# Patient Record
Sex: FEMALE | Race: WHITE | Employment: OTHER | URBAN - METROPOLITAN AREA
[De-identification: names, ages, dates, MRNs, and addresses within clinical notes are randomized per-mention and may not be internally consistent; named-entity substitution may affect disease eponyms.]

---

## 2017-01-24 ENCOUNTER — ANTICOAGULATION THERAPY VISIT (OUTPATIENT)
Dept: NURSING | Facility: CLINIC | Age: 72
End: 2017-01-24
Payer: COMMERCIAL

## 2017-01-24 DIAGNOSIS — I48.91 ATRIAL FIBRILLATION, UNSPECIFIED TYPE (H): ICD-10-CM

## 2017-01-24 DIAGNOSIS — Z79.01 LONG-TERM (CURRENT) USE OF ANTICOAGULANTS: Primary | ICD-10-CM

## 2017-01-24 LAB — INR POINT OF CARE: 3.3 (ref 0.86–1.14)

## 2017-01-24 PROCEDURE — 99207 ZZC NO CHARGE NURSE ONLY: CPT

## 2017-01-24 PROCEDURE — 85610 PROTHROMBIN TIME: CPT | Mod: QW

## 2017-01-24 PROCEDURE — 36416 COLLJ CAPILLARY BLOOD SPEC: CPT

## 2017-01-24 NOTE — PROGRESS NOTES
ANTICOAGULATION FOLLOW-UP CLINIC VISIT    Patient Name:  Zulema Nixon  Date:  1/24/2017  Contact Type:  Face to Face    SUBJECTIVE:     Patient Findings     Positives No Problem Findings           OBJECTIVE    INR PROTIME   Date Value Ref Range Status   01/24/2017 3.3* 0.86 - 1.14 Final       ASSESSMENT / PLAN  No question data found.  Anticoagulation Summary as of 1/24/2017     INR goal 2.0-3.0   Selected INR 3.3! (1/24/2017)   Maintenance plan 5 mg (5 mg x 1) on Wed, Sat; 7.5 mg (5 mg x 1.5) all other days   Full instructions 1/24: 5 mg; Otherwise 5 mg on Wed, Sat; 7.5 mg all other days   Weekly total 47.5 mg   Plan last modified Nereyda Root RN (3/1/2016)   Next INR check 1/31/2017   Target end date     Indications   Long-term (current) use of anticoagulants [Z79.01] [Z79.01]  Atrial fibrillation  unspecified type (H) [I48.91]         Anticoagulation Episode Summary     INR check location     Preferred lab     Send INR reminders to RV NURSE    Comments       Anticoagulation Care Providers     Provider Role Specialty Phone number    Madina Mercado MD [facility] [phone]            See the Encounter Report to view Anticoagulation Flowsheet and Dosing Calendar (Go to Encounters tab in chart review, and find the Anticoagulation Therapy Visit)    Dosage adjustment made based on physician directed care plan.    Kylah Pierce RN

## 2017-01-24 NOTE — MR AVS SNAPSHOT
Zulema Nixon   1/24/2017 10:30 AM   Anticoagulation Therapy Visit    Description:  71 year old female   Provider:   ANTICOAGULATION CLINIC   Department:   Nurse           INR as of 1/24/2017     Selected INR 3.3! (1/24/2017)      Anticoagulation Summary as of 1/24/2017     INR goal 2.0-3.0   Selected INR 3.3! (1/24/2017)   Full instructions 1/24: 5 mg; Otherwise 5 mg on Wed, Sat; 7.5 mg all other days   Next INR check 1/31/2017    Indications   Long-term (current) use of anticoagulants [Z79.01] [Z79.01]  Atrial fibrillation  unspecified type (H) [I48.91]         Your next Anticoagulation Clinic appointment(s)     Jan 31, 2017 10:15 AM   Anticoagulation Visit with  ANTICOAGULATION CLINIC   [facility] ([facility])    [address] [place] [id number]   [phone]              Contact Numbers     Clinic Number:         January 2017 Details    Sun Mon Tue Wed Thu Fri Sat     1               2               3               4               5               6               7                 8               9               10               11               12               13               14                 15               16               17               18               19               20               21                 22               23               24      5 mg   See details      25      5 mg         26      7.5 mg         27      7.5 mg         28      5 mg           29      7.5 mg         30      7.5 mg         31                 Date Details   01/24 This INR check       Date of next INR:  1/31/2017         How to take your warfarin dose     To take:  5 mg Take 1 of the 5 mg tablets.    To take:  7.5 mg Take 1.5 of the 5 mg tablets.

## 2017-01-31 ENCOUNTER — ANTICOAGULATION THERAPY VISIT (OUTPATIENT)
Dept: NURSING | Facility: CLINIC | Age: 72
End: 2017-01-31
Payer: COMMERCIAL

## 2017-01-31 DIAGNOSIS — I48.91 ATRIAL FIBRILLATION, UNSPECIFIED TYPE (H): ICD-10-CM

## 2017-01-31 DIAGNOSIS — Z79.01 LONG-TERM (CURRENT) USE OF ANTICOAGULANTS: Primary | ICD-10-CM

## 2017-01-31 LAB — INR POINT OF CARE: 2.6 (ref 0.86–1.14)

## 2017-01-31 PROCEDURE — 99207 ZZC NO CHARGE NURSE ONLY: CPT

## 2017-01-31 PROCEDURE — 36416 COLLJ CAPILLARY BLOOD SPEC: CPT

## 2017-01-31 PROCEDURE — 85610 PROTHROMBIN TIME: CPT | Mod: QW

## 2017-01-31 NOTE — MR AVS SNAPSHOT
Zulema Nixon   1/31/2017 10:15 AM   Anticoagulation Therapy Visit    Description:  71 year old female   Provider:   ANTICOAGULATION CLINIC   Department:   Nurse           INR as of 1/31/2017     Selected INR 2.6 (1/31/2017)      Anticoagulation Summary as of 1/31/2017     INR goal 2.0-3.0   Selected INR 2.6 (1/31/2017)   Full instructions 5 mg on Wed, Sat; 7.5 mg all other days   Next INR check 3/14/2017    Indications   Long-term (current) use of anticoagulants [Z79.01] [Z79.01]  Atrial fibrillation  unspecified type (H) [I48.91]         Your next Anticoagulation Clinic appointment(s)     Mar 14, 2017 10:30 AM   Anticoagulation Visit with  ANTICOAGULATION CLINIC   [facility] ([facility])    [address] [place] [id number] [phone]              Contact Numbers     Clinic Number:         January 2017 Details    Sun Mon Tue Wed Thu Fri Sat     1               2               3               4               5               6               7                 8               9               10               11               12               13               14                 15               16               17               18               19               20               21                 22               23               24               25               26               27               28                 29               30               31      7.5 mg   See details           Date Details   01/31 This INR check               How to take your warfarin dose     To take:  7.5 mg Take 1.5 of the 5 mg tablets.           February 2017 Details    Sun Mon Tue Wed Thu Fri Sat        1      5 mg         2      7.5 mg         3      7.5 mg         4      5 mg           5      7.5 mg         6      7.5 mg         7      7.5 mg         8      5 mg         9      7.5 mg         10      7.5 mg         11      5 mg           12      7.5 mg         13       7.5 mg         14      7.5 mg         15      5 mg         16      7.5 mg         17      7.5 mg         18      5 mg           19      7.5 mg         20      7.5 mg         21      7.5 mg         22      5 mg         23      7.5 mg         24      7.5 mg         25      5 mg           26      7.5 mg         27      7.5 mg         28      7.5 mg              Date Details   No additional details            How to take your warfarin dose     To take:  5 mg Take 1 of the 5 mg tablets.    To take:  7.5 mg Take 1.5 of the 5 mg tablets.           March 2017 Details    Sun Mon Tue Wed Thu Fri Sat        1      5 mg         2      7.5 mg         3      7.5 mg         4      5 mg           5      7.5 mg         6      7.5 mg         7      7.5 mg         8      5 mg         9      7.5 mg         10      7.5 mg         11      5 mg           12      7.5 mg         13      7.5 mg         14            15               16               17               18                 19               20               21               22               23               24               25                 26               27               28               29               30               31                 Date Details   No additional details    Date of next INR:  3/14/2017         How to take your warfarin dose     To take:  5 mg Take 1 of the 5 mg tablets.    To take:  7.5 mg Take 1.5 of the 5 mg tablets.

## 2017-01-31 NOTE — PROGRESS NOTES
ANTICOAGULATION FOLLOW-UP CLINIC VISIT    Patient Name:  Zulema Nixon  Date:  1/31/2017  Contact Type:  Face to Face    SUBJECTIVE:     Patient Findings     Positives No Problem Findings           OBJECTIVE    INR PROTIME   Date Value Ref Range Status   01/31/2017 2.6* 0.86 - 1.14 Final       ASSESSMENT / PLAN  No question data found.  Anticoagulation Summary as of 1/31/2017     INR goal 2.0-3.0   Selected INR 2.6 (1/31/2017)   Maintenance plan 5 mg (5 mg x 1) on Wed, Sat; 7.5 mg (5 mg x 1.5) all other days   Full instructions 5 mg on Wed, Sat; 7.5 mg all other days   Weekly total 47.5 mg   No change documented Kylah Pierce RN   Plan last modified Nereyda Root RN (3/1/2016)   Next INR check 3/14/2017   Target end date     Indications   Long-term (current) use of anticoagulants [Z79.01] [Z79.01]  Atrial fibrillation  unspecified type (H) [I48.91]         Anticoagulation Episode Summary     INR check location     Preferred lab     Send INR reminders to RV NURSE    Comments       Anticoagulation Care Providers     Provider Role Specialty Phone number    Madina Mercado MD [facility] [phone]            See the Encounter Report to view Anticoagulation Flowsheet and Dosing Calendar (Go to Encounters tab in chart review, and find the Anticoagulation Therapy Visit)    Dosage adjustment made based on physician directed care plan.    Kylah Pierce, RN

## 2017-02-22 ENCOUNTER — OFFICE VISIT (OUTPATIENT)
Dept: CARDIOLOGY | Facility: CLINIC | Age: 72
End: 2017-02-22
Payer: COMMERCIAL

## 2017-02-22 VITALS
SYSTOLIC BLOOD PRESSURE: 104 MMHG | BODY MASS INDEX: 42.32 KG/M2 | HEIGHT: 65 IN | DIASTOLIC BLOOD PRESSURE: 60 MMHG | HEART RATE: 57 BPM | WEIGHT: 254 LBS

## 2017-02-22 DIAGNOSIS — I48.20 CHRONIC ATRIAL FIBRILLATION (H): Primary | ICD-10-CM

## 2017-02-22 DIAGNOSIS — I83.93 ASYMPTOMATIC VARICOSE VEINS, BILATERAL: ICD-10-CM

## 2017-02-22 DIAGNOSIS — I10 ESSENTIAL HYPERTENSION WITH GOAL BLOOD PRESSURE LESS THAN 140/90: ICD-10-CM

## 2017-02-22 PROCEDURE — 99214 OFFICE O/P EST MOD 30 MIN: CPT | Performed by: INTERNAL MEDICINE

## 2017-02-22 RX ORDER — AMLODIPINE BESYLATE 10 MG/1
5 TABLET ORAL DAILY
Qty: 90 TABLET | Refills: 1 | COMMUNITY
Start: 2017-02-22 | End: 2017-06-24 | Stop reason: DRUGHIGH

## 2017-02-22 NOTE — PATIENT INSTRUCTIONS
Please try relaxing the hip angle when sewing. Or try getting up and moving more often.  Let's try decreasing the AMLODIPINE dose from 10mg down to 5mg and see how your blood pressure and swelling do.     Please check your blood pressures over the next couple of weeks and let me know if they are over 140 consistently after you decrease the AMLODIPINE.    Let's see you back in another year or so. If you notice any shortness of breath, worse swelling, chest discomfort or anything you think might be heart related, please call and we can squeeze you in earlier.

## 2017-02-22 NOTE — MR AVS SNAPSHOT
After Visit Summary   2/22/2017    Zulema Nixon    MRN: [id number]           Patient Information     Date Of Birth          1945        Visit Information        Provider Department      2/22/2017 11:00 AM Edis Walsh MD [facility]        Today's Diagnoses     Chronic atrial fibrillation (H)    -  1    Asymptomatic varicose veins, bilateral        Essential hypertension with goal blood pressure less than 140/90          Care Instructions    Please try relaxing the hip angle when sewing. Or try getting up and moving more often.  Let's try decreasing the AMLODIPINE dose from 10mg down to 5mg and see how your blood pressure and swelling do.     Please check your blood pressures over the next couple of weeks and let me know if they are over 140 consistently after you decrease the AMLODIPINE.    Let's see you back in another year or so. If you notice any shortness of breath, worse swelling, chest discomfort or anything you think might be heart related, please call and we can squeeze you in earlier.        Follow-ups after your visit        Additional Services     Follow-Up with Cardiologist                 Your next 10 appointments already scheduled     Mar 14, 2017 10:30 AM CDT   Anticoagulation Visit with [facility] ANTICOAGULATION CLINIC   [facility]    [address] [place] [id number] [phone]              Future tests that were ordered for you today     Open Future Orders        Priority Expected Expires Ordered    Follow-Up with Cardiologist Routine 2/22/2018 7/7/2018 2/22/2017            Who to contact     If you have questions or need follow up information about today's clinic visit or your schedule please contact [facility] directly at [phone].  Normal or non-critical lab and imaging results will be communicated to you by Ryann  "letter or phone within 4 business days after the clinic has received the results. If you do not hear from us within 7 days, please contact the clinic through [employer] or phone. If you have a critical or abnormal lab result, we will notify you by phone as soon as possible.  Submit refill requests through [employer] or call your pharmacy and they will forward the refill request to us. Please allow 3 business days for your refill to be completed.          Additional Information About Your Visit        [employer] Information     [employer] gives you secure access to your electronic health record. If you see a primary care provider, you can also send messages to your care team and make appointments. If you have questions, please call your primary care clinic.  If you do not have a primary care provider, please call [phone] and they will assist you.        Care EveryWhere ID     This is your Care EveryWhere ID. This could be used by other organizations to access your [place] medical records  [id number]        Your Vitals Were     Pulse Height BMI (Body Mass Index)             57 1.638 m (5' 4.5\") 42.93 kg/m2          Blood Pressure from Last 3 Encounters:   02/22/17 104/60   12/12/16 130/82   09/12/16 136/80    Weight from Last 3 Encounters:   02/22/17 115.2 kg (254 lb)   12/12/16 116.1 kg (256 lb)   09/12/16 115.2 kg (254 lb)                 Today's Medication Changes          These changes are accurate as of: 2/22/17 11:18 AM.  If you have any questions, ask your nurse or doctor.               These medicines have changed or have updated prescriptions.        Dose/Directions    amLODIPine 10 MG tablet   Commonly known as:  NORVASC   This may have changed:  how much to take   Used for:  Essential hypertension with goal blood pressure less than 140/90   Changed by:  Edis Walsh MD        Dose:  5 mg   Take 0.5 tablets (5 mg) by mouth daily   Quantity:  90 tablet   Refills:  1                Primary Care Provider " Office Phone # Fax #    Madina BHARATH Mercado [phone]       [address] [address]        Thank you!     Thank you for choosing [facility] [place]  for your care. Our goal is always to provide you with excellent care. Hearing back from our patients is one way we can continue to improve our services. Please take a few minutes to complete the written survey that you may receive in the mail after your visit with us. Thank you!             Your Updated Medication List - Protect others around you: Learn how to safely use, store and throw away your medicines at www.disposemymeds.org.          This list is accurate as of: 2/22/17 11:18 AM.  Always use your most recent med list.                   Brand Name Dispense Instructions for use    amLODIPine 10 MG tablet    NORVASC    90 tablet    Take 0.5 tablets (5 mg) by mouth daily       aspirin 81 MG tablet     0    1 tab po QD (Once per day)       atenolol-chlorthalidone 100-25 MG per tablet    TENORETIC    90 tablet    Take 1 tablet by mouth daily       Biotin 10 MG Tabs tablet      1 TABLET DAILY       blood glucose monitoring test strip    no brand specified     Compact Plus test strips Use to test blood sugars 1 times daily or as directed       CALTRATE 600 + D 600-200 MG-IU Tabs     60    1 tablet twice daily       CENTRUM SILVER per tablet     3 MONTHS    ONE DAILY       ketoconazole 2 % cream    NIZORAL    30 g    Apply topically 2 times daily Apply to affected nails daily       lisinopril 40 MG tablet    PRINIVIL/ZESTRIL    90 tablet    Take 1 tablet (40 mg) by mouth daily       metFORMIN 1000 MG tablet    GLUCOPHAGE    225 tablet    TAKE 1 AND A 1/2 TABLETS BY MOUTH WITH BREAKFAST AND 1 TABLET WITH SUPPER       omega 3 1000 MG Caps     90 capsule    Take 1 g by mouth daily       simvastatin 20 MG tablet    ZOCOR    90 tablet    TAKE ONE TABLET BY MOUTH EVERY NIGHT AT  BEDTIME       vitamin D 1000 UNITS capsule     3 MONTHS    1 CAPSULE DAILY       warfarin 5 MG tablet    COUMADIN    180 tablet    TAKE 1-2 TABLETS (5-10 MG) BY MOUTH DAILY AS INSTRUCTED

## 2017-02-22 NOTE — LETTER
"2/22/2017    Madina Mercado MD  [facility]  [address] [place] [address]    RE: Zulema Nixon       Dear Colleague,    I had the pleasure of seeing Zulema Nixon in the [facility].    Vascular Cardiology Progress Note          Assessment and Plan:     1. 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    Continue compression stockings.      2. Chronic atrial fibrillation, good rate control and anticoagulation    Continue current medications.    Follow-up in one year either in [place] or [place] depending on her preference.    This note was transcribed using electronic voice recognition software and there may be typographical errors present.                Interval History:   The patient is a very pleasant 71 year old whom I'm meeting for the first time.  She previously saw Dr. Chatman and had multiple vein ablations performed.  She states that these did help her symptoms of lower extremity edema.  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  She has been on amlodipine 10 mg daily throughout.  Her chronic atrial fibrillation is asymptomatic.  She has good rate control and is anticoagulated for stroke prophylaxis.                       Review of Systems:   Review of Systems:  Skin:  Negative     Eyes:  Positive for glasses  ENT:  Negative    Respiratory:  Negative    Cardiovascular:  Negative;palpitations edema  Gastroenterology: Negative    Genitourinary:  Negative    Musculoskeletal:  Negative    Neurologic:  Negative    Psychiatric:  Negative    Heme/Lymph/Imm:  Negative    Endocrine:  Positive for diabetes              Physical Exam:     Vitals: /60  Pulse 57  Ht 1.638 m (5' 4.5\")  Wt 115.2 kg (254 lb)  BMI 42.93 kg/m2  Constitutional:  cooperative, alert and oriented, well " developed, well nourished, in no acute distress        Skin:  warm and dry to the touch, no apparent skin lesions or masses noted        Head:  normocephalic, no masses or lesions        Eyes:  pupils equal and round, conjunctivae and lids unremarkable, sclera white, no xanthalasma, EOMS intact, no nystagmus        ENT:  no pallor or cyanosis, dentition good        Neck:  JVP normal        Chest:  normal breath sounds, clear to auscultation, normal A-P diameter, normal symmetry, normal respiratory excursion, no use of accessory muscles        Cardiac:   irregularly irregular rhythm           rate controlled    Abdomen:      benign    Extremities and Back:      trace lower extremity edema    Neurological:  affect appropriate, oriented to time, person and place                 Medications:     Current Outpatient Prescriptions   Medication Sig Dispense Refill     amLODIPine (NORVASC) 10 MG tablet Take 0.5 tablets (5 mg) by mouth daily 90 tablet 1     lisinopril (PRINIVIL/ZESTRIL) 40 MG tablet Take 1 tablet (40 mg) by mouth daily 90 tablet 1     atenolol-chlorthalidone (TENORETIC) 100-25 MG per tablet Take 1 tablet by mouth daily 90 tablet 1     simvastatin (ZOCOR) 20 MG tablet TAKE ONE TABLET BY MOUTH EVERY NIGHT AT BEDTIME 90 tablet 1     metFORMIN (GLUCOPHAGE) 1000 MG tablet TAKE 1 AND A 1/2 TABLETS BY MOUTH WITH BREAKFAST AND 1 TABLET WITH SUPPER 225 tablet 1     warfarin (COUMADIN) 5 MG tablet TAKE 1-2 TABLETS (5-10 MG) BY MOUTH DAILY AS INSTRUCTED 180 tablet 1     ketoconazole (NIZORAL) 2 % cream Apply topically 2 times daily Apply to affected nails daily 30 g 1     blood glucose monitoring (NO BRAND SPECIFIED) test strip Compact Plus test strips Use to test blood sugars 1 times daily or as directed       omega 3 1000 MG CAPS Take 1 g by mouth daily 90 capsule      BIOTIN 10 MG OR TABS 1 TABLET DAILY       VITAMIN D 1000 UNIT OR CAPS 1 CAPSULE DAILY 3 MONTHS 1 YEAR     CENTRUM SILVER OR TABS ONE DAILY 3 MONTHS 1  YEAR     CALTRATE 600 + D 600-200 MG-IU OR TABS 1 tablet twice daily 60 11     ASPIRIN 81 MG OR TABS 1 tab po QD (Once per day) 0 0     [DISCONTINUED] amLODIPine (NORVASC) 10 MG tablet Take 1 tablet (10 mg) by mouth daily 90 tablet 1                Data:   All laboratory data reviewed  No results found for this or any previous visit (from the past 24 hour(s)).    All laboratory data reviewed  Lab Results   Component Value Date    CHOL 109 05/24/2016     Lab Results   Component Value Date    HDL 35 05/24/2016     Lab Results   Component Value Date    LDL 55 05/24/2016     Lab Results   Component Value Date    TRIG 97 05/24/2016     Lab Results   Component Value Date    CHOLHDLRATIO 3.4 04/22/2015     TSH   Date Value Ref Range Status   12/12/2016 0.76 0.40 - 4.00 mU/L Final     Last Basic Metabolic Panel:  Lab Results   Component Value Date     12/12/2016      Lab Results   Component Value Date    POTASSIUM 3.8 12/12/2016     Lab Results   Component Value Date    CHLORIDE 101 12/12/2016     Lab Results   Component Value Date    ALVA 9.3 12/12/2016     Lab Results   Component Value Date    CO2 29 12/12/2016     Lab Results   Component Value Date    BUN 15 12/12/2016     Lab Results   Component Value Date    CR 0.71 12/12/2016     Lab Results   Component Value Date     12/12/2016     Lab Results   Component Value Date    WBC 7.3 12/12/2016     Lab Results   Component Value Date    RBC 4.60 12/12/2016     Lab Results   Component Value Date    HGB 13.1 12/12/2016     Lab Results   Component Value Date    HCT 39.2 12/12/2016     Lab Results   Component Value Date    MCV 85 12/12/2016     Lab Results   Component Value Date    MCH 28.5 12/12/2016     Lab Results   Component Value Date    MCHC 33.4 12/12/2016     Lab Results   Component Value Date    RDW 13.6 12/12/2016     Lab Results   Component Value Date     12/12/2016     Thank you for allowing me to participate in the care of your  patient.    Sincerely,     Edis Walsh MD     [facility]

## 2017-02-22 NOTE — PROGRESS NOTES
"Vascular Cardiology Progress Note          Assessment and Plan:     1. 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    Continue compression stockings.      2. Chronic atrial fibrillation, good rate control and anticoagulation    Continue current medications.    Follow-up in one year either in [place] or [place] depending on her preference.    This note was transcribed using electronic voice recognition software and there may be typographical errors present.                Interval History:   The patient is a very pleasant 71 year old whom I'm meeting for the first time.  She previously saw Dr. Chatman and had multiple vein ablations performed.  She states that these did help her symptoms of lower extremity edema.  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  She has been on amlodipine 10 mg daily throughout.  Her chronic atrial fibrillation is asymptomatic.  She has good rate control and is anticoagulated for stroke prophylaxis.                       Review of Systems:   Review of Systems:  Skin:  Negative     Eyes:  Positive for glasses  ENT:  Negative    Respiratory:  Negative    Cardiovascular:  Negative;palpitations edema  Gastroenterology: Negative    Genitourinary:  Negative    Musculoskeletal:  Negative    Neurologic:  Negative    Psychiatric:  Negative    Heme/Lymph/Imm:  Negative    Endocrine:  Positive for diabetes              Physical Exam:     Vitals: /60  Pulse 57  Ht 1.638 m (5' 4.5\")  Wt 115.2 kg (254 lb)  BMI 42.93 kg/m2  Constitutional:  cooperative, alert and oriented, well developed, well nourished, in no acute distress        Skin:  warm and dry to the touch, no apparent skin lesions or masses noted        Head:  normocephalic, no masses or lesions        Eyes:  pupils equal and round, conjunctivae and lids " unremarkable, sclera white, no xanthalasma, EOMS intact, no nystagmus        ENT:  no pallor or cyanosis, dentition good        Neck:  JVP normal        Chest:  normal breath sounds, clear to auscultation, normal A-P diameter, normal symmetry, normal respiratory excursion, no use of accessory muscles        Cardiac:   irregularly irregular rhythm           rate controlled    Abdomen:      benign    Extremities and Back:      trace lower extremity edema    Neurological:  affect appropriate, oriented to time, person and place                 Medications:     Current Outpatient Prescriptions   Medication Sig Dispense Refill     amLODIPine (NORVASC) 10 MG tablet Take 0.5 tablets (5 mg) by mouth daily 90 tablet 1     lisinopril (PRINIVIL/ZESTRIL) 40 MG tablet Take 1 tablet (40 mg) by mouth daily 90 tablet 1     atenolol-chlorthalidone (TENORETIC) 100-25 MG per tablet Take 1 tablet by mouth daily 90 tablet 1     simvastatin (ZOCOR) 20 MG tablet TAKE ONE TABLET BY MOUTH EVERY NIGHT AT BEDTIME 90 tablet 1     metFORMIN (GLUCOPHAGE) 1000 MG tablet TAKE 1 AND A 1/2 TABLETS BY MOUTH WITH BREAKFAST AND 1 TABLET WITH SUPPER 225 tablet 1     warfarin (COUMADIN) 5 MG tablet TAKE 1-2 TABLETS (5-10 MG) BY MOUTH DAILY AS INSTRUCTED 180 tablet 1     ketoconazole (NIZORAL) 2 % cream Apply topically 2 times daily Apply to affected nails daily 30 g 1     blood glucose monitoring (NO BRAND SPECIFIED) test strip Compact Plus test strips Use to test blood sugars 1 times daily or as directed       omega 3 1000 MG CAPS Take 1 g by mouth daily 90 capsule      BIOTIN 10 MG OR TABS 1 TABLET DAILY       VITAMIN D 1000 UNIT OR CAPS 1 CAPSULE DAILY 3 MONTHS 1 YEAR     CENTRUM SILVER OR TABS ONE DAILY 3 MONTHS 1 YEAR     CALTRATE 600 + D 600-200 MG-IU OR TABS 1 tablet twice daily 60 11     ASPIRIN 81 MG OR TABS 1 tab po QD (Once per day) 0 0     [DISCONTINUED] amLODIPine (NORVASC) 10 MG tablet Take 1 tablet (10 mg) by mouth daily 90 tablet 1                 Data:   All laboratory data reviewed  No results found for this or any previous visit (from the past 24 hour(s)).    All laboratory data reviewed  Lab Results   Component Value Date    CHOL 109 05/24/2016     Lab Results   Component Value Date    HDL 35 05/24/2016     Lab Results   Component Value Date    LDL 55 05/24/2016     Lab Results   Component Value Date    TRIG 97 05/24/2016     Lab Results   Component Value Date    CHOLHDLRATIO 3.4 04/22/2015     TSH   Date Value Ref Range Status   12/12/2016 0.76 0.40 - 4.00 mU/L Final     Last Basic Metabolic Panel:  Lab Results   Component Value Date     12/12/2016      Lab Results   Component Value Date    POTASSIUM 3.8 12/12/2016     Lab Results   Component Value Date    CHLORIDE 101 12/12/2016     Lab Results   Component Value Date    ALVA 9.3 12/12/2016     Lab Results   Component Value Date    CO2 29 12/12/2016     Lab Results   Component Value Date    BUN 15 12/12/2016     Lab Results   Component Value Date    CR 0.71 12/12/2016     Lab Results   Component Value Date     12/12/2016     Lab Results   Component Value Date    WBC 7.3 12/12/2016     Lab Results   Component Value Date    RBC 4.60 12/12/2016     Lab Results   Component Value Date    HGB 13.1 12/12/2016     Lab Results   Component Value Date    HCT 39.2 12/12/2016     Lab Results   Component Value Date    MCV 85 12/12/2016     Lab Results   Component Value Date    MCH 28.5 12/12/2016     Lab Results   Component Value Date    MCHC 33.4 12/12/2016     Lab Results   Component Value Date    RDW 13.6 12/12/2016     Lab Results   Component Value Date     12/12/2016

## 2017-02-23 ENCOUNTER — TRANSFERRED RECORDS (OUTPATIENT)
Dept: HEALTH INFORMATION MANAGEMENT | Facility: CLINIC | Age: 72
End: 2017-02-23

## 2017-03-14 ENCOUNTER — ANTICOAGULATION THERAPY VISIT (OUTPATIENT)
Dept: NURSING | Facility: CLINIC | Age: 72
End: 2017-03-14
Payer: COMMERCIAL

## 2017-03-14 DIAGNOSIS — Z79.01 LONG-TERM (CURRENT) USE OF ANTICOAGULANTS: ICD-10-CM

## 2017-03-14 DIAGNOSIS — I48.91 ATRIAL FIBRILLATION, UNSPECIFIED TYPE (H): ICD-10-CM

## 2017-03-14 LAB — INR POINT OF CARE: 3.5 (ref 0.86–1.14)

## 2017-03-14 PROCEDURE — 36416 COLLJ CAPILLARY BLOOD SPEC: CPT

## 2017-03-14 PROCEDURE — 85610 PROTHROMBIN TIME: CPT | Mod: QW

## 2017-03-14 NOTE — MR AVS SNAPSHOT
Zulema Nixon   3/14/2017 10:30 AM   Anticoagulation Therapy Visit    Description:  71 year old female   Provider:   ANTICOAGULATION CLINIC   Department:   Nurse           INR as of 3/14/2017     Today's INR 3.5!      Anticoagulation Summary as of 3/14/2017     INR goal 2.0-3.0   Today's INR 3.5!   Full instructions 3/14: 5 mg; Otherwise 5 mg on Wed, Sat; 7.5 mg all other days   Next INR check 3/28/2017    Indications   Long-term (current) use of anticoagulants [Z79.01] [Z79.01]  Atrial fibrillation  unspecified type (H) [I48.91]         Your next Anticoagulation Clinic appointment(s)     Mar 28, 2017 10:30 AM CDT   Anticoagulation Visit with  ANTICOAGULATION CLINIC   [facility]    [address] [place] [id number]   [phone]              Contact Numbers     Clinic Number:         March 2017 Details    Sun Mon Tue Wed Thu Fri Sat        1               2               3               4                 5               6               7               8               9               10               11                 12               13               14      5 mg   See details      15      5 mg         16      7.5 mg         17      7.5 mg         18      5 mg           19      7.5 mg         20      7.5 mg         21      7.5 mg         22      5 mg         23      7.5 mg         24      7.5 mg         25      5 mg           26      7.5 mg         27      7.5 mg         28            29               30               31                 Date Details   03/14 This INR check       Date of next INR:  3/28/2017         How to take your warfarin dose     To take:  5 mg Take 1 of the 5 mg tablets.    To take:  7.5 mg Take 1.5 of the 5 mg tablets.

## 2017-03-14 NOTE — PROGRESS NOTES
ANTICOAGULATION FOLLOW-UP CLINIC VISIT    Patient Name:  Zulema Nixon  Date:  3/14/2017  Contact Type:  Face to Face    SUBJECTIVE:     Patient Findings     Positives No Problem Findings           OBJECTIVE    INR Protime   Date Value Ref Range Status   03/14/2017 3.5 (A) 0.86 - 1.14 Final       ASSESSMENT / PLAN  No question data found.  Anticoagulation Summary as of 3/14/2017     INR goal 2.0-3.0   Today's INR 3.5!   Maintenance plan 5 mg (5 mg x 1) on Wed, Sat; 7.5 mg (5 mg x 1.5) all other days   Full instructions 3/14: 5 mg; Otherwise 5 mg on Wed, Sat; 7.5 mg all other days   Weekly total 47.5 mg   Plan last modified Nereyda Root RN (3/1/2016)   Next INR check 3/28/2017   Target end date     Indications   Long-term (current) use of anticoagulants [Z79.01] [Z79.01]  Atrial fibrillation  unspecified type (H) [I48.91]         Anticoagulation Episode Summary     INR check location     Preferred lab     Send INR reminders to RV NURSE    Comments       Anticoagulation Care Providers     Provider Role Specialty Phone number    Madina Mercado MD [facility] [phone]            See the Encounter Report to view Anticoagulation Flowsheet and Dosing Calendar (Go to Encounters tab in chart review, and find the Anticoagulation Therapy Visit)    Dosage adjustment made based on physician directed care plan.    Kylah Pierce RN

## 2017-03-28 ENCOUNTER — ANTICOAGULATION THERAPY VISIT (OUTPATIENT)
Dept: NURSING | Facility: CLINIC | Age: 72
End: 2017-03-28
Payer: COMMERCIAL

## 2017-03-28 DIAGNOSIS — I48.91 ATRIAL FIBRILLATION, UNSPECIFIED TYPE (H): ICD-10-CM

## 2017-03-28 DIAGNOSIS — Z79.01 LONG-TERM (CURRENT) USE OF ANTICOAGULANTS: ICD-10-CM

## 2017-03-28 LAB — INR POINT OF CARE: 2.9 (ref 0.86–1.14)

## 2017-03-28 PROCEDURE — 99207 ZZC NO CHARGE NURSE ONLY: CPT

## 2017-03-28 PROCEDURE — 85610 PROTHROMBIN TIME: CPT | Mod: QW

## 2017-03-28 PROCEDURE — 36416 COLLJ CAPILLARY BLOOD SPEC: CPT

## 2017-03-28 NOTE — MR AVS SNAPSHOT
Zulema Nixon   3/28/2017 10:30 AM   Anticoagulation Therapy Visit    Description:  71 year old female   Provider:   ANTICOAGULATION CLINIC   Department:  Rv Nurse           INR as of 3/28/2017     Today's INR 2.9      Anticoagulation Summary as of 3/28/2017     INR goal 2.0-3.0   Today's INR 2.9   Full instructions 5 mg on Wed, Sat; 7.5 mg all other days   Next INR check 4/11/2017    Indications   Long-term (current) use of anticoagulants [Z79.01] [Z79.01]  Atrial fibrillation  unspecified type (H) [I48.91]         Your next Anticoagulation Clinic appointment(s)     Apr 11, 2017 10:30 AM CDT   Anticoagulation Visit with  ANTICOAGULATION CLINIC   [facility]    [address] [place] [id number] [phone]              Contact Numbers     Clinic Number:         March 2017 Details    Sun Mon Tue Wed Thu Fri Sat        1               2               3               4                 5               6               7               8               9               10               11                 12               13               14               15               16               17               18                 19               20               21               22               23               24               25                 26               27               28      7.5 mg   See details      29      5 mg         30      7.5 mg         31      7.5 mg           Date Details   03/28 This INR check               How to take your warfarin dose     To take:  5 mg Take 1 of the 5 mg tablets.    To take:  7.5 mg Take 1.5 of the 5 mg tablets.           April 2017 Details    Sun Mon Tue Wed Thu Fri Sat           1      5 mg           2      7.5 mg         3      7.5 mg         4      7.5 mg         5      5 mg         6      7.5 mg         7      7.5 mg         8      5 mg           9      7.5 mg         10      7.5 mg         11             12               13               14               15                 16               17               18               19               20               21               22                 23               24               25               26               27               28               29                 30                      Date Details   No additional details    Date of next INR:  4/11/2017         How to take your warfarin dose     To take:  5 mg Take 1 of the 5 mg tablets.    To take:  7.5 mg Take 1.5 of the 5 mg tablets.

## 2017-03-28 NOTE — PROGRESS NOTES
ANTICOAGULATION FOLLOW-UP CLINIC VISIT    Patient Name:  Zulema Nixon  Date:  3/28/2017  Contact Type:  Face to Face    SUBJECTIVE:     Patient Findings     Positives No Problem Findings           OBJECTIVE    INR Protime   Date Value Ref Range Status   03/28/2017 2.9 (A) 0.86 - 1.14 Final       ASSESSMENT / PLAN  No question data found.  Anticoagulation Summary as of 3/28/2017     INR goal 2.0-3.0   Today's INR 2.9   Maintenance plan 5 mg (5 mg x 1) on Wed, Sat; 7.5 mg (5 mg x 1.5) all other days   Full instructions 5 mg on Wed, Sat; 7.5 mg all other days   Weekly total 47.5 mg   No change documented Kylah Pierce RN   Plan last modified Nereyda Root, RN (3/1/2016)   Next INR check 4/11/2017   Target end date     Indications   Long-term (current) use of anticoagulants [Z79.01] [Z79.01]  Atrial fibrillation  unspecified type (H) [I48.91]         Anticoagulation Episode Summary     INR check location     Preferred lab     Send INR reminders to RV NURSE    Comments       Anticoagulation Care Providers     Provider Role Specialty Phone number    Madina Mercado MD [facility] [phone]            See the Encounter Report to view Anticoagulation Flowsheet and Dosing Calendar (Go to Encounters tab in chart review, and find the Anticoagulation Therapy Visit)    Dosage adjustment made based on physician directed care plan.    Kylah Pierce, RN

## 2017-04-11 ENCOUNTER — ANTICOAGULATION THERAPY VISIT (OUTPATIENT)
Dept: NURSING | Facility: CLINIC | Age: 72
End: 2017-04-11
Payer: COMMERCIAL

## 2017-04-11 DIAGNOSIS — Z79.01 LONG-TERM (CURRENT) USE OF ANTICOAGULANTS: ICD-10-CM

## 2017-04-11 DIAGNOSIS — I48.91 ATRIAL FIBRILLATION, UNSPECIFIED TYPE (H): ICD-10-CM

## 2017-04-11 LAB — INR POINT OF CARE: 2.6 (ref 0.86–1.14)

## 2017-04-11 PROCEDURE — 85610 PROTHROMBIN TIME: CPT | Mod: QW

## 2017-04-11 PROCEDURE — 36416 COLLJ CAPILLARY BLOOD SPEC: CPT

## 2017-04-11 NOTE — MR AVS SNAPSHOT
Zulema Nixon   4/11/2017 10:30 AM   Anticoagulation Therapy Visit    Description:  71 year old female   Provider:   ANTICOAGULATION CLINIC   Department:  Rv Nurse           INR as of 4/11/2017     Today's INR 2.6      Anticoagulation Summary as of 4/11/2017     INR goal 2.0-3.0   Today's INR 2.6   Full instructions 5 mg on Wed, Sat; 7.5 mg all other days   Next INR check 5/23/2017    Indications   Long-term (current) use of anticoagulants [Z79.01] [Z79.01]  Atrial fibrillation  unspecified type (H) [I48.91]         Your next Anticoagulation Clinic appointment(s)     May 23, 2017 10:30 AM CDT   Anticoagulation Visit with  ANTICOAGULATION CLINIC   [facility]    [address] [place] [id number] [phone]              Contact Numbers     Clinic Number:         April 2017 Details    Sun Mon Tue Wed Thu Fri Sat           1                 2               3               4               5               6               7               8                 9               10               11      7.5 mg   See details      12      5 mg         13      7.5 mg         14      7.5 mg         15      5 mg           16      7.5 mg         17      7.5 mg         18      7.5 mg         19      5 mg         20      7.5 mg         21      7.5 mg         22      5 mg           23      7.5 mg         24      7.5 mg         25      7.5 mg         26      5 mg         27      7.5 mg         28      7.5 mg         29      5 mg           30      7.5 mg                Date Details   04/11 This INR check               How to take your warfarin dose     To take:  5 mg Take 1 of the 5 mg tablets.    To take:  7.5 mg Take 1.5 of the 5 mg tablets.           May 2017 Details    Sun Mon Tue Wed Thu Fri Sat      1      7.5 mg         2      7.5 mg         3      5 mg         4      7.5 mg         5      7.5 mg         6      5 mg           7      7.5 mg         8      7.5  mg         9      7.5 mg         10      5 mg         11      7.5 mg         12      7.5 mg         13      5 mg           14      7.5 mg         15      7.5 mg         16      7.5 mg         17      5 mg         18      7.5 mg         19      7.5 mg         20      5 mg           21      7.5 mg         22      7.5 mg         23            24               25               26               27                 28               29               30               31                   Date Details   No additional details    Date of next INR:  5/23/2017         How to take your warfarin dose     To take:  5 mg Take 1 of the 5 mg tablets.    To take:  7.5 mg Take 1.5 of the 5 mg tablets.

## 2017-04-11 NOTE — PROGRESS NOTES
ANTICOAGULATION FOLLOW-UP CLINIC VISIT    Patient Name:  Zulema Nixon  Date:  4/11/2017  Contact Type:  Face to Face    SUBJECTIVE:     Patient Findings     Positives No Problem Findings           OBJECTIVE    INR Protime   Date Value Ref Range Status   04/11/2017 2.6 (A) 0.86 - 1.14 Final       ASSESSMENT / PLAN  No question data found.  Anticoagulation Summary as of 4/11/2017     INR goal 2.0-3.0   Today's INR 2.6   Maintenance plan 5 mg (5 mg x 1) on Wed, Sat; 7.5 mg (5 mg x 1.5) all other days   Full instructions 5 mg on Wed, Sat; 7.5 mg all other days   Weekly total 47.5 mg   No change documented Kylah Pierce RN   Plan last modified Nereyda Root, RN (3/1/2016)   Next INR check 5/23/2017   Target end date     Indications   Long-term (current) use of anticoagulants [Z79.01] [Z79.01]  Atrial fibrillation  unspecified type (H) [I48.91]         Anticoagulation Episode Summary     INR check location     Preferred lab     Send INR reminders to RV NURSE    Comments       Anticoagulation Care Providers     Provider Role Specialty Phone number    Madina Mercado MD [facility] [phone]            See the Encounter Report to view Anticoagulation Flowsheet and Dosing Calendar (Go to Encounters tab in chart review, and find the Anticoagulation Therapy Visit)    Dosage adjustment made based on physician directed care plan.    Kylah Pierce, RN

## 2017-05-08 DIAGNOSIS — I10 ESSENTIAL HYPERTENSION WITH GOAL BLOOD PRESSURE LESS THAN 140/90: ICD-10-CM

## 2017-05-08 RX ORDER — ATENOLOL AND CHLORTHALIDONE TABLET 100; 25 MG/1; MG/1
1 TABLET ORAL DAILY
Qty: 90 TABLET | Refills: 0 | Status: SHIPPED | OUTPATIENT
Start: 2017-05-08 | End: 2017-05-11

## 2017-05-08 NOTE — TELEPHONE ENCOUNTER
Medication is being filled for 1 time refill only due to:  Patient needs labs lipids.   Peace Root RN

## 2017-05-08 NOTE — TELEPHONE ENCOUNTER
atenolol-chlorthalidone (TENORETIC) 100-25 MG per tablet      Last Written Prescription Date: 12.12.16  Last Fill Quantity: 90, # refills: 1  Last Office Visit with G, P or [facility] prescribing provider: 12.12.16       Potassium   Date Value Ref Range Status   12/12/2016 3.8 3.4 - 5.3 mmol/L Final     Creatinine   Date Value Ref Range Status   12/12/2016 0.71 0.52 - 1.04 mg/dL Final     BP Readings from Last 3 Encounters:   02/22/17 104/60   12/12/16 130/82   09/12/16 136/80

## 2017-05-11 ENCOUNTER — TELEPHONE (OUTPATIENT)
Dept: FAMILY MEDICINE | Facility: CLINIC | Age: 72
End: 2017-05-11

## 2017-05-11 DIAGNOSIS — I10 ESSENTIAL HYPERTENSION WITH GOAL BLOOD PRESSURE LESS THAN 140/90: Primary | ICD-10-CM

## 2017-05-11 RX ORDER — ATENOLOL 25 MG/1
25 TABLET ORAL DAILY
Qty: 30 TABLET | Refills: 0 | Status: SHIPPED | OUTPATIENT
Start: 2017-05-11 | End: 2017-05-11

## 2017-05-11 RX ORDER — CHLORTHALIDONE 50 MG/1
100 TABLET ORAL DAILY
Qty: 60 TABLET | Refills: 0 | Status: SHIPPED | OUTPATIENT
Start: 2017-05-11 | End: 2017-05-11

## 2017-05-11 RX ORDER — CHLORTHALIDONE 25 MG/1
25 TABLET ORAL DAILY
Qty: 30 TABLET | Refills: 1 | Status: SHIPPED | OUTPATIENT
Start: 2017-05-11 | End: 2017-06-24

## 2017-05-11 RX ORDER — ATENOLOL 100 MG/1
100 TABLET ORAL DAILY
Qty: 30 TABLET | Refills: 1 | Status: SHIPPED | OUTPATIENT
Start: 2017-05-11 | End: 2017-06-24

## 2017-05-11 NOTE — TELEPHONE ENCOUNTER
[facility] pharmacy fax received to change atenolol-chlorthalidone 100-25 tablets to separate meds due to back order of combination tablets.     Peace Root RN

## 2017-05-23 ENCOUNTER — ANTICOAGULATION THERAPY VISIT (OUTPATIENT)
Dept: NURSING | Facility: CLINIC | Age: 72
End: 2017-05-23
Payer: COMMERCIAL

## 2017-05-23 DIAGNOSIS — Z79.01 LONG-TERM (CURRENT) USE OF ANTICOAGULANTS: ICD-10-CM

## 2017-05-23 DIAGNOSIS — I48.91 ATRIAL FIBRILLATION, UNSPECIFIED TYPE (H): ICD-10-CM

## 2017-05-23 LAB — INR POINT OF CARE: 3.5 (ref 0.86–1.14)

## 2017-05-23 PROCEDURE — 36416 COLLJ CAPILLARY BLOOD SPEC: CPT

## 2017-05-23 PROCEDURE — 85610 PROTHROMBIN TIME: CPT | Mod: QW

## 2017-05-23 NOTE — PROGRESS NOTES
ANTICOAGULATION FOLLOW-UP CLINIC VISIT    Patient Name:  Zulema Nixon  Date:  5/23/2017  Contact Type:  Face to Face    SUBJECTIVE:     Patient Findings     Positives Change in diet/appetite           OBJECTIVE    INR Protime   Date Value Ref Range Status   05/23/2017 3.5 (A) 0.86 - 1.14 Final       ASSESSMENT / PLAN  INR assessment SUPRA    Recheck INR In: 6 WEEKS    INR Location Clinic      Anticoagulation Summary as of 5/23/2017     INR goal 2.0-3.0   Today's INR 3.5!   Maintenance plan 5 mg (5 mg x 1) on Wed, Sat; 7.5 mg (5 mg x 1.5) all other days   Full instructions 5/23: 5 mg; Otherwise 5 mg on Wed, Sat; 7.5 mg all other days   Weekly total 47.5 mg   Plan last modified Nereyda Root RN (3/1/2016)   Next INR check 6/27/2017   Target end date     Indications   Long-term (current) use of anticoagulants [Z79.01] [Z79.01]  Atrial fibrillation  unspecified type (H) [I48.91]         Anticoagulation Episode Summary     INR check location     Preferred lab     Send INR reminders to RV NURSE    Comments       Anticoagulation Care Providers     Provider Role Specialty Phone number    Madina Mercado MD [facility] [phone]            See the Encounter Report to view Anticoagulation Flowsheet and Dosing Calendar (Go to Encounters tab in chart review, and find the Anticoagulation Therapy Visit)        Torie Hunter RN

## 2017-05-23 NOTE — MR AVS SNAPSHOT
Zulema Nixon   5/23/2017 10:30 AM   Anticoagulation Therapy Visit    Description:  71 year old female   Provider:   ANTICOAGULATION CLINIC   Department:   Nurse           INR as of 5/23/2017     Today's INR 3.5!      Anticoagulation Summary as of 5/23/2017     INR goal 2.0-3.0   Today's INR 3.5!   Full instructions 5/23: 5 mg; Otherwise 5 mg on Wed, Sat; 7.5 mg all other days   Next INR check 6/27/2017    Indications   Long-term (current) use of anticoagulants [Z79.01] [Z79.01]  Atrial fibrillation  unspecified type (H) [I48.91]         Your next Anticoagulation Clinic appointment(s)     Jun 27, 2017 10:30 AM CDT   Anticoagulation Visit with  ANTICOAGULATION CLINIC   [facility] ([facility]    [address] [place] [id number] [phone]              Contact Numbers     Clinic Number:         May 2017 Details    Sun Mon Tue Wed Thu Fri Sat      1               2               3               4               5               6                 7               8               9               10               11               12               13                 14               15               16               17               18               19               20                 21               22               23      5 mg   See details      24      5 mg         25      7.5 mg         26      7.5 mg         27      5 mg           28      7.5 mg         29      7.5 mg         30      7.5 mg         31      5 mg             Date Details   05/23 This INR check               How to take your warfarin dose     To take:  5 mg Take 1 of the 5 mg tablets.    To take:  7.5 mg Take 1.5 of the 5 mg tablets.           June 2017 Details    Sun Mon Tue Wed Thu Fri Sat         1      7.5 mg         2      7.5 mg         3      5 mg           4      7.5 mg         5      7.5 mg         6      7.5 mg         7      5 mg         8      7.5 mg         9      7.5 mg          10      5 mg           11      7.5 mg         12      7.5 mg         13      7.5 mg         14      5 mg         15      7.5 mg         16      7.5 mg         17      5 mg           18      7.5 mg         19      7.5 mg         20      7.5 mg         21      5 mg         22      7.5 mg         23      7.5 mg         24      5 mg           25      7.5 mg         26      7.5 mg         27            28               29               30                 Date Details   No additional details    Date of next INR:  6/27/2017         How to take your warfarin dose     To take:  5 mg Take 1 of the 5 mg tablets.    To take:  7.5 mg Take 1.5 of the 5 mg tablets.

## 2017-06-08 DIAGNOSIS — E11.42 TYPE 2 DIABETES MELLITUS WITH DIABETIC POLYNEUROPATHY, WITHOUT LONG-TERM CURRENT USE OF INSULIN (H): ICD-10-CM

## 2017-06-08 DIAGNOSIS — E11.59 TYPE 2 DIABETES MELLITUS WITH OTHER CIRCULATORY COMPLICATIONS (H): ICD-10-CM

## 2017-06-08 NOTE — TELEPHONE ENCOUNTER
metFORMIN (GLUCOPHAGE) 1000 MG tablet         Last Written Prescription Date: 12.12.16  Last Fill Quantity: 225, # refills: 1  Last Office Visit with [facility], [facility] or  [facility] prescribing provider:  12.12.16   Next 5 appointments (look out 90 days)     Jun 24, 2017  8:45 AM CDT   Office Visit with Madina Mercado MD   [facility]    [address] [place] [id number] [phone]                   BP Readings from Last 3 Encounters:   02/22/17 104/60   12/12/16 130/82   09/12/16 136/80     Lab Results   Component Value Date    MICROL 15 12/12/2016     Lab Results   Component Value Date    UMALCR 13.60 12/12/2016     Creatinine   Date Value Ref Range Status   12/12/2016 0.71 0.52 - 1.04 mg/dL Final   ]  GFR Estimate   Date Value Ref Range Status   12/12/2016 80 >60 mL/min/1.7m2 Final     Comment:     Non  GFR Calc   05/24/2016 89 >60 mL/min/1.7m2 Final     Comment:     Non  GFR Calc   12/10/2015 >90  Non  GFR Calc   >60 mL/min/1.7m2 Final     GFR Estimate If Black   Date Value Ref Range Status   12/12/2016 >90   GFR Calc   >60 mL/min/1.7m2 Final   05/24/2016 >90   GFR Calc   >60 mL/min/1.7m2 Final   12/10/2015 >90   GFR Calc   >60 mL/min/1.7m2 Final     Lab Results   Component Value Date    CHOL 109 05/24/2016     Lab Results   Component Value Date    HDL 35 05/24/2016     Lab Results   Component Value Date    LDL 55 05/24/2016     Lab Results   Component Value Date    TRIG 97 05/24/2016     Lab Results   Component Value Date    CHOLHDLRATIO 3.4 04/22/2015     Lab Results   Component Value Date    AST 16 12/12/2016     Lab Results   Component Value Date    ALT 30 12/12/2016     Lab Results   Component Value Date    A1C 7.2 12/12/2016    A1C 6.8 05/24/2016    A1C 7.0 12/10/2015    A1C 6.9 04/22/2015    A1C 6.7 11/13/2014     Potassium   Date Value Ref  Range Status   12/12/2016 3.8 3.4 - 5.3 mmol/L Final

## 2017-06-24 ENCOUNTER — OFFICE VISIT (OUTPATIENT)
Dept: FAMILY MEDICINE | Facility: CLINIC | Age: 72
End: 2017-06-24
Payer: COMMERCIAL

## 2017-06-24 VITALS
DIASTOLIC BLOOD PRESSURE: 82 MMHG | HEART RATE: 75 BPM | OXYGEN SATURATION: 97 % | SYSTOLIC BLOOD PRESSURE: 126 MMHG | WEIGHT: 254 LBS | HEIGHT: 65 IN | BODY MASS INDEX: 42.32 KG/M2 | TEMPERATURE: 98.2 F

## 2017-06-24 DIAGNOSIS — R60.0 BILATERAL LEG EDEMA: ICD-10-CM

## 2017-06-24 DIAGNOSIS — N18.2 CKD (CHRONIC KIDNEY DISEASE) STAGE 2, GFR 60-89 ML/MIN: ICD-10-CM

## 2017-06-24 DIAGNOSIS — B35.1 ONYCHOMYCOSIS: ICD-10-CM

## 2017-06-24 DIAGNOSIS — E11.42 TYPE 2 DIABETES MELLITUS WITH DIABETIC POLYNEUROPATHY, WITHOUT LONG-TERM CURRENT USE OF INSULIN (H): ICD-10-CM

## 2017-06-24 DIAGNOSIS — I48.91 ATRIAL FIBRILLATION, UNSPECIFIED TYPE (H): ICD-10-CM

## 2017-06-24 DIAGNOSIS — E78.5 HYPERLIPIDEMIA LDL GOAL <100: ICD-10-CM

## 2017-06-24 DIAGNOSIS — E66.01 MORBID OBESITY DUE TO EXCESS CALORIES (H): ICD-10-CM

## 2017-06-24 DIAGNOSIS — E11.59 TYPE 2 DIABETES MELLITUS WITH OTHER CIRCULATORY COMPLICATIONS (H): ICD-10-CM

## 2017-06-24 DIAGNOSIS — I48.19 PERSISTENT ATRIAL FIBRILLATION (H): ICD-10-CM

## 2017-06-24 DIAGNOSIS — I10 ESSENTIAL HYPERTENSION WITH GOAL BLOOD PRESSURE LESS THAN 140/90: Primary | ICD-10-CM

## 2017-06-24 DIAGNOSIS — R20.0 NUMBNESS OF RIGHT HAND: ICD-10-CM

## 2017-06-24 LAB
ALBUMIN SERPL-MCNC: 3.6 G/DL (ref 3.4–5)
ALBUMIN UR-MCNC: NEGATIVE MG/DL
ALP SERPL-CCNC: 57 U/L (ref 40–150)
ALT SERPL W P-5'-P-CCNC: 22 U/L (ref 0–50)
ANION GAP SERPL CALCULATED.3IONS-SCNC: 8 MMOL/L (ref 3–14)
APPEARANCE UR: CLEAR
AST SERPL W P-5'-P-CCNC: 15 U/L (ref 0–45)
BILIRUB SERPL-MCNC: 0.6 MG/DL (ref 0.2–1.3)
BILIRUB UR QL STRIP: NEGATIVE
BUN SERPL-MCNC: 13 MG/DL (ref 7–30)
CALCIUM SERPL-MCNC: 9.1 MG/DL (ref 8.5–10.1)
CHLORIDE SERPL-SCNC: 100 MMOL/L (ref 94–109)
CHOLEST SERPL-MCNC: 122 MG/DL
CO2 SERPL-SCNC: 29 MMOL/L (ref 20–32)
COLOR UR AUTO: YELLOW
CREAT SERPL-MCNC: 0.73 MG/DL (ref 0.52–1.04)
ERYTHROCYTE [DISTWIDTH] IN BLOOD BY AUTOMATED COUNT: 13.7 % (ref 10–15)
GFR SERPL CREATININE-BSD FRML MDRD: 78 ML/MIN/1.7M2
GLUCOSE SERPL-MCNC: 179 MG/DL (ref 70–99)
GLUCOSE UR STRIP-MCNC: NEGATIVE MG/DL
HBA1C MFR BLD: 7.2 % (ref 4.3–6)
HCT VFR BLD AUTO: 38 % (ref 35–47)
HDLC SERPL-MCNC: 33 MG/DL
HGB BLD-MCNC: 12.6 G/DL (ref 11.7–15.7)
HGB UR QL STRIP: NEGATIVE
KETONES UR STRIP-MCNC: NEGATIVE MG/DL
LDLC SERPL CALC-MCNC: 65 MG/DL
LEUKOCYTE ESTERASE UR QL STRIP: NEGATIVE
MCH RBC QN AUTO: 28.3 PG (ref 26.5–33)
MCHC RBC AUTO-ENTMCNC: 33.2 G/DL (ref 31.5–36.5)
MCV RBC AUTO: 85 FL (ref 78–100)
NITRATE UR QL: NEGATIVE
NONHDLC SERPL-MCNC: 89 MG/DL
PH UR STRIP: 8.5 PH (ref 5–7)
PLATELET # BLD AUTO: 259 10E9/L (ref 150–450)
POTASSIUM SERPL-SCNC: 3.8 MMOL/L (ref 3.4–5.3)
PROT SERPL-MCNC: 7.2 G/DL (ref 6.8–8.8)
RBC # BLD AUTO: 4.46 10E12/L (ref 3.8–5.2)
SODIUM SERPL-SCNC: 137 MMOL/L (ref 133–144)
SP GR UR STRIP: 1.01 (ref 1–1.03)
TRIGL SERPL-MCNC: 119 MG/DL
TSH SERPL DL<=0.005 MIU/L-ACNC: 0.67 MU/L (ref 0.4–4)
URN SPEC COLLECT METH UR: ABNORMAL
UROBILINOGEN UR STRIP-ACNC: 1 EU/DL (ref 0.2–1)
WBC # BLD AUTO: 6.5 10E9/L (ref 4–11)

## 2017-06-24 PROCEDURE — 82043 UR ALBUMIN QUANTITATIVE: CPT | Performed by: FAMILY MEDICINE

## 2017-06-24 PROCEDURE — 83036 HEMOGLOBIN GLYCOSYLATED A1C: CPT | Performed by: FAMILY MEDICINE

## 2017-06-24 PROCEDURE — 80061 LIPID PANEL: CPT | Performed by: FAMILY MEDICINE

## 2017-06-24 PROCEDURE — 99214 OFFICE O/P EST MOD 30 MIN: CPT | Performed by: FAMILY MEDICINE

## 2017-06-24 PROCEDURE — 99207 C FOOT EXAM  NO CHARGE: CPT | Mod: 25 | Performed by: FAMILY MEDICINE

## 2017-06-24 PROCEDURE — 36415 COLL VENOUS BLD VENIPUNCTURE: CPT | Performed by: FAMILY MEDICINE

## 2017-06-24 PROCEDURE — 80053 COMPREHEN METABOLIC PANEL: CPT | Performed by: FAMILY MEDICINE

## 2017-06-24 PROCEDURE — 85027 COMPLETE CBC AUTOMATED: CPT | Performed by: FAMILY MEDICINE

## 2017-06-24 PROCEDURE — 81003 URINALYSIS AUTO W/O SCOPE: CPT | Performed by: FAMILY MEDICINE

## 2017-06-24 PROCEDURE — 84443 ASSAY THYROID STIM HORMONE: CPT | Performed by: FAMILY MEDICINE

## 2017-06-24 RX ORDER — WARFARIN SODIUM 5 MG/1
TABLET ORAL
Qty: 180 TABLET | Refills: 3 | Status: SHIPPED | OUTPATIENT
Start: 2017-06-24 | End: 2018-01-10

## 2017-06-24 RX ORDER — CHLORTHALIDONE 25 MG/1
25 TABLET ORAL DAILY
Qty: 90 TABLET | Refills: 1 | Status: SHIPPED | OUTPATIENT
Start: 2017-06-24 | End: 2018-01-10

## 2017-06-24 RX ORDER — KETOCONAZOLE 20 MG/G
CREAM TOPICAL 2 TIMES DAILY
Qty: 30 G | Refills: 1 | Status: SHIPPED | OUTPATIENT
Start: 2017-06-24 | End: 2018-01-10

## 2017-06-24 RX ORDER — SIMVASTATIN 20 MG
TABLET ORAL
Qty: 90 TABLET | Refills: 1 | Status: SHIPPED | OUTPATIENT
Start: 2017-06-24 | End: 2018-01-10

## 2017-06-24 RX ORDER — AMLODIPINE BESYLATE 10 MG/1
10 TABLET ORAL DAILY
Qty: 90 TABLET | Refills: 1 | Status: SHIPPED | OUTPATIENT
Start: 2017-06-24 | End: 2018-01-10

## 2017-06-24 RX ORDER — LISINOPRIL 40 MG/1
40 TABLET ORAL DAILY
Qty: 90 TABLET | Refills: 1 | Status: SHIPPED | OUTPATIENT
Start: 2017-06-24 | End: 2018-01-10

## 2017-06-24 RX ORDER — ATENOLOL 100 MG/1
100 TABLET ORAL DAILY
Qty: 90 TABLET | Refills: 1 | Status: SHIPPED | OUTPATIENT
Start: 2017-06-24 | End: 2018-01-10

## 2017-06-24 NOTE — NURSING NOTE
"Chief Complaint   Patient presents with     Recheck Medication       Initial /82  Pulse 75  Temp 98.2  F (36.8  C) (Oral)  Ht 5' 4.5\" (1.638 m)  Wt 254 lb (115.2 kg)  SpO2 97%  BMI 42.93 kg/m2 Estimated body mass index is 42.93 kg/(m^2) as calculated from the following:    Height as of this encounter: 5' 4.5\" (1.638 m).    Weight as of this encounter: 254 lb (115.2 kg)..  BP completed using cuff size: gala Sanz MA  "

## 2017-06-24 NOTE — MR AVS SNAPSHOT
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)        Type 2 diabetes mellitus with diabetic polyneuropathy, without long-term current use of insulin (H)        Bilateral leg edema- has been to lymphedema clinic in past        CKD (chronic kidney disease) stage 2, GFR 60-89 ml/min        Hyperlipidemia LDL goal <100        Atrial fibrillation, unspecified type (H)        Morbid obesity due to excess calories (H)        Onychomycosis        Persistent atrial fibrillation (H)        Numbness of right hand          Care Instructions    Start walking for exercise - If walking outside,   - rain or shine - walk a block, then come home, next day walk   2 blocks , then come home ; and then add a block further from home daily - work up to 30-45minutes daily or 3-4x/week - or can work  up to  3-4 miles briskly daily.       If walking on treadmill or at  the mall, start with 5 minutes first day, then 6 minutes next day , then 7 minutes next day, then 8 minutes next day, etc.   Gradually work up to the above goals.  No stopping.      Cardiology recommended that pt continue to take 81mg asa for CV prophylaxis ( no increased bruising for pt currently or in past) in addition to her coumadin for A fib.     Establish an exercise regimen. Activity goal: slowly work your way up to 45 minutes 5 days a week. New exercise routine: cardiovascular workout on exercise equipment, walking and weightlifting. Diet regimen was discussed and plan is self-directed dieting: reduce calories, reduce portions, reduce  "processed  carbs, increase fruits/vegetables and avoid sweets and supervised diet program. Avoid artificial sweeteners and \"diet\" drinks and sodas.       Try Yoga for seniors to help with flexibility and balance . Look at Preeti Kolb DVD's also for seniors.     See Patient Instructions.     Recheck with me again in 6 months or sooner , if needed.              Thank you for choosing [facility]  for your Health Care. It was a pleasure seeing you at your visit today. Please contact us with any questions or concerns you may have.                   Madina Mercado MD                                  To reach your [facility] care team after hours call:   [phone]    Our clinic hours are:     Monday- 7:30 am - 7:00 pm                             Tuesday through Friday- 7:30 am - 5:00 pm                                        Saturday- 8:00 am - 12:00 pm                  Phone:  [phone]    Our pharmacy hours are:     Monday  8:00 am to 7:00 pm      Tuesday through Friday 8:00am to 6:00pm                        Saturday - 9:00 am to 1:00 pm      Sunday : Closed.              Phone:  [phone]      There is also information available at our web site:  [email or web address]    If your provider ordered any lab tests and you do not receive the results within 10 business days, please call the clinic.    If you need a medication refill please contact your pharmacy.  Please allow 2 business days for your refill to be completed.    Our clinic offers telephone visits and e visits.  Please ask one of your team members to explain more.      Use [employer] (secure email communication and access to your chart) to send your primary care provider a message or make an appointment. Ask someone on your Team how to sign up for [employer].                       Follow-ups after your visit        Additional Services     DIABETES EDUCATOR REFERRAL       Your provider has referred you to Diabetes " Education: [facility]: Diabetes Education - All [facility] [phone]   [email or web address]    This is a Previous Diagnosis: Follow-up DSMT - 2 hours.  Type of diabetes is Type 2 - On Oral Medication                                                          A1C is: Lab Results       Component                Value               Date                       A1C                      7.2                 12/12/2016            If an urgent visit is needed or A1C is above 12, Care Team to call the diabetes education team at [phone] or send a message to the diabetes education pool (P DIAB ED-PATIENT CARE).    Diabetes education focus: Knowledge: Healthy Eating, Being Active, Monitoring Blood Sugar, Taking Medication, Problem Solving/Goal Setting and Reducing Risks (Preventing Acute and Chronic Diabetes Complications) and Blood glucose meter instruction       Education needs: needs a new meter and test strips, etc.                                                                                                                                                       Please be aware that coverage of these services is subject to the terms and limitations of your health insurance plan.  Call member services at your health plan to determine Diabetes Self-Management Training benefits and ask which blood glucose monitor brands are covered by your plan.      Please bring the following to your appointment:    -   List of current medications   -   List of blood glucose monitor brands that are covered by your insurance plan  -   Blood glucose monitor and log book  -   Food records for the 3 days prior to your visit            INR CLINIC REFERRAL       Your provider has referred you to INR Services.    Please be aware that coverage of these services is subject to the terms and limitations of your health insurance plan.  Call member services at your health plan with any benefit or coverage  questions.    Indication for Anticoagulation: Atrial Fibrillation  If nonstandard INR is desired, indicate goal range and explanation: 2.0-3.0  Expected Duration of Therapy: Lifetime            NEUROLOGY ADULT REFERRAL       Your provider has referred you to: [facility]: [facility] [phone]   [email or web address]    EMG Procedure/Referral: EMG  Right upper extremity      Please be aware that coverage of these services is subject to the terms and limitations of your health insurance plan.  Call member services at your health plan with any benefit or coverage questions.      Please bring the following with you to your appointment:    (1) Any X-Rays, CTs or MRIs which have been performed.  Contact the facility where they were done to arrange for  prior to your scheduled appointment.  Any new CT, MRI or other procedures ordered by your specialist must be performed at a [facility] or coordinated by your clinic's referral office.  (2) List of current medications  (3) This referral request   (4) Any documents/labs given to you for this referral                  Your next 10 appointments already scheduled     Jun 27, 2017 10:30 AM CDT   Anticoagulation Visit with  [facility] CLINIC   [facility]    [address] [place] [id number] [phone]            Jun 28, 2017  8:30 AM CDT   Diabetic Education with  DIABETIC ED RESOURCE   [facility]    [address] [place] [address] [address] [id number] [phone]              Who to contact     If you have questions or need follow up information about today's clinic visit or your schedule please contact [facility] directly at [phone].  Normal or non-critical lab and imaging results will be communicated to you by MyChart, letter or phone within 4 business days  "after the clinic has received the results. If you do not hear from us within 7 days, please contact the clinic through [employer] or phone. If you have a critical or abnormal lab result, we will notify you by phone as soon as possible.  Submit refill requests through [employer] or call your pharmacy and they will forward the refill request to us. Please allow 3 business days for your refill to be completed.          Additional Information About Your Visit        [employer] Information     [employer] gives you secure access to your electronic health record. If you see a primary care provider, you can also send messages to your care team and make appointments. If you have questions, please call your primary care clinic.  If you do not have a primary care provider, please call [phone] and they will assist you.        Care EveryWhere ID     This is your Care EveryWhere ID. This could be used by other organizations to access your [place] medical records  [id number]        Your Vitals Were     Pulse Temperature Height Pulse Oximetry BMI (Body Mass Index)       75 98.2  F (36.8  C) (Oral) 5' 4.5\" (1.638 m) 97% 42.93 kg/m2        Blood Pressure from Last 3 Encounters:   06/24/17 126/82   02/22/17 104/60   12/12/16 130/82    Weight from Last 3 Encounters:   06/24/17 254 lb (115.2 kg)   02/22/17 254 lb (115.2 kg)   12/12/16 256 lb (116.1 kg)              We Performed the Following     Albumin Random Urine Quantitative     CBC with platelets     Comprehensive metabolic panel     DIABETES EDUCATOR REFERRAL     FOOT EXAM     Hemoglobin A1c     INR CLINIC REFERRAL     Lipid panel reflex to direct LDL     NEUROLOGY ADULT REFERRAL     TSH with free T4 reflex     UA reflex to Microscopic and Culture          Today's Medication Changes          These changes are accurate as of: 6/24/17  9:45 AM.  If you have any questions, ask your nurse or doctor.               These medicines have changed or have updated prescriptions.        " Dose/Directions    amLODIPine 10 MG tablet   Commonly known as:  NORVASC   This may have changed:  how much to take   Used for:  Essential hypertension with goal blood pressure less than 140/90   Changed by:  Madina Mercado MD        Dose:  10 mg   Take 1 tablet (10 mg) by mouth daily   Quantity:  90 tablet   Refills:  1            Where to get your medicines      These medications were sent to [id number] - [place], [place] - [address]  [address] [place] [address]     Phone:  [phone]     amLODIPine 10 MG tablet    atenolol 100 MG tablet    chlorthalidone 25 MG tablet    ketoconazole 2 % cream    lisinopril 40 MG tablet    simvastatin 20 MG tablet    warfarin 5 MG tablet                Primary Care Provider Office Phone # Fax #    Madina Mercado [phone]       [facility] [address] [facility] [address]        Equal Access to Services     [facility] CenterALESIA AH: Hadii aad ku hadasho Soomaali, waaxda luqadaha, qaybta kaalmada adeegyada, waxay idiin hayaan sandra gonzalez . So [facility] [place] [phone].    ATENCIÓN: Si habla español, tiene a webster disposición servicios gratuitos de asistencia lingüística. LlUniversity Hospitals [facility] [phone].    We comply with applicable federal civil rights laws and [place] laws. We do not discriminate on the basis of race, color, national origin, age, disability sex, sexual orientation or gender identity.            Thank you!     Thank you for choosing [facility]  for your care. Our goal is always to provide you with excellent care. Hearing back from our patients is one way we can continue to improve our services. Please take a few minutes to complete the written survey that you may receive in the mail after your visit with us. Thank you!             Your Updated Medication List - Protect others around you: Learn how to safely use, store and throw away your medicines at www.disposemymeds.org.           This list is accurate as of: 6/24/17  9:45 AM.  Always use your most recent med list.                   Brand Name Dispense Instructions for use Diagnosis    amLODIPine 10 MG tablet    NORVASC    90 tablet    Take 1 tablet (10 mg) by mouth daily    Essential hypertension with goal blood pressure less than 140/90       aspirin 81 MG tablet     0    1 tab po QD (Once per day)    Type II or unspecified type diabetes mellitus without mention of complication, not stated as uncontrolled       atenolol 100 MG tablet    TENORMIN    90 tablet    Take 1 tablet (100 mg) by mouth daily    Essential hypertension with goal blood pressure less than 140/90       Biotin 10 MG Tabs tablet      1 TABLET DAILY        blood glucose monitoring test strip    no brand specified     Compact Plus test strips Use to test blood sugars 1 times daily or as directed        CALTRATE 600 + D 600-200 MG-IU Tabs     60    1 tablet twice daily        CENTRUM SILVER per tablet     3 MONTHS    ONE DAILY        chlorthalidone 25 MG tablet    HYGROTON    90 tablet    Take 1 tablet (25 mg) by mouth daily    Essential hypertension with goal blood pressure less than 140/90       ketoconazole 2 % cream    NIZORAL    30 g    Apply topically 2 times daily Apply to affected nails daily    Onychomycosis, Type 2 diabetes mellitus with diabetic polyneuropathy, without long-term current use of insulin (H)       lisinopril 40 MG tablet    PRINIVIL/ZESTRIL    90 tablet    Take 1 tablet (40 mg) by mouth daily    Essential hypertension with goal blood pressure less than 140/90       metFORMIN 1000 MG tablet    GLUCOPHAGE    225 tablet    TAKE 1 AND A 1/2 TABLETS BY MOUTH WITH BREAKFAST AND 1 TABLET WITH SUPPER    Type 2 diabetes mellitus with other circulatory complications (H), Type 2 diabetes mellitus with diabetic polyneuropathy, without long-term current use of insulin (H)       omega 3 1000 MG Caps     90 capsule    Take 1 g by mouth daily        simvastatin  20 MG tablet    ZOCOR    90 tablet    TAKE ONE TABLET BY MOUTH EVERY NIGHT AT BEDTIME    Hyperlipidemia LDL goal <100       vitamin D 1000 UNITS capsule     3 MONTHS    1 CAPSULE DAILY    Nutrition disorder       warfarin 5 MG tablet    COUMADIN    180 tablet    TAKE 1-2 TABLETS (5-10 MG) BY MOUTH DAILY AS INSTRUCTED    Persistent atrial fibrillation (H)

## 2017-06-24 NOTE — PATIENT INSTRUCTIONS
"Start walking for exercise - If walking outside,   - rain or shine - walk a block, then come home, next day walk   2 blocks , then come home ; and then add a block further from home daily - work up to 30-45minutes daily or 3-4x/week - or can work  up to  3-4 miles briskly daily.       If walking on treadmill or at  the mall, start with 5 minutes first day, then 6 minutes next day , then 7 minutes next day, then 8 minutes next day, etc.   Gradually work up to the above goals.  No stopping.      Cardiology recommended that pt continue to take 81mg asa for CV prophylaxis ( no increased bruising for pt currently or in past) in addition to her coumadin for A fib.     Establish an exercise regimen. Activity goal: slowly work your way up to 45 minutes 5 days a week. New exercise routine: cardiovascular workout on exercise equipment, walking and weightlifting. Diet regimen was discussed and plan is self-directed dieting: reduce calories, reduce portions, reduce processed  carbs, increase fruits/vegetables and avoid sweets and supervised diet program. Avoid artificial sweeteners and \"diet\" drinks and sodas.       Try Yoga for seniors to help with flexibility and balance . Look at Preeti Kolb DVD's also for seniors.     See Patient Instructions.     Recheck with me again in 6 months or sooner , if needed.              Thank you for choosing [facility]  for your Health Care. It was a pleasure seeing you at your visit today. Please contact us with any questions or concerns you may have.                   Madina Mercado MD                                  To reach your [facility] care team after hours call:   [phone]    Our clinic hours are:     Monday- 7:30 am - 7:00 pm                             Tuesday through Friday- 7:30 am - 5:00 pm                                        Saturday- 8:00 am - 12:00 pm                  Phone:  [phone]    Our pharmacy hours are:     Monday "  8:00 am to 7:00 pm      Tuesday through Friday 8:00am to 6:00pm                        Saturday - 9:00 am to 1:00 pm      Sunday : Closed.              Phone:  [phone]      There is also information available at our web site:  [email or web address]    If your provider ordered any lab tests and you do not receive the results within 10 business days, please call the clinic.    If you need a medication refill please contact your pharmacy.  Please allow 2 business days for your refill to be completed.    Our clinic offers telephone visits and e visits.  Please ask one of your team members to explain more.      Use [employer] (secure email communication and access to your chart) to send your primary care provider a message or make an appointment. Ask someone on your Team how to sign up for [employer].

## 2017-06-24 NOTE — PROGRESS NOTES
SUBJECTIVE:                                                    Zulema Nixon is a 71 year old female who presents to clinic today for the following health issues:    Hyperlipidemia Follow-Up      Rate your low fat/cholesterol diet?: fair    Taking statin?  Yes, no muscle aches from statin    Other lipid medications/supplements?:  Fish oil/Omega 3, dose 1000mg without side effects  Recent Labs   Lab Test  05/24/16   0929  12/10/15   1203  04/22/15   1002  11/13/14   0957   CHOL  109  110  111  128   HDL  35*  39*  33*  37*   LDL  55  51  55  66   TRIG  97  99  114  125   CHOLHDLRATIO   --    --   3.4  3.5            Hypertension Follow-up:       Outpatient blood pressures are not being checked.  Cardiologist wants to lower amlodipine but dose didn't get called in- when her bp was 104/60, but has been taking 10mg daily - didn't decrease to 1/2 tablet as recommended secondary to her bp's being higher again.   Will keep her amlodipine at 10mg daily as her leg edema is well controlled.  Uses compression knee-high hose daily.     Low Salt Diet: low salt  BP Readings from Last 6 Encounters:   06/24/17 126/82   02/22/17 104/60   12/12/16 130/82   09/12/16 136/80   08/08/16 122/82   05/23/16 110/52       Last Basic Metabolic Panel:  Lab Results   Component Value Date     12/12/2016      Lab Results   Component Value Date    POTASSIUM 3.8 12/12/2016     Lab Results   Component Value Date    CHLORIDE 101 12/12/2016     Lab Results   Component Value Date    ALVA 9.3 12/12/2016     Lab Results   Component Value Date    CO2 29 12/12/2016     Lab Results   Component Value Date    BUN 15 12/12/2016     Lab Results   Component Value Date    CR 0.71 12/12/2016     Lab Results   Component Value Date     12/12/2016       Diabetes Follow-up:       Patient is checking blood sugars: irregularly as her meter hasn't been working well.     Diabetic concerns: other - doesn't always follow her diet      Symptoms of hypoglycemia  (low blood sugar): none     Paresthesias (numbness or burning in feet) or sores: No     Date of last diabetic eye exam: 2/2017.     Chronic Kidney Disease Follow-up:       Current NSAID use?  No      Amount of exercise or physical activity: very little- -strongly encouraged regular walking program.      Problems taking medications regularly: No    Medication side effects: none    Diet: regular (no restrictions)      Patient Active Problem List   Diagnosis     Morbid obesity due to excess calories (H)     ARMAND (obstructive sleep apnea)     Atrial fibrillation, unspecified type (H)     HYPERLIPIDEMIA LDL GOAL <100     Status post laparoscopic cholecystectomy     CKD (chronic kidney disease) stage 2, GFR 60-89 ml/min     Venous stasis of lower extremity     Advanced directives, counseling/discussion     Sensorineural hearing loss of both ears - using hearing aids     Bilateral leg edema- has been to lymphedema clinic in past     Essential hypertension with goal blood pressure less than 140/90     Type 2 diabetes mellitus with other circulatory complications, 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)     Long-term (current) use of anticoagulants [Z79.01]     Onychomycosis     Type 2 diabetes mellitus with diabetic polyneuropathy, without long-term current use of insulin (H)       Current Outpatient Prescriptions   Medication Sig Dispense Refill     metFORMIN (GLUCOPHAGE) 1000 MG tablet TAKE 1 AND A 1/2 TABLETS BY MOUTH WITH BREAKFAST AND 1 TABLET WITH SUPPER 225 tablet 1     atenolol (TENORMIN) 100 MG tablet Take 1 tablet (100 mg) by mouth daily 30 tablet 1     chlorthalidone (HYGROTON) 25 MG tablet Take 1 tablet (25 mg) by mouth daily 30 tablet 1     amLODIPine (NORVASC) 10 MG tablet Take 0.5 tablets (5 mg) by mouth daily 90 tablet 1     lisinopril (PRINIVIL/ZESTRIL) 40 MG tablet Take 1 tablet (40 mg) by mouth daily 90 tablet 1     simvastatin (ZOCOR) 20 MG tablet TAKE ONE TABLET BY MOUTH EVERY NIGHT AT BEDTIME 90 tablet 1      warfarin (COUMADIN) 5 MG tablet TAKE 1-2 TABLETS (5-10 MG) BY MOUTH DAILY AS INSTRUCTED 180 tablet 1     ketoconazole (NIZORAL) 2 % cream Apply topically 2 times daily Apply to affected nails daily 30 g 1     blood glucose monitoring (NO BRAND SPECIFIED) test strip Compact Plus test strips Use to test blood sugars 1 times daily or as directed       omega 3 1000 MG CAPS Take 1 g by mouth daily 90 capsule      BIOTIN 10 MG OR TABS 1 TABLET DAILY       VITAMIN D 1000 UNIT OR CAPS 1 CAPSULE DAILY 3 MONTHS 1 YEAR     CENTRUM SILVER OR TABS ONE DAILY 3 MONTHS 1 YEAR     CALTRATE 600 + D 600-200 MG-IU OR TABS 1 tablet twice daily 60 11     ASPIRIN 81 MG OR TABS 1 tab po QD (Once per day) 0 0       Allergies   Allergen Reactions     Tetracycline      lightheaded     Problem list and histories reviewed & adjusted, as indicated.  Additional history: as documented    Recent Labs   Lab Test  12/12/16   1006  05/24/16   0929  12/10/15   1203  04/22/15   1002   A1C  7.2*  6.8*  7.0*  6.9*   LDL   --   55  51  55   HDL   --   35*  39*  33*   TRIG   --   97  99  114   ALT  30  31  29  28   CR  0.71  0.66  0.64  0.70   GFRESTIMATED  80  89  >90  Non  GFR Calc    82   GFRESTBLACK  >90   GFR Calc    >90   GFR Calc    >90   GFR Calc    >90   GFR Calc     POTASSIUM  3.8  3.7  3.6  4.0   TSH  0.76  0.76  0.82  0.70      BP Readings from Last 3 Encounters:   06/24/17 126/82   02/22/17 104/60   12/12/16 130/82    Wt Readings from Last 3 Encounters:   06/24/17 254 lb (115.2 kg)   02/22/17 254 lb (115.2 kg)   12/12/16 256 lb (116.1 kg)         Labs reviewed in EPIC.     Reviewed and updated as needed this visit by clinical staff       Reviewed and updated as needed this visit by Provider       ROS:getting some right hand numbness during the night.  Not right now. Happening more often, wonders about carpal tunnel.   C: NEGATIVE for fever, chills, change in  "weight  INTEGUMENTARY/SKIN: NEGATIVE for worrisome rashes, moles or lesions  E/M: NEGATIVE for ear, mouth and throat problems  R: NEGATIVE for significant cough or SOB  CV: NEGATIVE for chest pain, palpitations or peripheral edema  GI: NEGATIVE for nausea, abdominal pain, heartburn, or change in bowel habits  MUSCULOSKELETAL: NEGATIVE for significant arthralgias or myalgia  ENDOCRINE: NEGATIVE for temperature intolerance, skin/hair changes  PSYCHIATRIC: NEGATIVE for changes in mood or affect  ROS otherwise negative    OBJECTIVE:                                                    /82  Pulse 75  Temp 98.2  F (36.8  C) (Oral)  Ht 5' 4.5\" (1.638 m)  Wt 254 lb (115.2 kg)  SpO2 97%  BMI 42.93 kg/m2  Body mass index is 42.93 kg/(m^2).   GENERAL: morbidly obese, alert, well nourished, well hydrated, no distress  HENT: ear canals- normal; TMs- normal; Nose- normal; Mouth- no ulcers, no lesions  NECK: no tenderness, no adenopathy, no asymmetry, no masses, no stiffness; thyroid- normal to palpation  RESP: lungs clear to auscultation - no rales, no rhonchi, no wheezes  CV: regular rates and rhythm, normal S1 S2, no S3 or S4 and no murmur, no click or rub -  ABDOMEN: soft, no tenderness, no  hepatosplenomegaly, no masses, normal bowel sounds  MS: extremities- no gross deformities noted, no edema  SKIN: no suspicious lesions, no rashes  Diabetic foot exam: normal DP and PT pulses, no trophic changes or ulcerative lesions and normal sensory exam  PSYCH: Alert and oriented times 3; speech- coherent , normal rate and volume; able to articulate logical thoughts, able to abstract reason, no tangential thoughts, no hallucinations or delusions, affect- normal.     Diagnostic test results:  See [facility] orders.      ASSESSMENT/PLAN:                                                        ICD-10-CM    1. Essential hypertension with goal blood pressure less than 140/90 I10 Albumin Random Urine Quantitative     CBC with platelets "     Comprehensive metabolic panel     UA reflex to Microscopic and Culture     TSH with free T4 reflex     amLODIPine (NORVASC) 10 MG tablet     lisinopril (PRINIVIL/ZESTRIL) 40 MG tablet     chlorthalidone (HYGROTON) 25 MG tablet     atenolol (TENORMIN) 100 MG tablet   2. 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 Type 2 diabetes mellitus with diabetic polyneuropathy, without long-term current use of insulin (H) E11.42 FOOT EXAM     Comprehensive metabolic panel     Hemoglobin A1c     ketoconazole (NIZORAL) 2 % cream     DIABETES EDUCATOR REFERRAL   4. Bilateral leg edema- has been to lymphedema clinic in past R60.0    5. CKD (chronic kidney disease) stage 2, GFR 60-89 ml/min N18.2    6. Hyperlipidemia LDL goal <100 E78.5 Albumin Random Urine Quantitative     Comprehensive metabolic panel     Lipid panel reflex to direct LDL     simvastatin (ZOCOR) 20 MG tablet   7. Atrial fibrillation, unspecified type (H) I48.91 INR CLINIC REFERRAL   8. Morbid obesity due to excess calories (H) E66.01    9. Onychomycosis B35.1 ketoconazole (NIZORAL) 2 % cream   10. Persistent atrial fibrillation (H) I48.1 warfarin (COUMADIN) 5 MG tablet   11. Numbness of right hand R20.0 NEUROLOGY ADULT REFERRAL        Spent 36 minutes on pt care today. All face to face time from 0909am  to 0945am.  Greater than 50% of time spent in coordination of care/counseling today re:   1. Essential hypertension with goal blood pressure less than 140/90    2. 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 Type 2 diabetes mellitus with diabetic polyneuropathy, without long-term current use of insulin (H)    4. Bilateral leg edema- has  "been to lymphedema clinic in past    5. CKD (chronic kidney disease) stage 2, GFR 60-89 ml/min    6. Hyperlipidemia LDL goal <100    7. Atrial fibrillation, unspecified type (H)    8. Morbid obesity due to excess calories (H)    9. Onychomycosis    10. Persistent atrial fibrillation (H)    11. Numbness of right hand      Cardiology recommended that pt continue to take 81mg asa for CV prophylaxis ( no increased bruising for pt currently or in past) in addition to her coumadin for A fib.     Establish an exercise regimen. Activity goal: 45 minutes 5 days a week. New exercise routine: cardiovascular workout on exercise equipment, walking and weightlifting. Diet regimen was discussed and plan is self-directed dieting: reduce calories, reduce portions, reduce processed  carbs, increase fruits/vegetables and avoid sweets and supervised diet program. Avoid artificial sweeteners and \"diet\" drinks and sodas.       Try Yoga for seniors to help with flexibility and balance . Look at Preeti Kolb DVD's also for seniors.     See Patient Instructions.     Madina Mercado MD  [facility] [place]  "

## 2017-06-26 LAB
CREAT UR-MCNC: 141 MG/DL
MICROALBUMIN UR-MCNC: 11 MG/L
MICROALBUMIN/CREAT UR: 7.59 MG/G CR (ref 0–25)

## 2017-06-27 ENCOUNTER — ANTICOAGULATION THERAPY VISIT (OUTPATIENT)
Dept: NURSING | Facility: CLINIC | Age: 72
End: 2017-06-27
Payer: COMMERCIAL

## 2017-06-27 DIAGNOSIS — Z79.01 LONG-TERM (CURRENT) USE OF ANTICOAGULANTS: ICD-10-CM

## 2017-06-27 DIAGNOSIS — I48.91 ATRIAL FIBRILLATION, UNSPECIFIED TYPE (H): ICD-10-CM

## 2017-06-27 LAB — INR POINT OF CARE: 3.6 (ref 0.86–1.14)

## 2017-06-27 PROCEDURE — 36416 COLLJ CAPILLARY BLOOD SPEC: CPT

## 2017-06-27 PROCEDURE — 85610 PROTHROMBIN TIME: CPT | Mod: QW

## 2017-06-27 NOTE — PROGRESS NOTES
ANTICOAGULATION FOLLOW-UP CLINIC VISIT    Patient Name:  Zulema Nixon  Date:  6/27/2017  Contact Type:  Face to Face    SUBJECTIVE:     Patient Findings     Positives No Problem Findings           OBJECTIVE    INR Protime   Date Value Ref Range Status   06/27/2017 3.6 (A) 0.86 - 1.14 Final       ASSESSMENT / PLAN  No question data found.  Anticoagulation Summary as of 6/27/2017     INR goal 2.0-3.0   Today's INR 3.6!   Maintenance plan 5 mg (5 mg x 1) on Wed, Sat; 7.5 mg (5 mg x 1.5) all other days   Full instructions 6/27: Hold; Otherwise 5 mg on Wed, Sat; 7.5 mg all other days   Weekly total 47.5 mg   Plan last modified Nereyda Root RN (3/1/2016)   Next INR check 7/5/2017   Target end date     Indications   Long-term (current) use of anticoagulants [Z79.01] [Z79.01]  Atrial fibrillation  unspecified type (H) [I48.91]         Anticoagulation Episode Summary     INR check location     Preferred lab     Send INR reminders to RV NURSE    Comments       Anticoagulation Care Providers     Provider Role Specialty Phone number    Madina Mercado MD [facility] [phone]            See the Encounter Report to view Anticoagulation Flowsheet and Dosing Calendar (Go to Encounters tab in chart review, and find the Anticoagulation Therapy Visit)    Dosage adjustment made based on physician directed care plan.    Kylah Pierce RN

## 2017-06-27 NOTE — MR AVS SNAPSHOT
Zulema Nixon   6/27/2017 10:30 AM   Anticoagulation Therapy Visit    Description:  71 year old female   Provider:   ANTICOAGULATION CLINIC   Department:   Nurse           INR as of 6/27/2017     Today's INR 3.6!      Anticoagulation Summary as of 6/27/2017     INR goal 2.0-3.0   Today's INR 3.6!   Full instructions 6/27: Hold; Otherwise 5 mg on Wed, Sat; 7.5 mg all other days   Next INR check 7/5/2017    Indications   Long-term (current) use of anticoagulants [Z79.01] [Z79.01]  Atrial fibrillation  unspecified type (H) [I48.91]         Your next Anticoagulation Clinic appointment(s)     Jul 03, 2017  9:45 AM CDT   Anticoagulation Visit with  ANTICOAGULATION CLINIC   [facility]    [address] [place] [id number]   [phone]              Contact Numbers     Clinic Number:         June 2017 Details    Sun Mon Tue Wed Thu Fri Sat         1               2               3                 4               5               6               7               8               9               10                 11               12               13               14               15               16               17                 18               19               20               21               22               23               24                 25               26               27      Hold   See details      28      5 mg         29      7.5 mg         30      7.5 mg           Date Details   06/27 This INR check               How to take your warfarin dose     To take:  5 mg Take 1 of the 5 mg tablets.    To take:  7.5 mg Take 1.5 of the 5 mg tablets.    Hold Do not take your warfarin dose. See the Details table to the right for additional instructions.                July 2017 Details    Sun Mon Tue Wed Thu Fri Sat           1      5 mg           2      7.5 mg         3      7.5 mg         4      7.5 mg         5            6               7                8                 9               10               11               12               13               14               15                 16               17               18               19               20               21               22                 23               24               25               26               27               28               29                 30               31                     Date Details   No additional details    Date of next INR:  7/5/2017         How to take your warfarin dose     To take:  5 mg Take 1 of the 5 mg tablets.    To take:  7.5 mg Take 1.5 of the 5 mg tablets.

## 2017-06-28 ENCOUNTER — TELEPHONE (OUTPATIENT)
Dept: FAMILY MEDICINE | Facility: CLINIC | Age: 72
End: 2017-06-28

## 2017-06-28 ENCOUNTER — ALLIED HEALTH/NURSE VISIT (OUTPATIENT)
Dept: EDUCATION SERVICES | Facility: CLINIC | Age: 72
End: 2017-06-28
Payer: COMMERCIAL

## 2017-06-28 DIAGNOSIS — E11.59 TYPE 2 DIABETES MELLITUS WITH OTHER CIRCULATORY COMPLICATIONS (H): Primary | ICD-10-CM

## 2017-06-28 DIAGNOSIS — E11.9 DIABETES MELLITUS WITHOUT COMPLICATION (H): ICD-10-CM

## 2017-06-28 PROCEDURE — G0108 DIAB MANAGE TRN  PER INDIV: HCPCS

## 2017-06-28 NOTE — MR AVS SNAPSHOT
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  Normal or non-critical lab and imaging results will be communicated to you by MyChart, letter or phone within 4 business days after the clinic has received the results. If you do not hear from us within 7 days, please contact the clinic through [employer] or phone. If you have a critical or abnormal lab result, we will notify you by phone as soon as possible.  Submit refill requests through [employer] or call your pharmacy and they will forward the refill request to us. Please allow 3 business days for your refill to be completed.          Additional Information About Your Visit        MyChart Information     [employer]Things gives you secure access to your electronic health record. If you see a primary care provider, you can also send messages to your care team and make appointments. If you have questions, please call your primary care clinic.  If you do not have a primary care provider, please call [phone] and they will assist you.        Care EveryWhere ID     This is your Care  EveryWhere ID. This could be used by other organizations to access your [facility] medical records  [id number]         Blood Pressure from Last 3 Encounters:   06/24/17 126/82   02/22/17 104/60   12/12/16 130/82    Weight from Last 3 Encounters:   06/24/17 254 lb (115.2 kg)   02/22/17 254 lb (115.2 kg)   12/12/16 256 lb (116.1 kg)              We Performed the Following     DIABETES EDUCATION - Individual  []          Today's Medication Changes          These changes are accurate as of: 6/28/17  9:17 AM.  If you have any questions, ask your nurse or doctor.               Start taking these medicines.        Dose/Directions    blood glucose monitoring lancets   Used for:  Type 2 diabetes mellitus with other circulatory complications (H)        Use to test blood sugar 1 times daily or as directed.   Quantity:  100 each   Refills:  1         These medicines have changed or have updated prescriptions.        Dose/Directions    * blood glucose monitoring test strip   Commonly known as:  no brand specified   This may have changed:  Another medication with the same name was added. Make sure you understand how and when to take each.        Compact Plus test strips Use to test blood sugars 1 times daily or as directed   Refills:  0       * blood glucose monitoring test strip   Commonly known as:  JAYDE CONTOUR NEXT   This may have changed:  You were already taking a medication with the same name, and this prescription was added. Make sure you understand how and when to take each.   Used for:  Type 2 diabetes mellitus with other circulatory complications (H)        Use to test blood sugar 1 times daily or as directed.   Quantity:  100 strip   Refills:  3       * Notice:  This list has 2 medication(s) that are the same as other medications prescribed for you. Read the directions carefully, and ask your doctor or other care provider to review them with you.         Where to get your medicines      These medications were  sent to [id number] - [place], [place] - [address]  [address] [address]     Phone:  [phone]     blood glucose monitoring lancets    blood glucose monitoring test strip                Primary Care Provider Office Phone # Fax #    Madina Mercado [phone]       [facility] [address] [place] [id number]        Equal Access to Services     TREY WELCH : Hadii aad ku hadasho Soomaali, waaxda luqadaha, qaybta kaalmada adeegyada, waxay idiin hayaan adeeg kharash la'shern . So [facility] [phone].    ATENCIÓN: Si habla español, tiene a webster disposición servicios gratuitos de asistencia lingüística. [facility] [phone].    We comply with applicable federal civil rights laws and [place] laws. We do not discriminate on the basis of race, color, national origin, age, disability sex, sexual orientation or gender identity.            Thank you!     Thank you for choosing [facility]  for your care. Our goal is always to provide you with excellent care. Hearing back from our patients is one way we can continue to improve our services. Please take a few minutes to complete the written survey that you may receive in the mail after your visit with us. Thank you!             Your Updated Medication List - Protect others around you: Learn how to safely use, store and throw away your medicines at www.disposemymeds.org.          This list is accurate as of: 6/28/17  9:17 AM.  Always use your most recent med list.                   Brand Name Dispense Instructions for use Diagnosis    amLODIPine 10 MG tablet    NORVASC    90 tablet    Take 1 tablet (10 mg) by mouth daily    Essential hypertension with goal blood pressure less than 140/90       aspirin 81 MG tablet     0    1 tab po QD (Once per day)    Type II or unspecified type diabetes mellitus without mention of complication, not stated as uncontrolled       atenolol 100 MG  tablet    TENORMIN    90 tablet    Take 1 tablet (100 mg) by mouth daily    Essential hypertension with goal blood pressure less than 140/90       Biotin 10 MG Tabs tablet      1 TABLET DAILY        blood glucose monitoring lancets     100 each    Use to test blood sugar 1 times daily or as directed.    Type 2 diabetes mellitus with other circulatory complications (H)       * blood glucose monitoring test strip    no brand specified     Compact Plus test strips Use to test blood sugars 1 times daily or as directed        * blood glucose monitoring test strip    JAYDE CONTOUR NEXT    100 strip    Use to test blood sugar 1 times daily or as directed.    Type 2 diabetes mellitus with other circulatory complications (H)       CALTRATE 600 + D 600-200 MG-IU Tabs     60    1 tablet twice daily        CENTRUM SILVER per tablet     3 MONTHS    ONE DAILY        chlorthalidone 25 MG tablet    HYGROTON    90 tablet    Take 1 tablet (25 mg) by mouth daily    Essential hypertension with goal blood pressure less than 140/90       ketoconazole 2 % cream    NIZORAL    30 g    Apply topically 2 times daily Apply to affected nails daily    Onychomycosis, Type 2 diabetes mellitus with diabetic polyneuropathy, without long-term current use of insulin (H)       lisinopril 40 MG tablet    PRINIVIL/ZESTRIL    90 tablet    Take 1 tablet (40 mg) by mouth daily    Essential hypertension with goal blood pressure less than 140/90       metFORMIN 1000 MG tablet    GLUCOPHAGE    225 tablet    TAKE 1 AND A 1/2 TABLETS BY MOUTH WITH BREAKFAST AND 1 TABLET WITH SUPPER    Type 2 diabetes mellitus with other circulatory complications (H), Type 2 diabetes mellitus with diabetic polyneuropathy, without long-term current use of insulin (H)       omega 3 1000 MG Caps     90 capsule    Take 1 g by mouth daily        simvastatin 20 MG tablet    ZOCOR    90 tablet    TAKE ONE TABLET BY MOUTH EVERY NIGHT AT BEDTIME    Hyperlipidemia LDL goal <100        vitamin D 1000 UNITS capsule     3 MONTHS    1 CAPSULE DAILY    Nutrition disorder       warfarin 5 MG tablet    COUMADIN    180 tablet    TAKE 1-2 TABLETS (5-10 MG) BY MOUTH DAILY AS INSTRUCTED    Persistent atrial fibrillation (H)       * Notice:  This list has 2 medication(s) that are the same as other medications prescribed for you. Read the directions carefully, and ask your doctor or other care provider to review them with you.

## 2017-06-28 NOTE — TELEPHONE ENCOUNTER
Date Forms was received: June 28, 2017    Forms received by: Fax    Last office visit: 6/24/2017    Purpose of Form:  Medicare New Prescription Order for Diabetic Testing Supplies    How the form needs to be returned for patient:  Fax  [phone]    Form currently placed  [facility]    Felecia Powell, CMA

## 2017-06-28 NOTE — PROGRESS NOTES
Diabetes Self Management Training: Individual Review Visit    Zulema Nixon presents today for education related to Type 2 diabetes.    She is accompanied by self    Patient's diabetes management related comments/concerns: wants new meter    Patient's emotional response to diabetes: expresses readiness to learn and confidence diabetes can be controlled    Patient would like this visit to be focused around the following diabetes-related behaviors and goals: Assistance with making lifestyle changes and Monitoring    ASSESSMENT:  Patient Problem List and Family Medical History reviewed for relevant medical history, current medical status, and diabetes risk factors.    Zulema has been diabetic for many years, and admits that she has been a little loose in her food habits lately. Lots of travel, has been eating more unhealthy foods and has been less motivated to really stick to healthy choices. Admits this is a mental game she plays in her head, because she feels ok, and really does think she is healthier than many of her friends, so she justifies some poor choices. Really focused on reshifting her mentality towards keeping her health, because healthier habits will only help her stay healthy in the future. Wants to start a diary to write down these thought patterns.     Current Diabetes Management per Patient:  Taking diabetes medications?   yes:     Diabetes Medication(s)     Biguanides Sig    metFORMIN (GLUCOPHAGE) 1000 MG tablet TAKE 1 AND A 1/2 TABLETS BY MOUTH WITH BREAKFAST AND 1 TABLET WITH SUPPER          *Abbreviated insulin dose documentation key: Insulin Trade Name ([id number]) - i.e. Humalog 5-5-5-0 (Humalog 5 units at breakfast, 5 units at lunch, and 5 units at dinner).    Past Diabetes Education: Yes    Patient glucose self monitoring as follows: one time daily.   BG results: meter broke last month - needs new     BG values are: unable to assess  Patient's most recent   Lab Results  "  Component Value Date    A1C 7.2 06/24/2017    is not meeting goal of <7.0    Nutrition:  Patient eats 3 meals per day    Physical Activity:    Does have 2 flights of stairs in her home ,though doesn't do much activity outside of being \"busy\" with family    Diabetes Complications:  Not discussed today.    Vitals:  There were no vitals taken for this visit.  Estimated body mass index is 42.93 kg/(m^2) as calculated from the following:    Height as of 6/24/17: 5' 4.5\" (1.638 m).    Weight as of 6/24/17: 254 lb (115.2 kg).   Last 3 BP:   BP Readings from Last 3 Encounters:   06/24/17 126/82   02/22/17 104/60   12/12/16 130/82       History   Smoking Status     Never Smoker   Smokeless Tobacco     Never Used       Labs:  Lab Results   Component Value Date    A1C 7.2 06/24/2017     Lab Results   Component Value Date     06/24/2017     Lab Results   Component Value Date    LDL 65 06/24/2017     HDL Cholesterol   Date Value Ref Range Status   06/24/2017 33 (L) >49 mg/dL Final   ]  GFR Estimate   Date Value Ref Range Status   06/24/2017 78 >60 mL/min/1.7m2 Final     Comment:     Non  GFR Calc     GFR Estimate If Black   Date Value Ref Range Status   06/24/2017 >90   GFR Calc   >60 mL/min/1.7m2 Final     Lab Results   Component Value Date    CR 0.73 06/24/2017     No results found for: MICROALBUMIN    Socio/Economic Considerations:    Support system: family, spouse/significant other and children    Health Beliefs and Attitudes:   Patient Activation Measure Survey Score:  ANTHONY Score (Last Two) 4/12/2011 3/25/2013   ANTHONY Raw Score 39 37   Activation Score 56.4 49.9   ANTHONY Level 3 2       Stage of Change: PREPARATION (Decided to change - considering how)      Diabetes knowledge and skills assessment:     Patient is knowledgeable in diabetes management concepts related to: Healthy Eating, Being Active, Monitoring, Taking Medication, Problem Solving, Reducing Risks and Healthy " Coping    Patient needs further education on the following diabetes management concepts: Healthy Eating    Barriers to Learning Assessment: No Barriers identified    Based on learning assessment above, most appropriate setting for further diabetes education would be: Group class or Individual setting.    INTERVENTION:   Education provided today on:  AADE Self-Care Behaviors:  Healthy Eating: consistency in amount, composition, and timing of food intake, weight reduction and portion control  Being Active: relationship to blood glucose  Monitoring: individual blood glucose targets  Patient was instructed on Contour Next EZ meter and was able to provide an accurate return demonstration.    Opportunities for ongoing education and support in diabetes-self management were discussed.    Pt verbalized understanding of concepts discussed and recommendations provided today.       Education Materials Provided:  No new materials provided today    PLAN:  Keep a food diary  Keep a journal of healthy thought patterns to help motivate you to make healthy choices  Increase walking to an intentional time frame of at least 10 extra minutes per day  Test blood sugar fasting - goal <130 in the morning    FOLLOW-UP:  Follow-up yearly for diabetes education review.    Ongoing plan for education and support: Written resources (magazines, books, etc.)    LOBITO Quintero CDE  Time Spent: 30 minutes  Encounter Type: Individual    Any diabetes medication dose changes were made via the CDE Protocol and Collaborative Practice Agreement with the patient's referring provider. A copy of this encounter was shared with the provider.

## 2017-07-03 ENCOUNTER — MEDICAL CORRESPONDENCE (OUTPATIENT)
Dept: HEALTH INFORMATION MANAGEMENT | Facility: CLINIC | Age: 72
End: 2017-07-03

## 2017-07-03 ENCOUNTER — ANTICOAGULATION THERAPY VISIT (OUTPATIENT)
Dept: NURSING | Facility: CLINIC | Age: 72
End: 2017-07-03
Payer: COMMERCIAL

## 2017-07-03 ENCOUNTER — TELEPHONE (OUTPATIENT)
Dept: FAMILY MEDICINE | Facility: CLINIC | Age: 72
End: 2017-07-03

## 2017-07-03 DIAGNOSIS — I48.91 ATRIAL FIBRILLATION, UNSPECIFIED TYPE (H): ICD-10-CM

## 2017-07-03 DIAGNOSIS — Z79.01 LONG-TERM (CURRENT) USE OF ANTICOAGULANTS: ICD-10-CM

## 2017-07-03 LAB — INR POINT OF CARE: 2.3 (ref 0.86–1.14)

## 2017-07-03 PROCEDURE — 85610 PROTHROMBIN TIME: CPT | Mod: QW

## 2017-07-03 PROCEDURE — 36416 COLLJ CAPILLARY BLOOD SPEC: CPT

## 2017-07-03 NOTE — TELEPHONE ENCOUNTER
Date Forms was received: July 3, 2017    Forms received by: Fax    Last office visit: 06/24/2017    Purpose of Form:  Medicare New Prescription Order for Diabetic Testing Supplies    How the form needs to be returned for patient:  Fax [phone]    Form currently placed  [employer]    Felecia Powell, CMA

## 2017-07-03 NOTE — TELEPHONE ENCOUNTER
Dr. Mercado completed form.  Faxed back to [facility].  Sent original to scan.    Felecia Powell CMA

## 2017-07-03 NOTE — PROGRESS NOTES
ANTICOAGULATION FOLLOW-UP CLINIC VISIT    Patient Name:  Zulema Nixon  Date:  7/3/2017  Contact Type:  Face to Face    SUBJECTIVE:        OBJECTIVE    INR Protime   Date Value Ref Range Status   07/03/2017 2.3 (A) 0.86 - 1.14 Final       ASSESSMENT / PLAN  No question data found.  Anticoagulation Summary as of 7/3/2017     INR goal 2.0-3.0   Today's INR 2.3   Maintenance plan 5 mg (5 mg x 1) on Wed, Sat; 7.5 mg (5 mg x 1.5) all other days   Full instructions 5 mg on Wed, Sat; 7.5 mg all other days   Weekly total 47.5 mg   No change documented Kylah Pierce RN   Plan last modified Nereyda Root RN (3/1/2016)   Next INR check 7/24/2017   Target end date     Indications   Long-term (current) use of anticoagulants [Z79.01] [Z79.01]  Atrial fibrillation  unspecified type (H) [I48.91]         Anticoagulation Episode Summary     INR check location     Preferred lab     Send INR reminders to RV NURSE    Comments       Anticoagulation Care Providers     Provider Role Specialty Phone number    Madina Mercado MD [facility] [phone]            See the Encounter Report to view Anticoagulation Flowsheet and Dosing Calendar (Go to Encounters tab in chart review, and find the Anticoagulation Therapy Visit)    Dosage adjustment made based on physician directed care plan.    Kylah Pierce, RN

## 2017-07-03 NOTE — MR AVS SNAPSHOT
Zulema Nixon   7/3/2017 9:45 AM   Anticoagulation Therapy Visit    Description:  71 year old female   Provider:   ANTICOAGULATION CLINIC   Department:  [facility] Nurse           INR as of 7/3/2017     Today's INR 2.3      Anticoagulation Summary as of 7/3/2017     INR goal 2.0-3.0   Today's INR 2.3   Full instructions 5 mg on Wed, Sat; 7.5 mg all other days   Next INR check 7/24/2017    Indications   Long-term (current) use of anticoagulants [Z79.01] [Z79.01]  Atrial fibrillation  unspecified type (H) [I48.91]         Your next Anticoagulation Clinic appointment(s)     Jul 24, 2017 10:30 AM CDT   Anticoagulation Visit with  ANTICOAGULATION CLINIC   [facility]    [address] [place] [id number]   [phone]              Contact Numbers     Clinic Number:         July 2017 Details    Sun Mon Tue Wed Thu Fri Sat           1                 2               3      7.5 mg   See details      4      7.5 mg         5      5 mg         6      7.5 mg         7      7.5 mg         8      5 mg           9      7.5 mg         10      7.5 mg         11      7.5 mg         12      5 mg         13      7.5 mg         14      7.5 mg         15      5 mg           16      7.5 mg         17      7.5 mg         18      7.5 mg         19      5 mg         20      7.5 mg         21      7.5 mg         22      5 mg           23      7.5 mg         24            25               26               27               28               29                 30               31                     Date Details   07/03 This INR check       Date of next INR:  7/24/2017         How to take your warfarin dose     To take:  5 mg Take 1 of the 5 mg tablets.    To take:  7.5 mg Take 1.5 of the 5 mg tablets.

## 2017-07-24 ENCOUNTER — ANTICOAGULATION THERAPY VISIT (OUTPATIENT)
Dept: NURSING | Facility: CLINIC | Age: 72
End: 2017-07-24
Payer: COMMERCIAL

## 2017-07-24 DIAGNOSIS — Z79.01 LONG-TERM (CURRENT) USE OF ANTICOAGULANTS: ICD-10-CM

## 2017-07-24 DIAGNOSIS — I48.91 ATRIAL FIBRILLATION, UNSPECIFIED TYPE (H): ICD-10-CM

## 2017-07-24 LAB — INR POINT OF CARE: 2.8 (ref 0.86–1.14)

## 2017-07-24 PROCEDURE — 36416 COLLJ CAPILLARY BLOOD SPEC: CPT

## 2017-07-24 PROCEDURE — 85610 PROTHROMBIN TIME: CPT | Mod: QW

## 2017-07-24 NOTE — PROGRESS NOTES
ANTICOAGULATION FOLLOW-UP CLINIC VISIT    Patient Name:  Zulema Nixon  Date:  7/24/2017  Contact Type:  Face to Face    SUBJECTIVE:     Patient Findings     Positives No Problem Findings           OBJECTIVE    INR Protime   Date Value Ref Range Status   07/24/2017 2.8 (A) 0.86 - 1.14 Final       ASSESSMENT / PLAN  INR assessment THER    Recheck INR In: 6 WEEKS    INR Location Clinic      Anticoagulation Summary as of 7/24/2017     INR goal 2.0-3.0   Today's INR 2.8   Maintenance plan 5 mg (5 mg x 1) on Wed, Sat; 7.5 mg (5 mg x 1.5) all other days   Full instructions 5 mg on Wed, Sat; 7.5 mg all other days   Weekly total 47.5 mg   No change documented Neryeda Root RN   Plan last modified Nereyda Root RN (3/1/2016)   Next INR check 9/5/2017   Target end date     Indications   Long-term (current) use of anticoagulants [Z79.01] [Z79.01]  Atrial fibrillation  unspecified type (H) [I48.91]         Anticoagulation Episode Summary     INR check location     Preferred lab     Send INR reminders to RV NURSE    Comments       Anticoagulation Care Providers     Provider Role Specialty Phone number    Madina Mercado MD [facility] [phone]            See the Encounter Report to view Anticoagulation Flowsheet and Dosing Calendar (Go to Encounters tab in chart review, and find the Anticoagulation Therapy Visit)    Dosage adjustment made based on physician directed care plan.    Nereyda Root RN

## 2017-07-24 NOTE — MR AVS SNAPSHOT
Zulema Nixon   7/24/2017 11:15 AM   Anticoagulation Therapy Visit    Description:  72 year old female   Provider:   ANTICOAGULATION CLINIC   Department:   Nurse           INR as of 7/24/2017     Today's INR 2.8      Anticoagulation Summary as of 7/24/2017     INR goal 2.0-3.0   Today's INR 2.8   Full instructions 5 mg on Wed, Sat; 7.5 mg all other days   Next INR check 9/5/2017    Indications   Long-term (current) use of anticoagulants [Z79.01] [Z79.01]  Atrial fibrillation  unspecified type (H) [I48.91]         Your next Anticoagulation Clinic appointment(s)     Jul 24, 2017 11:15 AM CDT   Anticoagulation [facility]  [facility] CL[facility]    [address] [place] [id number]   [phone]            Sep 05, 2017 10:30 AM CDT   Anticoagulation Visit with  ANTICOAGULATION CLINIC   [facility]    [address] [place] [id number]   [phone]              Contact Numbers     Clinic Number:         July 2017 Details    Sun Mon Tue Wed Thu Fri Sat           1                 2               3               4               5               6               7               8                 9               10               11               12               13               14               15                 16               17               18               19               20               21               22                 23               24      7.5 mg   See details      25      7.5 mg         26      5 mg         27      7.5 mg         28      7.5 mg         29      5 mg           30      7.5 mg         31      7.5 mg               Date Details   07/24 This INR check               How to take your warfarin dose     To take:  5 mg Take 1 of the 5 mg tablets.    To take:  7.5 mg Take 1.5 of the 5 mg tablets.           August 2017 Details    Sun Mon Tue Wed  Thu Fri Sat       1      7.5 mg         2      5 mg         3      7.5 mg         4      7.5 mg         5      5 mg           6      7.5 mg         7      7.5 mg         8      7.5 mg         9      5 mg         10      7.5 mg         11      7.5 mg         12      5 mg           13      7.5 mg         14      7.5 mg         15      7.5 mg         16      5 mg         17      7.5 mg         18      7.5 mg         19      5 mg           20      7.5 mg         21      7.5 mg         22      7.5 mg         23      5 mg         24      7.5 mg         25      7.5 mg         26      5 mg           27      7.5 mg         28      7.5 mg         29      7.5 mg         30      5 mg         31      7.5 mg            Date Details   No additional details            How to take your warfarin dose     To take:  5 mg Take 1 of the 5 mg tablets.    To take:  7.5 mg Take 1.5 of the 5 mg tablets.           September 2017 Details    Sun Mon Tue Wed Thu Fri Sat          1      7.5 mg         2      5 mg           3      7.5 mg         4      7.5 mg         5            6               7               8               9                 10               11               12               13               14               15               16                 17               18               19               20               21               22               23                 24               25               26               27               28               29               30                Date Details   No additional details    Date of next INR:  9/5/2017         How to take your warfarin dose     To take:  5 mg Take 1 of the 5 mg tablets.    To take:  7.5 mg Take 1.5 of the 5 mg tablets.

## 2017-09-05 ENCOUNTER — ANTICOAGULATION THERAPY VISIT (OUTPATIENT)
Dept: NURSING | Facility: CLINIC | Age: 72
End: 2017-09-05
Payer: COMMERCIAL

## 2017-09-05 DIAGNOSIS — I48.91 ATRIAL FIBRILLATION, UNSPECIFIED TYPE (H): ICD-10-CM

## 2017-09-05 DIAGNOSIS — Z79.01 LONG-TERM (CURRENT) USE OF ANTICOAGULANTS: ICD-10-CM

## 2017-09-05 LAB — INR POINT OF CARE: 3.4 (ref 0.86–1.14)

## 2017-09-05 PROCEDURE — 85610 PROTHROMBIN TIME: CPT | Mod: QW

## 2017-09-05 PROCEDURE — 36416 COLLJ CAPILLARY BLOOD SPEC: CPT

## 2017-09-05 NOTE — PROGRESS NOTES
ANTICOAGULATION FOLLOW-UP CLINIC VISIT    Patient Name:  Zulema Nixon  Date:  9/5/2017  Contact Type:  Face to Face    SUBJECTIVE:     Patient Findings     Positives No Problem Findings           OBJECTIVE    INR Protime   Date Value Ref Range Status   09/05/2017 3.4 (A) 0.86 - 1.14 Final       ASSESSMENT / PLAN  No question data found.  Anticoagulation Summary as of 9/5/2017     INR goal 2.0-3.0   Today's INR 3.4!   Maintenance plan 5 mg (5 mg x 1) on Wed, Sat; 7.5 mg (5 mg x 1.5) all other days   Full instructions 9/5: 5 mg; Otherwise 5 mg on Wed, Sat; 7.5 mg all other days   Weekly total 47.5 mg   Plan last modified Nereyda Root RN (3/1/2016)   Next INR check 9/19/2017   Target end date     Indications   Long-term (current) use of anticoagulants [Z79.01] [Z79.01]  Atrial fibrillation  unspecified type (H) [I48.91]         Anticoagulation Episode Summary     INR check location     Preferred lab     Send INR reminders to RV NURSE    Comments       Anticoagulation Care Providers     Provider Role Specialty Phone number    Madina Mercado MD [facility] [phone]            See the Encounter Report to view Anticoagulation Flowsheet and Dosing Calendar (Go to Encounters tab in chart review, and find the Anticoagulation Therapy Visit)    Dosage adjustment made based on physician directed care plan.    Kylah Pierce RN

## 2017-09-05 NOTE — MR AVS SNAPSHOT
Zulema Nixon   9/5/2017 10:30 AM   Anticoagulation Therapy Visit    Description:  72 year old female   Provider:   ANTICOAGULATION CLINIC   Department:   Nurse           INR as of 9/5/2017     Today's INR 3.4!      Anticoagulation Summary as of 9/5/2017     INR goal 2.0-3.0   Today's INR 3.4!   Full instructions 9/5: 5 mg; Otherwise 5 mg on Wed, Sat; 7.5 mg all other days   Next INR check 9/19/2017    Indications   Long-term (current) use of anticoagulants [Z79.01] [Z79.01]  Atrial fibrillation  unspecified type (H) [I48.91]         Your next Anticoagulation Clinic appointment(s)     Sep 19, 2017 10:30 AM CDT   Anticoagulation Visit with  ANTICOAGULATION CLINIC   [facility]    [address] [place] [id number]   [phone]              Contact Numbers     Clinic Number:         September 2017 Details    Sun Mon Tue Wed Thu Fri Sat          1               2                 3               4               5      5 mg   See details      6      5 mg         7      7.5 mg         8      7.5 mg         9      5 mg           10      7.5 mg         11      7.5 mg         12      7.5 mg         13      5 mg         14      7.5 mg         15      7.5 mg         16      5 mg           17      7.5 mg         18      7.5 mg         19            20               21               22               23                 24               25               26               27               28               29               30                Date Details   09/05 This INR check       Date of next INR:  9/19/2017         How to take your warfarin dose     To take:  5 mg Take 1 of the 5 mg tablets.    To take:  7.5 mg Take 1.5 of the 5 mg tablets.

## 2017-09-18 ENCOUNTER — ANTICOAGULATION THERAPY VISIT (OUTPATIENT)
Dept: NURSING | Facility: CLINIC | Age: 72
End: 2017-09-18
Payer: COMMERCIAL

## 2017-09-18 DIAGNOSIS — I48.91 ATRIAL FIBRILLATION, UNSPECIFIED TYPE (H): ICD-10-CM

## 2017-09-18 DIAGNOSIS — Z79.01 LONG-TERM (CURRENT) USE OF ANTICOAGULANTS: ICD-10-CM

## 2017-09-18 LAB — INR POINT OF CARE: 3.6 (ref 0.86–1.14)

## 2017-09-18 PROCEDURE — 36416 COLLJ CAPILLARY BLOOD SPEC: CPT

## 2017-09-18 PROCEDURE — 85610 PROTHROMBIN TIME: CPT | Mod: QW

## 2017-09-18 NOTE — PROGRESS NOTES
ANTICOAGULATION FOLLOW-UP CLINIC VISIT    Patient Name:  Zulema Nixon  Date:  9/18/2017  Contact Type:  Face to Face    SUBJECTIVE:     Patient Findings     Positives No Problem Findings           OBJECTIVE    INR Protime   Date Value Ref Range Status   09/18/2017 3.6 (A) 0.86 - 1.14 Final       ASSESSMENT / PLAN  No question data found.  Anticoagulation Summary as of 9/18/2017     INR goal 2.0-3.0   Today's INR 3.6!   Maintenance plan 5 mg (5 mg x 1) on Wed, Sat; 7.5 mg (5 mg x 1.5) all other days   Full instructions 9/18: Hold; Otherwise 5 mg on Wed, Sat; 7.5 mg all other days   Weekly total 47.5 mg   Plan last modified Nereyda Root RN (3/1/2016)   Next INR check 9/25/2017   Target end date     Indications   Long-term (current) use of anticoagulants [Z79.01] [Z79.01]  Atrial fibrillation  unspecified type (H) [I48.91]         Anticoagulation Episode Summary     INR check location     Preferred lab     Send INR reminders to RV NURSE    Comments       Anticoagulation Care Providers     Provider Role Specialty Phone number    Madina Mercado MD [facility] [phone]            See the Encounter Report to view Anticoagulation Flowsheet and Dosing Calendar (Go to Encounters tab in chart review, and find the Anticoagulation Therapy Visit)    Dosage adjustment made based on physician directed care plan.    Kylah Pierce RN

## 2017-09-18 NOTE — MR AVS SNAPSHOT
Zulema Nixon   9/18/2017 10:30 AM   Anticoagulation Therapy Visit    Description:  72 year old female   Provider:   ANTICOAGULATION CLINIC   Department:   Nurse           INR as of 9/18/2017     Today's INR 3.6!      Anticoagulation Summary as of 9/18/2017     INR goal 2.0-3.0   Today's INR 3.6!   Full instructions 9/18: Hold; Otherwise 5 mg on Wed, Sat; 7.5 mg all other days   Next INR check 9/25/2017    Indications   Long-term (current) use of anticoagulants [Z79.01] [Z79.01]  Atrial fibrillation  unspecified type (H) [I48.91]         Your next Anticoagulation Clinic appointment(s)     Sep 25, 2017 10:30 AM CDT   Anticoagulation Visit with  ANT[facility]AGULATION CLINIC   [facility]    [address] [place] [phone]   [phone]              Contact Numbers     Clinic Number:         September 2017 Details    Sun Mon Tue Wed Thu Fri Sat          1               2                 3               4               5               6               7               8               9                 10               11               12               13               14               15               16                 17               18      Hold   See details      19      7.5 mg         20      5 mg         21      7.5 mg         22      7.5 mg         23      5 mg           24      7.5 mg         25            26               27               28               29               30                Date Details   09/18 This INR check       Date of next INR:  9/25/2017         How to take your warfarin dose     To take:  5 mg Take 1 of the 5 mg tablets.    To take:  7.5 mg Take 1.5 of the 5 mg tablets.    Hold Do not take your warfarin dose. See the Details table to the right for additional instructions.

## 2017-09-25 ENCOUNTER — ANTICOAGULATION THERAPY VISIT (OUTPATIENT)
Dept: NURSING | Facility: CLINIC | Age: 72
End: 2017-09-25
Payer: COMMERCIAL

## 2017-09-25 DIAGNOSIS — I48.91 ATRIAL FIBRILLATION, UNSPECIFIED TYPE (H): ICD-10-CM

## 2017-09-25 DIAGNOSIS — Z79.01 LONG-TERM (CURRENT) USE OF ANTICOAGULANTS: ICD-10-CM

## 2017-09-25 LAB — INR POINT OF CARE: 2.1 (ref 0.86–1.14)

## 2017-09-25 PROCEDURE — 85610 PROTHROMBIN TIME: CPT | Mod: QW

## 2017-09-25 PROCEDURE — 36416 COLLJ CAPILLARY BLOOD SPEC: CPT

## 2017-09-25 NOTE — PROGRESS NOTES
ANTICOAGULATION FOLLOW-UP CLINIC VISIT    Patient Name:  Zulema Nixon  Date:  9/25/2017  Contact Type:  Face to Face    SUBJECTIVE:     Patient Findings     Positives No Problem Findings           OBJECTIVE    INR Protime   Date Value Ref Range Status   09/25/2017 2.1 (A) 0.86 - 1.14 Final       ASSESSMENT / PLAN  No question data found.  Anticoagulation Summary as of 9/25/2017     INR goal 2.0-3.0   Today's INR 2.1   Maintenance plan 5 mg (5 mg x 1) on Wed, Sat; 7.5 mg (5 mg x 1.5) all other days   Full instructions 5 mg on Wed, Sat; 7.5 mg all other days   Weekly total 47.5 mg   No change documented Kylah Pierce RN   Plan last modified Nereyda Root, RN (3/1/2016)   Next INR check 10/17/2017   Target end date     Indications   Long-term (current) use of anticoagulants [Z79.01] [Z79.01]  Atrial fibrillation  unspecified type (H) [I48.91]         Anticoagulation Episode Summary     INR check location     Preferred lab     Send INR reminders to RV NURSE    Comments       Anticoagulation Care Providers     Provider Role Specialty Phone number    Madina Mercado MD [facility] [phone]            See the Encounter Report to view Anticoagulation Flowsheet and Dosing Calendar (Go to Encounters tab in chart review, and find the Anticoagulation Therapy Visit)    Dosage adjustment made based on physician directed care plan.    Kylah Pierce, RN

## 2017-09-25 NOTE — MR AVS SNAPSHOT
Zulema Nixon   9/25/2017 10:30 AM   Anticoagulation Therapy Visit    Description:  72 year old female   Provider:   ANTICOAGULATION CLINIC   Department:  [facility]           INR as of 9/25/2017     Today's INR 2.1      Anticoagulation Summary as of 9/25/2017     INR goal 2.0-3.0   Today's INR 2.1   Full instructions 5 mg on Wed, Sat; 7.5 mg all other days   Next INR check 10/17/2017    Indications   Long-term (current) use of anticoagulants [Z79.01] [Z79.01]  Atrial fibrillation  unspecified type (H) [I48.91]         Your next Anticoagulation Clinic appointment(s)     Oct 17, 2017 10:30 AM CDT   Anticoagulation Visit with  ANTICOAGULATION CLINIC   [facility] ([facility]    [address] [place] [id number] [phone]              Contact Numbers     Clinic Number:         September 2017 Details    Sun Mon Tue Wed Thu Fri Sat          1               2                 3               4               5               6               7               8               9                 10               11               12               13               14               15               16                 17               18               19               20               21               22               23                 24               25      7.5 mg   See details      26      7.5 mg         27      5 mg         28      7.5 mg         29      7.5 mg         30      5 mg          Date Details   09/25 This INR check               How to take your warfarin dose     To take:  5 mg Take 1 of the 5 mg tablets.    To take:  7.5 mg Take 1.5 of the 5 mg tablets.           October 2017 Details    Sun Mon Tue Wed Thu Fri Sat     1      7.5 mg         2      7.5 mg         3      7.5 mg         4      5 mg         5      7.5 mg         6      7.5 mg         7      5 mg           8      7.5 mg         9      7.5 mg         10      7.5 mg         11      5 mg          12      7.5 mg         13      7.5 mg         14      5 mg           15      7.5 mg         16      7.5 mg         17            18               19               20               21                 22               23               24               25               26               27               28                 29               30               31                    Date Details   No additional details    Date of next INR:  10/17/2017         How to take your warfarin dose     To take:  5 mg Take 1 of the 5 mg tablets.    To take:  7.5 mg Take 1.5 of the 5 mg tablets.

## 2017-10-17 ENCOUNTER — ANTICOAGULATION THERAPY VISIT (OUTPATIENT)
Dept: NURSING | Facility: CLINIC | Age: 72
End: 2017-10-17
Payer: COMMERCIAL

## 2017-10-17 DIAGNOSIS — Z79.01 LONG-TERM (CURRENT) USE OF ANTICOAGULANTS: ICD-10-CM

## 2017-10-17 DIAGNOSIS — I48.91 ATRIAL FIBRILLATION, UNSPECIFIED TYPE (H): ICD-10-CM

## 2017-10-17 LAB — INR POINT OF CARE: 2.9 (ref 0.86–1.14)

## 2017-10-17 PROCEDURE — 85610 PROTHROMBIN TIME: CPT | Mod: QW

## 2017-10-17 PROCEDURE — 36416 COLLJ CAPILLARY BLOOD SPEC: CPT

## 2017-10-17 NOTE — MR AVS SNAPSHOT
Zulema Nixon   10/17/2017 10:30 AM   Anticoagulation Therapy Visit    Description:  72 year old female   Provider:   ANTICOAGULATION CLINIC   Department:  [facility] Nurse           INR as of 10/17/2017     Today's INR 2.9      Anticoagulation Summary as of 10/17/2017     INR goal 2.0-3.0   Today's INR 2.9   Full instructions 5 mg on Wed, Sat; 7.5 mg all other days   Next INR check 11/28/2017    Indications   Long-term (current) use of anticoagulants [Z79.01] [Z79.01]  Atrial fibrillation  unspecified type (H) [I48.91]         Your next Anticoagulation Clinic appointment(s)     Nov 28, 2017 10:30 AM CST   Anticoagulation Visit with  ANTICOAGULATION CLINIC   [facility]    [address] [place] [id number] [phone]              Contact Numbers     Clinic Number:         October 2017 Details    Sun Mon Tue Wed Thu Fri Sat     1               2               3               4               5               6               7                 8               9               10               11               12               13               14                 15               16               17      7.5 mg   See details      18      5 mg         19      7.5 mg         20      7.5 mg         21      5 mg           22      7.5 mg         23      7.5 mg         24      7.5 mg         25      5 mg         26      7.5 mg         27      7.5 mg         28      5 mg           29      7.5 mg         30      7.5 mg         31      7.5 mg              Date Details   10/17 This INR check               How to take your warfarin dose     To take:  5 mg Take 1 of the 5 mg tablets.    To take:  7.5 mg Take 1.5 of the 5 mg tablets.           November 2017 Details    Sun Mon Tue Wed Thu Fri Sat        1      5 mg         2      7.5 mg         3      7.5 mg         4      5 mg           5      7.5 mg         6      7.5 mg         7      7.5 mg         8      5 mg          9      7.5 mg         10      7.5 mg         11      5 mg           12      7.5 mg         13      7.5 mg         14      7.5 mg         15      5 mg         16      7.5 mg         17      7.5 mg         18      5 mg           19      7.5 mg         20      7.5 mg         21      7.5 mg         22      5 mg         23      7.5 mg         24      7.5 mg         25      5 mg           26      7.5 mg         27      7.5 mg         28            29               30                  Date Details   No additional details    Date of next INR:  11/28/2017         How to take your warfarin dose     To take:  5 mg Take 1 of the 5 mg tablets.    To take:  7.5 mg Take 1.5 of the 5 mg tablets.

## 2017-10-17 NOTE — PROGRESS NOTES
ANTICOAGULATION FOLLOW-UP CLINIC VISIT    Patient Name:  Zulema Nixon  Date:  10/17/2017  Contact Type:  Face to Face    SUBJECTIVE:     Patient Findings     Positives No Problem Findings           OBJECTIVE    INR Protime   Date Value Ref Range Status   09/25/2017 2.1 (A) 0.86 - 1.14 Final       ASSESSMENT / PLAN  INR assessment THER    Recheck INR In: 6 WEEKS    INR Location Clinic      Anticoagulation Summary as of 10/17/2017     INR goal 2.0-3.0   Today's INR    Maintenance plan 5 mg (5 mg x 1) on Wed, Sat; 7.5 mg (5 mg x 1.5) all other days   Full instructions 5 mg on Wed, Sat; 7.5 mg all other days   Weekly total 47.5 mg   No change documented Torie Perales, RN   Plan last modified Nereyda Root RN (3/1/2016)   Next INR check 11/28/2017   Target end date     Indications   Long-term (current) use of anticoagulants [Z79.01] [Z79.01]  Atrial fibrillation  unspecified type (H) [I48.91]         Anticoagulation Episode Summary     INR check location     Preferred lab     Send INR reminders to RV NURSE    Comments       Anticoagulation Care Providers     Provider Role Specialty Phone number    Madina Mercado MD Responsible [facility] [phone]            See the Encounter Report to view Anticoagulation Flowsheet and Dosing Calendar (Go to Encounters tab in chart review, and find the Anticoagulation Therapy Visit)    Dosage adjustment made based on physician directed care plan.    PURVI Thorne, RN, PHN  [facility] [phone]

## 2017-11-28 ENCOUNTER — ANTICOAGULATION THERAPY VISIT (OUTPATIENT)
Dept: NURSING | Facility: CLINIC | Age: 72
End: 2017-11-28
Payer: COMMERCIAL

## 2017-11-28 DIAGNOSIS — Z79.01 LONG-TERM (CURRENT) USE OF ANTICOAGULANTS: ICD-10-CM

## 2017-11-28 DIAGNOSIS — Z23 NEED FOR PROPHYLACTIC VACCINATION AND INOCULATION AGAINST INFLUENZA: Primary | ICD-10-CM

## 2017-11-28 DIAGNOSIS — I48.91 ATRIAL FIBRILLATION, UNSPECIFIED TYPE (H): ICD-10-CM

## 2017-11-28 LAB — INR POINT OF CARE: 3 (ref 0.86–1.14)

## 2017-11-28 PROCEDURE — 85610 PROTHROMBIN TIME: CPT | Mod: QW

## 2017-11-28 PROCEDURE — 36416 COLLJ CAPILLARY BLOOD SPEC: CPT

## 2017-11-28 PROCEDURE — 90662 IIV NO PRSV INCREASED AG IM: CPT

## 2017-11-28 PROCEDURE — G0008 ADMIN INFLUENZA VIRUS VAC: HCPCS

## 2017-11-28 NOTE — PROGRESS NOTES
Injectable Influenza Immunization Documentation    1.  Is the person to be vaccinated sick today?   No    2. Does the person to be vaccinated have an allergy to a component   of the vaccine?   No  Egg Allergy Algorithm Link    3. Has the person to be vaccinated ever had a serious reaction   to influenza vaccine in the past?   No    4. Has the person to be vaccinated ever had Guillain-Barré syndrome?   No    Form completed by pt and Kylah Pierce RN, BSN  [facility] Triage              ANTICOAGULATION FOLLOW-UP CLINIC VISIT    Patient Name:  Zulema Nixon  Date:  11/28/2017  Contact Type:  Face to Face    SUBJECTIVE:     Patient Findings     Positives No Problem Findings           OBJECTIVE    INR Protime   Date Value Ref Range Status   11/28/2017 3.0 (A) 0.86 - 1.14 Final       ASSESSMENT / PLAN  No question data found.  Anticoagulation Summary as of 11/28/2017     INR goal 2.0-3.0   Today's INR 3.0   Maintenance plan 5 mg (5 mg x 1) on Wed, Sat; 7.5 mg (5 mg x 1.5) all other days   Full instructions 5 mg on Wed, Sat; 7.5 mg all other days   Weekly total 47.5 mg   Plan last modified Nereyda Root, RN (3/1/2016)   Next INR check 1/10/2018   Target end date     Indications   Long-term (current) use of anticoagulants [Z79.01] [Z79.01]  Atrial fibrillation  unspecified type (H) [I48.91]         Anticoagulation Episode Summary     INR check location     Preferred lab     Send INR reminders to RV NURSE    Comments       Anticoagulation Care Providers     Provider Role Specialty Phone number    Madina Mercado MD [facility] [phone]            See the Encounter Report to view Anticoagulation Flowsheet and Dosing Calendar (Go to Encounters tab in chart review, and find the Anticoagulation Therapy Visit)    Dosage adjustment made based on physician directed care plan.    Kylah Pierce, RN

## 2017-11-28 NOTE — MR AVS SNAPSHOT
Zulema Nixon   11/28/2017 10:30 AM   Anticoagulation Therapy Visit    Description:  72 year old female   Provider:  [facility]   Department:  [facility]           INR as of 11/28/2017     Today's INR 3.0      Anticoagulation Summary as of 11/28/2017     INR goal 2.0-3.0   Today's INR 3.0   Full instructions 5 mg on Wed, Sat; 7.5 mg all other days   Next INR check 1/10/2018    Indications   Long-term (current) use of anticoagulants [Z79.01] [Z79.01]  Atrial fibrillation  unspecified type (H) [I48.91]         Contact Numbers     Clinic Number:         November 2017 Details    Sun Mon Tue Wed Thu Fri Sat        1               2               3               4                 5               6               7               8               9               10               11                 12               13               14               15               16               17               18                 19               20               21               22               23               24               25                 26               27               28      7.5 mg   See details      29      5 mg         30      7.5 mg            Date Details   11/28 This INR check               How to take your warfarin dose     To take:  5 mg Take 1 of the 5 mg tablets.    To take:  7.5 mg Take 1.5 of the 5 mg tablets.           December 2017 Details    Sun Mon Tue Wed Thu Fri Sat          1      7.5 mg         2      5 mg           3      7.5 mg         4      7.5 mg         5      7.5 mg         6      5 mg         7      7.5 mg         8      7.5 mg         9      5 mg           10      7.5 mg         11      7.5 mg         12      7.5 mg         13      5 mg         14      7.5 mg         15      7.5 mg         16      5 mg           17      7.5 mg         18      7.5 mg         19      7.5 mg         20      5 mg         21      7.5 mg         22      7.5 mg         23      5 mg           24      7.5  mg         25      7.5 mg         26      7.5 mg         27      5 mg         28      7.5 mg         29      7.5 mg         30      5 mg           31      7.5 mg                Date Details   No additional details            How to take your warfarin dose     To take:  5 mg Take 1 of the 5 mg tablets.    To take:  7.5 mg Take 1.5 of the 5 mg tablets.           January 2018 Details    Sun Mon Tue Wed Thu Fri Sat      1      7.5 mg         2      7.5 mg         3      5 mg         4      7.5 mg         5      7.5 mg         6      5 mg           7      7.5 mg         8      7.5 mg         9      7.5 mg         10            11               12               13                 14               15               16               17               18               19               20                 21               22               23               24               25               26               27                 28               29               30               31                   Date Details   No additional details    Date of next INR:  1/10/2018         How to take your warfarin dose     To take:  5 mg Take 1 of the 5 mg tablets.    To take:  7.5 mg Take 1.5 of the 5 mg tablets.

## 2017-12-05 DIAGNOSIS — E11.42 TYPE 2 DIABETES MELLITUS WITH DIABETIC POLYNEUROPATHY, WITHOUT LONG-TERM CURRENT USE OF INSULIN (H): ICD-10-CM

## 2017-12-06 NOTE — TELEPHONE ENCOUNTER
Requested Prescriptions   Pending Prescriptions Disp Refills     metFORMIN (GLUCOPHAGE) 1000 MG tablet [Pharmacy Med Name: MetFORMIN HCl Oral Tablet 1000 MG] 225 tablet 0     Sig: TAKE 1 AND 1/2 TABLETS BY MOUTH WITH BREAKFAST AND TAKE 1 TABLET BY MOUTH WITH SUPPER    Biguanide Agents Passed    12/5/2017 12:06 PM       Passed - Patient's BP is less than 140/90    BP Readings from Last 3 Encounters:   06/24/17 126/82   02/22/17 104/60   12/12/16 130/82                Passed - Patient has documented LDL within the past 12 mos.    Recent Labs   Lab Test  06/24/17   0946   LDL  65            Passed - Patient has had a Microalbumin in the past 12 mos.    Recent Labs   Lab Test  06/24/17   0946   MICROL  11   UMALCR  7.59            Passed - Patient is age 10 or older       Passed - Patient has documented A1c within the specified period of time.    Recent Labs   Lab Test  06/24/17   0946   A1C  7.2*            Passed - Patient's CR is NOT>1.4 OR Patient's EGFR is NOT<45 within past 12 mos.    Recent Labs   Lab Test  06/24/17   0946   GFRESTIMATED  78   GFRESTBLACK  >90   GFR Calc         Recent Labs   Lab Test  06/24/17   0946   CR  0.73            Passed - Patient does NOT have a diagnosis of CHF.       Passed - Patient is not pregnant       Passed - Patient has not had a positive pregnancy test within the past 12 mos.        Passed - Recent (6 mos) or future visit with authorizing provider's specialty    Patient had office visit in the last 6 months or has a visit in the next 30 days with authorizing provider.  See chart review.             Prescription approved per [facility] Refill Protocol.  Patient has follow up scheduled for 1/10/2018.      Torie Perales, BS, RN, PHN  [facility]) [phone]

## 2018-01-06 ENCOUNTER — HEALTH MAINTENANCE LETTER (OUTPATIENT)
Age: 73
End: 2018-01-06

## 2018-01-10 ENCOUNTER — OFFICE VISIT (OUTPATIENT)
Dept: FAMILY MEDICINE | Facility: CLINIC | Age: 73
End: 2018-01-10
Payer: COMMERCIAL

## 2018-01-10 ENCOUNTER — ANTICOAGULATION THERAPY VISIT (OUTPATIENT)
Dept: FAMILY MEDICINE | Facility: CLINIC | Age: 73
End: 2018-01-10

## 2018-01-10 VITALS
OXYGEN SATURATION: 98 % | WEIGHT: 249 LBS | HEART RATE: 80 BPM | DIASTOLIC BLOOD PRESSURE: 68 MMHG | TEMPERATURE: 98.1 F | HEIGHT: 64 IN | BODY MASS INDEX: 42.51 KG/M2 | SYSTOLIC BLOOD PRESSURE: 112 MMHG

## 2018-01-10 DIAGNOSIS — E66.01 MORBID OBESITY DUE TO EXCESS CALORIES (H): ICD-10-CM

## 2018-01-10 DIAGNOSIS — R26.89 BALANCE PROBLEMS: ICD-10-CM

## 2018-01-10 DIAGNOSIS — E78.5 HYPERLIPIDEMIA LDL GOAL <100: ICD-10-CM

## 2018-01-10 DIAGNOSIS — Z02.89 ENCOUNTER FOR COMPLETION OF FORM WITH PATIENT: ICD-10-CM

## 2018-01-10 DIAGNOSIS — R60.0 BILATERAL LEG EDEMA: ICD-10-CM

## 2018-01-10 DIAGNOSIS — I87.8 VENOUS STASIS OF LOWER EXTREMITY: ICD-10-CM

## 2018-01-10 DIAGNOSIS — M75.102 ROTATOR CUFF SYNDROME, LEFT: ICD-10-CM

## 2018-01-10 DIAGNOSIS — Z12.11 SCREEN FOR COLON CANCER: ICD-10-CM

## 2018-01-10 DIAGNOSIS — N18.2 CKD (CHRONIC KIDNEY DISEASE) STAGE 2, GFR 60-89 ML/MIN: ICD-10-CM

## 2018-01-10 DIAGNOSIS — I48.19 PERSISTENT ATRIAL FIBRILLATION (H): ICD-10-CM

## 2018-01-10 DIAGNOSIS — Z12.31 VISIT FOR SCREENING MAMMOGRAM: ICD-10-CM

## 2018-01-10 DIAGNOSIS — B35.1 ONYCHOMYCOSIS: ICD-10-CM

## 2018-01-10 DIAGNOSIS — I48.91 ATRIAL FIBRILLATION, UNSPECIFIED TYPE (H): ICD-10-CM

## 2018-01-10 DIAGNOSIS — I10 ESSENTIAL HYPERTENSION WITH GOAL BLOOD PRESSURE LESS THAN 140/90: ICD-10-CM

## 2018-01-10 DIAGNOSIS — M17.0 OSTEOARTHRITIS OF BOTH KNEES, UNSPECIFIED OSTEOARTHRITIS TYPE: ICD-10-CM

## 2018-01-10 DIAGNOSIS — E11.42 TYPE 2 DIABETES MELLITUS WITH DIABETIC POLYNEUROPATHY, WITHOUT LONG-TERM CURRENT USE OF INSULIN (H): Primary | ICD-10-CM

## 2018-01-10 DIAGNOSIS — K58.0 IRRITABLE BOWEL SYNDROME WITH DIARRHEA: ICD-10-CM

## 2018-01-10 DIAGNOSIS — Z79.01 LONG-TERM (CURRENT) USE OF ANTICOAGULANTS: ICD-10-CM

## 2018-01-10 DIAGNOSIS — R29.898 LEG WEAKNESS, BILATERAL: ICD-10-CM

## 2018-01-10 LAB — INR POINT OF CARE: 2.4 (ref 0.86–1.14)

## 2018-01-10 PROCEDURE — 36416 COLLJ CAPILLARY BLOOD SPEC: CPT | Performed by: FAMILY MEDICINE

## 2018-01-10 PROCEDURE — 99215 OFFICE O/P EST HI 40 MIN: CPT | Performed by: FAMILY MEDICINE

## 2018-01-10 PROCEDURE — 85610 PROTHROMBIN TIME: CPT | Mod: QW | Performed by: FAMILY MEDICINE

## 2018-01-10 PROCEDURE — 99207 C FOOT EXAM  NO CHARGE: CPT | Mod: 25 | Performed by: FAMILY MEDICINE

## 2018-01-10 RX ORDER — WARFARIN SODIUM 5 MG/1
TABLET ORAL
Qty: 180 TABLET | Refills: 3 | Status: SHIPPED | OUTPATIENT
Start: 2018-01-10 | End: 2018-07-13

## 2018-01-10 RX ORDER — SIMVASTATIN 20 MG
TABLET ORAL
Qty: 90 TABLET | Refills: 1 | Status: SHIPPED | OUTPATIENT
Start: 2018-01-10 | End: 2018-07-13

## 2018-01-10 RX ORDER — KETOCONAZOLE 20 MG/G
CREAM TOPICAL 2 TIMES DAILY
Qty: 30 G | Refills: 1 | Status: SHIPPED | OUTPATIENT
Start: 2018-01-10 | End: 2019-01-14

## 2018-01-10 RX ORDER — ATENOLOL 100 MG/1
100 TABLET ORAL DAILY
Qty: 90 TABLET | Refills: 1 | Status: SHIPPED | OUTPATIENT
Start: 2018-01-10 | End: 2018-07-13

## 2018-01-10 RX ORDER — HYDROCHLOROTHIAZIDE 25 MG/1
25 TABLET ORAL DAILY
Qty: 90 TABLET | Refills: 1 | Status: SHIPPED | OUTPATIENT
Start: 2018-01-10 | End: 2018-07-13

## 2018-01-10 RX ORDER — AMLODIPINE BESYLATE 10 MG/1
10 TABLET ORAL DAILY
Qty: 90 TABLET | Refills: 1 | Status: SHIPPED | OUTPATIENT
Start: 2018-01-10 | End: 2018-04-09 | Stop reason: DRUGHIGH

## 2018-01-10 RX ORDER — LISINOPRIL 40 MG/1
40 TABLET ORAL DAILY
Qty: 90 TABLET | Refills: 1 | Status: SHIPPED | OUTPATIENT
Start: 2018-01-10 | End: 2018-07-13

## 2018-01-10 NOTE — MR AVS SNAPSHOT
After Visit Summary   1/10/2018    Zulema Nixon    MRN: [id number]           Patient Information     Date Of Birth          1945        Visit Information        Provider Department      1/10/2018 9:45 AM Madina Mercado MD [facility] [place]        Today's Diagnoses     Type 2 diabetes mellitus with diabetic polyneuropathy, without long-term current use of insulin (H)    -  1    Leg weakness, bilateral        Balance problems        Rotator cuff syndrome, left        Visit for screening mammogram        Morbid obesity due to excess calories (H)        Irritable bowel syndrome with diarrhea        Bilateral leg edema- has been to lymphedema clinic in past        Venous stasis of lower extremity        Hyperlipidemia LDL goal <100        Atrial fibrillation, unspecified type (H)        CKD (chronic kidney disease) stage 2, GFR 60-89 ml/min        Essential hypertension with goal blood pressure less than 140/90        Screen for colon cancer        Onychomycosis        Persistent atrial fibrillation (H)        Osteoarthritis of both knees, unspecified osteoarthritis type        Encounter for completion of form with patient          Care Instructions        Recheck your blood pressure in our pharmacy in 1 week or sooner if needed.  Have pharmacy send me their note.        KNEE  Rehabilitation Exercises              You can do the hamstring stretch right away. When the pain in your knee has decreased, you can do the quadriceps stretch and start strengthening the thigh muscles using the rest of the exercises.   Standing hamstring stretch: Put the heel of the leg on your injured side on a stool about 15 inches high. Keep your leg straight. Lean forward, bending at the hips, until you feel a mild stretch in the back of your thigh. Make sure you don't roll your shoulders or bend at the waist when doing this or you will stretch your lower back instead of your leg. Hold the stretch for 15 to  30 seconds. Repeat 3 times.   Quadriceps stretch: Stand an arm's length away from the wall with your injured leg farthest from the wall. Facing straight ahead, brace yourself by keeping one hand against the wall. With your other hand, grasp the ankle of your injured leg and pull your heel toward your buttocks. Don't arch or twist your back. Keep your knees together. Hold this stretch for 15 to 30 seconds.   Side-lying leg lift: Lie on your uninjured side. Tighten the front thigh muscles on your injured leg and lift that leg 8 to 10 inches away from the other leg. Keep the leg straight and lower it slowly. Do 3 sets of 10.   Quad sets: Sit on the floor with your injured leg straight and your other leg bent. Press the back of the knee of your injured leg against the floor by tightening the muscles on the top of your thigh. Hold this position 10 seconds. Relax. Do 3 sets of 10.   Straight leg raise: Lie on your back with your legs straight out in front of you. Bend the knee on your uninjured side and place the foot flat on the floor. Tighten the thigh muscle on your injured side and lift your leg about 8 inches off the floor. Keep your leg straight and your thigh muscle tight. Slowly lower your leg back down to the floor. Do 3 sets of 10.   Step-up: Stand with the foot of your injured leg on a support 3 to 5 inches high (like a small step or block of wood). Keep your other foot flat on the floor. Shift your weight onto the injured leg on the support. Straighten your injured leg as the other leg comes off the floor. Return to the starting position by bending your injured leg and slowly lowering your uninjured leg back to the floor. Do 3 sets of 10.   Wall squat with a ball: Stand with your back, shoulders, and head against a wall. Look straight ahead. Keep your shoulders relaxed and your feet 3 feet from the wall and shoulder's width apart. Place a soccer or basketball-sized ball behind your back. Keeping your back  against the wall, slowly squat down to a 45-degree angle. Your thighs will not yet be parallel to the floor. Hold this position for 10 seconds and then slowly slide back up the wall. Repeat 10 times. Build up to 3 sets of 10.   Knee stabilization: Wrap a piece of elastic tubing around the ankle of the uninjured leg. Tie a knot in the other end of the tubing and close it in a door.   0. Stand facing the door on the leg without tubing and bend your knee slightly, keeping your thigh muscles tight. While maintaining this position, move the leg with the tubing straight back behind you. Do 3 sets of 10.   0. Turn 90 degrees so the leg without tubing is closest to the door. Move the leg with tubing away from your body. Do 3 sets of 10.   0. Turn 90 degrees again so your back is to the door. Move the leg with tubing straight out in front of you. Do 3 sets of 10.   0. Turn your body 90 degrees again so the leg with tubing is closest to the door. Move the leg with tubing across your body. Do 3 sets of 10.   Hold onto a chair if you need help balancing. This exercise can be made even more challenging by standing on a pillow while you move the leg with tubing.   Resisted terminal knee extension: Make a loop from a piece of elastic tubing by tying a knot in both ends. Close both knots in a door. Step into the loop so the tubing is around the back of your injured leg. Lift the other foot off the ground. Hold onto a chair for balance, if needed. Bend the knee on the leg with tubing about 45 degrees. Slowly straighten your leg, keeping your thigh muscle tight as you do this. Do this 10 times. Do 3 sets. An easier way to do this is to stand on both legs for better support while you do the exercise.   Standing calf stretch: Stand facing a wall with your hands on the wall at about eye level. Keep your injured leg back with your heel on the floor. Keep the other leg forward with the knee bent. Turn your back foot slightly inward (as if  you were pigeon-toed). Slowly lean into the wall until you feel a stretch in the back of your calf. Hold the stretch for 15 to 30 seconds. Return to the starting position. Repeat 3 times. Do this exercise several times each day.   Clam exercise: Lie on your uninjured side with your hips and knees bent and feet together. Slowly raise your top leg toward the ceiling while keeping your heels touching each other. Hold for 2 seconds and lower slowly. Do 3 sets of 10 repetitions.   Iliotibial band stretch: Side-bending: Cross one leg in front of the other leg and lean in the opposite direction from the front leg. Reach the arm on the side of the back leg over your head while you do this. Hold this position for 15 to 30 seconds. Return to the starting position. Repeat 3 times and then switch legs and repeat the exercise.   Published by [employer]  This content is reviewed periodically and is subject to change as new health information becomes available. The information is intended to inform and educate and is not a replacement for medical evaluation, advice, diagnosis or treatment by a healthcare professional.   Written by Norma Gutierrez MS, PT, and Rosanna Brandon, PT, [facility], [facility], for [employer]   ? 2010 [facility] and/or its affiliates. All Rights Reserved.   Copyright   Clinical Reference Systems 2011  Adult Health Advisor                                   Follow-ups after your visit        Additional Services     CARE COORDINATION REFERRAL       Services are provided by a Care Coordinator for people with complex needs such as: medical, social, or financial troubles.  The Care Coordinator works with the patient and their Primary Care Provider to determine health goals, obtain resources, achieve outcomes, and develop care plans that help coordinate the patient's care.     Reason for Referral: pt desires referral to [facility] program     Provide additional details for Care Coordination to best meet the patient's  current needs: pt desires referral to  Wellness program for exercise and nutrition and group program, if still availab.e     Clinical Staff have discussed the Care Coordination Referral with the patient and/or caregiver: yes            GASTROENTEROLOGY ADULT REF PROCEDURE ONLY       Last Lab Result: Creatinine (mg/dL)       Date                     Value                 06/24/2017               0.73             ----------  There is no height or weight on file to calculate BMI.     Needed:  No  Language:  English    Patient will be contacted to schedule procedure.     Please be aware that coverage of these services is subject to the terms and limitations of your health insurance plan.  Call member services at your health plan with any benefit or coverage questions.  Any procedures must be performed at a [place] facility OR coordinated by your clinic's referral office.    Please bring the following with you to your appointment:    (1) Any X-Rays, CTs or MRIs which have been performed.  Contact the facility where they were done to arrange for  prior to your scheduled appointment.    (2) List of current medications   (3) This referral request   (4) Any documents/labs given to you for this referral            INR CLINIC REFERRAL       Your provider has referred you to INR Services.    Please be aware that coverage of these services is subject to the terms and limitations of your health insurance plan.  Call member services at your health plan with any benefit or coverage questions.    Indication for Anticoagulation: Atrial Fibrillation  If nonstandard INR is desired, indicate goal range and explanation: 2.0-3.0  Expected Duration of Therapy: Lifetime            NEUROLOGY ADULT REFERRAL       Your provider has referred you for the following:   Consult at [facility]: [facility] [phone]   [email or web address]  Or   [facility]:  [facility] - [place] [phone]   [email or web address]  General Neurology  Or   [facility]: [facility] [place] [phone]   [email or web address]  [place] [phone]   [email or web address]  [place] [phone]   [email or web address]    Please be aware that coverage of these services is subject to the terms and limitations of your health insurance plan.  Call member services at your health plan with any benefit or coverage questions.      Please bring the following with you to your appointment:    (1) Any X-Rays, CTs or MRIs which have been performed.  Contact the facility where they were done to arrange for  prior to your scheduled appointment.    (2) List of current medications  (3) This referral request   (4) Any documents/labs given to you for this referral            PHYSICAL THERAPY REFERRAL       *This therapy referral will be filtered to a centralized scheduling office at [facility] and the patient will receive a call to schedule an appointment at a [place] location most convenient for them. *     [facility] provides Physical Therapy evaluation and treatment and many specialty services across the [place] system.  If requesting a specialty program, please choose from the list below.    If you have not heard from the scheduling office within 2 business days, please call [phone] for all locations, with the exception of [place], please call [phone].  Treatment: Evaluation & Treatment for bilateral leg weakness , left rotator cuff issues and feeling off balance   Special Instructions/Modalities: general physical therapy and vestibular therapy   Special Programs: Balance/Vestibular     Please be aware that coverage of these services is subject to the terms and limitations of your health insurance  "plan.  Call member services at your health plan with any benefit or coverage questions.      **Note to Provider:  If you are referring outside of [place] for the therapy appointment, please list the name of the location in the \"special instructions\" above, print the referral and give to the patient to schedule the appointment.                  Your next 10 appointments already scheduled     Jan 17, 2018 10:50 AM CST   (Arrive by 10:35 AM)   MAMMOGRAM with [email or web address]   [facility] ([facility])    [address] [facility], [address]  [facility] [id number] [phone]           Do not wear any body powder, lotions, deodorant or perfume the day of the exam. Bring a list of all medications, especially hormones.  If your last mammogram was not done at [place], please bring your mammogram films. We will need the name of your MD/PA to send a copy of your report.            Feb 21, 2018 10:45 AM CST   Anticoagulation Visit with [facility] ANT[facility]AGULATION CLINIC   [facility]    [address] [place] [id number] [phone]              Future tests that were ordered for you today     Open Future Orders        Priority Expected Expires Ordered    MA SCREENING DIGITAL BILAT - Future  (s+30) Routine  1/10/2019 1/10/2018            Who to contact     If you have questions or need follow up information about today's clinic visit or your schedule please contact [facility] directly at [phone].  Normal or non-critical lab and imaging results will be communicated to you by MyChart, letter or phone within 4 business days after the clinic has received the results. If you do not hear from us within 7 days, please contact the clinic through MyChart or phone. If you have a critical or abnormal lab result, we will notify you by phone as soon as possible.  Submit refill requests through [email or web address] or call your pharmacy and they will " "forward the refill request to us. Please allow 3 business days for your refill to be completed.          Additional Information About Your Visit        [employer] Information     [employer] gives you secure access to your electronic health record. If you see a primary care provider, you can also send messages to your care team and make appointments. If you have questions, please call your primary care clinic.  If you do not have a primary care provider, please call [phone] and they will assist you.        Care EveryWhere ID     This is your Care EveryWhere ID. This could be used by other organizations to access your [place] medical records  [id number]        Your Vitals Were     Pulse Temperature Height Pulse Oximetry BMI (Body Mass Index)       80 98.1  F (36.7  C) (Oral) 5' 3.75\" (1.619 m) 98% 43.08 kg/m2        Blood Pressure from Last 3 Encounters:   01/10/18 112/68   06/24/17 126/82   02/22/17 104/60    Weight from Last 3 Encounters:   01/10/18 249 lb (112.9 kg)   06/24/17 254 lb (115.2 kg)   02/22/17 254 lb (115.2 kg)              We Performed the Following     CARE COORDINATION REFERRAL     FOOT EXAM     GASTROENTEROLOGY ADULT REF PROCEDURE ONLY     INR CLINIC REFERRAL     NEUROLOGY ADULT REFERRAL     PHYSICAL THERAPY REFERRAL          Today's Medication Changes          These changes are accurate as of: 1/10/18 11:59 PM.  If you have any questions, ask your nurse or doctor.               Start taking these medicines.        Dose/Directions    hydrochlorothiazide 25 MG tablet   Commonly known as:  HYDRODIURIL   Used for:  Essential hypertension with goal blood pressure less than 140/90, CKD (chronic kidney disease) stage 2, GFR 60-89 ml/min   Started by:  Madina Mercado MD        Dose:  25 mg   Take 1 tablet (25 mg) by mouth daily   Quantity:  90 tablet   Refills:  1         These medicines have changed or have updated prescriptions.        Dose/Directions    metFORMIN 1000 MG tablet   Commonly " known as:  GLUCOPHAGE   This may have changed:  See the new instructions.   Used for:  Type 2 diabetes mellitus with diabetic polyneuropathy, without long-term current use of insulin (H)   Changed by:  Madina Mercado MD        TAKE 1 AND 1/2 TABLETS BY MOUTH WITH BREAKFAST AND TAKE 1 TABLET BY MOUTH WITH SUPPER   Quantity:  225 tablet   Refills:  1         Stop taking these medicines if you haven't already. Please contact your care team if you have questions.     chlorthalidone 25 MG tablet   Commonly known as:  HYGROTON   Stopped by:  Madina Mercado MD                Where to get your medicines      These medications were sent to [id number] - [place], [place] - [address]  [address] [address]     Phone:  [phone]     amLODIPine 10 MG tablet    atenolol 100 MG tablet    hydrochlorothiazide 25 MG tablet    ketoconazole 2 % cream    lisinopril 40 MG tablet    metFORMIN 1000 MG tablet    simvastatin 20 MG tablet    warfarin 5 MG tablet                Primary Care Provider Office Phone # Fax #    Madina Mercado [phone] [address] [facility] [address]        Equal Access to Services     [facility] AH: Hadii aad ku hadasho Soomaali, waaxda luqadaha, qaybta kaalmada adeegyada, waxay leesa hayshern sandra doty laprimitivo monzon. So [facility] [phone].    ATENCIÓN: Si habla español, tiene a webster disposición servicios gratuitos de asistencia lingüística. [facility] [phone].    We comply with applicable federal civil rights laws and [place] laws. We do not discriminate on the basis of race, color, national origin, age, disability, sex, sexual orientation, or gender identity.            Thank you!     Thank you for choosing [facility]  for your care. Our goal is always to provide you with excellent care. Hearing back from our patients is one way we can continue to improve our services. Please take a few minutes to  complete the written survey that you may receive in the mail after your visit with us. Thank you!             Your Updated Medication List - Protect others around you: Learn how to safely use, store and throw away your medicines at www.disposemymeds.org.          This list is accurate as of: 1/10/18 11:59 PM.  Always use your most recent med list.                   Brand Name Dispense Instructions for use Diagnosis    amLODIPine 10 MG tablet    NORVASC    90 tablet    Take 1 tablet (10 mg) by mouth daily    Essential hypertension with goal blood pressure less than 140/90       aspirin 81 MG tablet     0    1 tab po QD (Once per day)    Type II or unspecified type diabetes mellitus without mention of complication, not stated as uncontrolled       atenolol 100 MG tablet    TENORMIN    90 tablet    Take 1 tablet (100 mg) by mouth daily    Essential hypertension with goal blood pressure less than 140/90       Biotin 10 MG Tabs tablet      1 TABLET DAILY        blood glucose monitoring lancets     100 each    Use to test blood sugar 1 times daily or as directed.    Type 2 diabetes mellitus with other circulatory complications       * blood glucose monitoring test strip    no brand specified     Compact Plus test strips Use to test blood sugars 1 times daily or as directed        * blood glucose monitoring test strip    JAYDE CONTOUR NEXT    100 strip    Use to test blood sugar 1 times daily or as directed.    Type 2 diabetes mellitus with other circulatory complications       CALTRATE 600 + D 600-200 MG-IU Tabs     60    1 tablet twice daily        CENTRUM SILVER per tablet     3 MONTHS    ONE DAILY        hydrochlorothiazide 25 MG tablet    HYDRODIURIL    90 tablet    Take 1 tablet (25 mg) by mouth daily    Essential hypertension with goal blood pressure less than 140/90, CKD (chronic kidney disease) stage 2, GFR 60-89 ml/min       ketoconazole 2 % cream    NIZORAL    30 g    Apply topically 2 times daily Apply to  affected nails daily    Onychomycosis, Type 2 diabetes mellitus with diabetic polyneuropathy, without long-term current use of insulin (H)       lisinopril 40 MG tablet    PRINIVIL/ZESTRIL    90 tablet    Take 1 tablet (40 mg) by mouth daily    Essential hypertension with goal blood pressure less than 140/90       metFORMIN 1000 MG tablet    GLUCOPHAGE    225 tablet    TAKE 1 AND 1/2 TABLETS BY MOUTH WITH BREAKFAST AND TAKE 1 TABLET BY MOUTH WITH SUPPER    Type 2 diabetes mellitus with diabetic polyneuropathy, without long-term current use of insulin (H)       omega 3 1000 MG Caps     90 capsule    Take 1 g by mouth daily        simvastatin 20 MG tablet    ZOCOR    90 tablet    TAKE ONE TABLET BY MOUTH EVERY NIGHT AT BEDTIME    Hyperlipidemia LDL goal <100       vitamin D 1000 UNITS capsule     3 MONTHS    1 CAPSULE DAILY    Nutrition disorder       warfarin 5 MG tablet    COUMADIN    180 tablet    TAKE 1-2 TABLETS (5-10 MG) BY MOUTH DAILY AS INSTRUCTED    Persistent atrial fibrillation (H)       * Notice:  This list has 2 medication(s) that are the same as other medications prescribed for you. Read the directions carefully, and ask your doctor or other care provider to review them with you.

## 2018-01-10 NOTE — MR AVS SNAPSHOT
Zulema Nixon   1/10/2018   Anticoagulation Therapy Visit    Description:  72 year old female   Provider:  Madina Mercado MD   Department:   Family Practice           INR as of 1/10/2018     Today's INR 2.4      Anticoagulation Summary as of 1/10/2018     INR goal 2.0-3.0   Today's INR 2.4   Full instructions 5 mg on Wed, Sat; 7.5 mg all other days   Next INR check 2/21/2018    Indications   Long-term (current) use of anticoagulants [Z79.01] [Z79.01]  Atrial fibrillation  unspecified type (H) [I48.91]         Your next Anticoagulation Clinic appointment(s)     Feb 21, 2018 10:45 AM CST   Anticoagulation Visit with  ANTICOAGULATION CLINIC   [facility] ([facility]    [address] [place] [id number]   [phone]              January 2018 Details    Sun Mon Tue Wed Thu Fri Sat      1               2               3               4               5               6                 7               8               9               10      5 mg   See details      11      7.5 mg         12      7.5 mg         13      5 mg           14      7.5 mg         15      7.5 mg         16      7.5 mg         17      5 mg         18      7.5 mg         19      7.5 mg         20      5 mg           21      7.5 mg         22      7.5 mg         23      7.5 mg         24      5 mg         25      7.5 mg         26      7.5 mg         27      5 mg           28      7.5 mg         29      7.5 mg         30      7.5 mg         31      5 mg             Date Details   01/10 This INR check               How to take your warfarin dose     To take:  5 mg Take 1 of the 5 mg tablets.    To take:  7.5 mg Take 1.5 of the 5 mg tablets.           February 2018 Details    Sun Mon Tue Wed Thu Fri Sat         1      7.5 mg         2      7.5 mg         3      5 mg           4      7.5 mg         5      7.5 mg         6      7.5 mg         7      5 mg         8      7.5 mg         9       7.5 mg         10      5 mg           11      7.5 mg         12      7.5 mg         13      7.5 mg         14      5 mg         15      7.5 mg         16      7.5 mg         17      5 mg           18      7.5 mg         19      7.5 mg         20      7.5 mg         21            22               23               24                 25               26               27               28                   Date Details   No additional details    Date of next INR:  2/21/2018         How to take your warfarin dose     To take:  5 mg Take 1 of the 5 mg tablets.    To take:  7.5 mg Take 1.5 of the 5 mg tablets.

## 2018-01-10 NOTE — NURSING NOTE
"Chief Complaint   Patient presents with     Recheck Medication       Initial /82 (BP Location: Right arm, Patient Position: Chair, Cuff Size: Adult Large)  Pulse 80  Temp 98.1  F (36.7  C) (Oral)  Ht 5' 3.75\" (1.619 m)  Wt 249 lb (112.9 kg)  SpO2 98%  BMI 43.08 kg/m2 Estimated body mass index is 43.08 kg/(m^2) as calculated from the following:    Height as of this encounter: 5' 3.75\" (1.619 m).    Weight as of this encounter: 249 lb (112.9 kg).  Medication Reconciliation: complete   Felecia Powell CMA  "

## 2018-01-10 NOTE — PROGRESS NOTES
ANTICOAGULATION FOLLOW-UP CLINIC VISIT    Patient Name:  Zulema Nixon  Date:  1/10/2018  Contact Type:  Face to Face    SUBJECTIVE:     Patient Findings     Positives No Problem Findings           OBJECTIVE    INR Protime   Date Value Ref Range Status   01/10/2018 2.4 (A) 0.86 - 1.14 Final       ASSESSMENT / PLAN  No question data found.  Anticoagulation Summary as of 1/10/2018     INR goal 2.0-3.0   Today's INR 2.4   Maintenance plan 5 mg (5 mg x 1) on Wed, Sat; 7.5 mg (5 mg x 1.5) all other days   Full instructions 5 mg on Wed, Sat; 7.5 mg all other days   Weekly total 47.5 mg   No change documented Kylah Pierce RN   Plan last modified Nereyda Root, RN (3/1/2016)   Next INR check 2/21/2018   Target end date     Indications   Long-term (current) use of anticoagulants [Z79.01] [Z79.01]  Atrial fibrillation  unspecified type (H) [I48.91]         Anticoagulation Episode Summary     INR check location     Preferred lab     Send INR reminders to RV NURSE    Comments       Anticoagulation Care Providers     Provider Role Specialty Phone number    Madina Mercado MD [facility] [phone]            See the Encounter Report to view Anticoagulation Flowsheet and Dosing Calendar (Go to Encounters tab in chart review, and find the Anticoagulation Therapy Visit)    Dosage adjustment made based on physician directed care plan.    Kylah Pierce, RN

## 2018-01-10 NOTE — PATIENT INSTRUCTIONS
Recheck your blood pressure in our pharmacy in 1 week or sooner if needed.  Have pharmacy send me their note.        KNEE  Rehabilitation Exercises              You can do the hamstring stretch right away. When the pain in your knee has decreased, you can do the quadriceps stretch and start strengthening the thigh muscles using the rest of the exercises.   Standing hamstring stretch: Put the heel of the leg on your injured side on a stool about 15 inches high. Keep your leg straight. Lean forward, bending at the hips, until you feel a mild stretch in the back of your thigh. Make sure you don't roll your shoulders or bend at the waist when doing this or you will stretch your lower back instead of your leg. Hold the stretch for 15 to 30 seconds. Repeat 3 times.   Quadriceps stretch: Stand an arm's length away from the wall with your injured leg farthest from the wall. Facing straight ahead, brace yourself by keeping one hand against the wall. With your other hand, grasp the ankle of your injured leg and pull your heel toward your buttocks. Don't arch or twist your back. Keep your knees together. Hold this stretch for 15 to 30 seconds.   Side-lying leg lift: Lie on your uninjured side. Tighten the front thigh muscles on your injured leg and lift that leg 8 to 10 inches away from the other leg. Keep the leg straight and lower it slowly. Do 3 sets of 10.   Quad sets: Sit on the floor with your injured leg straight and your other leg bent. Press the back of the knee of your injured leg against the floor by tightening the muscles on the top of your thigh. Hold this position 10 seconds. Relax. Do 3 sets of 10.   Straight leg raise: Lie on your back with your legs straight out in front of you. Bend the knee on your uninjured side and place the foot flat on the floor. Tighten the thigh muscle on your injured side and lift your leg about 8 inches off the floor. Keep your leg straight and your thigh muscle tight. Slowly  lower your leg back down to the floor. Do 3 sets of 10.   Step-up: Stand with the foot of your injured leg on a support 3 to 5 inches high (like a small step or block of wood). Keep your other foot flat on the floor. Shift your weight onto the injured leg on the support. Straighten your injured leg as the other leg comes off the floor. Return to the starting position by bending your injured leg and slowly lowering your uninjured leg back to the floor. Do 3 sets of 10.   Wall squat with a ball: Stand with your back, shoulders, and head against a wall. Look straight ahead. Keep your shoulders relaxed and your feet 3 feet from the wall and shoulder's width apart. Place a soccer or basketball-sized ball behind your back. Keeping your back against the wall, slowly squat down to a 45-degree angle. Your thighs will not yet be parallel to the floor. Hold this position for 10 seconds and then slowly slide back up the wall. Repeat 10 times. Build up to 3 sets of 10.   Knee stabilization: Wrap a piece of elastic tubing around the ankle of the uninjured leg. Tie a knot in the other end of the tubing and close it in a door.   0. Stand facing the door on the leg without tubing and bend your knee slightly, keeping your thigh muscles tight. While maintaining this position, move the leg with the tubing straight back behind you. Do 3 sets of 10.   0. Turn 90 degrees so the leg without tubing is closest to the door. Move the leg with tubing away from your body. Do 3 sets of 10.   0. Turn 90 degrees again so your back is to the door. Move the leg with tubing straight out in front of you. Do 3 sets of 10.   0. Turn your body 90 degrees again so the leg with tubing is closest to the door. Move the leg with tubing across your body. Do 3 sets of 10.   Hold onto a chair if you need help balancing. This exercise can be made even more challenging by standing on a pillow while you move the leg with tubing.   Resisted terminal knee extension:  Make a loop from a piece of elastic tubing by tying a knot in both ends. Close both knots in a door. Step into the loop so the tubing is around the back of your injured leg. Lift the other foot off the ground. Hold onto a chair for balance, if needed. Bend the knee on the leg with tubing about 45 degrees. Slowly straighten your leg, keeping your thigh muscle tight as you do this. Do this 10 times. Do 3 sets. An easier way to do this is to stand on both legs for better support while you do the exercise.   Standing calf stretch: Stand facing a wall with your hands on the wall at about eye level. Keep your injured leg back with your heel on the floor. Keep the other leg forward with the knee bent. Turn your back foot slightly inward (as if you were pigeon-toed). Slowly lean into the wall until you feel a stretch in the back of your calf. Hold the stretch for 15 to 30 seconds. Return to the starting position. Repeat 3 times. Do this exercise several times each day.   Clam exercise: Lie on your uninjured side with your hips and knees bent and feet together. Slowly raise your top leg toward the ceiling while keeping your heels touching each other. Hold for 2 seconds and lower slowly. Do 3 sets of 10 repetitions.   Iliotibial band stretch: Side-bending: Cross one leg in front of the other leg and lean in the opposite direction from the front leg. Reach the arm on the side of the back leg over your head while you do this. Hold this position for 15 to 30 seconds. Return to the starting position. Repeat 3 times and then switch legs and repeat the exercise.   Published by [facility].  This content is reviewed periodically and is subject to change as new health information becomes available. The information is intended to inform and educate and is not a replacement for medical evaluation, advice, diagnosis or treatment by a healthcare professional.   Written by Norma Gutierrez, MS, PT, and Rosanna Brandon, PT, [facility], [facility], for  [employer].   ? 2010 [facility] and/or its affiliates. All Rights Reserved.   Copyright   [employer] 2011  Adult Health Advisor

## 2018-01-10 NOTE — PROGRESS NOTES
SUBJECTIVE:                                                    Zulema Nixon is a 72 year old female who presents to clinic today for the following health issues:    Getting some achiness and weakness in her legs with walking at the mall,etc.  Then knees start to hurt.  Afraid of falling, so hasn't been walking outside.  Decreased physical activity over previous.  Feels like her balance is off a bit. Hx of osteoporosis.   Hasn't been exercising regularly.  Usually wears compression hose, but didn't today, so I could check her feet.  Saw a Neurologist for her right hand numbness and carpal tunnel syndrome and has been wearing a night-time brace and hasn't had the numbness that she had before.   Desires to have a handicapped sticker for parking in winter.  No issues with tremors.     Feels like left shoulder still weak and decreased range of motion. Desires PT for that as well.       Diabetes Follow-up:       Patient is checking blood sugars: 3 times a week, results are averaging 170 - fasting in the am's.     Diabetic concerns: None     Symptoms of hypoglycemia (low blood sugar): none     Paresthesias (numbness or burning in feet) or sores: No     Date of last diabetic eye exam: 02/23/2017    Was hard leaving the [employer] alone this year.     Lab Results   Component Value Date    A1C 7.2 06/24/2017    A1C 7.2 12/12/2016    A1C 6.8 05/24/2016    A1C 7.0 12/10/2015    A1C 6.9 04/22/2015       Hyperlipidemia Follow-Up:       Rate your low fat/cholesterol diet?: fair    Taking statin?  Yes, possible muscle aches from statin    Other lipid medications/supplements?:  Fish oil/Omega 3    Is  fasting today.   Recent Labs   Lab Test  06/24/17   0946  05/24/16   0929   04/22/15   1002  11/13/14   0957   CHOL  122  109   < >  111  128   HDL  33*  35*   < >  33*  37*   LDL  65  55   < >  55  66   TRIG  119  97   < >  114  125   CHOLHDLRATIO   --    --    --   3.4  3.5    < > = values in this interval not displayed.           Lab Results   Component Value Date    CHOL 122 06/24/2017    CHOL 109 05/24/2016     Lab Results   Component Value Date    HDL 33 06/24/2017    HDL 35 05/24/2016     Lab Results   Component Value Date    LDL 65 06/24/2017    LDL 55 05/24/2016     Lab Results   Component Value Date    TRIG 119 06/24/2017    TRIG 97 05/24/2016     Lab Results   Component Value Date    CHOLHDLRATIO 3.4 04/22/2015    CHOLHDLRATIO 3.5 11/13/2014       Hypertension Follow-up      Outpatient blood pressures are being checked at home.  Results are usually around 135/80.    Low Salt Diet: low salt    Chlorthalidone has become too expensive for her $57-$60 /month. Would like to switch to HCTZ.      BP Readings from Last 5 Encounters:   01/10/18 144/82   06/24/17 126/82   02/22/17 104/60   12/12/16 130/82   09/12/16 136/80       Chronic Kidney Disease Follow-up      Current NSAID use?  Yes:   Aspirin 81 mg  Frequency: 1 tablet daily    Lab Results   Component Value Date    CR 0.73 06/24/2017           Amount of exercise or physical activity: trying to but walking is getting more difficult    Problems taking medications regularly: No    Medication side effects: none    Diet: eating a lot more vegetables and less meat      Problem list and histories reviewed & adjusted, as indicated.  Additional history: as documented    Reviewed and updated as needed this visit by clinical staffTobacco  Allergies  Meds  Med Hx  Surg Hx  Fam Hx  Soc Hx      Reviewed and updated as needed this visit by Provider       Patient Active Problem List   Diagnosis     Morbid obesity due to excess calories (H)     ARMAND (obstructive sleep apnea)     Atrial fibrillation, unspecified type (H)     Hyperlipidemia LDL goal <100     Status post laparoscopic cholecystectomy     CKD (chronic kidney disease) stage 2, GFR 60-89 ml/min     Venous stasis of lower extremity     Advanced directives, counseling/discussion     Sensorineural hearing loss of both ears - using  hearing aids     Bilateral leg edema- has been to lymphedema clinic in past     Essential hypertension with goal blood pressure less than 140/90     Type 2 diabetes mellitus with other circulatory complications, 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)     Long-term (current) use of anticoagulants [Z79.01]     Onychomycosis     Type 2 diabetes mellitus with diabetic polyneuropathy, without long-term current use of insulin (H)       Current Outpatient Prescriptions   Medication Sig Dispense Refill     metFORMIN (GLUCOPHAGE) 1000 MG tablet TAKE 1 AND 1/2 TABLETS BY MOUTH WITH BREAKFAST AND TAKE 1 TABLET BY MOUTH WITH SUPPER 225 tablet 0     blood glucose monitoring (JAYDE CONTOUR NEXT) test strip Use to test blood sugar 1 times daily or as directed. 100 strip 3     blood glucose monitoring (JAYDE MICROLET) lancets Use to test blood sugar 1 times daily or as directed. 100 each 1     amLODIPine (NORVASC) 10 MG tablet Take 1 tablet (10 mg) by mouth daily 90 tablet 1     ketoconazole (NIZORAL) 2 % cream Apply topically 2 times daily Apply to affected nails daily 30 g 1     warfarin (COUMADIN) 5 MG tablet TAKE 1-2 TABLETS (5-10 MG) BY MOUTH DAILY AS INSTRUCTED 180 tablet 3     simvastatin (ZOCOR) 20 MG tablet TAKE ONE TABLET BY MOUTH EVERY NIGHT AT BEDTIME 90 tablet 1     lisinopril (PRINIVIL/ZESTRIL) 40 MG tablet Take 1 tablet (40 mg) by mouth daily 90 tablet 1     chlorthalidone (HYGROTON) 25 MG tablet Take 1 tablet (25 mg) by mouth daily 90 tablet 1     atenolol (TENORMIN) 100 MG tablet Take 1 tablet (100 mg) by mouth daily 90 tablet 1     blood glucose monitoring (NO BRAND SPECIFIED) test strip Compact Plus test strips Use to test blood sugars 1 times daily or as directed       omega 3 1000 MG CAPS Take 1 g by mouth daily 90 capsule      BIOTIN 10 MG OR TABS 1 TABLET DAILY       VITAMIN D 1000 UNIT OR CAPS 1 CAPSULE DAILY 3 MONTHS 1 YEAR     CENTRUM SILVER OR TABS ONE DAILY 3 MONTHS 1 YEAR     CALTRATE 600 + D 600-200 MG-IU  "OR TABS 1 tablet twice daily 60 11     ASPIRIN 81 MG OR TABS 1 tab po QD (Once per day) 0 0          Allergies   Allergen Reactions     Tetracycline      lightheaded          ROS:getting some irritable bowel syndrome symptoms with diarrhea in the am's. Unpredictable - doesn't seem to matter what she has for breakfast.  Tried to eliminate dairy and    ROS: 12 point ROS neg other than the symptoms noted above     OBJECTIVE:                                                    /82 (BP Location: Right arm, Patient Position: Chair, Cuff Size: Adult Large)  Pulse 80  Temp 98.1  F (36.7  C) (Oral)  Ht 5' 3.75\" (1.619 m)  Wt 249 lb (112.9 kg)  SpO2 98%  BMI 43.08 kg/m2  Body mass index is 43.08 kg/(m^2).   GENERAL: healthy, alert, well nourished, well hydrated, no distress  HENT: ear canals- normal; TMs- normal; Nose- normal; Mouth- no ulcers, no lesions  NECK: no tenderness, no adenopathy, no asymmetry, no masses, no stiffness; thyroid- normal to palpation  RESP: lungs clear to auscultation - no rales, no rhonchi, no wheezes  CV: regular rates and rhythm, normal S1 S2, no S3 or S4 and no murmur, no click or rub -  Breasts are symmetric.  No dominant, discrete, fixed  or suspicious masses are noted.  Mild symmetric fibrocystic densities are noted in both upper outer quadrants. No skin or nipple changes or axillary nodes. Self exam is taught and encouraged. Mammogram - is due and ordered today.  ABDOMEN: soft, no tenderness, no  hepatosplenomegaly, no masses, normal bowel sounds  MS: extremities- no gross deformities noted, no edema  NEURO: strength and tone- normal, sensory exam- grossly normal, mentation- intact, speech- normal, reflexes- symmetric  Diabetic foot exam: normal DP and PT pulses, no trophic changes or ulcerative lesions, normal sensory exam and normal monofilament exam    Diagnostic Test Results:  See [facility] orders.        ASSESSMENT/PLAN:                                                        " ICD-10-CM    1. Type 2 diabetes mellitus with diabetic polyneuropathy, without long-term current use of insulin (H) E11.42 HEMOGLOBIN A1C     CBC with platelets differential     Comprehensive metabolic panel     Albumin Random Urine Quantitative with Creat Ratio     UA reflex to Microscopic and Culture     FOOT EXAM     CARE COORDINATION REFERRAL     metFORMIN (GLUCOPHAGE) 1000 MG tablet     ketoconazole (NIZORAL) 2 % cream   2. Leg weakness, bilateral R29.898 PHYSICAL THERAPY REFERRAL     Magnesium     NEUROLOGY ADULT REFERRAL     CARE COORDINATION REFERRAL   3. Balance problems R26.89 PHYSICAL THERAPY REFERRAL     CARE COORDINATION REFERRAL   4. Rotator cuff syndrome, left M75.102    5. Visit for screening mammogram Z12.31 MA SCREENING DIGITAL BILAT - Future  (s+30)   6. Morbid obesity due to excess calories (H) E66.01 CARE COORDINATION REFERRAL   7. Irritable bowel syndrome with diarrhea K58.0    8. Bilateral leg edema- has been to lymphedema clinic in past R60.0 Comprehensive metabolic panel     CARE COORDINATION REFERRAL   9. Venous stasis of lower extremity I87.8 CARE COORDINATION REFERRAL   10. Hyperlipidemia LDL goal <100 E78.5 Lipid panel reflex to direct LDL Fasting     simvastatin (ZOCOR) 20 MG tablet   11. Atrial fibrillation, unspecified type (H) I48.91 CBC with platelets differential     INR CLINIC REFERRAL     CARE COORDINATION REFERRAL   12. CKD (chronic kidney disease) stage 2, GFR 60-89 ml/min N18.2 CBC with platelets differential     Comprehensive metabolic panel     Albumin Random Urine Quantitative with Creat Ratio     hydrochlorothiazide (HYDRODIURIL) 25 MG tablet   13. Essential hypertension with goal blood pressure less than 140/90 I10 CBC with platelets differential     Comprehensive metabolic panel     T3 total     TSH     T4 FREE     UA reflex to Microscopic and Culture     amLODIPine (NORVASC) 10 MG tablet     lisinopril (PRINIVIL/ZESTRIL) 40 MG tablet     atenolol (TENORMIN) 100 MG  tablet     hydrochlorothiazide (HYDRODIURIL) 25 MG tablet   14. Screen for colon cancer Z12.11 GASTROENTEROLOGY ADULT REF PROCEDURE ONLY   15. Onychomycosis B35.1 ketoconazole (NIZORAL) 2 % cream   16. Persistent atrial fibrillation (H) I48.1 warfarin (COUMADIN) 5 MG tablet   17. Osteoarthritis of both knees, unspecified osteoarthritis type M17.0    18. Encounter for completion of form with patient Z02.89      Spent  51 minutes on pt care today outside of preventative care. All face to face time from 10:22am  to 11:16am.  Greater than 50% of time spent in coordination of care/counseling today re:  1. Type 2 diabetes mellitus with diabetic polyneuropathy, without long-term current use of insulin (H)    2. Leg weakness, bilateral    3. Balance problems    4. Rotator cuff syndrome, left    5. Visit for screening mammogram    6. Morbid obesity due to excess calories (H)    7. Irritable bowel syndrome with diarrhea    8. Bilateral leg edema- has been to lymphedema clinic in past    9. Venous stasis of lower extremity    10. Hyperlipidemia LDL goal <100    11. Atrial fibrillation, unspecified type (H)    12. CKD (chronic kidney disease) stage 2, GFR 60-89 ml/min    13. Essential hypertension with goal blood pressure less than 140/90    14. Screen for colon cancer    15. Onychomycosis    16. Persistent atrial fibrillation (H)    17. Osteoarthritis of both knees, unspecified osteoarthritis type    18. Encounter for completion of form with patient       See Patient Instructions.  Taking Align probiotic for irritable bowel syndrome daily.  Completed disability parking permit application for patient today - reason - cannot walk without signif. Risk of falling.     Recheck your blood pressure in our pharmacy in 1 week or sooner if needed.  Have pharmacy send me their note.      Recheck with me again in 3-6 months or sooner, if needed.    Exercises given to help strengthen the knees and leg muscles.   .       Madina Mercado  MD    [facility]

## 2018-01-11 DIAGNOSIS — R60.0 BILATERAL LEG EDEMA: ICD-10-CM

## 2018-01-11 DIAGNOSIS — E11.42 TYPE 2 DIABETES MELLITUS WITH DIABETIC POLYNEUROPATHY, WITHOUT LONG-TERM CURRENT USE OF INSULIN (H): ICD-10-CM

## 2018-01-11 DIAGNOSIS — N18.2 CKD (CHRONIC KIDNEY DISEASE) STAGE 2, GFR 60-89 ML/MIN: ICD-10-CM

## 2018-01-11 DIAGNOSIS — I48.91 ATRIAL FIBRILLATION, UNSPECIFIED TYPE (H): ICD-10-CM

## 2018-01-11 DIAGNOSIS — I10 ESSENTIAL HYPERTENSION WITH GOAL BLOOD PRESSURE LESS THAN 140/90: ICD-10-CM

## 2018-01-11 DIAGNOSIS — E78.5 HYPERLIPIDEMIA LDL GOAL <100: ICD-10-CM

## 2018-01-11 DIAGNOSIS — R29.898 LEG WEAKNESS, BILATERAL: ICD-10-CM

## 2018-01-11 LAB
ALBUMIN UR-MCNC: NEGATIVE MG/DL
APPEARANCE UR: CLEAR
BACTERIA #/AREA URNS HPF: ABNORMAL /HPF
BASOPHILS # BLD AUTO: 0 10E9/L (ref 0–0.2)
BASOPHILS NFR BLD AUTO: 0.3 %
BILIRUB UR QL STRIP: NEGATIVE
COLOR UR AUTO: YELLOW
DIFFERENTIAL METHOD BLD: NORMAL
EOSINOPHIL # BLD AUTO: 0.2 10E9/L (ref 0–0.7)
EOSINOPHIL NFR BLD AUTO: 2.1 %
ERYTHROCYTE [DISTWIDTH] IN BLOOD BY AUTOMATED COUNT: 13.2 % (ref 10–15)
GLUCOSE UR STRIP-MCNC: NEGATIVE MG/DL
HBA1C MFR BLD: 7.2 % (ref 4.3–6)
HCT VFR BLD AUTO: 38.7 % (ref 35–47)
HGB BLD-MCNC: 12.8 G/DL (ref 11.7–15.7)
HGB UR QL STRIP: NEGATIVE
KETONES UR STRIP-MCNC: ABNORMAL MG/DL
LEUKOCYTE ESTERASE UR QL STRIP: ABNORMAL
LYMPHOCYTES # BLD AUTO: 1.9 10E9/L (ref 0.8–5.3)
LYMPHOCYTES NFR BLD AUTO: 26.4 %
MCH RBC QN AUTO: 27.8 PG (ref 26.5–33)
MCHC RBC AUTO-ENTMCNC: 33.1 G/DL (ref 31.5–36.5)
MCV RBC AUTO: 84 FL (ref 78–100)
MONOCYTES # BLD AUTO: 0.6 10E9/L (ref 0–1.3)
MONOCYTES NFR BLD AUTO: 7.8 %
NEUTROPHILS # BLD AUTO: 4.6 10E9/L (ref 1.6–8.3)
NEUTROPHILS NFR BLD AUTO: 63.4 %
NITRATE UR QL: NEGATIVE
NON-SQ EPI CELLS #/AREA URNS LPF: ABNORMAL /LPF
PH UR STRIP: 8.5 PH (ref 5–7)
PLATELET # BLD AUTO: 232 10E9/L (ref 150–450)
RBC # BLD AUTO: 4.6 10E12/L (ref 3.8–5.2)
RBC #/AREA URNS AUTO: ABNORMAL /HPF
SOURCE: ABNORMAL
SP GR UR STRIP: 1.01 (ref 1–1.03)
T3 SERPL-MCNC: 96 NG/DL (ref 60–181)
UROBILINOGEN UR STRIP-ACNC: 1 EU/DL (ref 0.2–1)
WBC # BLD AUTO: 7.3 10E9/L (ref 4–11)
WBC #/AREA URNS AUTO: ABNORMAL /HPF

## 2018-01-11 PROCEDURE — 83036 HEMOGLOBIN GLYCOSYLATED A1C: CPT | Performed by: FAMILY MEDICINE

## 2018-01-11 PROCEDURE — 80050 GENERAL HEALTH PANEL: CPT | Performed by: FAMILY MEDICINE

## 2018-01-11 PROCEDURE — 82550 ASSAY OF CK (CPK): CPT | Performed by: FAMILY MEDICINE

## 2018-01-11 PROCEDURE — 36415 COLL VENOUS BLD VENIPUNCTURE: CPT | Performed by: FAMILY MEDICINE

## 2018-01-11 PROCEDURE — 82043 UR ALBUMIN QUANTITATIVE: CPT | Performed by: FAMILY MEDICINE

## 2018-01-11 PROCEDURE — 84439 ASSAY OF FREE THYROXINE: CPT | Performed by: FAMILY MEDICINE

## 2018-01-11 PROCEDURE — 84480 ASSAY TRIIODOTHYRONINE (T3): CPT | Performed by: FAMILY MEDICINE

## 2018-01-11 PROCEDURE — 80061 LIPID PANEL: CPT | Performed by: FAMILY MEDICINE

## 2018-01-11 PROCEDURE — 81001 URINALYSIS AUTO W/SCOPE: CPT | Performed by: FAMILY MEDICINE

## 2018-01-11 PROCEDURE — 83735 ASSAY OF MAGNESIUM: CPT | Performed by: FAMILY MEDICINE

## 2018-01-12 LAB
ALBUMIN SERPL-MCNC: 3.8 G/DL (ref 3.4–5)
ALP SERPL-CCNC: 53 U/L (ref 40–150)
ALT SERPL W P-5'-P-CCNC: 22 U/L (ref 0–50)
ANION GAP SERPL CALCULATED.3IONS-SCNC: 11 MMOL/L (ref 3–14)
AST SERPL W P-5'-P-CCNC: 18 U/L (ref 0–45)
BILIRUB SERPL-MCNC: 0.6 MG/DL (ref 0.2–1.3)
BUN SERPL-MCNC: 12 MG/DL (ref 7–30)
CALCIUM SERPL-MCNC: 9.4 MG/DL (ref 8.5–10.1)
CHLORIDE SERPL-SCNC: 99 MMOL/L (ref 94–109)
CHOLEST SERPL-MCNC: 109 MG/DL
CK SERPL-CCNC: 52 U/L (ref 30–225)
CO2 SERPL-SCNC: 27 MMOL/L (ref 20–32)
CREAT SERPL-MCNC: 0.63 MG/DL (ref 0.52–1.04)
CREAT UR-MCNC: 185 MG/DL
GFR SERPL CREATININE-BSD FRML MDRD: >90 ML/MIN/1.7M2
GLUCOSE SERPL-MCNC: 178 MG/DL (ref 70–99)
HDLC SERPL-MCNC: 37 MG/DL
LDLC SERPL CALC-MCNC: 49 MG/DL
MAGNESIUM SERPL-MCNC: 1.7 MG/DL (ref 1.6–2.3)
MICROALBUMIN UR-MCNC: 19 MG/L
MICROALBUMIN/CREAT UR: 10.22 MG/G CR (ref 0–25)
NONHDLC SERPL-MCNC: 72 MG/DL
POTASSIUM SERPL-SCNC: 3.9 MMOL/L (ref 3.4–5.3)
PROT SERPL-MCNC: 6.8 G/DL (ref 6.8–8.8)
SODIUM SERPL-SCNC: 137 MMOL/L (ref 133–144)
T4 FREE SERPL-MCNC: 1.42 NG/DL (ref 0.76–1.46)
TRIGL SERPL-MCNC: 117 MG/DL
TSH SERPL DL<=0.005 MIU/L-ACNC: 0.95 MU/L (ref 0.4–4)

## 2018-01-15 ENCOUNTER — CARE COORDINATION (OUTPATIENT)
Dept: CARE COORDINATION | Facility: CLINIC | Age: 73
End: 2018-01-15

## 2018-01-15 NOTE — LETTER
Health Care Home - Access Care Plan    About Me  Patient Name:  Zulema Nixon    YOB: 1945  Age:                             72 year old   Clarence MRN:            [id number] Telephone Information:     Home Phone [phone]   Mobile [phone]       Address:    [address] [place] [id number] Email address:  [email or web address]      Emergency Contact(s)  Name Relationship Lgl Grd Work Phone Home Phone Mobile Phone   1. PALAK NIXON Spouse   [phone]   2. SABAS PINTO Daughter   [phone]              Health Maintenance: Routine Health maintenance Reviewed: Due/Overdue     My Access Plan  Medical Emergency 911   Questions or concerns during clinic hours Primary Clinic Line, I will call the clinic directly: Primary Clinic: [facility] [phone]   24 Hour Appointment Line [phone] or  [phone]) (toll free)   24 Hour Nurse Line [phone] (toll free)   Questions or concerns outside clinic hours 24 Hour Appointment Line, I will call the after-hours on-call line:   [facility] [phone] or [phone]) (toll-free)   Preferred Urgent Care Preferred Urgent Care: Other   Preferred Hospital Preferred Hospital: [facility]  [phone]   Preferred Pharmacy [id number] [place], [place] - [address] [place] [address] E     Behavioral Health Crisis Line The National Suicide Prevention Lifeline at 1-130.616.1994 or 911     My Care Team Members  Patient Care Team       Relationship Specialty Notifications Start End    Madina Mercado MD PCP - General Family Practice  11/13/14     Phone: [phone] Fax: [phone]         [address] [place] [id number]    Kylah Ramírez, RN Clinic Care Coordinator Nurse Admissions 2/14/18     Phone: [phone] Fax: [phone]            My Medical and Care Information  Problem List   Patient Active Problem List    Diagnosis     Morbid obesity due to excess calories (H)     ARMAND (obstructive sleep apnea)     Atrial fibrillation, unspecified type (H)     Hyperlipidemia LDL goal <100     Status post laparoscopic cholecystectomy     CKD (chronic kidney disease) stage 2, GFR 60-89 ml/min     Venous stasis of lower extremity     Advanced directives, counseling/discussion     Sensorineural hearing loss of both ears - using hearing aids     Bilateral leg edema- has been to lymphedema clinic in past     Essential hypertension with goal blood pressure less than 140/90     Type 2 diabetes mellitus with other circulatory complication, without long-term current use of insulin (H)     Long-term (current) use of anticoagulants [Z79.01]     Onychomycosis     Type 2 diabetes mellitus with diabetic polyneuropathy, without long-term current use of insulin (H)     Irritable bowel syndrome with diarrhea     Osteoarthritis of both knees, unspecified osteoarthritis type      Current Medications and Allergies:  See printed Medication Report

## 2018-01-17 ENCOUNTER — HOSPITAL ENCOUNTER (OUTPATIENT)
Dept: MAMMOGRAPHY | Facility: CLINIC | Age: 73
Discharge: HOME | End: 2018-01-17
Attending: FAMILY MEDICINE | Admitting: FAMILY MEDICINE
Payer: MEDICARE

## 2018-01-17 DIAGNOSIS — Z12.31 VISIT FOR SCREENING MAMMOGRAM: ICD-10-CM

## 2018-01-17 PROCEDURE — 77067 SCR MAMMO BI INCL CAD: CPT

## 2018-02-14 NOTE — PROGRESS NOTES
Clinic Care Coordination Contact  Care Team Conversations    Received referral from PCP for the [facility] program.  Placed call to pt, [place], inquired on whether or not she would be interested in going to one further out, or if she would like to wait.  She stated that she would wait to hear if there is a closer option.  She denies any other needs at this time.  Will monitor for a program in the next month closer to pt and let her know if one is available.

## 2018-02-16 ENCOUNTER — ALLIED HEALTH/NURSE VISIT (OUTPATIENT)
Dept: FAMILY MEDICINE | Facility: CLINIC | Age: 73
End: 2018-02-16
Payer: COMMERCIAL

## 2018-02-16 VITALS — DIASTOLIC BLOOD PRESSURE: 72 MMHG | SYSTOLIC BLOOD PRESSURE: 132 MMHG

## 2018-02-16 DIAGNOSIS — I10 ESSENTIAL HYPERTENSION WITH GOAL BLOOD PRESSURE LESS THAN 140/90: Primary | ICD-10-CM

## 2018-02-16 PROCEDURE — 99207 ZZC NO CHARGE NURSE ONLY: CPT | Performed by: FAMILY MEDICINE

## 2018-02-16 NOTE — MR AVS SNAPSHOT
After Visit Summary   2/16/2018    Zulema Nixon    MRN: [id number]           Patient Information     Date Of Birth          1945        Visit Information        Provider Department      2/16/2018 9:08 AM Madina Mercado MD [facility]        Today's Diagnoses     Essential hypertension with goal blood pressure less than 140/90    -  1       Follow-ups after your visit        Your next 10 appointments already scheduled     Feb 21, 2018 10:45 AM CST   Anticoagulation Visit with [facility]   [facility] ([facility]    [address] [place] [id number] [phone]            Mar 08, 2018 10:45 AM CST   Return Visit with Edis Walsh MD   [facility] ([facility])    [address] [place] [id number] [phone]              Who to contact     If you have questions or need follow up information about today's clinic visit or your schedule please contact [facility] directly at [phone].  Normal or non-critical lab and imaging results will be communicated to you by "[employer]hart, letter or phone within 4 business days after the clinic has received the results. If you do not hear from us within 7 days, please contact the clinic through "[employer]hart or phone. If you have a critical or abnormal lab result, we will notify you by phone as soon as possible.  Submit refill requests through [employer] or call your pharmacy and they will forward the refill request to us. Please allow 3 business days for your refill to be completed.          Additional Information About Your Visit        MyChart Information     [employer] gives you secure access to your electronic health record. If you see a primary care provider, you can also send messages to your care team and make appointments. If you have questions, please call your primary care clinic.   If you do not have a primary care provider, please call [phone] and they will assist you.        Care EveryWhere ID     This is your Care EveryWhere ID. This could be used by other organizations to access your [facility] records  [id number]         Blood Pressure from Last 3 Encounters:   02/16/18 132/72   01/10/18 112/68   06/24/17 126/82    Weight from Last 3 Encounters:   01/10/18 249 lb (112.9 kg)   06/24/17 254 lb (115.2 kg)   02/22/17 254 lb (115.2 kg)              Today, you had the following     No orders found for display       Primary Care Provider Office Phone # Fax #    Madina Mercado [phone] [address] [facility] [address]        Equal Access to Services     TREY WELCH : Hadii tawny bello hadasho Soomaali, waaxda luqadaha, qaybta kaalmada ademaceyyada, anjel gonzalez . So [facility] [phone].    ATENCIÓN: Si habla español, tiene a webster disposición servicios gratuitos de asistencia lingüística. Miles al [phone].    We comply with applicable federal civil rights laws and [place] laws. We do not discriminate on the basis of race, color, national origin, age, disability, sex, sexual orientation, or gender identity.            Thank you!     Thank you for choosing [facility]  for your care. Our goal is always to provide you with excellent care. Hearing back from our patients is one way we can continue to improve our services. Please take a few minutes to complete the written survey that you may receive in the mail after your visit with us. Thank you!             Your Updated Medication List - Protect others around you: Learn how to safely use, store and throw away your medicines at www.disposemymeds.org.          This list is accurate as of 2/16/18  9:09 AM.  Always use your most recent med list.                   Brand Name Dispense Instructions for use Diagnosis    amLODIPine 10 MG tablet    NORVASC    90  tablet    Take 1 tablet (10 mg) by mouth daily    Essential hypertension with goal blood pressure less than 140/90       aspirin 81 MG tablet     0    1 tab po QD (Once per day)    Type II or unspecified type diabetes mellitus without mention of complication, not stated as uncontrolled       atenolol 100 MG tablet    TENORMIN    90 tablet    Take 1 tablet (100 mg) by mouth daily    Essential hypertension with goal blood pressure less than 140/90       Biotin 10 MG Tabs tablet      1 TABLET DAILY        blood glucose monitoring lancets     100 each    Use to test blood sugar 1 times daily or as directed.    Type 2 diabetes mellitus with other circulatory complications       * blood glucose monitoring test strip    no brand specified     Compact Plus test strips Use to test blood sugars 1 times daily or as directed        * blood glucose monitoring test strip    JAYDE CONTOUR NEXT    100 strip    Use to test blood sugar 1 times daily or as directed.    Type 2 diabetes mellitus with other circulatory complications       CALTRATE 600 + D 600-200 MG-IU Tabs     60    1 tablet twice daily        CENTRUM SILVER per tablet     3 MONTHS    ONE DAILY        hydrochlorothiazide 25 MG tablet    HYDRODIURIL    90 tablet    Take 1 tablet (25 mg) by mouth daily    Essential hypertension with goal blood pressure less than 140/90, CKD (chronic kidney disease) stage 2, GFR 60-89 ml/min       ketoconazole 2 % cream    NIZORAL    30 g    Apply topically 2 times daily Apply to affected nails daily    Onychomycosis, Type 2 diabetes mellitus with diabetic polyneuropathy, without long-term current use of insulin (H)       lisinopril 40 MG tablet    PRINIVIL/ZESTRIL    90 tablet    Take 1 tablet (40 mg) by mouth daily    Essential hypertension with goal blood pressure less than 140/90       metFORMIN 1000 MG tablet    GLUCOPHAGE    225 tablet    TAKE 1 AND 1/2 TABLETS BY MOUTH WITH BREAKFAST AND TAKE 1 TABLET BY MOUTH WITH SUPPER    Type 2  diabetes mellitus with diabetic polyneuropathy, without long-term current use of insulin (H)       omega 3 1000 MG Caps     90 capsule    Take 1 g by mouth daily        simvastatin 20 MG tablet    ZOCOR    90 tablet    TAKE ONE TABLET BY MOUTH EVERY NIGHT AT BEDTIME    Hyperlipidemia LDL goal <100       vitamin D 1000 UNITS capsule     3 MONTHS    1 CAPSULE DAILY    Nutrition disorder       warfarin 5 MG tablet    COUMADIN    180 tablet    TAKE 1-2 TABLETS (5-10 MG) BY MOUTH DAILY AS INSTRUCTED    Persistent atrial fibrillation (H)       * Notice:  This list has 2 medication(s) that are the same as other medications prescribed for you. Read the directions carefully, and ask your doctor or other care provider to review them with you.

## 2018-02-16 NOTE — PROGRESS NOTES
Zulema Nixon is enrolled/participating in the retail pharmacy Blood Pressure Goals Achievement Program (BPGAP).  Zulema Nxion was evaluated at [facility] on February 16, 2018 at which time her blood pressure was:    BP Readings from Last 3 Encounters:   02/16/18 132/72   01/10/18 112/68   06/24/17 126/82     Reviewed lifestyle modifications for blood pressure control and reduction: including making healthy food choices, managing weight, getting regular exercise, smoking cessation, reducing alcohol consumption, monitoring blood pressure regularly.     Zulema Nixon is not experiencing symptoms.    Follow-Up: BP is at goal of < 140/90mmHg (patient 18+ years of age with or without diabetes).  Recommended follow-up at pharmacy in 6 months.     Recommendation to Provider: No change     Zulema Nixon was evaluated for enrollment into the PGEN study today.    Patient eligible for enrollment:  No  Patient interested in enrollment:  No    Completed by: Thank you,  Doris Early RPh, Mgr [facility] [phone]

## 2018-02-21 ENCOUNTER — ANTICOAGULATION THERAPY VISIT (OUTPATIENT)
Dept: NURSING | Facility: CLINIC | Age: 73
End: 2018-02-21
Payer: COMMERCIAL

## 2018-02-21 DIAGNOSIS — I48.91 ATRIAL FIBRILLATION, UNSPECIFIED TYPE (H): ICD-10-CM

## 2018-02-21 DIAGNOSIS — Z79.01 LONG-TERM (CURRENT) USE OF ANTICOAGULANTS: ICD-10-CM

## 2018-02-21 LAB — INR POINT OF CARE: 3.1 (ref 0.86–1.14)

## 2018-02-21 PROCEDURE — 36416 COLLJ CAPILLARY BLOOD SPEC: CPT

## 2018-02-21 PROCEDURE — 85610 PROTHROMBIN TIME: CPT | Mod: QW

## 2018-02-21 NOTE — MR AVS SNAPSHOT
Zulema Nixon   2/21/2018 10:45 AM   Anticoagulation Therapy Visit    Description:  72 year old female   Provider:  [facility]   Department:  [facility]           INR as of 2/21/2018     Today's INR 3.1!      Anticoagulation Summary as of 2/21/2018     INR goal 2.0-3.0   Today's INR 3.1!   Full instructions 5 mg on Wed, Sat; 7.5 mg all other days   Next INR check 4/4/2018    Indications   Long-term (current) use of anticoagulants [Z79.01] [Z79.01]  Atrial fibrillation  unspecified type (H) [I48.91]         Your next Anticoagulation Clinic appointment(s)     Apr 04, 2018 10:45 AM CDT   Anticoagulation [facility] with  [facility]UL[facility] CLINIC   [facility]    [address] [place] [id number] [phone]              Contact Numbers     Clinic Number:         February 2018 Details    Sun Mon Tue Wed Thu Fri Sat         1               2               3                 4               5               6               7               8               9               10                 11               12               13               14               15               16               17                 18               19               20               21      5 mg   See details      22      7.5 mg         23      7.5 mg         24      5 mg           25      7.5 mg         26      7.5 mg         27      7.5 mg         28      5 mg             Date Details   02/21 This INR check               How to take your warfarin dose     To take:  5 mg Take 1 of the 5 mg tablets.    To take:  7.5 mg Take 1.5 of the 5 mg tablets.           March 2018 Details    Sun Mon Tue Wed Thu Fri Sat         1      7.5 mg         2      7.5 mg         3      5 mg           4      7.5 mg         5      7.5 mg         6      7.5 mg         7      5 mg         8      7.5 mg         9      7.5 mg         10      5 mg           11      7.5 mg         12      7.5 mg          13      7.5 mg         14      5 mg         15      7.5 mg         16      7.5 mg         17      5 mg           18      7.5 mg         19      7.5 mg         20      7.5 mg         21      5 mg         22      7.5 mg         23      7.5 mg         24      5 mg           25      7.5 mg         26      7.5 mg         27      7.5 mg         28      5 mg         29      7.5 mg         30      7.5 mg         31      5 mg          Date Details   No additional details            How to take your warfarin dose     To take:  5 mg Take 1 of the 5 mg tablets.    To take:  7.5 mg Take 1.5 of the 5 mg tablets.           April 2018 Details    Sun Mon Tue Wed Thu Fri Sat     1      7.5 mg         2      7.5 mg         3      7.5 mg         4            5               6               7                 8               9               10               11               12               13               14                 15               16               17               18               19               20               21                 22               23               24               25               26               27               28                 29               30                     Date Details   No additional details    Date of next INR:  4/4/2018         How to take your warfarin dose     To take:  5 mg Take 1 of the 5 mg tablets.    To take:  7.5 mg Take 1.5 of the 5 mg tablets.

## 2018-02-21 NOTE — PROGRESS NOTES
ANTICOAGULATION FOLLOW-UP CLINIC VISIT    Patient Name:  Zulema Nixon  Date:  2/21/2018  Contact Type:  Face to Face    SUBJECTIVE:     Patient Findings     Positives No Problem Findings           OBJECTIVE    INR Protime   Date Value Ref Range Status   02/21/2018 3.1 (A) 0.86 - 1.14 Final       ASSESSMENT / PLAN  No question data found.  Anticoagulation Summary as of 2/21/2018     INR goal 2.0-3.0   Today's INR 3.1!   Maintenance plan 5 mg (5 mg x 1) on Wed, Sat; 7.5 mg (5 mg x 1.5) all other days   Full instructions 5 mg on Wed, Sat; 7.5 mg all other days   Weekly total 47.5 mg   No change documented Kylah Pierce RN   Plan last modified Nereyda Root, RN (3/1/2016)   Next INR check 4/4/2018   Target end date     Indications   Long-term (current) use of anticoagulants [Z79.01] [Z79.01]  Atrial fibrillation  unspecified type (H) [I48.91]         Anticoagulation Episode Summary     INR check location     Preferred lab     Send INR reminders to RV NURSE    Comments       Anticoagulation Care Providers     Provider Role Specialty Phone number    Madina Mercado MD [facility] [phone]            See the Encounter Report to view Anticoagulation Flowsheet and Dosing Calendar (Go to Encounters tab in chart review, and find the Anticoagulation Therapy Visit)    Dosage adjustment made based on physician directed care plan.    Kylah Pierce, RN

## 2018-02-22 ENCOUNTER — TELEPHONE (OUTPATIENT)
Dept: FAMILY MEDICINE | Facility: CLINIC | Age: 73
End: 2018-02-22

## 2018-02-22 NOTE — TELEPHONE ENCOUNTER
I spoke with the patient. The reason for referral was for possible carpal tunnel, PCP has the patient wear a brace and that has eliminated most of the patient's concers, she will call the clinic to schedule if she is interested in the future. I informed the patient that if and when she is interested, we would schedule her with a Neurologist closer to her home in [place]. She appreciated that suggestion.    Olga STARKS [facility]~Specialty/Med Surg   [phone]

## 2018-03-08 ENCOUNTER — OFFICE VISIT (OUTPATIENT)
Dept: CARDIOLOGY | Facility: CLINIC | Age: 73
End: 2018-03-08
Attending: INTERNAL MEDICINE
Payer: COMMERCIAL

## 2018-03-08 VITALS
BODY MASS INDEX: 42.85 KG/M2 | SYSTOLIC BLOOD PRESSURE: 124 MMHG | HEART RATE: 60 BPM | WEIGHT: 251 LBS | DIASTOLIC BLOOD PRESSURE: 72 MMHG | HEIGHT: 64 IN

## 2018-03-08 DIAGNOSIS — I48.20 CHRONIC ATRIAL FIBRILLATION (H): ICD-10-CM

## 2018-03-08 DIAGNOSIS — I83.90 ASYMPTOMATIC VARICOSE VEINS: ICD-10-CM

## 2018-03-08 PROCEDURE — 99214 OFFICE O/P EST MOD 30 MIN: CPT | Performed by: INTERNAL MEDICINE

## 2018-03-08 NOTE — PROGRESS NOTES
Cardiology Progress Note          Assessment and Plan:     1. Vein disease, status post radio frequency ablation    Continue compression stockings.  Consider decrease of amlodipine if edema continues or worsens.      2. Hypertension, good control    Normal renal function.  Could consider adding triamterene or Spironolactone if needed to substitute for some or all of the amlodipine.      3. Chronic atrial for ablation, good rate control and asymptomatic.    Continue anticoagulation and atenolol.    If patient notices occasional palpitations in the morning before taking her atenolol, we may split dose it to 50 mg twice per day.    Routine follow-up in 1 year, sooner if symptoms arise.    This note was transcribed using electronic voice recognition software and there may be typographical errors present.                Interval History:   The patient is a very pleasant 72 year old whom I have been following after venous ablation.  She also has chronic atrial fibrillation.  She did not decrease her amlodipine last year for concern that the blood pressure would increase.  She has been switched from chlorthalidone to hydrochlorothiazide 25 mrem daily.  She has not noticed any increase in urination.  Blood pressures are pretty well controlled.  The edema does not bother her very much at this point.  She wears compression stockings religiously.  She denies any lightheadedness or dyspnea on exertion.  She is anticoagulated for her atrial fibrillation.                     Review of Systems:   Review of Systems:  Skin:  Negative for     Eyes:  Negative for glasses  ENT:  Positive for hearing loss  Respiratory:  Positive for sleep apnea;CPAP  Cardiovascular:    edema;Positive for  Gastroenterology: Negative for    Genitourinary:  Negative for    Musculoskeletal:    arthritis  Neurologic:  Negative for    Psychiatric:  Negative    Heme/Lymph/Imm:  Negative    Endocrine:  Positive for diabetes              Physical Exam:  "    Vitals: /72  Pulse 60  Ht 1.619 m (5' 3.75\")  Wt 113.9 kg (251 lb)  BMI 43.42 kg/m2  Constitutional:  cooperative, alert and oriented, well developed, well nourished, in no acute distress        Skin:  warm and dry to the touch, no apparent skin lesions or masses noted        Head:  normocephalic, no masses or lesions        Eyes:  pupils equal and round, conjunctivae and lids unremarkable, sclera white, no xanthalasma, EOMS intact, no nystagmus        ENT:  no pallor or cyanosis, dentition good        Neck:  JVP normal;no carotid bruit        Chest:  normal breath sounds, clear to auscultation, normal A-P diameter, normal symmetry, normal respiratory excursion, no use of accessory muscles        Cardiac:   irregularly irregular rhythm           rate controlled    Abdomen:      benign    Extremities and Back:    erythema      Neurological:  no gross motor deficits;affect appropriate                 Medications:     Current Outpatient Prescriptions   Medication Sig Dispense Refill     metFORMIN (GLUCOPHAGE) 1000 MG tablet TAKE 1 AND 1/2 TABLETS BY MOUTH WITH BREAKFAST AND TAKE 1 TABLET BY MOUTH WITH SUPPER 225 tablet 1     amLODIPine (NORVASC) 10 MG tablet Take 1 tablet (10 mg) by mouth daily 90 tablet 1     ketoconazole (NIZORAL) 2 % cream Apply topically 2 times daily Apply to affected nails daily 30 g 1     warfarin (COUMADIN) 5 MG tablet TAKE 1-2 TABLETS (5-10 MG) BY MOUTH DAILY AS INSTRUCTED 180 tablet 3     simvastatin (ZOCOR) 20 MG tablet TAKE ONE TABLET BY MOUTH EVERY NIGHT AT BEDTIME 90 tablet 1     lisinopril (PRINIVIL/ZESTRIL) 40 MG tablet Take 1 tablet (40 mg) by mouth daily 90 tablet 1     atenolol (TENORMIN) 100 MG tablet Take 1 tablet (100 mg) by mouth daily 90 tablet 1     hydrochlorothiazide (HYDRODIURIL) 25 MG tablet Take 1 tablet (25 mg) by mouth daily 90 tablet 1     blood glucose monitoring (Carter-Waters CONTOUR NEXT) test strip Use to test blood sugar 1 times daily or as directed. 100 " strip 3     blood glucose monitoring (JAYED MICROLET) lancets Use to test blood sugar 1 times daily or as directed. 100 each 1     blood glucose monitoring (NO BRAND SPECIFIED) test strip Compact Plus test strips Use to test blood sugars 1 times daily or as directed       omega 3 1000 MG CAPS Take 1 g by mouth daily 90 capsule      BIOTIN 10 MG OR TABS 1 TABLET DAILY       VITAMIN D 1000 UNIT OR CAPS 1 CAPSULE DAILY 3 MONTHS 1 YEAR     CENTRUM SILVER OR TABS ONE DAILY 3 MONTHS 1 YEAR     CALTRATE 600 + D 600-200 MG-IU OR TABS 1 tablet twice daily 60 11     ASPIRIN 81 MG OR TABS 1 tab po QD (Once per day) 0 0                Data:   All laboratory data reviewed  No results found for this or any previous visit (from the past 24 hour(s)).    All laboratory data reviewed  Lab Results   Component Value Date    CHOL 109 01/11/2018     Lab Results   Component Value Date    HDL 37 01/11/2018     Lab Results   Component Value Date    LDL 49 01/11/2018     Lab Results   Component Value Date    TRIG 117 01/11/2018     Lab Results   Component Value Date    CHOLHDLRATIO 3.4 04/22/2015     TSH   Date Value Ref Range Status   01/11/2018 0.95 0.40 - 4.00 mU/L Final     Last Basic Metabolic Panel:  Lab Results   Component Value Date     01/11/2018      Lab Results   Component Value Date    POTASSIUM 3.9 01/11/2018     Lab Results   Component Value Date    CHLORIDE 99 01/11/2018     Lab Results   Component Value Date    ALVA 9.4 01/11/2018     Lab Results   Component Value Date    CO2 27 01/11/2018     Lab Results   Component Value Date    BUN 12 01/11/2018     Lab Results   Component Value Date    CR 0.63 01/11/2018     Lab Results   Component Value Date     01/11/2018     Lab Results   Component Value Date    WBC 7.3 01/11/2018     Lab Results   Component Value Date    RBC 4.60 01/11/2018     Lab Results   Component Value Date    HGB 12.8 01/11/2018     Lab Results   Component Value Date    HCT 38.7 01/11/2018     Lab  Results   Component Value Date    MCV 84 01/11/2018     Lab Results   Component Value Date    MCH 27.8 01/11/2018     Lab Results   Component Value Date    MCHC 33.1 01/11/2018     Lab Results   Component Value Date    RDW 13.2 01/11/2018     Lab Results   Component Value Date     01/11/2018

## 2018-03-08 NOTE — LETTER
3/8/2018    Madina Mercado MD  [address] [facility] [address]    RE: Zulema Nixon       Dear Colleague,    I had the pleasure of seeing Zulema Nixon in the [facility].    Cardiology Progress Note          Assessment and Plan:     1. Vein disease, status post radio frequency ablation    Continue compression stockings.  Consider decrease of amlodipine if edema continues or worsens.      2. Hypertension, good control    Normal renal function.  Could consider adding triamterene or Spironolactone if needed to substitute for some or all of the amlodipine.      3. Chronic atrial for ablation, good rate control and asymptomatic.    Continue anticoagulation and atenolol.    If patient notices occasional palpitations in the morning before taking her atenolol, we may split dose it to 50 mg twice per day.    Routine follow-up in 1 year, sooner if symptoms arise.    This note was transcribed using electronic voice recognition software and there may be typographical errors present.                Interval History:   The patient is a very pleasant 72 year old whom I have been following after venous ablation.  She also has chronic atrial fibrillation.  She did not decrease her amlodipine last year for concern that the blood pressure would increase.  She has been switched from chlorthalidone to hydrochlorothiazide 25 mrem daily.  She has not noticed any increase in urination.  Blood pressures are pretty well controlled.  The edema does not bother her very much at this point.  She wears compression stockings religiously.  She denies any lightheadedness or dyspnea on exertion.  She is anticoagulated for her atrial fibrillation.                     Review of Systems:   Review of Systems:  Skin:  Negative for     Eyes:  Negative for glasses  ENT:  Positive for hearing loss  Respiratory:  Positive for sleep apnea;CPAP  Cardiovascular:    edema;Positive for  Gastroenterology: Negative for   "  Genitourinary:  Negative for    Musculoskeletal:    arthritis  Neurologic:  Negative for    Psychiatric:  Negative    Heme/Lymph/Imm:  Negative    Endocrine:  Positive for diabetes              Physical Exam:     Vitals: /72  Pulse 60  Ht 1.619 m (5' 3.75\")  Wt 113.9 kg (251 lb)  BMI 43.42 kg/m2  Constitutional:  cooperative, alert and oriented, well developed, well nourished, in no acute distress        Skin:  warm and dry to the touch, no apparent skin lesions or masses noted        Head:  normocephalic, no masses or lesions        Eyes:  pupils equal and round, conjunctivae and lids unremarkable, sclera white, no xanthalasma, EOMS intact, no nystagmus        ENT:  no pallor or cyanosis, dentition good        Neck:  JVP normal;no carotid bruit        Chest:  normal breath sounds, clear to auscultation, normal A-P diameter, normal symmetry, normal respiratory excursion, no use of accessory muscles        Cardiac:   irregularly irregular rhythm           rate controlled    Abdomen:      benign    Extremities and Back:    erythema      Neurological:  no gross motor deficits;affect appropriate                 Medications:     Current Outpatient Prescriptions   Medication Sig Dispense Refill     metFORMIN (GLUCOPHAGE) 1000 MG tablet TAKE 1 AND 1/2 TABLETS BY MOUTH WITH BREAKFAST AND TAKE 1 TABLET BY MOUTH WITH SUPPER 225 tablet 1     amLODIPine (NORVASC) 10 MG tablet Take 1 tablet (10 mg) by mouth daily 90 tablet 1     ketoconazole (NIZORAL) 2 % cream Apply topically 2 times daily Apply to affected nails daily 30 g 1     warfarin (COUMADIN) 5 MG tablet TAKE 1-2 TABLETS (5-10 MG) BY MOUTH DAILY AS INSTRUCTED 180 tablet 3     simvastatin (ZOCOR) 20 MG tablet TAKE ONE TABLET BY MOUTH EVERY NIGHT AT BEDTIME 90 tablet 1     lisinopril (PRINIVIL/ZESTRIL) 40 MG tablet Take 1 tablet (40 mg) by mouth daily 90 tablet 1     atenolol (TENORMIN) 100 MG tablet Take 1 tablet (100 mg) by mouth daily 90 tablet 1     " hydrochlorothiazide (HYDRODIURIL) 25 MG tablet Take 1 tablet (25 mg) by mouth daily 90 tablet 1     blood glucose monitoring (JAYDE CONTOUR NEXT) test strip Use to test blood sugar 1 times daily or as directed. 100 strip 3     blood glucose monitoring (JAYDE MICROLET) lancets Use to test blood sugar 1 times daily or as directed. 100 each 1     blood glucose monitoring (NO BRAND SPECIFIED) test strip Compact Plus test strips Use to test blood sugars 1 times daily or as directed       omega 3 1000 MG CAPS Take 1 g by mouth daily 90 capsule      BIOTIN 10 MG OR TABS 1 TABLET DAILY       VITAMIN D 1000 UNIT OR CAPS 1 CAPSULE DAILY 3 MONTHS 1 YEAR     CENTRUM SILVER OR TABS ONE DAILY 3 MONTHS 1 YEAR     CALTRATE 600 + D 600-200 MG-IU OR TABS 1 tablet twice daily 60 11     ASPIRIN 81 MG OR TABS 1 tab po QD (Once per day) 0 0                Data:   All laboratory data reviewed  No results found for this or any previous visit (from the past 24 hour(s)).    All laboratory data reviewed  Lab Results   Component Value Date    CHOL 109 01/11/2018     Lab Results   Component Value Date    HDL 37 01/11/2018     Lab Results   Component Value Date    LDL 49 01/11/2018     Lab Results   Component Value Date    TRIG 117 01/11/2018     Lab Results   Component Value Date    CHOLHDLRATIO 3.4 04/22/2015     TSH   Date Value Ref Range Status   01/11/2018 0.95 0.40 - 4.00 mU/L Final     Last Basic Metabolic Panel:  Lab Results   Component Value Date     01/11/2018      Lab Results   Component Value Date    POTASSIUM 3.9 01/11/2018     Lab Results   Component Value Date    CHLORIDE 99 01/11/2018     Lab Results   Component Value Date    ALVA 9.4 01/11/2018     Lab Results   Component Value Date    CO2 27 01/11/2018     Lab Results   Component Value Date    BUN 12 01/11/2018     Lab Results   Component Value Date    CR 0.63 01/11/2018     Lab Results   Component Value Date     01/11/2018     Lab Results   Component Value Date     WBC 7.3 01/11/2018     Lab Results   Component Value Date    RBC 4.60 01/11/2018     Lab Results   Component Value Date    HGB 12.8 01/11/2018     Lab Results   Component Value Date    HCT 38.7 01/11/2018     Lab Results   Component Value Date    MCV 84 01/11/2018     Lab Results   Component Value Date    MCH 27.8 01/11/2018     Lab Results   Component Value Date    MCHC 33.1 01/11/2018     Lab Results   Component Value Date    RDW 13.2 01/11/2018     Lab Results   Component Value Date     01/11/2018                 Thank you for allowing me to participate in the care of your patient.      Sincerely,     Edis Walsh MD     [facility]    cc:   Edis Walsh MD  [address] [facility] [address]  [place] [address]

## 2018-03-08 NOTE — MR AVS SNAPSHOT
After Visit Summary   3/8/2018    Zulema Nixon    MRN: [id number]           Patient Information     Date Of Birth          1945        Visit Information        Provider Department      3/8/2018 10:45 AM Edis Walsh MD [facility]        Today's Diagnoses     Asymptomatic varicose veins        Chronic atrial fibrillation (H)          Care Instructions    If the swelling gets worse or too annoying, then we can cut the AMLODIPINE dose to 5mg or 2.5mg. But to compensate for the blood pressure, we would have to add an additional medication.    We might add a helper pill to the HCTZ called triamterene. It comes in a combination pill with the HTCZ called MAXZIDE. The Triamterene helps the HCTZ work better to lower the blood pressure.    Alternately, we could use a different diuretic altogether called SPIRONOLACTONE and that is a hormone blocking blood pressure medication that prevents retaining water in the first place.    If you notice more palpitations early in the morning, we might cut the ATENOLOL in half and take it twice per day to overlap the doses.              Follow-ups after your visit        Additional Services     Follow-Up with Cardiologist                 Your next 10 appointments already scheduled     Apr 04, 2018 10:45 AM CDT   Anticoagulation Visit with  ANTICOAGULATION CLINIC   [facility]    [address] [place] [id number] [phone]              Future tests that were ordered for you today     Open Future Orders        Priority Expected Expires Ordered    Follow-Up with Cardiologist Routine 3/8/2019 3/9/2019 3/8/2018            Who to contact     If you have questions or need follow up information about today's clinic visit or your schedule please contact [facility] directly at [phone].  Normal or non-critical lab and  "imaging results will be communicated to you by MyChart, letter or phone within 4 business days after the clinic has received the results. If you do not hear from us within 7 days, please contact the clinic through [employer] or phone. If you have a critical or abnormal lab result, we will notify you by phone as soon as possible.  Submit refill requests through [employer] or call your pharmacy and they will forward the refill request to us. Please allow 3 business days for your refill to be completed.          Additional Information About Your Visit        [employer] Capture Information     [employer] gives you secure access to your electronic health record. If you see a primary care provider, you can also send messages to your care team and make appointments. If you have questions, please call your primary care clinic.  If you do not have a primary care provider, please call [phone] and they will assist you.        Care EveryWhere ID     This is your Care EveryWhere ID. This could be used by other organizations to access your [facility] medical records  [id number]        Your Vitals Were     Pulse Height BMI (Body Mass Index)             60 1.619 m (5' 3.75\") 43.42 kg/m2          Blood Pressure from Last 3 Encounters:   03/08/18 124/72   02/16/18 132/72   01/10/18 112/68    Weight from Last 3 Encounters:   03/08/18 113.9 kg (251 lb)   01/10/18 112.9 kg (249 lb)   06/24/17 115.2 kg (254 lb)              We Performed the Following     Follow-Up with Cardiologist        Primary Care Provider Office Phone # Fax #    Madinajose l Mercado [phone] [address] [facility] [address]        Equal Access to Services     [facility] : Hadii tawny [place], waaxda luqadaha, qaybta kaalanjel cabrera. So [facility] [phone].    ATENCIÓN: Si habla español, tiene a webster disposición servicios gratuitos de asistencia lingüística. Llame al [phone].    We " comply with applicable federal civil rights laws and [place] laws. We do not discriminate on the basis of race, color, national origin, age, disability, sex, sexual orientation, or gender identity.            Thank you!     Thank you for choosing [facility]  for your care. Our goal is always to provide you with excellent care. Hearing back from our patients is one way we can continue to improve our services. Please take a few minutes to complete the written survey that you may receive in the mail after your visit with us. Thank you!             Your Updated Medication List - Protect others around you: Learn how to safely use, store and throw away your medicines at www.disposemymeds.org.          This list is accurate as of 3/8/18 11:13 AM.  Always use your most recent med list.                   Brand Name Dispense Instructions for use Diagnosis    amLODIPine 10 MG tablet    NORVASC    90 tablet    Take 1 tablet (10 mg) by mouth daily    Essential hypertension with goal blood pressure less than 140/90       aspirin 81 MG tablet     0    1 tab po QD (Once per day)    Type II or unspecified type diabetes mellitus without mention of complication, not stated as uncontrolled       atenolol 100 MG tablet    TENORMIN    90 tablet    Take 1 tablet (100 mg) by mouth daily    Essential hypertension with goal blood pressure less than 140/90       Biotin 10 MG Tabs tablet      1 TABLET DAILY        blood glucose monitoring lancets     100 each    Use to test blood sugar 1 times daily or as directed.    Type 2 diabetes mellitus with other circulatory complications       * blood glucose monitoring test strip    no brand specified     Compact Plus test strips Use to test blood sugars 1 times daily or as directed        * blood glucose monitoring test strip    JAYDE CONTOUR NEXT    100 strip    Use to test blood sugar 1 times daily or as directed.    Type 2 diabetes mellitus with other  circulatory complications       CALTRATE 600 + D 600-200 MG-IU Tabs     60    1 tablet twice daily        CENTRUM SILVER per tablet     3 MONTHS    ONE DAILY        hydrochlorothiazide 25 MG tablet    HYDRODIURIL    90 tablet    Take 1 tablet (25 mg) by mouth daily    Essential hypertension with goal blood pressure less than 140/90, CKD (chronic kidney disease) stage 2, GFR 60-89 ml/min       ketoconazole 2 % cream    NIZORAL    30 g    Apply topically 2 times daily Apply to affected nails daily    Onychomycosis, Type 2 diabetes mellitus with diabetic polyneuropathy, without long-term current use of insulin (H)       lisinopril 40 MG tablet    PRINIVIL/ZESTRIL    90 tablet    Take 1 tablet (40 mg) by mouth daily    Essential hypertension with goal blood pressure less than 140/90       metFORMIN 1000 MG tablet    GLUCOPHAGE    225 tablet    TAKE 1 AND 1/2 TABLETS BY MOUTH WITH BREAKFAST AND TAKE 1 TABLET BY MOUTH WITH SUPPER    Type 2 diabetes mellitus with diabetic polyneuropathy, without long-term current use of insulin (H)       omega 3 1000 MG Caps     90 capsule    Take 1 g by mouth daily        simvastatin 20 MG tablet    ZOCOR    90 tablet    TAKE ONE TABLET BY MOUTH EVERY NIGHT AT BEDTIME    Hyperlipidemia LDL goal <100       vitamin D 1000 UNITS capsule     3 MONTHS    1 CAPSULE DAILY    Nutrition disorder       warfarin 5 MG tablet    COUMADIN    180 tablet    TAKE 1-2 TABLETS (5-10 MG) BY MOUTH DAILY AS INSTRUCTED    Persistent atrial fibrillation (H)       * Notice:  This list has 2 medication(s) that are the same as other medications prescribed for you. Read the directions carefully, and ask your doctor or other care provider to review them with you.

## 2018-03-08 NOTE — LETTER
3/8/2018    Madina Mercado MD  [address] [facility] [address]    RE: Zulema Nixon       Dear Colleague,    I had the pleasure of seeing Zulema Nixon in the [facility].    Cardiology Progress Note          Assessment and Plan:     1. Vein disease, status post radio frequency ablation    Continue compression stockings.  Consider decrease of amlodipine if edema continues or worsens.      2. Hypertension, good control    Normal renal function.  Could consider adding triamterene or Spironolactone if needed to substitute for some or all of the amlodipine.      3. Chronic atrial for ablation, good rate control and asymptomatic.    Continue anticoagulation and atenolol.    If patient notices occasional palpitations in the morning before taking her atenolol, we may split dose it to 50 mg twice per day.    Routine follow-up in 1 year, sooner if symptoms arise.    This note was transcribed using electronic voice recognition software and there may be typographical errors present.                Interval History:   The patient is a very pleasant 72 year old whom I have been following after venous ablation.  She also has chronic atrial fibrillation.  She did not decrease her amlodipine last year for concern that the blood pressure would increase.  She has been switched from chlorthalidone to hydrochlorothiazide 25 mrem daily.  She has not noticed any increase in urination.  Blood pressures are pretty well controlled.  The edema does not bother her very much at this point.  She wears compression stockings religiously.  She denies any lightheadedness or dyspnea on exertion.  She is anticoagulated for her atrial fibrillation.                     Review of Systems:   Review of Systems:  Skin:  Negative for     Eyes:  Negative for glasses  ENT:  Positive for hearing loss  Respiratory:  Positive for sleep apnea;CPAP  Cardiovascular:    edema;Positive for  Gastroenterology: Negative for   "  Genitourinary:  Negative for    Musculoskeletal:    arthritis  Neurologic:  Negative for    Psychiatric:  Negative    Heme/Lymph/Imm:  Negative    Endocrine:  Positive for diabetes              Physical Exam:     Vitals: /72  Pulse 60  Ht 1.619 m (5' 3.75\")  Wt 113.9 kg (251 lb)  BMI 43.42 kg/m2  Constitutional:  cooperative, alert and oriented, well developed, well nourished, in no acute distress        Skin:  warm and dry to the touch, no apparent skin lesions or masses noted        Head:  normocephalic, no masses or lesions        Eyes:  pupils equal and round, conjunctivae and lids unremarkable, sclera white, no xanthalasma, EOMS intact, no nystagmus        ENT:  no pallor or cyanosis, dentition good        Neck:  JVP normal;no carotid bruit        Chest:  normal breath sounds, clear to auscultation, normal A-P diameter, normal symmetry, normal respiratory excursion, no use of accessory muscles        Cardiac:   irregularly irregular rhythm           rate controlled    Abdomen:      benign    Extremities and Back:    erythema      Neurological:  no gross motor deficits;affect appropriate                 Medications:     Current Outpatient Prescriptions   Medication Sig Dispense Refill     metFORMIN (GLUCOPHAGE) 1000 MG tablet TAKE 1 AND 1/2 TABLETS BY MOUTH WITH BREAKFAST AND TAKE 1 TABLET BY MOUTH WITH SUPPER 225 tablet 1     amLODIPine (NORVASC) 10 MG tablet Take 1 tablet (10 mg) by mouth daily 90 tablet 1     ketoconazole (NIZORAL) 2 % cream Apply topically 2 times daily Apply to affected nails daily 30 g 1     warfarin (COUMADIN) 5 MG tablet TAKE 1-2 TABLETS (5-10 MG) BY MOUTH DAILY AS INSTRUCTED 180 tablet 3     simvastatin (ZOCOR) 20 MG tablet TAKE ONE TABLET BY MOUTH EVERY NIGHT AT BEDTIME 90 tablet 1     lisinopril (PRINIVIL/ZESTRIL) 40 MG tablet Take 1 tablet (40 mg) by mouth daily 90 tablet 1     atenolol (TENORMIN) 100 MG tablet Take 1 tablet (100 mg) by mouth daily 90 tablet 1     " hydrochlorothiazide (HYDRODIURIL) 25 MG tablet Take 1 tablet (25 mg) by mouth daily 90 tablet 1     blood glucose monitoring (JAYDE CONTOUR NEXT) test strip Use to test blood sugar 1 times daily or as directed. 100 strip 3     blood glucose monitoring (JAYDE MICROLET) lancets Use to test blood sugar 1 times daily or as directed. 100 each 1     blood glucose monitoring (NO BRAND SPECIFIED) test strip Compact Plus test strips Use to test blood sugars 1 times daily or as directed       omega 3 1000 MG CAPS Take 1 g by mouth daily 90 capsule      BIOTIN 10 MG OR TABS 1 TABLET DAILY       VITAMIN D 1000 UNIT OR CAPS 1 CAPSULE DAILY 3 MONTHS 1 YEAR     CENTRUM SILVER OR TABS ONE DAILY 3 MONTHS 1 YEAR     CALTRATE 600 + D 600-200 MG-IU OR TABS 1 tablet twice daily 60 11     ASPIRIN 81 MG OR TABS 1 tab po QD (Once per day) 0 0                Data:   All laboratory data reviewed  No results found for this or any previous visit (from the past 24 hour(s)).    All laboratory data reviewed  Lab Results   Component Value Date    CHOL 109 01/11/2018     Lab Results   Component Value Date    HDL 37 01/11/2018     Lab Results   Component Value Date    LDL 49 01/11/2018     Lab Results   Component Value Date    TRIG 117 01/11/2018     Lab Results   Component Value Date    CHOLHDLRATIO 3.4 04/22/2015     TSH   Date Value Ref Range Status   01/11/2018 0.95 0.40 - 4.00 mU/L Final     Last Basic Metabolic Panel:  Lab Results   Component Value Date     01/11/2018      Lab Results   Component Value Date    POTASSIUM 3.9 01/11/2018     Lab Results   Component Value Date    CHLORIDE 99 01/11/2018     Lab Results   Component Value Date    ALVA 9.4 01/11/2018     Lab Results   Component Value Date    CO2 27 01/11/2018     Lab Results   Component Value Date    BUN 12 01/11/2018     Lab Results   Component Value Date    CR 0.63 01/11/2018     Lab Results   Component Value Date     01/11/2018     Lab Results   Component Value Date     WBC 7.3 01/11/2018     Lab Results   Component Value Date    RBC 4.60 01/11/2018     Lab Results   Component Value Date    HGB 12.8 01/11/2018     Lab Results   Component Value Date    HCT 38.7 01/11/2018     Lab Results   Component Value Date    MCV 84 01/11/2018     Lab Results   Component Value Date    MCH 27.8 01/11/2018     Lab Results   Component Value Date    MCHC 33.1 01/11/2018     Lab Results   Component Value Date    RDW 13.2 01/11/2018     Lab Results   Component Value Date     01/11/2018     Thank you for allowing me to participate in the care of your patient.    Sincerely,     Edis Walsh MD     [facility]

## 2018-03-08 NOTE — PATIENT INSTRUCTIONS
If the swelling gets worse or too annoying, then we can cut the AMLODIPINE dose to 5mg or 2.5mg. But to compensate for the blood pressure, we would have to add an additional medication.    We might add a helper pill to the HCTZ called triamterene. It comes in a combination pill with the HTCZ called MAXZIDE. The Triamterene helps the HCTZ work better to lower the blood pressure.    Alternately, we could use a different diuretic altogether called SPIRONOLACTONE and that is a hormone blocking blood pressure medication that prevents retaining water in the first place.    If you notice more palpitations early in the morning, we might cut the ATENOLOL in half and take it twice per day to overlap the doses.

## 2018-04-04 ENCOUNTER — TELEPHONE (OUTPATIENT)
Dept: FAMILY MEDICINE | Facility: CLINIC | Age: 73
End: 2018-04-04

## 2018-04-04 ENCOUNTER — ANTICOAGULATION THERAPY VISIT (OUTPATIENT)
Dept: NURSING | Facility: CLINIC | Age: 73
End: 2018-04-04
Payer: COMMERCIAL

## 2018-04-04 DIAGNOSIS — Z79.01 LONG-TERM (CURRENT) USE OF ANTICOAGULANTS: ICD-10-CM

## 2018-04-04 DIAGNOSIS — I10 ESSENTIAL HYPERTENSION WITH GOAL BLOOD PRESSURE LESS THAN 140/90: Primary | ICD-10-CM

## 2018-04-04 DIAGNOSIS — I48.91 ATRIAL FIBRILLATION, UNSPECIFIED TYPE (H): ICD-10-CM

## 2018-04-04 LAB — INR POINT OF CARE: 3.3 (ref 0.86–1.14)

## 2018-04-04 PROCEDURE — 85610 PROTHROMBIN TIME: CPT | Mod: QW

## 2018-04-04 PROCEDURE — 36416 COLLJ CAPILLARY BLOOD SPEC: CPT

## 2018-04-04 NOTE — TELEPHONE ENCOUNTER
Pt saw cardiology in Feb 2018 and was told to decrease her amlodipine due to swelling, pt was told by cardiology she needed to discuss this change with MD EB. The reason for the suggested change was r/t swelling in lower legs - please see the letter that was sent to MD EB from Dr. Walsh regarding this dating 3/8/2018    Thank you for your time     [facility] # [phone] (home)  Ok LM     Pt uses [facility] in [place]     Kylah Pierce RN, BSN  [facility]

## 2018-04-04 NOTE — MR AVS SNAPSHOT
Zulema Nixon   4/4/2018 10:45 AM   Anticoagulation Therapy Visit    Description:  72 year old female   Provider:   ANTICOAGULATION CLINIC   Department:   Nurse           INR as of 4/4/2018     Today's INR 3.3!      Anticoagulation Summary as of 4/4/2018     INR goal 2.0-3.0   Today's INR 3.3!   Full instructions 4/4: 2.5 mg; Otherwise 5 mg on Wed, Sat; 7.5 mg all other days   Next INR check 4/11/2018    Indications   Long-term (current) use of anticoagulants [Z79.01] [Z79.01]  Atrial fibrillation  unspecified type (H) [I48.91]         Your next Anticoagulation Clinic appointment(s)     Apr 11, 2018 10:45 AM CDT   Anticoagulation [facility]  [facility] CL[facility]    [address] [place] [id number]   [phone]              Contact Numbers     Clinic Number:         April 2018 Details    Sun Mon Tue Wed Thu Fri Sat     1               2               3               4      2.5 mg   See details      5      7.5 mg         6      7.5 mg         7      5 mg           8      7.5 mg         9      7.5 mg         10      7.5 mg         11            12               13               14                 15               16               17               18               19               20               21                 22               23               24               25               26               27               28                 29               30                     Date Details   04/04 This INR check       Date of next INR:  4/11/2018         How to take your warfarin dose     To take:  2.5 mg Take 0.5 of a 5 mg tablet.    To take:  5 mg Take 1 of the 5 mg tablets.    To take:  7.5 mg Take 1.5 of the 5 mg tablets.

## 2018-04-09 RX ORDER — AMLODIPINE BESYLATE 5 MG/1
5 TABLET ORAL DAILY
Qty: 90 TABLET | Refills: 0 | Status: SHIPPED | OUTPATIENT
Start: 2018-04-09 | End: 2018-07-13

## 2018-04-09 NOTE — TELEPHONE ENCOUNTER
Decrease amlodine to 5mg 1 tab po daily or 1/2 of a 10mg tab po daily - offer both options to pt. After dosage change - Recheck your blood pressure in our pharmacy in 1 week or sooner if needed.  Have pharmacy send me their note.      Ok for #90 tabs of the above and see be make in 7/2018 as planned. Please assist pt in making appt to be seen at that time.

## 2018-04-09 NOTE — TELEPHONE ENCOUNTER
Patient notified by phone.  She would like the 5 mg tablets sent to [facility] in [place].    Order sent to [facility].  Future visit scheduled for 7/13/2018.    Patient verbalized understanding and agreed with plan.    PURVI Thorne, RN, N  [facility]) [phone]

## 2018-04-11 ENCOUNTER — ANTICOAGULATION THERAPY VISIT (OUTPATIENT)
Dept: NURSING | Facility: CLINIC | Age: 73
End: 2018-04-11
Payer: COMMERCIAL

## 2018-04-11 DIAGNOSIS — Z79.01 LONG-TERM (CURRENT) USE OF ANTICOAGULANTS: ICD-10-CM

## 2018-04-11 DIAGNOSIS — I48.91 ATRIAL FIBRILLATION, UNSPECIFIED TYPE (H): ICD-10-CM

## 2018-04-11 LAB — INR POINT OF CARE: 2.6 (ref 0.86–1.14)

## 2018-04-11 PROCEDURE — 36416 COLLJ CAPILLARY BLOOD SPEC: CPT

## 2018-04-11 PROCEDURE — 85610 PROTHROMBIN TIME: CPT | Mod: QW

## 2018-04-11 NOTE — MR AVS SNAPSHOT
Zulema Nixon   4/11/2018 3:30 PM   Anticoagulation Therapy Visit    Description:  72 year old female   Provider:   ANTICOAGULATION CLINIC   Department:  Rv Nurse           INR as of 4/11/2018     Today's INR 2.6      Anticoagulation Summary as of 4/11/2018     INR goal 2.0-3.0   Today's INR 2.6   Full instructions 5 mg on Wed, Sat; 7.5 mg all other days   Next INR check 5/9/2018    Indications   Long-term (current) use of anticoagulants [Z79.01] [Z79.01]  Atrial fibrillation  unspecified type (H) [I48.91]         Your next Anticoagulation Clinic appointment(s)     May 09, 2018 10:45 AM CDT   Anticoagulation Visit with  ANTICOAGULATION CLINIC   [facility]    [address] [place] [id number] [phone]              Contact Numbers     Clinic Number:         April 2018 Details    Sun Mon Tue Wed Thu Fri Sat     1               2               3               4               5               6               7                 8               9               10               11      5 mg   See details      12      7.5 mg         13      7.5 mg         14      5 mg           15      7.5 mg         16      7.5 mg         17      7.5 mg         18      5 mg         19      7.5 mg         20      7.5 mg         21      5 mg           22      7.5 mg         23      7.5 mg         24      7.5 mg         25      5 mg         26      7.5 mg         27      7.5 mg         28      5 mg           29      7.5 mg         30      7.5 mg               Date Details   04/11 This INR check               How to take your warfarin dose     To take:  5 mg Take 1 of the 5 mg tablets.    To take:  7.5 mg Take 1.5 of the 5 mg tablets.           May 2018 Details    Sun Mon Tue Wed Thu Fri Sat       1      7.5 mg         2      5 mg         3      7.5 mg         4      7.5 mg         5      5 mg           6      7.5 mg         7      7.5 mg         8      7.5 mg          9            10               11               12                 13               14               15               16               17               18               19                 20               21               22               23               24               25               26                 27               28               29               30               31                  Date Details   No additional details    Date of next INR:  5/9/2018         How to take your warfarin dose     To take:  5 mg Take 1 of the 5 mg tablets.    To take:  7.5 mg Take 1.5 of the 5 mg tablets.

## 2018-04-11 NOTE — PROGRESS NOTES
ANTICOAGULATION FOLLOW-UP CLINIC VISIT    Patient Name:  Zulema Nixon  Date:  4/11/2018  Contact Type:  Face to Face    SUBJECTIVE:     Patient Findings     Positives No Problem Findings           OBJECTIVE    INR Protime   Date Value Ref Range Status   04/11/2018 2.6 (A) 0.86 - 1.14 Final       ASSESSMENT / PLAN  No question data found.  Anticoagulation Summary as of 4/11/2018     INR goal 2.0-3.0   Today's INR 2.6   Maintenance plan 5 mg (5 mg x 1) on Wed, Sat; 7.5 mg (5 mg x 1.5) all other days   Full instructions 5 mg on Wed, Sat; 7.5 mg all other days   Weekly total 47.5 mg   No change documented Kylah Pierce RN   Plan last modified Nereyda Root, RN (3/1/2016)   Next INR check 5/9/2018   Target end date     Indications   Long-term (current) use of anticoagulants [Z79.01] [Z79.01]  Atrial fibrillation  unspecified type (H) [I48.91]         Anticoagulation Episode Summary     INR check location     Preferred lab     Send INR reminders to RV NURSE    Comments       Anticoagulation Care Providers     Provider Role Specialty Phone number    Madina Mercado MD [facility] [phone]            See the Encounter Report to view Anticoagulation Flowsheet and Dosing Calendar (Go to Encounters tab in chart review, and find the Anticoagulation Therapy Visit)    Dosage adjustment made based on physician directed care plan.    Kylah Pierce, RN

## 2018-05-09 ENCOUNTER — ANTICOAGULATION THERAPY VISIT (OUTPATIENT)
Dept: NURSING | Facility: CLINIC | Age: 73
End: 2018-05-09
Payer: COMMERCIAL

## 2018-05-09 DIAGNOSIS — I48.91 ATRIAL FIBRILLATION, UNSPECIFIED TYPE (H): ICD-10-CM

## 2018-05-09 DIAGNOSIS — Z79.01 LONG-TERM (CURRENT) USE OF ANTICOAGULANTS: ICD-10-CM

## 2018-05-09 LAB — INR POINT OF CARE: 3.1 (ref 0.86–1.14)

## 2018-05-09 PROCEDURE — 36416 COLLJ CAPILLARY BLOOD SPEC: CPT

## 2018-05-09 PROCEDURE — 85610 PROTHROMBIN TIME: CPT | Mod: QW

## 2018-05-09 NOTE — MR AVS SNAPSHOT
Zulema Nixon   5/9/2018 10:45 AM   Anticoagulation Therapy Visit    Description:  72 year old female   Provider:  [facility]   Department:  Rv Nurse           INR as of 5/9/2018     Today's INR 3.1!      Anticoagulation Summary as of 5/9/2018     INR goal 2.0-3.0   Today's INR 3.1!   Full instructions 5 mg on Wed, Sat; 7.5 mg all other days   Next INR check 6/20/2018    Indications   Long-term (current) use of anticoagulants [Z79.01] [Z79.01]  Atrial fibrillation  unspecified type (H) [I48.91]         Your next Anticoagulation Clinic appointment(s)     Jun 20, 2018 10:30 AM CDT   Anticoagulation Visit with  [facility]AGULATION CLINIC   [facility]    [address] [place] [id number] [phone]              Contact Numbers     Clinic Number:         May 2018 Details    Sun Mon Tue Wed Thu Fri Sat       1               2               3               4               5                 6               7               8               9      5 mg   See details      10      7.5 mg         11      7.5 mg         12      5 mg           13      7.5 mg         14      7.5 mg         15      7.5 mg         16      5 mg         17      7.5 mg         18      7.5 mg         19      5 mg           20      7.5 mg         21      7.5 mg         22      7.5 mg         23      5 mg         24      7.5 mg         25      7.5 mg         26      5 mg           27      7.5 mg         28      7.5 mg         29      7.5 mg         30      5 mg         31      7.5 mg            Date Details   05/09 This INR check               How to take your warfarin dose     To take:  5 mg Take 1 of the 5 mg tablets.    To take:  7.5 mg Take 1.5 of the 5 mg tablets.           June 2018 Details    Sun Mon Tue Wed Thu Fri Sat          1      7.5 mg         2      5 mg           3      7.5 mg         4      7.5 mg         5      7.5 mg         6      5 mg         7       7.5 mg         8      7.5 mg         9      5 mg           10      7.5 mg         11      7.5 mg         12      7.5 mg         13      5 mg         14      7.5 mg         15      7.5 mg         16      5 mg           17      7.5 mg         18      7.5 mg         19      7.5 mg         20            21               22               23                 24               25               26               27               28               29               30                Date Details   No additional details    Date of next INR:  6/20/2018         How to take your warfarin dose     To take:  5 mg Take 1 of the 5 mg tablets.    To take:  7.5 mg Take 1.5 of the 5 mg tablets.

## 2018-05-09 NOTE — PROGRESS NOTES
ANTICOAGULATION FOLLOW-UP CLINIC VISIT    Patient Name:  Zulema Nixon  Date:  5/9/2018  Contact Type:  Face to Face    SUBJECTIVE:     Patient Findings     Positives No Problem Findings           OBJECTIVE    INR Protime   Date Value Ref Range Status   05/09/2018 3.1 (A) 0.86 - 1.14 Final       ASSESSMENT / PLAN  No question data found.  Anticoagulation Summary as of 5/9/2018     INR goal 2.0-3.0   Today's INR 3.1!   Maintenance plan 5 mg (5 mg x 1) on Wed, Sat; 7.5 mg (5 mg x 1.5) all other days   Full instructions 5 mg on Wed, Sat; 7.5 mg all other days   Weekly total 47.5 mg   No change documented Kylah Pierce RN   Plan last modified Nereyda Root, RN (3/1/2016)   Next INR check 6/20/2018   Target end date     Indications   Long-term (current) use of anticoagulants [Z79.01] [Z79.01]  Atrial fibrillation  unspecified type (H) [I48.91]         Anticoagulation Episode Summary     INR check location     Preferred lab     Send INR reminders to RV NURSE    Comments       Anticoagulation Care Providers     Provider Role Specialty Phone number    Madina Mercado MD [facility] [phone]            See the Encounter Report to view Anticoagulation Flowsheet and Dosing Calendar (Go to Encounters tab in chart review, and find the Anticoagulation Therapy Visit)    Dosage adjustment made based on physician directed care plan.    Kylah Pierce, RN

## 2018-05-29 ENCOUNTER — ALLIED HEALTH/NURSE VISIT (OUTPATIENT)
Dept: FAMILY MEDICINE | Facility: CLINIC | Age: 73
End: 2018-05-29
Payer: COMMERCIAL

## 2018-05-29 VITALS — DIASTOLIC BLOOD PRESSURE: 86 MMHG | SYSTOLIC BLOOD PRESSURE: 138 MMHG

## 2018-05-29 DIAGNOSIS — I10 ESSENTIAL HYPERTENSION WITH GOAL BLOOD PRESSURE LESS THAN 140/90: Primary | ICD-10-CM

## 2018-05-29 PROCEDURE — 99207 ZZC NO CHARGE NURSE ONLY: CPT | Performed by: FAMILY MEDICINE

## 2018-05-29 NOTE — PROGRESS NOTES
Zulema Nixon is enrolled/participating in the retail pharmacy Blood Pressure Goals Achievement Program (BPGAP).  Zulema Nixon was evaluated at [facility] on May 29, 2018 at which time her blood pressure was:    BP Readings from Last 3 Encounters:   05/29/18 138/86   03/08/18 124/72   02/16/18 132/72     Reviewed lifestyle modifications for blood pressure control and reduction: including making healthy food choices, managing weight, getting regular exercise, smoking cessation, reducing alcohol consumption, monitoring blood pressure regularly.     Zulema Nixon is not experiencing symptoms.    Follow-Up: BP is at goal of < 140/90mmHg (patient 18+ years of age with or without diabetes).  Recommended follow-up at pharmacy in 6 months.     Recommendation to Provider: no change    Zulema Nixon was evaluated for enrollment into the PGEN study today.    Patient eligible for enrollment:  No  Patient interested in enrollment:  No    Completed by: Thank you,  Doris Early RPh, Mgr [facility] [phone]

## 2018-05-29 NOTE — MR AVS SNAPSHOT
After Visit Summary   5/29/2018    Zulema Nixon    MRN: [id number]           Patient Information     Date Of Birth          1945        Visit Information        Provider Department      5/29/2018 10:22 AM Madina Mercado MD [facility]        Today's Diagnoses     Essential hypertension with goal blood pressure less than 140/90    -  1       Follow-ups after your visit        Your next 10 appointments already scheduled     Jun 20, 2018 10:30 AM CDT   Anticoagulation Visit with [facility]ATION CL[facility]IC   [facility]    [address] [place] [id number] [phone]            Jul 13, 2018  9:00 AM CDT   SHORT with Madina Mercado MD   [facility] ([facility])    [address] [place] [id number] [phone]              Who to contact     If you have questions or need follow up information about today's clinic visit or your schedule please contact [facility] directly at [phone].  Normal or non-critical lab and imaging results will be communicated to you by [employer], letter or phone within 4 business days after the clinic has received the results. If you do not hear from us within 7 days, please contact the clinic through [employer] or phone. If you have a critical or abnormal lab result, we will notify you by phone as soon as possible.  Submit refill requests through [employer] or call your pharmacy and they will forward the refill request to us. Please allow 3 business days for your refill to be completed.          Additional Information About Your Visit        [employer] Information     [employer] gives you secure access to your electronic health record. If you see a primary care provider, you can also send messages to your care team and make appointments. If you have questions, please call your primary care clinic.  If you do not  have a primary care provider, please call [phone] and they will assist you.        Care EveryWhere ID     This is your Care EveryWhere ID. This could be used by other organizations to access your [place] medical records  [id number]         Blood Pressure from Last 3 Encounters:   05/29/18 138/86   03/08/18 124/72   02/16/18 132/72    Weight from Last 3 Encounters:   03/08/18 251 lb (113.9 kg)   01/10/18 249 lb (112.9 kg)   06/24/17 254 lb (115.2 kg)              Today, you had the following     No orders found for display       Primary Care Provider Office Phone # Fax #    Madina Mercado [phone]       [facility] [address]        Equal Access to Services     TREY WELCH : Hadii aad ku hadasho Soomaali, waaxda luqadaha, qaybta kaalmada ademaceyyada, anjel gonzalez . So [facility] [phone].    ATENCIÓN: Si habla español, tiene a webster disposición servicios gratuitos de asistencia lingüística. Miles al [phone].    We comply with applicable federal civil rights laws and [place] laws. We do not discriminate on the basis of race, color, national origin, age, disability, sex, sexual orientation, or gender identity.            Thank you!     Thank you for choosing [facility]  for your care. Our goal is always to provide you with excellent care. Hearing back from our patients is one way we can continue to improve our services. Please take a few minutes to complete the written survey that you may receive in the mail after your visit with us. Thank you!             Your Updated Medication List - Protect others around you: Learn how to safely use, store and throw away your medicines at www.disposemymeds.org.          This list is accurate as of 5/29/18 10:23 AM.  Always use your most recent med list.                   Brand Name Dispense Instructions for use Diagnosis    amLODIPine 5 MG tablet    NORVASC    90 tablet    Take 1  tablet (5 mg) by mouth daily    Essential hypertension with goal blood pressure less than 140/90       aspirin 81 MG tablet     0    1 tab po QD (Once per day)    Type II or unspecified type diabetes mellitus without mention of complication, not stated as uncontrolled       atenolol 100 MG tablet    TENORMIN    90 tablet    Take 1 tablet (100 mg) by mouth daily    Essential hypertension with goal blood pressure less than 140/90       Biotin 10 MG Tabs tablet      1 TABLET DAILY        blood glucose monitoring lancets     100 each    Use to test blood sugar 1 times daily or as directed.    Type 2 diabetes mellitus with other circulatory complications       * blood glucose monitoring test strip    no brand specified     Compact Plus test strips Use to test blood sugars 1 times daily or as directed        * blood glucose monitoring test strip    JAYDE CONTOUR NEXT    100 strip    Use to test blood sugar 1 times daily or as directed.    Type 2 diabetes mellitus with other circulatory complications       CALTRATE 600 + D 600-200 MG-IU Tabs     60    1 tablet twice daily        CENTRUM SILVER per tablet     3 MONTHS    ONE DAILY        hydrochlorothiazide 25 MG tablet    HYDRODIURIL    90 tablet    Take 1 tablet (25 mg) by mouth daily    Essential hypertension with goal blood pressure less than 140/90, CKD (chronic kidney disease) stage 2, GFR 60-89 ml/min       ketoconazole 2 % cream    NIZORAL    30 g    Apply topically 2 times daily Apply to affected nails daily    Onychomycosis, Type 2 diabetes mellitus with diabetic polyneuropathy, without long-term current use of insulin (H)       lisinopril 40 MG tablet    PRINIVIL/ZESTRIL    90 tablet    Take 1 tablet (40 mg) by mouth daily    Essential hypertension with goal blood pressure less than 140/90       metFORMIN 1000 MG tablet    GLUCOPHAGE    225 tablet    TAKE 1 AND 1/2 TABLETS BY MOUTH WITH BREAKFAST AND TAKE 1 TABLET BY MOUTH WITH SUPPER    Type 2 diabetes mellitus  with diabetic polyneuropathy, without long-term current use of insulin (H)       omega 3 1000 MG Caps     90 capsule    Take 1 g by mouth daily        simvastatin 20 MG tablet    ZOCOR    90 tablet    TAKE ONE TABLET BY MOUTH EVERY NIGHT AT BEDTIME    Hyperlipidemia LDL goal <100       vitamin D 1000 units capsule     3 MONTHS    1 CAPSULE DAILY    Nutrition disorder       warfarin 5 MG tablet    COUMADIN    180 tablet    TAKE 1-2 TABLETS (5-10 MG) BY MOUTH DAILY AS INSTRUCTED    Persistent atrial fibrillation (H)       * Notice:  This list has 2 medication(s) that are the same as other medications prescribed for you. Read the directions carefully, and ask your doctor or other care provider to review them with you.

## 2018-06-20 ENCOUNTER — ANTICOAGULATION THERAPY VISIT (OUTPATIENT)
Dept: NURSING | Facility: CLINIC | Age: 73
End: 2018-06-20
Payer: COMMERCIAL

## 2018-06-20 DIAGNOSIS — I48.91 ATRIAL FIBRILLATION, UNSPECIFIED TYPE (H): ICD-10-CM

## 2018-06-20 DIAGNOSIS — Z79.01 LONG-TERM (CURRENT) USE OF ANTICOAGULANTS: ICD-10-CM

## 2018-06-20 LAB — INR POINT OF CARE: 2.5 (ref 0.86–1.14)

## 2018-06-20 PROCEDURE — 36416 COLLJ CAPILLARY BLOOD SPEC: CPT

## 2018-06-20 PROCEDURE — 85610 PROTHROMBIN TIME: CPT | Mod: QW

## 2018-06-20 NOTE — MR AVS SNAPSHOT
Zulema Nixon   6/20/2018 10:30 AM   Anticoagulation Therapy Visit    Description:  72 year old female   Provider:   ANTICOAGULATION CLINIC   Department:  Rv Nurse           INR as of 6/20/2018     Today's INR 2.5      Anticoagulation Summary as of 6/20/2018     INR goal 2.0-3.0   Today's INR 2.5   Full warfarin instructions 5 mg on Wed, Sat; 7.5 mg all other days   Next INR check 7/13/2018    Indications   Long-term (current) use of anticoagulants [Z79.01] [Z79.01]  Atrial fibrillation  unspecified type (H) [I48.91]         Your next Anticoagulation Clinic appointment(s)     Jul 13, 2018  9:30 AM CDT   Anticoagulation Visit with  ANTICOAGULATION CLINIC   [facility]    [address] [place] [address]   [phone]              Contact Numbers     Clinic Number:         June 2018 Details    Sun Mon Tue Wed Thu Fri Sat          1               2                 3               4               5               6               7               8               9                 10               11               12               13               14               15               16                 17               18               19               20      5 mg   See details      21      7.5 mg         22      7.5 mg         23      5 mg           24      7.5 mg         25      7.5 mg         26      7.5 mg         27      5 mg         28      7.5 mg         29      7.5 mg         30      5 mg          Date Details   06/20 This INR check               How to take your warfarin dose     To take:  5 mg Take 1 of the 5 mg tablets.    To take:  7.5 mg Take 1.5 of the 5 mg tablets.           July 2018 Details    Sun Mon Tue Wed Thu Fri Sat     1      7.5 mg         2      7.5 mg         3      7.5 mg         4      5 mg         5      7.5 mg         6      7.5 mg         7      5 mg           8      7.5 mg         9      7.5 mg         10      7.5 mg          11      5 mg         12      7.5 mg         13            14                 15               16               17               18               19               20               21                 22               23               24               25               26               27               28                 29               30               31                    Date Details   No additional details    Date of next INR:  7/13/2018         How to take your warfarin dose     To take:  5 mg Take 1 of the 5 mg tablets.    To take:  7.5 mg Take 1.5 of the 5 mg tablets.

## 2018-06-20 NOTE — PROGRESS NOTES
ANTICOAGULATION FOLLOW-UP CLINIC VISIT    Patient Name:  Zulema Nixon  Date:  6/20/2018  Contact Type:  Face to Face    SUBJECTIVE:     Patient Findings     Positives No Problem Findings           OBJECTIVE    INR Protime   Date Value Ref Range Status   06/20/2018 2.5 (A) 0.86 - 1.14 Final       ASSESSMENT / PLAN  No question data found.  Anticoagulation Summary as of 6/20/2018     INR goal 2.0-3.0   Today's INR 2.5   Warfarin maintenance plan 5 mg (5 mg x 1) on Wed, Sat; 7.5 mg (5 mg x 1.5) all other days   Full warfarin instructions 5 mg on Wed, Sat; 7.5 mg all other days   Weekly warfarin total 47.5 mg   No change documented Kylah Pierce RN   Plan last modified Nereyda Root RN (3/1/2016)   Next INR check 7/13/2018   Target end date     Indications   Long-term (current) use of anticoagulants [Z79.01] [Z79.01]  Atrial fibrillation  unspecified type (H) [I48.91]         Anticoagulation Episode Summary     INR check location     Preferred lab     Send INR reminders to RV NURSE    Comments       Anticoagulation Care Providers     Provider Role Specialty Phone number    Madina Mercado MD [facility] [phone]            See the Encounter Report to view Anticoagulation Flowsheet and Dosing Calendar (Go to Encounters tab in chart review, and find the Anticoagulation Therapy Visit)    Dosage adjustment made based on physician directed care plan.    Kylah Pierce, RN

## 2018-07-13 ENCOUNTER — OFFICE VISIT (OUTPATIENT)
Dept: FAMILY MEDICINE | Facility: CLINIC | Age: 73
End: 2018-07-13
Payer: COMMERCIAL

## 2018-07-13 ENCOUNTER — ANTICOAGULATION THERAPY VISIT (OUTPATIENT)
Dept: NURSING | Facility: CLINIC | Age: 73
End: 2018-07-13
Payer: COMMERCIAL

## 2018-07-13 VITALS
DIASTOLIC BLOOD PRESSURE: 78 MMHG | WEIGHT: 248.4 LBS | HEIGHT: 64 IN | BODY MASS INDEX: 42.41 KG/M2 | TEMPERATURE: 98.1 F | OXYGEN SATURATION: 98 % | SYSTOLIC BLOOD PRESSURE: 132 MMHG | HEART RATE: 70 BPM

## 2018-07-13 DIAGNOSIS — E78.5 HYPERLIPIDEMIA LDL GOAL <100: ICD-10-CM

## 2018-07-13 DIAGNOSIS — Z13.5 SCREENING FOR DIABETIC RETINOPATHY: ICD-10-CM

## 2018-07-13 DIAGNOSIS — E11.42 TYPE 2 DIABETES MELLITUS WITH DIABETIC POLYNEUROPATHY, WITHOUT LONG-TERM CURRENT USE OF INSULIN (H): Primary | ICD-10-CM

## 2018-07-13 DIAGNOSIS — Z13.89 SCREENING FOR DIABETIC PERIPHERAL NEUROPATHY: ICD-10-CM

## 2018-07-13 DIAGNOSIS — I10 ESSENTIAL HYPERTENSION WITH GOAL BLOOD PRESSURE LESS THAN 140/90: ICD-10-CM

## 2018-07-13 DIAGNOSIS — I48.19 PERSISTENT ATRIAL FIBRILLATION (H): ICD-10-CM

## 2018-07-13 DIAGNOSIS — Z12.11 SCREEN FOR COLON CANCER: ICD-10-CM

## 2018-07-13 DIAGNOSIS — E11.42 TYPE 2 DIABETES MELLITUS WITH DIABETIC POLYNEUROPATHY, WITHOUT LONG-TERM CURRENT USE OF INSULIN (H): ICD-10-CM

## 2018-07-13 DIAGNOSIS — N18.2 CKD (CHRONIC KIDNEY DISEASE) STAGE 2, GFR 60-89 ML/MIN: ICD-10-CM

## 2018-07-13 DIAGNOSIS — I48.91 ATRIAL FIBRILLATION, UNSPECIFIED TYPE (H): ICD-10-CM

## 2018-07-13 DIAGNOSIS — Z23 NEED FOR SHINGLES VACCINE: ICD-10-CM

## 2018-07-13 DIAGNOSIS — Z79.01 LONG-TERM (CURRENT) USE OF ANTICOAGULANTS: ICD-10-CM

## 2018-07-13 LAB
ALBUMIN SERPL-MCNC: 3.6 G/DL (ref 3.4–5)
ALBUMIN UR-MCNC: NEGATIVE MG/DL
ALP SERPL-CCNC: 52 U/L (ref 40–150)
ALT SERPL W P-5'-P-CCNC: 26 U/L (ref 0–50)
ANION GAP SERPL CALCULATED.3IONS-SCNC: 9 MMOL/L (ref 3–14)
APPEARANCE UR: CLEAR
AST SERPL W P-5'-P-CCNC: 19 U/L (ref 0–45)
BILIRUB SERPL-MCNC: 0.5 MG/DL (ref 0.2–1.3)
BILIRUB UR QL STRIP: NEGATIVE
BUN SERPL-MCNC: 14 MG/DL (ref 7–30)
CALCIUM SERPL-MCNC: 9.3 MG/DL (ref 8.5–10.1)
CHLORIDE SERPL-SCNC: 99 MMOL/L (ref 94–109)
CHOLEST SERPL-MCNC: 105 MG/DL
CO2 SERPL-SCNC: 27 MMOL/L (ref 20–32)
COLOR UR AUTO: YELLOW
CREAT SERPL-MCNC: 0.69 MG/DL (ref 0.52–1.04)
CREAT UR-MCNC: 140 MG/DL
ERYTHROCYTE [DISTWIDTH] IN BLOOD BY AUTOMATED COUNT: 13.2 % (ref 10–15)
GFR SERPL CREATININE-BSD FRML MDRD: 84 ML/MIN/1.7M2
GLUCOSE SERPL-MCNC: 148 MG/DL (ref 70–99)
GLUCOSE UR STRIP-MCNC: NEGATIVE MG/DL
HBA1C MFR BLD: 6.7 % (ref 0–5.6)
HCT VFR BLD AUTO: 38.1 % (ref 35–47)
HDLC SERPL-MCNC: 33 MG/DL
HGB BLD-MCNC: 12.4 G/DL (ref 11.7–15.7)
HGB UR QL STRIP: NEGATIVE
INR POINT OF CARE: 2.4 (ref 0.86–1.14)
KETONES UR STRIP-MCNC: NEGATIVE MG/DL
LDLC SERPL CALC-MCNC: 49 MG/DL
LEUKOCYTE ESTERASE UR QL STRIP: NEGATIVE
MCH RBC QN AUTO: 27.4 PG (ref 26.5–33)
MCHC RBC AUTO-ENTMCNC: 32.5 G/DL (ref 31.5–36.5)
MCV RBC AUTO: 84 FL (ref 78–100)
MICROALBUMIN UR-MCNC: 25 MG/L
MICROALBUMIN/CREAT UR: 17.93 MG/G CR (ref 0–25)
NITRATE UR QL: NEGATIVE
NONHDLC SERPL-MCNC: 72 MG/DL
PH UR STRIP: 7.5 PH (ref 5–7)
PLATELET # BLD AUTO: 230 10E9/L (ref 150–450)
POTASSIUM SERPL-SCNC: 4 MMOL/L (ref 3.4–5.3)
PROT SERPL-MCNC: 7.1 G/DL (ref 6.8–8.8)
RBC # BLD AUTO: 4.53 10E12/L (ref 3.8–5.2)
SODIUM SERPL-SCNC: 135 MMOL/L (ref 133–144)
SOURCE: ABNORMAL
SP GR UR STRIP: 1.01 (ref 1–1.03)
T3 SERPL-MCNC: 99 NG/DL (ref 60–181)
T4 FREE SERPL-MCNC: 1.33 NG/DL (ref 0.76–1.46)
TRIGL SERPL-MCNC: 116 MG/DL
TSH SERPL DL<=0.005 MIU/L-ACNC: 0.83 MU/L (ref 0.4–4)
UROBILINOGEN UR STRIP-ACNC: 1 EU/DL (ref 0.2–1)
WBC # BLD AUTO: 6.2 10E9/L (ref 4–11)

## 2018-07-13 PROCEDURE — 85027 COMPLETE CBC AUTOMATED: CPT | Performed by: FAMILY MEDICINE

## 2018-07-13 PROCEDURE — 80053 COMPREHEN METABOLIC PANEL: CPT | Performed by: FAMILY MEDICINE

## 2018-07-13 PROCEDURE — 80061 LIPID PANEL: CPT | Performed by: FAMILY MEDICINE

## 2018-07-13 PROCEDURE — 85610 PROTHROMBIN TIME: CPT | Mod: QW

## 2018-07-13 PROCEDURE — 84439 ASSAY OF FREE THYROXINE: CPT | Performed by: FAMILY MEDICINE

## 2018-07-13 PROCEDURE — 99207 C FOOT EXAM  NO CHARGE: CPT | Mod: 25 | Performed by: FAMILY MEDICINE

## 2018-07-13 PROCEDURE — 81003 URINALYSIS AUTO W/O SCOPE: CPT | Performed by: FAMILY MEDICINE

## 2018-07-13 PROCEDURE — 84480 ASSAY TRIIODOTHYRONINE (T3): CPT | Performed by: FAMILY MEDICINE

## 2018-07-13 PROCEDURE — 36416 COLLJ CAPILLARY BLOOD SPEC: CPT

## 2018-07-13 PROCEDURE — 83036 HEMOGLOBIN GLYCOSYLATED A1C: CPT | Performed by: FAMILY MEDICINE

## 2018-07-13 PROCEDURE — 84443 ASSAY THYROID STIM HORMONE: CPT | Performed by: FAMILY MEDICINE

## 2018-07-13 PROCEDURE — 99214 OFFICE O/P EST MOD 30 MIN: CPT | Performed by: FAMILY MEDICINE

## 2018-07-13 PROCEDURE — 82043 UR ALBUMIN QUANTITATIVE: CPT | Performed by: FAMILY MEDICINE

## 2018-07-13 RX ORDER — WARFARIN SODIUM 5 MG/1
TABLET ORAL
Qty: 180 TABLET | Refills: 3 | Status: SHIPPED | OUTPATIENT
Start: 2018-07-13 | End: 2019-07-08

## 2018-07-13 RX ORDER — AMLODIPINE BESYLATE 5 MG/1
5 TABLET ORAL DAILY
Qty: 90 TABLET | Refills: 3 | Status: SHIPPED | OUTPATIENT
Start: 2018-07-13 | End: 2019-01-14

## 2018-07-13 RX ORDER — SIMVASTATIN 20 MG
TABLET ORAL
Qty: 90 TABLET | Refills: 1 | Status: SHIPPED | OUTPATIENT
Start: 2018-07-13 | End: 2019-01-04

## 2018-07-13 RX ORDER — LISINOPRIL 40 MG/1
40 TABLET ORAL DAILY
Qty: 90 TABLET | Refills: 1 | Status: SHIPPED | OUTPATIENT
Start: 2018-07-13 | End: 2019-01-04

## 2018-07-13 RX ORDER — ATENOLOL 100 MG/1
100 TABLET ORAL DAILY
Qty: 90 TABLET | Refills: 1 | Status: SHIPPED | OUTPATIENT
Start: 2018-07-13 | End: 2019-01-04

## 2018-07-13 RX ORDER — HYDROCHLOROTHIAZIDE 25 MG/1
25 TABLET ORAL DAILY
Qty: 90 TABLET | Refills: 1 | Status: SHIPPED | OUTPATIENT
Start: 2018-07-13 | End: 2019-01-04

## 2018-07-13 NOTE — MR AVS SNAPSHOT
Zulema Nixon   7/13/2018 9:30 AM   Anticoagulation Therapy Visit    Description:  72 year old female   Provider:   ANTICOAGULATION CLINIC   Department:  Rv Nurse           INR as of 7/13/2018     Today's INR 2.4      Anticoagulation Summary as of 7/13/2018     INR goal 2.0-3.0   Today's INR 2.4   Full warfarin instructions 5 mg on Wed, Sat; 7.5 mg all other days   Next INR check 8/22/2018    Indications   Long-term (current) use of anticoagulants [Z79.01] [Z79.01]  Atrial fibrillation  unspecified type (H) [I48.91]         Your next Anticoagulation Clinic appointment(s)     Aug 22, 2018 10:45 AM CDT   Anticoagulation Visit with  ANTICOAGULATION CLINIC   [facility]    [address] [place] [id number] [phone]              Contact Numbers     Clinic Number:         July 2018 Details    Sun Mon Tue Wed Thu Fri Sat     1               2               3               4               5               6               7                 8               9               10               11               12               13      7.5 mg   See details      14      5 mg           15      7.5 mg         16      7.5 mg         17      7.5 mg         18      5 mg         19      7.5 mg         20      7.5 mg         21      5 mg           22      7.5 mg         23      7.5 mg         24      7.5 mg         25      5 mg         26      7.5 mg         27      7.5 mg         28      5 mg           29      7.5 mg         30      7.5 mg         31      7.5 mg              Date Details   07/13 This INR check               How to take your warfarin dose     To take:  5 mg Take 1 of the 5 mg tablets.    To take:  7.5 mg Take 1.5 of the 5 mg tablets.           August 2018 Details    Sun Mon Tue Wed Thu Fri Sat        1      5 mg         2      7.5 mg         3      7.5 mg         4      5 mg           5      7.5 mg         6      7.5 mg         7      7.5 mg          8      5 mg         9      7.5 mg         10      7.5 mg         11      5 mg           12      7.5 mg         13      7.5 mg         14      7.5 mg         15      5 mg         16      7.5 mg         17      7.5 mg         18      5 mg           19      7.5 mg         20      7.5 mg         21      7.5 mg         22            23               24               25                 26               27               28               29               30               31                 Date Details   No additional details    Date of next INR:  8/22/2018         How to take your warfarin dose     To take:  5 mg Take 1 of the 5 mg tablets.    To take:  7.5 mg Take 1.5 of the 5 mg tablets.
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
## 2018-07-13 NOTE — MR AVS SNAPSHOT
After Visit Summary   7/13/2018    Zulema Nixon    MRN: [id number]           Patient Information     Date Of Birth          1945        Visit Information        Provider Department      7/13/2018 9:00 AM Madina Mercado MD [facility] [place]        Today's Diagnoses     Type 2 diabetes mellitus with diabetic polyneuropathy, without long-term current use of insulin (H)- elevated fasting sugars currently     -  1    Essential hypertension with goal blood pressure less than 140/90        CKD (chronic kidney disease) stage 2, GFR 60-89 ml/min        Hyperlipidemia LDL goal <100        Screening for diabetic retinopathy        Screen for colon cancer        Screening for diabetic peripheral neuropathy        Need for shingles vaccine        Type 2 diabetes mellitus with diabetic polyneuropathy, without long-term current use of insulin (H)        Persistent atrial fibrillation (H)          Care Instructions              Try to keep your fiber consistent daily.   Ok to take imodium AD over the counter as needed for diarrhea.         Irritable Bowel Syndrome         What is irritable bowel syndrome?   Irritable bowel syndrome (IBS) is a chronic (long-lasting) disorder of the large intestine. (The large intestine is also called the colon or bowel.) IBS is not a disease. It's a condition in which the bowel does not always work normally. Although IBS can cause much distress, it does not damage the bowel and does not lead to life-threatening illness.   IBS is the most common intestinal disorder. It affects more women than men and usually begins in early adult life. Sometimes it is referred to as spastic colon.   How does it occur?   The cause of IBS is not well understood. Changes in the nerves and muscles in the bowel or in the central nervous system may be a cause. For example, the nerves in the bowel may make the muscles contract too much when you eat. These contractions can make food move  too fast through the intestines, causing gas, bloating, cramping, and diarrhea. In other cases abnormal contractions may slow the passage of food and delay bowel movements, causing cramps and constipation.   Some foods may trigger attacks. Sometimes the symptoms of IBS may be caused by intestinal gas or an illness such as stomach flu. Other possible triggers of attacks are hormonal changes, emotional stress, or depression.   What are the symptoms?   The most common symptoms include:   cramping and pain in the abdomen, which may be mild or severe   constipation or diarrhea   a lot of gas.   Other symptoms include:   bloating   a feeling of fullness in the rectum.   Symptoms often occur after you have eaten a big meal or when you are under stress. Women may have more symptoms during their menstrual periods. You may feel better after you have a bowel movement.   How is it diagnosed?   After asking about your symptoms and medical history, your healthcare provider will examine your abdomen and may do a rectal exam.   There is no specific test for IBS. The diagnosis is usually based on your symptoms. But your provider may do one or more of these simple tests:   blood tests   tests of bowel movement samples to check for blood and infection.   Depending on your medical and family history, physical exam, and age, your provider may do the following tests to look for other possible causes of your symptoms:   colonoscopy or sigmoidoscopy, which are procedures that allow your provider to see the inside of your colon with a thin, flexible, lighted tube   barium enema, which is a procedure that uses X-rays and a liquid dye passed into the colon through the rectum to check the colon.   Your healthcare provider may ask you to try a milk-free diet to see if lactose intolerance (trouble digesting milk products) may be causing your symptoms. Or your provider may suggest not eating foods with gluten for a certain amount of time to see  if the symptoms then go away. This is a way to check for gluten intolerance (called celiac disease), which can cause symptoms similar to the symptoms of IBS. Common gluten-containing foods are wheat products. There is also a blood test that can help check for celiac disease.   How is it treated?   Doctors have not yet found a cure for IBS. However, a combination of careful food selection and stress management usually relieves the symptoms. Some medicines may also help.   Diet Talk to your healthcare provider about whether you should eat more or less high-fiber food. Try eating smaller meals more often each day rather than just 2 or 3 larger meals. Avoid foods that cause gas, such as carbonated drinks, cabbage, and beans. Other foods that may cause symptoms are:   fatty foods, such as French fries   milk products, such as cheese or ice cream   chocolate   caffeine (found in coffee and some sodas)   Food diary Your healthcare provider may ask you to keep a food diary to see if eating a particular food, for example, milk, worsens your symptoms.   Stress Your provider will help you identify things that cause stress in your life and will suggest ways to help you control them. Relaxation or biofeedback techniques may help you manage stress.   Medicines Your provider may prescribe:   Bulk-forming agents, such as bran or methylcellulose.   Antispasmodic drugs to slow contractions in the bowel and help with diarrhea and pain.   Antidepressants, which can help control chronic pain.   Medicines to help with constipation or diarrhea: For example, alosetron may be prescribed for treatment of IBS when diarrhea is the main symptom and other medicines have not helped. A medicine called Zelnorm (tegaserod) was previously available for the treatment of severe side effects of IBS. Due to complications in a few people, the drug was recalled by the . It is currently available from the  by special referral from  your healthcare provider.   How long will the effects last?   Because IBS is a chronic disorder, you may have flare-ups of symptoms throughout your life. Although a cure hasn't been found yet, the disorder can usually be controlled. IBS will not progress to something worse.   How can I take care of myself?   Follow your healthcare provider's recommendations.   Learn stress-management techniques to reduce stress and anxiety in your life. Professional counseling may be helpful.   Exercise regularly, according to your provider's recommendations. Exercise helps keep bowel movements regular. It may also help maintain serotonin levels in the brain, which can lessen depression and stress symptoms.   Drink plenty of water.   Do not drink alcohol, which can make symptoms of IBS worse.   Select your foods carefully. If a food appears to bring on your symptoms, avoid it. However, don't eliminate a food just because it appears to cause symptoms one time. Be sure that a food produces symptoms several times before you give it up. You should try to keep many different foods in your diet because a varied diet provides better nutrition.   Ask your healthcare provider if you should have a high-fiber diet, especially if you tend to be constipated. High-fiber foods may cause gas and bloating, but usually these symptoms lessen as the digestive tract gets used to the increased fiber. Some high-fiber foods include:   whole-grain breads and cereals, such as shredded wheat or bran flakes   fruits, especially apricots, blackberries, coconut, dates, figs, kiwi, peaches, pears, pineapple, prunes, raspberries, and strawberries   nuts, especially almonds, pistachios, and walnuts   vegetables, particularly [place] sprouts, corn and popcorn, broccoli, and parsley   beans and lentils.   Ask your healthcare provider if you should use a nonprescription fiber supplement.   Eat smaller meals more often. For example, eat 4 to 6 small meals a day rather  than 3 large ones.   See your healthcare provider if your symptoms are getting worse or you are having them more often.   How can I help prevent irritable bowel syndrome (IBS)?   There is no way to prevent IBS, since its cause is still unknown. However, having a healthy lifestyle may help to prevent symptoms:   Eat a healthy diet   Eat regular meals, keeping on a schedule as much as possible and not skipping meals.   Get enough sleep each night, usually 7 to 9 hours a night   Do what you can to reduce and manage the stress in your life.     Published by [employer].  This content is reviewed periodically and is subject to change as new health information becomes available. The information is intended to inform and educate and is not a replacement for medical evaluation, advice, diagnosis or treatment by a healthcare professional.   Developed by [employer].   ? 2010 [employer] and/or its affiliates. All Rights Reserved.   Copyright   [employer] 2011                   Thank you for choosing [facility]  for your Health Care. It was a pleasure seeing you at your visit today. Please contact us with any questions or concerns you may have.                   Madina Mercado MD                                  To reach your [facility] care team after hours call:   [phone]    Our clinic hours are:     Monday- 7:30 am - 7:00 pm                             Tuesday through Friday- 7:30 am - 5:00 pm                                        Saturday- 8:00 am - 12:00 pm                  Phone:  [phone]    Our pharmacy hours are:     Monday  8:00 am to 7:00 pm      Tuesday through Friday 8:00am to 6:00pm                        Saturday - 9:00 am to 1:00 pm      Sunday : Closed.              Phone:  [phone]      There is also information available at our web site:  [email or web address]    If your provider ordered any lab tests and you do not receive the results  within 10 business days, please call the clinic.    If you need a medication refill please contact your pharmacy.  Please allow 2 business days for your refill to be completed.    Our clinic offers telephone visits and e visits.  Please ask one of your team members to explain more.      Use [employer]Lokt (secure email communication and access to your chart) to send your primary care provider a message or make an appointment. Ask someone on your Team how to sign up for [facility].                       Follow-ups after your visit        Additional Services     DIABETES EDUCATOR REFERRAL       Your provider has referred you to Diabetes Education: FMG: Diabetes Education - All [facility] [phone]   [email or web address]     If an urgent visit is needed or A1C is above 12, Care Team to call the Diabetes  Education Team at [phone] or send an In Basket message to the Diabetes Education Pool (P DIAB ED-PATIENT CARE).    This is a Previous Diagnosis: Follow-up DSMT - 2 hours.  Type of diabetes is Type 2 - On Oral Medication   Medicare covers: 10 hours of initial DSMT in 12 month period from the time of first visit, plus 2 hours of follow-up DSMT annually, and additional hours as requested for insulin training.         Diabetes Co-Morbidities: dyslipidemia, hypertension, kidney disease and obesity               A1C Goal:  <8.0       A1C is: Lab Results       Component                Value               Date                       A1C                      6.7                 07/13/2018              MEDICAL NUTRITION THERAPY (MNT) for Diabetes    Medical Nutrition Therapy with a Registered Dietitian can be provided in coordination with Diabetes Self-Management Training to assist in achieving optimal diabetes management.    MNT Type and Hours: Previous diagnosis: Annual follow-up MNT - 2 hours                       Medicare will cover: 3 hours initial MNT in 12 month period  after first visit, plus 2 hours of follow-up MNT annually                                                          A1C is: Lab Results       Component                Value               Date                       A1C                      6.7                 07/13/2018            If an urgent visit is needed or A1C is above 12, Care Team to call the diabetes education team at [phone] or send a message to the diabetes education pool (P DIAB ED-PATIENT CARE).    Diabetes education focus: Knowledge: Healthy Eating, Being Active, Monitoring Blood Sugar, Problem Solving/Goal Setting, Reducing Risks (Preventing Acute and Chronic Diabetes Complications) and Healthy Coping and please do Diagnostic Continuous Glucose Monitoring       Education needs: None                                                                                                                                                      Please be aware that coverage of these services is subject to the terms and limitations of your health insurance plan.  Call member services at your health plan to determine Diabetes Self-Management Training benefits and ask which blood glucose monitor brands are covered by your plan.      Please bring the following to your appointment:    -   List of current medications   -   List of blood glucose monitor brands that are covered by your insurance plan  -   Blood glucose monitor and log book  -   Food records for the 3 days prior to your visit            GASTROENTEROLOGY ADULT REF PROCEDURE ONLY [facility] [phone]; [facility]       Last Lab Result: Creatinine (mg/dL)       Date                     Value                 01/11/2018               0.63             ----------  There is no height or weight on file to calculate BMI.     Needed:  No  Language:  English    Patient will be contacted to schedule procedure.     Please be aware that coverage of these services is subject to the terms  and limitations of your health insurance plan.  Call member services at your health plan with any benefit or coverage questions.  Any procedures must be performed at a [facility] facility OR coordinated by your clinic's referral office.    Please bring the following with you to your appointment:    (1) Any X-Rays, CTs or MRIs which have been performed.  Contact the facility where they were done to arrange for  prior to your scheduled appointment.    (2) List of current medications   (3) This referral request   (4) Any documents/labs given to you for this referral                  Follow-up notes from your care team     Return in about 6 months (around 1/13/2019) for Physical Exam, Lab Work, Routine Visit, BP Recheck, cholesterol recheck, diabetes recheck.      Your next 10 appointments already scheduled     Aug 22, 2018 10:45 AM CDT   [facility]tico[facility] with [facility]   [facility]    [address] [place] [id number] [phone]              Who to contact     If you have questions or need follow up information about today's clinic visit or your schedule please contact [facility] directly at [phone].  Normal or non-critical lab and imaging results will be communicated to you by MyChart, letter or phone within 4 business days after the clinic has received the results. If you do not hear from us within 7 days, please contact the clinic through MyChart or phone. If you have a critical or abnormal lab result, we will notify you by phone as soon as possible.  Submit refill requests through [employer] or call your pharmacy and they will forward the refill request to us. Please allow 3 business days for your refill to be completed.          Additional Information About Your Visit        Modafirmahart Information     P[employer]k gives you secure access to your electronic health record. If you see a primary care provider,  "you can also send messages to your care team and make appointments. If you have questions, please call your primary care clinic.  If you do not have a primary care provider, please call [phone] and they will assist you.        Care EveryWhere ID     This is your Care EveryWhere ID. This could be used by other organizations to access your [facility] medical records  [id number]        Your Vitals Were     Pulse Temperature Height Pulse Oximetry BMI (Body Mass Index)       70 98.1  F (36.7  C) (Oral) 5' 3.75\" (1.619 m) 98% 42.97 kg/m2        Blood Pressure from Last 3 Encounters:   07/13/18 132/78   05/29/18 138/86   03/08/18 124/72    Weight from Last 3 Encounters:   07/13/18 248 lb 6.4 oz (112.7 kg)   03/08/18 251 lb (113.9 kg)   01/10/18 249 lb (112.9 kg)              We Performed the Following     Albumin Random Urine Quantitative with Creat Ratio     CBC with platelets     Comprehensive metabolic panel     DIABETES EDUCATOR REFERRAL     EYE EXAM (SIMPLE-NONBILLABLE)       FOOT EXAM     GASTROENTEROLOGY ADULT REF PROCEDURE ONLY [facility] [phone]; [facility]     HEMOGLOBIN A1C     Lipid panel reflex to direct LDL Fasting     T3, total     T4 free     TSH     UA reflex to Microscopic and Culture          Today's Medication Changes          These changes are accurate as of 7/13/18 10:29 AM.  If you have any questions, ask your nurse or doctor.               Start taking these medicines.        Dose/Directions    zoster vaccine recombinant adjuvanted injection   Commonly known as:  SHINGRIX   Used for:  Need for shingles vaccine   Started by:  Madina Mercado MD        Dose:  1 each   Inject 0.5 mLs into the muscle once for 1 dose   Quantity:  0.5 mL   Refills:  1            Where to get your medicines      These medications were sent to [id number] - [place] - [address]  [address] [place] [address]     Phone:  [phone]     amLODIPine 5 " MG tablet    atenolol 100 MG tablet    blood glucose monitoring lancets    blood glucose monitoring test strip    hydrochlorothiazide 25 MG tablet    lisinopril 40 MG tablet    metFORMIN 1000 MG tablet    simvastatin 20 MG tablet    warfarin 5 MG tablet         Some of these will need a paper prescription and others can be bought over the counter.  Ask your nurse if you have questions.     Bring a paper prescription for each of these medications     zoster vaccine recombinant adjuvanted injection                Primary Care Provider Office Phone # Fax #    Madina Mercado [phone] [address] [facility] [address]        Equal Access to Services     [facility]: Hadii aad ku hadasho Soomaali, waaxda luqadaha, qaybta kaalmada ademaceyyatremayne, anjel gonzalez . [facility] [phone].    ATENCIÓN: Si habla español, tiene a webster disposición servicios gratuitos de asistencia lingüística. [facility] [phone].    We comply with applicable federal civil rights laws and [place] laws. We do not discriminate on the basis of race, color, national origin, age, disability, sex, sexual orientation, or gender identity.            Thank you!     Thank you for choosing [facility]  for your care. Our goal is always to provide you with excellent care. Hearing back from our patients is one way we can continue to improve our services. Please take a few minutes to complete the written survey that you may receive in the mail after your visit with us. Thank you!             Your Updated Medication List - Protect others around you: Learn how to safely use, store and throw away your medicines at www.disposemymeds.org.          This list is accurate as of 7/13/18 10:29 AM.  Always use your most recent med list.                   Brand Name Dispense Instructions for use Diagnosis    amLODIPine 5 MG tablet    NORVASC    90 tablet    Take 1 tablet (5 mg) by mouth  daily    Essential hypertension with goal blood pressure less than 140/90       aspirin 81 MG tablet     0    1 tab po QD (Once per day)    Type II or unspecified type diabetes mellitus without mention of complication, not stated as uncontrolled       atenolol 100 MG tablet    TENORMIN    90 tablet    Take 1 tablet (100 mg) by mouth daily    Essential hypertension with goal blood pressure less than 140/90       Biotin 10 MG Tabs tablet      1 TABLET DAILY        blood glucose monitoring lancets     100 each    Use to test blood sugar 1 times daily or as directed.    Type 2 diabetes mellitus with diabetic polyneuropathy, without long-term current use of insulin (H)       * blood glucose monitoring test strip    no brand specified     Compact Plus test strips Use to test blood sugars 1 times daily or as directed        * blood glucose monitoring test strip    JAYDE CONTOUR NEXT    100 strip    Use to test blood sugar 1 times daily or as directed.    Type 2 diabetes mellitus with diabetic polyneuropathy, without long-term current use of insulin (H)       CALTRATE 600 + D 600-200 MG-IU Tabs     60    1 tablet twice daily        CENTRUM SILVER per tablet     3 MONTHS    ONE DAILY        hydrochlorothiazide 25 MG tablet    HYDRODIURIL    90 tablet    Take 1 tablet (25 mg) by mouth daily    Essential hypertension with goal blood pressure less than 140/90, CKD (chronic kidney disease) stage 2, GFR 60-89 ml/min       ketoconazole 2 % cream    NIZORAL    30 g    Apply topically 2 times daily Apply to affected nails daily    Onychomycosis, Type 2 diabetes mellitus with diabetic polyneuropathy, without long-term current use of insulin (H)       lisinopril 40 MG tablet    PRINIVIL/ZESTRIL    90 tablet    Take 1 tablet (40 mg) by mouth daily    Essential hypertension with goal blood pressure less than 140/90       metFORMIN 1000 MG tablet    GLUCOPHAGE    225 tablet    TAKE 1 AND 1/2 TABLETS BY MOUTH WITH BREAKFAST AND TAKE 1  TABLET BY MOUTH WITH SUPPER    Type 2 diabetes mellitus with diabetic polyneuropathy, without long-term current use of insulin (H), Type 2 diabetes mellitus with diabetic polyneuropathy, without long-term current use of insulin (H)       omega 3 1000 MG Caps     90 capsule    Take 1 g by mouth daily        simvastatin 20 MG tablet    ZOCOR    90 tablet    TAKE ONE TABLET BY MOUTH EVERY NIGHT AT BEDTIME    Hyperlipidemia LDL goal <100       vitamin D 1000 units capsule     3 MONTHS    1 CAPSULE DAILY    Nutrition disorder       warfarin 5 MG tablet    COUMADIN    180 tablet    TAKE 1-2 TABLETS (5-10 MG) BY MOUTH DAILY AS INSTRUCTED    Persistent atrial fibrillation (H)       zoster vaccine recombinant adjuvanted injection    SHINGRIX    0.5 mL    Inject 0.5 mLs into the muscle once for 1 dose    Need for shingles vaccine       * Notice:  This list has 2 medication(s) that are the same as other medications prescribed for you. Read the directions carefully, and ask your doctor or other care provider to review them with you.

## 2018-07-13 NOTE — PROGRESS NOTES
ANTICOAGULATION FOLLOW-UP CLINIC VISIT    Patient Name:  Zulema Nixon  Date:  7/13/2018  Contact Type:  Face to Face    SUBJECTIVE:     Patient Findings     Positives No Problem Findings           OBJECTIVE    INR Protime   Date Value Ref Range Status   07/13/2018 2.4 (A) 0.86 - 1.14 Final       ASSESSMENT / PLAN  INR assessment THER    Recheck INR In: 6 WEEKS    INR Location Clinic      Anticoagulation Summary as of 7/13/2018     INR goal 2.0-3.0   Today's INR 2.4   Warfarin maintenance plan 5 mg (5 mg x 1) on Wed, Sat; 7.5 mg (5 mg x 1.5) all other days   Full warfarin instructions 5 mg on Wed, Sat; 7.5 mg all other days   Weekly warfarin total 47.5 mg   No change documented Nereyda Root RN   Plan last modified Nereyda Root RN (3/1/2016)   Next INR check 8/24/2018   Target end date     Indications   Long-term (current) use of anticoagulants [Z79.01] [Z79.01]  Atrial fibrillation  unspecified type (H) [I48.91]         Anticoagulation Episode Summary     INR check location     Preferred lab     Send INR reminders to RV NURSE    Comments       Anticoagulation Care Providers     Provider Role Specialty Phone number    Madina Mercado MD [facility] [phone]            See the Encounter Report to view Anticoagulation Flowsheet and Dosing Calendar (Go to Encounters tab in chart review, and find the Anticoagulation Therapy Visit)    Dosage adjustment made based on physician directed care plan.    Nereyda Root RN

## 2018-07-13 NOTE — PATIENT INSTRUCTIONS
Try to keep your fiber consistent daily.   Ok to take imodium AD over the counter as needed for diarrhea.         Irritable Bowel Syndrome         What is irritable bowel syndrome?   Irritable bowel syndrome (IBS) is a chronic (long-lasting) disorder of the large intestine. (The large intestine is also called the colon or bowel.) IBS is not a disease. It's a condition in which the bowel does not always work normally. Although IBS can cause much distress, it does not damage the bowel and does not lead to life-threatening illness.   IBS is the most common intestinal disorder. It affects more women than men and usually begins in early adult life. Sometimes it is referred to as spastic colon.   How does it occur?   The cause of IBS is not well understood. Changes in the nerves and muscles in the bowel or in the central nervous system may be a cause. For example, the nerves in the bowel may make the muscles contract too much when you eat. These contractions can make food move too fast through the intestines, causing gas, bloating, cramping, and diarrhea. In other cases abnormal contractions may slow the passage of food and delay bowel movements, causing cramps and constipation.   Some foods may trigger attacks. Sometimes the symptoms of IBS may be caused by intestinal gas or an illness such as stomach flu. Other possible triggers of attacks are hormonal changes, emotional stress, or depression.   What are the symptoms?   The most common symptoms include:   cramping and pain in the abdomen, which may be mild or severe   constipation or diarrhea   a lot of gas.   Other symptoms include:   bloating   a feeling of fullness in the rectum.   Symptoms often occur after you have eaten a big meal or when you are under stress. Women may have more symptoms during their menstrual periods. You may feel better after you have a bowel movement.   How is it diagnosed?   After asking about your symptoms and medical history, your  healthcare provider will examine your abdomen and may do a rectal exam.   There is no specific test for IBS. The diagnosis is usually based on your symptoms. But your provider may do one or more of these simple tests:   blood tests   tests of bowel movement samples to check for blood and infection.   Depending on your medical and family history, physical exam, and age, your provider may do the following tests to look for other possible causes of your symptoms:   colonoscopy or sigmoidoscopy, which are procedures that allow your provider to see the inside of your colon with a thin, flexible, lighted tube   barium enema, which is a procedure that uses X-rays and a liquid dye passed into the colon through the rectum to check the colon.   Your healthcare provider may ask you to try a milk-free diet to see if lactose intolerance (trouble digesting milk products) may be causing your symptoms. Or your provider may suggest not eating foods with gluten for a certain amount of time to see if the symptoms then go away. This is a way to check for gluten intolerance (called celiac disease), which can cause symptoms similar to the symptoms of IBS. Common gluten-containing foods are wheat products. There is also a blood test that can help check for celiac disease.   How is it treated?   Doctors have not yet found a cure for IBS. However, a combination of careful food selection and stress management usually relieves the symptoms. Some medicines may also help.   Diet Talk to your healthcare provider about whether you should eat more or less high-fiber food. Try eating smaller meals more often each day rather than just 2 or 3 larger meals. Avoid foods that cause gas, such as carbonated drinks, cabbage, and beans. Other foods that may cause symptoms are:   fatty foods, such as French fries   milk products, such as cheese or ice cream   chocolate   caffeine (found in coffee and some sodas)   Food diary Your healthcare provider may ask  you to keep a food diary to see if eating a particular food, for example, milk, worsens your symptoms.   Stress Your provider will help you identify things that cause stress in your life and will suggest ways to help you control them. Relaxation or biofeedback techniques may help you manage stress.   Medicines Your provider may prescribe:   Bulk-forming agents, such as bran or methylcellulose.   Antispasmodic drugs to slow contractions in the bowel and help with diarrhea and pain.   Antidepressants, which can help control chronic pain.   Medicines to help with constipation or diarrhea: For example, alosetron may be prescribed for treatment of IBS when diarrhea is the main symptom and other medicines have not helped. A medicine called Zelnorm (tegaserod) was previously available for the treatment of severe side effects of IBS. Due to complications in a few people, the drug was recalled by the . It is currently available from the  by special referral from your healthcare provider.   How long will the effects last?   Because IBS is a chronic disorder, you may have flare-ups of symptoms throughout your life. Although a cure hasn't been found yet, the disorder can usually be controlled. IBS will not progress to something worse.   How can I take care of myself?   Follow your healthcare provider's recommendations.   Learn stress-management techniques to reduce stress and anxiety in your life. Professional counseling may be helpful.   Exercise regularly, according to your provider's recommendations. Exercise helps keep bowel movements regular. It may also help maintain serotonin levels in the brain, which can lessen depression and stress symptoms.   Drink plenty of water.   Do not drink alcohol, which can make symptoms of IBS worse.   Select your foods carefully. If a food appears to bring on your symptoms, avoid it. However, don't eliminate a food just because it appears to cause symptoms one time.  Be sure that a food produces symptoms several times before you give it up. You should try to keep many different foods in your diet because a varied diet provides better nutrition.   Ask your healthcare provider if you should have a high-fiber diet, especially if you tend to be constipated. High-fiber foods may cause gas and bloating, but usually these symptoms lessen as the digestive tract gets used to the increased fiber. Some high-fiber foods include:   whole-grain breads and cereals, such as shredded wheat or bran flakes   fruits, especially apricots, blackberries, coconut, dates, figs, kiwi, peaches, pears, pineapple, prunes, raspberries, and strawberries   nuts, especially almonds, pistachios, and walnuts   vegetables, particularly Dawn sprouts, corn and popcorn, broccoli, and parsley   beans and lentils.   Ask your healthcare provider if you should use a nonprescription fiber supplement.   Eat smaller meals more often. For example, eat 4 to 6 small meals a day rather than 3 large ones.   See your healthcare provider if your symptoms are getting worse or you are having them more often.   How can I help prevent irritable bowel syndrome (IBS)?   There is no way to prevent IBS, since its cause is still unknown. However, having a healthy lifestyle may help to prevent symptoms:   Eat a healthy diet   Eat regular meals, keeping on a schedule as much as possible and not skipping meals.   Get enough sleep each night, usually 7 to 9 hours a night   Do what you can to reduce and manage the stress in your life.     Published by [employer].  This content is reviewed periodically and is subject to change as new health information becomes available. The information is intended to inform and educate and is not a replacement for medical evaluation, advice, diagnosis or treatment by a healthcare professional.   Developed by [employer].   ? 2010 [employer] and/or its affiliates. All Rights Reserved.   Copyright    [employer] 2011                   Thank you for choosing [facility]  for your Health Care. It was a pleasure seeing you at your visit today. Please contact us with any questions or concerns you may have.                   Madina Mercado MD                                  To reach your [facility] care team after hours call:   [phone]    Our clinic hours are:     Monday- 7:30 am - 7:00 pm                             Tuesday through Friday- 7:30 am - 5:00 pm                                        Saturday- 8:00 am - 12:00 pm                  Phone:  [phone]    Our pharmacy hours are:     Monday  8:00 am to 7:00 pm      Tuesday through Friday 8:00am to 6:00pm                        Saturday - 9:00 am to 1:00 pm      Sunday : Closed.              Phone:  [phone]      There is also information available at our web site:  [email or web address]    If your provider ordered any lab tests and you do not receive the results within 10 business days, please call the clinic.    If you need a medication refill please contact your pharmacy.  Please allow 2 business days for your refill to be completed.    Our clinic offers telephone visits and e visits.  Please ask one of your team members to explain more.      Use [employer] (secure email communication and access to your chart) to send your primary care provider a message or make an appointment. Ask someone on your Team how to sign up for [employer].

## 2018-08-27 ENCOUNTER — ANTICOAGULATION THERAPY VISIT (OUTPATIENT)
Dept: NURSING | Facility: CLINIC | Age: 73
End: 2018-08-27
Payer: COMMERCIAL

## 2018-08-27 DIAGNOSIS — I48.91 ATRIAL FIBRILLATION, UNSPECIFIED TYPE (H): ICD-10-CM

## 2018-08-27 DIAGNOSIS — Z79.01 LONG-TERM (CURRENT) USE OF ANTICOAGULANTS: ICD-10-CM

## 2018-08-27 LAB — INR POINT OF CARE: 3.2 (ref 0.86–1.14)

## 2018-08-27 PROCEDURE — 85610 PROTHROMBIN TIME: CPT | Mod: QW

## 2018-08-27 PROCEDURE — 36416 COLLJ CAPILLARY BLOOD SPEC: CPT

## 2018-08-27 NOTE — MR AVS SNAPSHOT
Zulema Nixon   8/27/2018 10:45 AM   Anticoagulation Therapy Visit    Description:  73 year old female   Provider:  [facility]   Department:  [facility] Nurse           INR as of 8/27/2018     Today's INR 3.2!      Anticoagulation Summary as of 8/27/2018     INR goal 2.0-3.0   Today's INR 3.2!   Full warfarin instructions 5 mg on Wed, Sat; 7.5 mg all other days   Next INR check 9/4/2018    Indications   Long-term (current) use of anticoagulants [Z79.01] [Z79.01]  Atrial fibrillation  unspecified type (H) [I48.91]         Your next Anticoagulation Clinic appointment(s)     Sep 04, 2018 10:00 AM CDT   Anticoagulation Visit with  ANTICOAGULATION CLINIC   [facility] ([facility])    [address] [place] [id number]   [phone]              Contact Numbers     Clinic Number:         August 2018 Details    Sun Mon Tue Wed Thu Fri Sat        1               2               3               4                 5               6               7               8               9               10               11                 12               13               14               15               16               17               18                 19               20               21               22               23               24               25                 26               27      7.5 mg   See details      28      7.5 mg         29      5 mg         30      7.5 mg         31      7.5 mg           Date Details   08/27 This INR check               How to take your warfarin dose     To take:  5 mg Take 1 of the 5 mg tablets.    To take:  7.5 mg Take 1.5 of the 5 mg tablets.           September 2018 Details    Sun Mon Tue Wed Thu Fri Sat           1      5 mg           2      7.5 mg         3      7.5 mg         4            5               6               7               8                 9               10               11               12                13               14               15                 16               17               18               19               20               21               22                 23               24               25               26               27               28               29                 30                      Date Details   No additional details    Date of next INR:  9/4/2018         How to take your warfarin dose     To take:  5 mg Take 1 of the 5 mg tablets.    To take:  7.5 mg Take 1.5 of the 5 mg tablets.

## 2018-08-27 NOTE — PROGRESS NOTES
ANTICOAGULATION FOLLOW-UP CLINIC VISIT    Patient Name:  Zulema Nixon  Date:  8/27/2018  Contact Type:  Face to Face    SUBJECTIVE:     Patient Findings     Positives No Problem Findings           OBJECTIVE    INR Protime   Date Value Ref Range Status   08/27/2018 3.2 (A) 0.86 - 1.14 Final       ASSESSMENT / PLAN  No question data found.  Anticoagulation Summary as of 8/27/2018     INR goal 2.0-3.0   Today's INR 3.2!   Warfarin maintenance plan 5 mg (5 mg x 1) on Wed, Sat; 7.5 mg (5 mg x 1.5) all other days   Full warfarin instructions 5 mg on Wed, Sat; 7.5 mg all other days   Weekly warfarin total 47.5 mg   No change documented Kylah Pierce RN   Plan last modified Nereyda Root RN (3/1/2016)   Next INR check 9/4/2018   Target end date     Indications   Long-term (current) use of anticoagulants [Z79.01] [Z79.01]  Atrial fibrillation  unspecified type (H) [I48.91]         Anticoagulation Episode Summary     INR check location     Preferred lab     Send INR reminders to RV NURSE    Comments       Anticoagulation Care Providers     Provider Role Specialty Phone number    Madina Mercado MD [facility] [phone]            See the Encounter Report to view Anticoagulation Flowsheet and Dosing Calendar (Go to Encounters tab in chart review, and find the Anticoagulation Therapy Visit)    Dosage adjustment made based on physician directed care plan.    Kylah Pierce, RN

## 2018-09-05 ENCOUNTER — ANTICOAGULATION THERAPY VISIT (OUTPATIENT)
Dept: NURSING | Facility: CLINIC | Age: 73
End: 2018-09-05
Payer: COMMERCIAL

## 2018-09-05 DIAGNOSIS — Z79.01 LONG-TERM (CURRENT) USE OF ANTICOAGULANTS: ICD-10-CM

## 2018-09-05 DIAGNOSIS — I48.91 ATRIAL FIBRILLATION, UNSPECIFIED TYPE (H): ICD-10-CM

## 2018-09-05 LAB — INR POINT OF CARE: 5.2 (ref 0.86–1.14)

## 2018-09-05 PROCEDURE — 36416 COLLJ CAPILLARY BLOOD SPEC: CPT

## 2018-09-05 PROCEDURE — 85610 PROTHROMBIN TIME: CPT | Mod: QW

## 2018-09-05 NOTE — PROGRESS NOTES
ANTICOAGULATION FOLLOW-UP CLINIC VISIT    Patient Name:  Zulema Nixon  Date:  9/5/2018  Contact Type:  Face to Face    SUBJECTIVE:        OBJECTIVE    INR Protime   Date Value Ref Range Status   09/05/2018 5.2 (A) 0.86 - 1.14 Final       ASSESSMENT / PLAN  INR assessment SUPRA    Recheck INR In: 2 DAYS    INR Location Clinic      Anticoagulation Summary as of 9/5/2018     INR goal 2.0-3.0   Today's INR 5.2!   Warfarin maintenance plan 5 mg (5 mg x 1) on Wed, Sat; 7.5 mg (5 mg x 1.5) all other days   Full warfarin instructions 9/5: Hold; 9/6: Hold; Otherwise 5 mg on Wed, Sat; 7.5 mg all other days   Weekly warfarin total 47.5 mg   Plan last modified Nereyda Root RN (3/1/2016)   Next INR check 9/7/2018   Target end date     Indications   Long-term (current) use of anticoagulants [Z79.01] [Z79.01]  Atrial fibrillation  unspecified type (H) [I48.91]         Anticoagulation Episode Summary     INR check location     Preferred lab     Send INR reminders to RV NURSE    Comments       Anticoagulation Care Providers     Provider Role Specialty Phone number    Madina Mercado MD [facility] [phone]            See the Encounter Report to view Anticoagulation Flowsheet and Dosing Calendar (Go to Encounters tab in chart review, and find the Anticoagulation Therapy Visit)    Dosage adjustment made based on physician directed care plan.    Nereyda Root RN

## 2018-09-05 NOTE — MR AVS SNAPSHOT
Zulema Nixon   9/5/2018 3:00 PM   Anticoagulation Therapy Visit    Description:  73 year old female   Provider:   ANT[facility]ULATION CLINIC   Department:   Nurse           INR as of 9/5/2018     Today's INR 5.2!      Anticoagulation Summary as of 9/5/2018     INR goal 2.0-3.0   Today's INR 5.2!   Full warfarin instructions 9/5: Hold; 9/6: Hold; Otherwise 5 mg on Wed, Sat; 7.5 mg all other days   Next INR check 9/7/2018    Indications   Long-term (current) use of anticoagulants [Z79.01] [Z79.01]  Atrial fibrillation  unspecified type (H) [I48.91]         Your next Anticoagulation Clinic appointment(s)     Sep 07, 2018 10:30 AM CDT   Anticoagulation Visit with  ANTICOAGULATION CLINIC   [facility] ([facility])    [address] [place] [id number]   [phone]              Contact Numbers     Clinic Number:         September 2018 Details    Sun Mon Tue Wed Thu Fri Sat           1                 2               3               4               5      Hold   See details      6      Hold         7            8                 9               10               11               12               13               14               15                 16               17               18               19               20               21               22                 23               24               25               26               27               28               29                 30                      Date Details   09/05 This INR check       Date of next INR:  9/7/2018         How to take your warfarin dose     To take:  7.5 mg Take 1.5 of the 5 mg tablets.    Hold Do not take your warfarin dose. See the Details table to the right for additional instructions.

## 2018-09-07 ENCOUNTER — ANTICOAGULATION THERAPY VISIT (OUTPATIENT)
Dept: NURSING | Facility: CLINIC | Age: 73
End: 2018-09-07
Payer: COMMERCIAL

## 2018-09-07 DIAGNOSIS — I48.91 ATRIAL FIBRILLATION, UNSPECIFIED TYPE (H): ICD-10-CM

## 2018-09-07 DIAGNOSIS — Z79.01 LONG-TERM (CURRENT) USE OF ANTICOAGULANTS: ICD-10-CM

## 2018-09-07 LAB — INR POINT OF CARE: 1.9 (ref 0.86–1.14)

## 2018-09-07 PROCEDURE — 36416 COLLJ CAPILLARY BLOOD SPEC: CPT

## 2018-09-07 PROCEDURE — 85610 PROTHROMBIN TIME: CPT | Mod: QW

## 2018-09-07 NOTE — MR AVS SNAPSHOT
Zulema Nixon   9/7/2018 10:30 AM   Anticoagulation Therapy Visit    Description:  73 year old female   Provider:   ANTICOAGULATION CLINIC   Department:   Nurse           INR as of 9/7/2018     Today's INR 1.9!      Anticoagulation Summary as of 9/7/2018     INR goal 2.0-3.0   Today's INR 1.9!   Full warfarin instructions 5 mg on Wed, Sat; 7.5 mg all other days   Next INR check 9/18/2018    Indications   Long-term (current) use of anticoagulants [Z79.01] [Z79.01]  Atrial fibrillation  unspecified type (H) [I48.91]         Your next Anticoagulation Clinic appointment(s)     Sep 18, 2018 11:00 AM CDT   Anticoagulation Visit with  ANTICOAGULATION CLINIC   [facility] ([facility])    [address] [place] [id number]   [phone]              Contact Numbers     Clinic Number:         September 2018 Details    Sun Mon Tue Wed Thu Fri Sat           1                 2               3               4               5               6               7      7.5 mg   See details      8      5 mg           9      7.5 mg         10      7.5 mg         11      7.5 mg         12      5 mg         13      7.5 mg         14      7.5 mg         15      5 mg           16      7.5 mg         17      7.5 mg         18            19               20               21               22                 23               24               25               26               27               28               29                 30                      Date Details   09/07 This INR check       Date of next INR:  9/18/2018         How to take your warfarin dose     To take:  5 mg Take 1 of the 5 mg tablets.    To take:  7.5 mg Take 1.5 of the 5 mg tablets.

## 2018-09-07 NOTE — PROGRESS NOTES
ANTICOAGULATION FOLLOW-UP CLINIC VISIT    Patient Name:  Zulema Nixon  Date:  9/7/2018  Contact Type:  Face to Face    SUBJECTIVE:     Patient Findings     Positives Other complaints (pt notes when they left the other day, they had chills and sweats, went to sleep and never sleeps during the day. feels fine today. denies any diarrhea, etc.)      possible viral illness going on. Advised pt to call with any further sx or concerns and would need an OV to do some labs, etc. The patient indicates understanding of these issues and agrees with the plan.       OBJECTIVE    INR Protime   Date Value Ref Range Status   09/07/2018 1.9 (A) 0.86 - 1.14 Final       ASSESSMENT / PLAN  INR assessment THER    Recheck INR In: 2 WEEKS    INR Location Clinic      Anticoagulation Summary as of 9/7/2018     INR goal 2.0-3.0   Today's INR 1.9!   Warfarin maintenance plan 5 mg (5 mg x 1) on Wed, Sat; 7.5 mg (5 mg x 1.5) all other days   Full warfarin instructions 5 mg on Wed, Sat; 7.5 mg all other days   Weekly warfarin total 47.5 mg   No change documented Nereyda Root RN   Plan last modified Nereyda Root RN (3/1/2016)   Next INR check 9/18/2018   Target end date     Indications   Long-term (current) use of anticoagulants [Z79.01] [Z79.01]  Atrial fibrillation  unspecified type (H) [I48.91]         Anticoagulation Episode Summary     INR check location     Preferred lab     Send INR reminders to RV NURSE    Comments       Anticoagulation Care Providers     Provider Role Specialty Phone number    Madina Mercado MD [facility] [phone]            See the Encounter Report to view Anticoagulation Flowsheet and Dosing Calendar (Go to Encounters tab in chart review, and find the Anticoagulation Therapy Visit)    Dosage adjustment made based on physician directed care plan.    Nereyda Root RN

## 2018-09-14 ENCOUNTER — TRANSFERRED RECORDS (OUTPATIENT)
Dept: HEALTH INFORMATION MANAGEMENT | Facility: CLINIC | Age: 73
End: 2018-09-14

## 2018-09-14 LAB
ALT SERPL-CCNC: 16 IU/L (ref 8–45)
AST SERPL-CCNC: 19 IU/L (ref 2–40)
GLUCOSE SERPL-MCNC: 172 MG/DL (ref 65–100)
INR PPP: 2.3
POTASSIUM SERPL-SCNC: 3.9 MMOL/L (ref 3.5–5)

## 2018-09-18 ENCOUNTER — ANTICOAGULATION THERAPY VISIT (OUTPATIENT)
Dept: NURSING | Facility: CLINIC | Age: 73
End: 2018-09-18
Payer: COMMERCIAL

## 2018-09-18 DIAGNOSIS — I48.91 ATRIAL FIBRILLATION, UNSPECIFIED TYPE (H): ICD-10-CM

## 2018-09-18 DIAGNOSIS — Z79.01 LONG-TERM (CURRENT) USE OF ANTICOAGULANTS: ICD-10-CM

## 2018-09-18 LAB — INR POINT OF CARE: 2.1 (ref 0.86–1.14)

## 2018-09-18 PROCEDURE — 36416 COLLJ CAPILLARY BLOOD SPEC: CPT

## 2018-09-18 PROCEDURE — 85610 PROTHROMBIN TIME: CPT | Mod: QW

## 2018-09-18 NOTE — MR AVS SNAPSHOT
Zulema Nixon   9/18/2018 11:00 AM   Anticoagulation Therapy Visit    Description:  73 year old female   Provider:   ANTICOAGULATION CLINIC   Department:  [facility]           INR as of 9/18/2018     Today's INR 2.1      Anticoagulation Summary as of 9/18/2018     INR goal 2.0-3.0   Today's INR 2.1   Full warfarin instructions 5 mg on Wed, Sat; 7.5 mg all other days   Next INR check 10/30/2018    Indications   Long-term (current) use of anticoagulants [Z79.01] [Z79.01]  Atrial fibrillation  unspecified type (H) [I48.91]         Your next Anticoagulation Clinic appointment(s)     Oct 30, 2018 10:30 AM CDT   Anticoagulation Visit with  ANTICOAGULATION CLINIC   [facility]    [address] [place] [id number]   [phone]              Contact Numbers     Clinic Number:         September 2018 Details    Sun Mon Tue Wed Thu Fri Sat           1                 2               3               4               5               6               7               8                 9               10               11               12               13               14               15                 16               17               18      7.5 mg   See details      19      5 mg         20      7.5 mg         21      7.5 mg         22      5 mg           23      7.5 mg         24      7.5 mg         25      7.5 mg         26      5 mg         27      7.5 mg         28      7.5 mg         29      5 mg           30      7.5 mg                Date Details   09/18 This INR check               How to take your warfarin dose     To take:  5 mg Take 1 of the 5 mg tablets.    To take:  7.5 mg Take 1.5 of the 5 mg tablets.           October 2018 Details    Sun Mon Tue Wed Thu Fri Sat      1      7.5 mg         2      7.5 mg         3      5 mg         4      7.5 mg         5      7.5 mg         6      5 mg           7      7.5 mg         8      7.5 mg         9       7.5 mg         10      5 mg         11      7.5 mg         12      7.5 mg         13      5 mg           14      7.5 mg         15      7.5 mg         16      7.5 mg         17      5 mg         18      7.5 mg         19      7.5 mg         20      5 mg           21      7.5 mg         22      7.5 mg         23      7.5 mg         24      5 mg         25      7.5 mg         26      7.5 mg         27      5 mg           28      7.5 mg         29      7.5 mg         30            31                   Date Details   No additional details    Date of next INR:  10/30/2018         How to take your warfarin dose     To take:  5 mg Take 1 of the 5 mg tablets.    To take:  7.5 mg Take 1.5 of the 5 mg tablets.

## 2018-09-18 NOTE — PROGRESS NOTES
ANTICOAGULATION FOLLOW-UP CLINIC VISIT    Patient Name:  Zulema Nixon  Date:  9/18/2018  Contact Type:  Face to Face    SUBJECTIVE:        OBJECTIVE    INR Protime   Date Value Ref Range Status   09/18/2018 2.1 (A) 0.86 - 1.14 Final       ASSESSMENT / PLAN  INR assessment THER    Recheck INR In: 6 WEEKS    INR Location Clinic      Anticoagulation Summary as of 9/18/2018     INR goal 2.0-3.0   Today's INR 2.1   Warfarin maintenance plan 5 mg (5 mg x 1) on Wed, Sat; 7.5 mg (5 mg x 1.5) all other days   Full warfarin instructions 5 mg on Wed, Sat; 7.5 mg all other days   Weekly warfarin total 47.5 mg   No change documented Torie Perales RN   Plan last modified Nereyda Root RN (3/1/2016)   Next INR check 10/30/2018   Target end date     Indications   Long-term (current) use of anticoagulants [Z79.01] [Z79.01]  Atrial fibrillation  unspecified type (H) [I48.91]         Anticoagulation Episode Summary     INR check location     Preferred lab     Send INR reminders to RV NURSE    Comments       Anticoagulation Care Providers     Provider Role Specialty Phone number    Madina Mercado MD Responsible Family Practice [phone]            See the Encounter Report to view Anticoagulation Flowsheet and Dosing Calendar (Go to Encounters tab in chart review, and find the Anticoagulation Therapy Visit)    Dosage adjustment made based on physician directed care plan.    Torie Perales RN

## 2018-09-20 ENCOUNTER — OFFICE VISIT (OUTPATIENT)
Dept: FAMILY MEDICINE | Facility: CLINIC | Age: 73
End: 2018-09-20
Payer: COMMERCIAL

## 2018-09-20 VITALS
TEMPERATURE: 96.5 F | HEIGHT: 64 IN | HEART RATE: 92 BPM | SYSTOLIC BLOOD PRESSURE: 122 MMHG | DIASTOLIC BLOOD PRESSURE: 78 MMHG | WEIGHT: 242 LBS | BODY MASS INDEX: 41.32 KG/M2 | OXYGEN SATURATION: 98 %

## 2018-09-20 DIAGNOSIS — Z12.11 SCREEN FOR COLON CANCER: ICD-10-CM

## 2018-09-20 DIAGNOSIS — E87.1 HYPONATREMIA: ICD-10-CM

## 2018-09-20 DIAGNOSIS — D32.0 BENIGN NEOPLASM OF CEREBRAL MENINGES (H): ICD-10-CM

## 2018-09-20 DIAGNOSIS — R51.9 NONINTRACTABLE HEADACHE, UNSPECIFIED CHRONICITY PATTERN, UNSPECIFIED HEADACHE TYPE: Primary | ICD-10-CM

## 2018-09-20 PROCEDURE — 99214 OFFICE O/P EST MOD 30 MIN: CPT | Performed by: PHYSICIAN ASSISTANT

## 2018-09-20 NOTE — MR AVS SNAPSHOT
After Visit Summary   9/20/2018    Zulema Nixon    MRN: [id number]           Patient Information     Date Of Birth          1945        Visit Information        Provider Department      9/20/2018 2:20 PM Lizy Mendoza PA-C [facility]        Today's Diagnoses     Nonintractable headache, unspecified chronicity pattern, unspecified headache type    -  1    Benign neoplasm of cerebral meninges (H)        Hyponatremia        Screen for colon cancer           Follow-ups after your visit        Additional Services     NEUROSURGERY REFERRAL       Your provider has referred you to: [facility] [place] [phone]   [email or web address]    Please be aware that coverage of these services is subject to the terms and limitations of your health insurance plan.  Call member services at your health plan with any benefit or coverage questions.      Please bring the following with you to your appointment:    (1) Any X-Rays, CTs or MRIs which have been performed.  Contact the facility where they were done to arrange for  prior to your scheduled appointment.   (2) List of current medications  (3) This referral request   (4) Any documents/labs given to you for this referral                  Follow-up notes from your care team     Return in about 6 weeks (around 11/1/2018) for Physical Exam, Lab Work.      Your next 10 appointments already scheduled     Oct 30, 2018 10:30 AM CDT   Anticoagulation Visit with  ANTICOAGULATION CLINIC   [facility]    [address] [place] [id number] [phone]            Jan 14, 2019  8:40 AM CST   PHYSICAL with Madina Mercado MD   [facility] ([facility])    [address] [place] [id number] [phone]              Future tests that were ordered for you today  "    Open Future Orders        Priority Expected Expires Ordered    Fecal colorectal cancer screen (FIT) Routine 10/11/2018 12/13/2018 9/20/2018            Who to contact     If you have questions or need follow up information about today's clinic visit or your schedule please contact [facility] directly at [phone].  Normal or non-critical lab and imaging results will be communicated to you by MyChart, letter or phone within 4 business days after the clinic has received the results. If you do not hear from us within 7 days, please contact the clinic through [employer] or phone. If you have a critical or abnormal lab result, we will notify you by phone as soon as possible.  Submit refill requests through [employer] or call your pharmacy and they will forward the refill request to us. Please allow 3 business days for your refill to be completed.          Additional Information About Your Visit        [employer] Information     [employer] gives you secure access to your electronic health record. If you see a primary care provider, you can also send messages to your care team and make appointments. If you have questions, please call your primary care clinic.  If you do not have a primary care provider, please call [phone] and they will assist you.        Care EveryWhere ID     This is your Care EveryWhere ID. This could be used by other organizations to access your [place] medical records  [id number]        Your Vitals Were     Pulse Temperature Height Pulse Oximetry BMI (Body Mass Index)       92 96.5  F (35.8  C) (Tympanic) 5' 3.75\" (1.619 m) 98% 41.87 kg/m2        Blood Pressure from Last 3 Encounters:   09/20/18 122/78   07/13/18 132/78   05/29/18 138/86    Weight from Last 3 Encounters:   09/20/18 242 lb (109.8 kg)   07/13/18 248 lb 6.4 oz (112.7 kg)   03/08/18 251 lb (113.9 kg)              We Performed the Following     Basic metabolic panel  (Ca, Cl, CO2, Creat, Gluc, K, Na, BUN)     " NEUROSURGERY REFERRAL        Primary Care Provider Office Phone # Fax #    Madina Mercado [phone]       [facility] [facility] [address]        Equal Access to Services     TREY WELCH : Hadii aad ku hadmikeo Socameliaali, waaxda luqadaha, qaybta kaalmada ademaceyyada, anjel steele darbymacey doty lisa monzon. So [facility] [phone].    ATENCIÓN: Si habla español, tiene a webster disposición servicios gratuitos de asistencia lingüística. Llame al [phone].    We comply with applicable federal civil rights laws and [place] laws. We do not discriminate on the basis of race, color, national origin, age, disability, sex, sexual orientation, or gender identity.            Thank you!     Thank you for choosing [facility]  for your care. Our goal is always to provide you with excellent care. Hearing back from our patients is one way we can continue to improve our services. Please take a few minutes to complete the written survey that you may receive in the mail after your visit with us. Thank you!             Your Updated Medication List - Protect others around you: Learn how to safely use, store and throw away your medicines at www.disposemymeds.org.          This list is accurate as of 9/20/18  3:20 PM.  Always use your most recent med list.                   Brand Name Dispense Instructions for use Diagnosis    amLODIPine 5 MG tablet    NORVASC    90 tablet    Take 1 tablet (5 mg) by mouth daily    Essential hypertension with goal blood pressure less than 140/90       aspirin 81 MG tablet     0    1 tab po QD (Once per day)    Type II or unspecified type diabetes mellitus without mention of complication, not stated as uncontrolled       atenolol 100 MG tablet    TENORMIN    90 tablet    Take 1 tablet (100 mg) by mouth daily    Essential hypertension with goal blood pressure less than 140/90       Biotin 10 MG Tabs tablet      1 TABLET DAILY        blood glucose monitoring  lancets     100 each    Use to test blood sugar 1 times daily or as directed.    Type 2 diabetes mellitus with diabetic polyneuropathy, without long-term current use of insulin (H)       * blood glucose monitoring test strip    no brand specified     Compact Plus test strips Use to test blood sugars 1 times daily or as directed        * blood glucose monitoring test strip    JAYDE CONTOUR NEXT    100 strip    Use to test blood sugar 1 times daily or as directed.    Type 2 diabetes mellitus with diabetic polyneuropathy, without long-term current use of insulin (H)       CALTRATE 600 + D 600-200 MG-IU Tabs     60    1 tablet twice daily        CENTRUM SILVER per tablet     3 MONTHS    ONE DAILY        hydrochlorothiazide 25 MG tablet    HYDRODIURIL    90 tablet    Take 1 tablet (25 mg) by mouth daily    Essential hypertension with goal blood pressure less than 140/90, CKD (chronic kidney disease) stage 2, GFR 60-89 ml/min       ketoconazole 2 % cream    NIZORAL    30 g    Apply topically 2 times daily Apply to affected nails daily    Onychomycosis, Type 2 diabetes mellitus with diabetic polyneuropathy, without long-term current use of insulin (H)       lisinopril 40 MG tablet    PRINIVIL/ZESTRIL    90 tablet    Take 1 tablet (40 mg) by mouth daily    Essential hypertension with goal blood pressure less than 140/90       metFORMIN 1000 MG tablet    GLUCOPHAGE    225 tablet    TAKE 1 AND 1/2 TABLETS BY MOUTH WITH BREAKFAST AND TAKE 1 TABLET BY MOUTH WITH SUPPER    Type 2 diabetes mellitus with diabetic polyneuropathy, without long-term current use of insulin (H), Type 2 diabetes mellitus with diabetic polyneuropathy, without long-term current use of insulin (H)       simvastatin 20 MG tablet    ZOCOR    90 tablet    TAKE ONE TABLET BY MOUTH EVERY NIGHT AT BEDTIME    Hyperlipidemia LDL goal <100       vitamin D 1000 units capsule     3 MONTHS    1 CAPSULE DAILY    Nutrition disorder       warfarin 5 MG tablet    COUMADIN     180 tablet    TAKE 1-2 TABLETS (5-10 MG) BY MOUTH DAILY AS INSTRUCTED    Persistent atrial fibrillation (H)       * Notice:  This list has 2 medication(s) that are the same as other medications prescribed for you. Read the directions carefully, and ask your doctor or other care provider to review them with you.

## 2018-09-20 NOTE — PROGRESS NOTES
SUBJECTIVE:   Zulema Nixon is a 73 year old female who presents to clinic today for the following health issues:      ED/UC Followup:    Facility:  [facility]  Date of visit: 9/15/18  Reason for visit: Sudden onset headache  Current Status: Patient states she has not had a headache since the ER. Has been having diarrhea on and off since Sunday. Would like her primary doctor to know that she was diagnosed with meningioma while she was there.      ER note:   73 y.o. female with a history of DM type 2, HTN, hyperlipidemia, atrial fibrillation, stage 2 CKD, IBS, osteoarthritis, ARMAND, bilateral sensorineural hearing loss, and s/p lap cholecystectomy who presents via EMS with headache. Patient was watching the [employer] game at around 1730 tonight when she had a sudden onset of headache. Patient waited an hour and a half before calling daughter and shortly after called 911. Per EMS, patient was crying, hyperventilating, and unable to get her words out upon their arrival. En route, her blood pressure and heart rate was elevated and her blood sugar was recorded at 123. Upon arrival in the ED, patient complains of ongoing mild headache. She notes that pain is diffused but is more pronounced in the right aspect. Denies visual changes. No vomiting or other focal pain. No other associated symptoms. Patient denies to history of similar symptoms. She is on long term anticoagulation.    Patient here with headache. Apparently there was significant anxiety associated with this headache. He came on rather acutely but is not the worst headache of her life. No indication for LP. Her head CT was negative. She was given lorazepam.    I do not feel this is a TIA. Very atypical to be accompanied with a headache. But she will require close follow-up.    MRI, MRA of her head and neck are pending. She was sent to my associate Dr. De Dios at [phone]. Disposition will likely be home.    Diagnoses:   ICD-10-CM   1. Nonintractable headache, unspecified  chronicity pattern, unspecified headache type R51     Plan: The patient is appropriate for outpatient management as outlined above.    Discharge Medications:   Medications Prescribed this Visit   None          Today she reports that her headache has completely resolved.  She is very curious about recommended follow-up for the meningioma and whether or not she can travel to [place] in October.  She denies any neurological symptoms.  Denies any balance/vision issues.    Problem list and histories reviewed & adjusted, as indicated.  Additional history: as documented    Patient Active Problem List   Diagnosis     Morbid obesity due to excess calories (H)     ARMAND (obstructive sleep apnea)     Atrial fibrillation, unspecified type (H)     Hyperlipidemia LDL goal <100- fish oil = worse IBS with diarrhea      Status post laparoscopic cholecystectomy     CKD (chronic kidney disease) stage 2, GFR 60-89 ml/min     Venous stasis of lower extremity     Advanced directives, counseling/discussion     Sensorineural hearing loss of both ears - using hearing aids     Bilateral leg edema- has been to lymphedema clinic in past     Essential hypertension with goal blood pressure less than 140/90     Type 2 diabetes mellitus with other circulatory complication, without long-term current use of insulin (H)     Long-term (current) use of anticoagulants [Z79.01]     Onychomycosis     Type 2 diabetes mellitus with diabetic polyneuropathy, without long-term current use of insulin (H)     Irritable bowel syndrome with diarrhea     Osteoarthritis of both knees, unspecified osteoarthritis type     Meningioma - found on MRI/CT brain 9/2018 during ER headache evaluation     Past Surgical History:   Procedure Laterality Date     C CARDIOVERSION, ELECTIVE;INTERN  2/2007    Successful cardioversion from atrial fibrillation      C DEXA INTERPRETATION, AXIAL  8/2007    T score lumbar 3.7, femoral neck 2.8/2.0(all stable)     C DEXA INTERPRETATION,  AXIAL  2010    T score lumbar 3.4 (marked degen changes), femoral neck 2.1/2.1 (sig dec lt femur)     C ECHO HEART XTHORACIC,COMPLETE, W/O DOPPLER  2006    normal LV/RV size and function, mild pulm ht(30-40mm Hg), mild TR     C ECHO HEART XTHORACIC,COMPLETE, W/O DOPPLER  10/2008    normal LV systolic function with EF 55-60%, impaired LV relaxation, mod to severe LAE     C LIGATE FALLOPIAN TUBE  1973    Tubal ligation     CARDIAC NUC ESHA STRESS TEST NL  2006    exercised 4 min, no inducible ischemia on ECG or perfusion images     COLONOSCOPY  2006    diverticulosis, repeat 10 years     FLEXIBLE SIGMOIDOSCOPY  10/2000     HC LAPAROSCOPY, SURGICAL; CHOLECYSTECTOMY      Cholecystectomy, Laparoscopic     LIGATN/STRIP LONG & SHORT SAPHEN      L varicose vein excision     REPAIR PTOSIS BILATERAL  2011     Bilateral upper eyelid blepharoplasty and internal browpexy brow ptosis repair, bilateral lower eyelid ectropion repair with snip punctoplasty     TRANSFUSION, DIRECT, BLOOD      pregnancy related       Social History   Substance Use Topics     Smoking status: Never Smoker     Smokeless tobacco: Never Used     Alcohol use Yes      Comment: 0-2 per month     Family History   Problem Relation Age of Onset     Diabetes Mother      type 2     Hypertension Mother      Cardiovascular Mother      pulmonary embolus     Lipids Mother      HEART DISEASE Mother       atrial fibrillation     Diabetes Father      type 2     Cardiovascular Father      atrial fibrillation,  during an EP procedure     Cancer - colorectal Maternal Grandmother      onset age 88     HEART DISEASE Maternal Grandmother       age 96     Diabetes Maternal Grandfather      type 2     Diabetes Paternal Grandfather      type 2     Hypertension Sister      Lipids Sister      Lipids Brother      Hypertension Brother      Hypertension Son      Other - See Comments Cousin 68     Myotonic Dystrophy         Current Outpatient Prescriptions    Medication Sig Dispense Refill     amLODIPine (NORVASC) 5 MG tablet Take 1 tablet (5 mg) by mouth daily 90 tablet 3     ASPIRIN 81 MG OR TABS 1 tab po QD (Once per day) 0 0     atenolol (TENORMIN) 100 MG tablet Take 1 tablet (100 mg) by mouth daily 90 tablet 1     BIOTIN 10 MG OR TABS 1 TABLET DAILY       blood glucose monitoring (JAYDE CONTOUR NEXT) test strip Use to test blood sugar 1 times daily or as directed. 100 strip 3     blood glucose monitoring (JAYDE MICROLET) lancets Use to test blood sugar 1 times daily or as directed. 100 each 3     blood glucose monitoring (NO BRAND SPECIFIED) test strip Compact Plus test strips Use to test blood sugars 1 times daily or as directed       CALTRATE 600 + D 600-200 MG-IU OR TABS 1 tablet twice daily 60 11     CENTRUM SILVER OR TABS ONE DAILY 3 MONTHS 1 YEAR     hydrochlorothiazide (HYDRODIURIL) 25 MG tablet Take 1 tablet (25 mg) by mouth daily 90 tablet 1     lisinopril (PRINIVIL/ZESTRIL) 40 MG tablet Take 1 tablet (40 mg) by mouth daily 90 tablet 1     metFORMIN (GLUCOPHAGE) 1000 MG tablet TAKE 1 AND 1/2 TABLETS BY MOUTH WITH BREAKFAST AND TAKE 1 TABLET BY MOUTH WITH SUPPER 225 tablet 1     simvastatin (ZOCOR) 20 MG tablet TAKE ONE TABLET BY MOUTH EVERY NIGHT AT BEDTIME 90 tablet 1     VITAMIN D 1000 UNIT OR CAPS 1 CAPSULE DAILY 3 MONTHS 1 YEAR     warfarin (COUMADIN) 5 MG tablet TAKE 1-2 TABLETS (5-10 MG) BY MOUTH DAILY AS INSTRUCTED 180 tablet 3     ketoconazole (NIZORAL) 2 % cream Apply topically 2 times daily Apply to affected nails daily (Patient not taking: Reported on 9/20/2018) 30 g 1     Allergies   Allergen Reactions     Tetracycline      lightheaded       Reviewed and updated as needed this visit by clinical staff  Tobacco  Allergies  Meds  Problems  Med Hx  Surg Hx  Fam Hx  Soc Hx        Reviewed and updated as needed this visit by Provider  Tobacco  Allergies  Meds  Problems  Med Hx  Surg Hx  Fam Hx  Soc Hx          ROS:  Constitutional,  "HEENT, cardiovascular, pulmonary, GI, , musculoskeletal, neuro, skin, endocrine and psych systems are negative, except as otherwise noted.    OBJECTIVE:     /78  Pulse 92  Temp 96.5  F (35.8  C) (Tympanic)  Ht 5' 3.75\" (1.619 m)  Wt 242 lb (109.8 kg)  SpO2 98%  BMI 41.87 kg/m2  Body mass index is 41.87 kg/(m^2).  GENERAL: healthy, alert and no distress  EYES: Eyes grossly normal to inspection, PERRL and conjunctivae and sclerae normal  HENT: ear canals and TM's normal, nose and mouth without ulcers or lesions  NECK: no adenopathy, no asymmetry, masses, or scars and thyroid normal to palpation  RESP: lungs clear to auscultation - no rales, rhonchi or wheezes  CV: regular rate and rhythm, normal S1 S2, no S3 or S4, no murmur, click or rub, no peripheral edema and peripheral pulses strong  MS: no gross musculoskeletal defects noted, no edema  SKIN: no suspicious lesions or rashes  NEURO: Normal strength and tone, mentation intact and speech normal  PSYCH: mentation appears normal, affect normal/bright    Diagnostic Test Results:  MR ANGIO NECK W/WO CONTRAST9/15/2018  [facility]  Result Narrative   INDICATION:  Sudden onset headache on the right.    COMPARISON:  None.    TECHNIQUE:  Time-of-flight and gadolinium bolus MRA of the neck. 3D reconstructed images.    FINDINGS:  No focal flow-void on time-of-flight MRA to suggest high-grade stenosis. Gadolinium bolus MRA demonstrates patent bilateral carotid artery circulations the origin to the skullbase. No focal stenosis. Patent bilateral vertebral circulations from the origin to the vertebrobasilar junction. No stenosis or dissection.    IMPRESSION:  Normal MRA neck.       MR ANGIO HEAD BRAIN WO9/15/2018  [facility]  Result Narrative   INDICATION:  Sudden onset headache.    Comparison :  None     TECHNIQUE:  Time-of-flight MRA. 3D reconstructed images.    FINDINGS:  Bilateral carotid siphons " are patent. Patent [place] of [place] with patent bilateral post connecting arteries. Visualized bilateral JOSHUA and MCA circulations are patent without focal stenosis or aneurysm. Bilateral PCA circulations are patent without stenosis or aneurysm. Codominant vertebrobasilar system.    IMPRESSION:  Normal MRA head.     MR BRAIN WO CONTRAST9/15/2018  [email or web address]  Result Narrative   INDICATION:  Sudden onset headache on the right.    COMPARISON:  CT 09/14/2018.    TECHNIQUE:  Multiplanar T1, T2, FLAIR and diffusion-weighted imaging.    FINDINGS:  mild generalized volume loss. Scattered patchy foci of T2/FLAIR signal hyperintensity within the white matter consistent with chronic small vessel ischemic changes. As seen on the previous CT, there is an extra-axial mass measuring approximately 13 x 8 mm adjacent to the left frontal lobe with a dural tail (best seen on series 14, image 12). On the previous CT, this mass partially calcified, findings consistent with a meningioma. Mass-effect and immediately adjacent parenchyma with notable edema (series 15, image 11). No intracranial hemorrhage. No abnormal ventricular dilatation. Intracranial vascular flow voids are preserved. No midline shift. No restricted diffusion to suggest acute ischemia.  Bilateral orbits are unremarkable. Normal appearing sella. Visualized paranasal sinuses and mastoid air cells are unremarkable.    IMPRESSION:  1. Compared to the previous CT, again seen is a partially calcified 13 mm meningioma adjacent to left frontal lobe with edema in the adjacent parenchyma.  2. No other mass or mass effect.   3. Moderate cerebral volume loss. Chronic deep white matter small vessel ischemic changes   4. No acute intracranial abnormality     CT HEAD BRAIN WO9/14/2018  [email or web address] [employer] [facility]  Result Narrative   INDICATION:  Sudden onset of right-sided headache    TECHNIQUE:  Head CT without  contrast.    COMPARISON:  None     FINDINGS:  CSF spaces: Within normal limits for age.      Brain parenchyma: There are subtle nonspecific low attenuation white matter changes consistent with chronic microvascular disease. There is a 10 mm partially calcified meningioma in the left frontal region on series 2, image 26. No other sign of mass, hemorrhage, or midline shift.      Skull base and calvarium: The visualized paranasal sinuses and mastoid air cells demonstrate no acute or significant findings.  The visualized orbits are grossly unremarkable.  No skull fractures.          IMPRESSION:  No acute or specific finding to explain right-sided headache. A 10 mm left frontal extra-axial meningioma is present.    Please note that all CT scans at this facility use dose modulation, iterative reconstruction, and/or weight-based dosing when appropriate to reduce radiation dose to as low as reasonably achievable.         ISTAT EC 8 (POC) (09/14/2018 11:23 PM)  ISTAT EC 8 (POC) (09/14/2018 11:23 PM)   Component Value Ref Range Performed At   GLUCOSE,ISTAT 172 (H) 65 - 100 mg/dL [facility]   BUN,ISTAT 13 8 - 25 mg/dL [facility]   SODIUM,ISTAT 132 (L) 135 - 145 mmol/L [facility]   POTASSIUM,ISTAT 3.9 3.5 - 5.0 mmol/L [facility]   CHLORIDE,ISTAT 93 (L) 98 - 107 mmol/L [facility]   CO2,TOTAL,ISTAT 29 21 - 31 mmol/L [facility]   ANION GAP,ISTAT 15 10 - 20 [facility]   PH,VENOUS,ISTAT 7.44 (H) 7.32 - 7.42  [facility]   PCO2,VENOUS,ISTAT 41 41 - 51 mmHg [facility]   HCO3,VENOUS,ISTAT 28 22 - 30 mmol/L [facility]   BASE EXCESS 3.0 -2.0 - 3.0  [facility]   HEMATOCRIT,ISTAT 39.0 33.0 - 51.0 % [facility]   HEMOGLOBIN,ISTAT 13.3 12.0 - 16.0 g/dL [facility]            ASSESSMENT/PLAN:     Zulema was seen today for er f/u.    Diagnoses and all orders for this visit:    Nonintractable headache, unspecified chronicity pattern, unspecified headache type  Benign neoplasm of cerebral meninges (H)  Advised patient that meningioma is a very slow-growing and benign brain tumor.  We generally like neurosurgery to weigh in regarding frequency of surveillance follow-up scans and if any further diagnostics/treatment are necessary based on the location of the tumor.  Patient voiced understanding and agreement.  Literature given to the patient during today's office visit.  -     NEUROSURGERY REFERRAL    Hyponatremia  Patient left before having this performed.  Will call patient and have her schedule lab only appointment to recheck this level.  -     Basic metabolic panel  (Ca, Cl, CO2, Creat, Gluc, K, Na, BUN)    Screen for colon cancer  Routine screening  -     Fecal colorectal cancer screen (FIT); Future        Return in about 3 months (around 1/1/2019) for Physical Exam, Lab Work.             Lizy Mendoza, MS, PA-C        [facility] [place]

## 2018-09-21 DIAGNOSIS — E87.1 HYPONATREMIA: ICD-10-CM

## 2018-09-21 LAB
ANION GAP SERPL CALCULATED.3IONS-SCNC: 10 MMOL/L (ref 3–14)
BUN SERPL-MCNC: 13 MG/DL (ref 7–30)
CALCIUM SERPL-MCNC: 9.1 MG/DL (ref 8.5–10.1)
CHLORIDE SERPL-SCNC: 98 MMOL/L (ref 94–109)
CO2 SERPL-SCNC: 28 MMOL/L (ref 20–32)
CREAT SERPL-MCNC: 0.58 MG/DL (ref 0.52–1.04)
GFR SERPL CREATININE-BSD FRML MDRD: >90 ML/MIN/1.7M2
GLUCOSE SERPL-MCNC: 143 MG/DL (ref 70–99)
POTASSIUM SERPL-SCNC: 4.1 MMOL/L (ref 3.4–5.3)
SODIUM SERPL-SCNC: 136 MMOL/L (ref 133–144)

## 2018-09-21 PROCEDURE — 36415 COLL VENOUS BLD VENIPUNCTURE: CPT | Performed by: PHYSICIAN ASSISTANT

## 2018-09-21 PROCEDURE — 80048 BASIC METABOLIC PNL TOTAL CA: CPT | Performed by: PHYSICIAN ASSISTANT

## 2018-09-24 NOTE — PROGRESS NOTES
Zulema  Here are your recent results.  Great news!  Your potassium level has normalized.  If you have any questions please do not hesitate to contact our office via phone ([phone]) or MyChart.    Lizy Mendoza MS, PA-C  [facility]

## 2018-10-21 PROCEDURE — G0328 FECAL BLOOD SCRN IMMUNOASSAY: HCPCS | Performed by: PHYSICIAN ASSISTANT

## 2018-10-23 DIAGNOSIS — D32.9 MENINGIOMA (H): Primary | ICD-10-CM

## 2018-10-23 DIAGNOSIS — Z12.11 SCREEN FOR COLON CANCER: ICD-10-CM

## 2018-10-23 LAB — HEMOCCULT STL QL IA: NEGATIVE

## 2018-10-23 NOTE — PROGRESS NOTES
LVM for patient. Needs new Brain MRI W/ and W/O contrast prior to appt her scan from sept was only W/O . New order placed. Awaiting call back form patient to discuss.

## 2018-10-24 ENCOUNTER — OFFICE VISIT (OUTPATIENT)
Dept: NEUROSURGERY | Facility: CLINIC | Age: 73
End: 2018-10-24
Attending: NURSE PRACTITIONER
Payer: MEDICARE

## 2018-10-24 ENCOUNTER — HOSPITAL ENCOUNTER (OUTPATIENT)
Dept: MRI IMAGING | Facility: CLINIC | Age: 73
Discharge: HOME | End: 2018-10-24
Attending: NURSE PRACTITIONER | Admitting: NURSE PRACTITIONER
Payer: MEDICARE

## 2018-10-24 VITALS
OXYGEN SATURATION: 99 % | WEIGHT: 242 LBS | DIASTOLIC BLOOD PRESSURE: 69 MMHG | HEIGHT: 64 IN | HEART RATE: 80 BPM | BODY MASS INDEX: 41.32 KG/M2 | SYSTOLIC BLOOD PRESSURE: 136 MMHG

## 2018-10-24 DIAGNOSIS — D32.9 MENINGIOMA (H): ICD-10-CM

## 2018-10-24 DIAGNOSIS — D32.9 MENINGIOMA (H): Primary | ICD-10-CM

## 2018-10-24 LAB
CREAT BLD-MCNC: 0.7 MG/DL (ref 0.52–1.04)
GFR SERPL CREATININE-BSD FRML MDRD: 82 ML/MIN/1.7M2

## 2018-10-24 PROCEDURE — 25500064 ZZH RX 255 OP 636: Performed by: NURSE PRACTITIONER

## 2018-10-24 PROCEDURE — A9585 GADOBUTROL INJECTION: HCPCS | Performed by: NURSE PRACTITIONER

## 2018-10-24 PROCEDURE — 70553 MRI BRAIN STEM W/O & W/DYE: CPT

## 2018-10-24 PROCEDURE — 99204 OFFICE O/P NEW MOD 45 MIN: CPT | Performed by: NEUROLOGICAL SURGERY

## 2018-10-24 PROCEDURE — 82565 ASSAY OF CREATININE: CPT

## 2018-10-24 PROCEDURE — G0463 HOSPITAL OUTPT CLINIC VISIT: HCPCS

## 2018-10-24 RX ORDER — GADOBUTROL 604.72 MG/ML
10 INJECTION INTRAVENOUS ONCE
Status: COMPLETED | OUTPATIENT
Start: 2018-10-24 | End: 2018-10-24

## 2018-10-24 RX ADMIN — GADOBUTROL 10 ML: 604.72 INJECTION INTRAVENOUS at 08:46

## 2018-10-24 ASSESSMENT — PAIN SCALES - GENERAL: PAINLEVEL: NO PAIN (0)

## 2018-10-24 NOTE — LETTER
10/24/2018         RE: Zulema Nixon  [address] [address] [address]        Dear Colleague,    Thank you for referring your patient, Zulema Nixon, to the [facility]. Please see a copy of my visit note below.    [facility]   Neurosurgery Consult Note       CC: Frontal brain tumor    Primary care Provider: Madina Mercado    I was asked to see the patient by:   Lizy Mendoza PA-C  [address] [place] [address]      [place]: Zulema Nixon is a 73 year old female that presents to my office after being found to have a left frontal dural based calcified brain mass.  The patient developed a headache in September of this year and had a CT and MRI of her brain that revealed a calcified meningioma in the left frontal region.  She has had no new symptoms and no persistent headache.  She has no history of cancer.  She is doing exceptionally well at this time and is asymptomatic.      Past Medical History:   Diagnosis Date     Atrial fibrillation (H) 2006    cardioverted 2007, on chronic anticoagulation      CKD (chronic kidney disease) stage 2, GFR 60-89 ml/min      with microalbuminuria     Essential hypertension, benign      Hyperlipidemia LDL goal <100 10/31/2010    fish oil = IBS with diarrhea      Morbid obesity (H)      ARMAND (obstructive sleep apnea)     C PAP     Sensorineural hearing loss of both ears     using hearing aids     Status post laparoscopic cholecystectomy     cholecystectomy for cholelithiasis     Supervision of other normal pregnancy      - vaginal, one set of twins     Tubal ligation status      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)      Varicose veins of lower extremities with inflammation     excision varicose vein left lower extremity     Venous stasis of lower extremity     excision varicose vein left lower extremity        Past Surgical History:   Procedure Laterality Date     C  CARDIOVERSION, ELECTIVE;INTERN  2/2007    Successful cardioversion from atrial fibrillation      C DEXA INTERPRETATION, AXIAL  8/2007    T score lumbar 3.7, femoral neck 2.8/2.0(all stable)     C DEXA INTERPRETATION, AXIAL  6/2010    T score lumbar 3.4 (marked degen changes), femoral neck 2.1/2.1 (sig dec lt femur)     C ECHO HEART XTHORACIC,COMPLETE, W/O DOPPLER  11/2006    normal LV/RV size and function, mild pulm ht(30-40mm Hg), mild TR     C ECHO HEART XTHORACIC,COMPLETE, W/O DOPPLER  10/2008    normal LV systolic function with EF 55-60%, impaired LV relaxation, mod to severe LAE     C LIGATE FALLOPIAN TUBE  1973    Tubal ligation     CARDIAC NUC ESHA STRESS TEST NL  11/2006    exercised 4 min, no inducible ischemia on ECG or perfusion images     COLONOSCOPY  11/2006    diverticulosis, repeat 10 years     FLEXIBLE SIGMOIDOSCOPY  10/2000     HC LAPAROSCOPY, SURGICAL; CHOLECYSTECTOMY  1991    Cholecystectomy, Laparoscopic     LIGATN/STRIP LONG & SHORT SAPHEN  1995    L varicose vein excision     REPAIR PTOSIS BILATERAL  2/2011     Bilateral upper eyelid blepharoplasty and internal browpexy brow ptosis repair, bilateral lower eyelid ectropion repair with snip punctoplasty     TRANSFUSION, DIRECT, BLOOD      pregnancy related       Current Outpatient Prescriptions   Medication     amLODIPine (NORVASC) 5 MG tablet     ASPIRIN 81 MG OR TABS     atenolol (TENORMIN) 100 MG tablet     BIOTIN 10 MG OR TABS     blood glucose monitoring (JAYDE CONTOUR NEXT) test strip     blood glucose monitoring (JAYDE MICROLET) lancets     blood glucose monitoring (NO BRAND SPECIFIED) test strip     CALTRATE 600 + D 600-200 MG-IU OR TABS     CENTRUM SILVER OR TABS     hydrochlorothiazide (HYDRODIURIL) 25 MG tablet     ketoconazole (NIZORAL) 2 % cream     lisinopril (PRINIVIL/ZESTRIL) 40 MG tablet     metFORMIN (GLUCOPHAGE) 1000 MG tablet     simvastatin (ZOCOR) 20 MG tablet     VITAMIN D 1000 UNIT OR CAPS     warfarin (COUMADIN) 5 MG  tablet     No current facility-administered medications for this visit.        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      volunteers at [employer]      Social History Main Topics     Smoking status: Never Smoker     Smokeless tobacco: Never Used     Alcohol use Yes      Comment: 0-2 per month     Drug use: No     Sexual activity: Yes     Partners: Male      Comment: same partner since , only partner     Other Topics Concern      Service No     Blood Transfusions Yes     pregnancy     Caffeine Concern No     1-2 cups per day     Occupational Exposure No     Hobby Hazards No     Sleep Concern Yes     needing new equipment for her C pap     Stress Concern No     Weight Concern Yes     chronic obesity     Special Diet Yes     decreasing salt     Back Care No     Exercise Yes     walking 20-30 min 2 times weekly     Bike Helmet No     not ride     Seat Belt Yes     Self-Exams Yes     Social History Narrative    Lives with  and a dog.  6 grandchildren; previously did  for 3 of them.  Now volunteering in school and [employer].  Children ages 45 and twins age 42.      2012:  diagnosed with low grade prostate cancer- s/p cryotherapy surgery in , then s/p radiation in 2017.            Family History   Problem Relation Age of Onset     Diabetes Mother      type 2     Hypertension Mother      Cardiovascular Mother      pulmonary embolus     Lipids Mother      HEART DISEASE Mother       atrial fibrillation     Diabetes Father      type 2     Cardiovascular Father      atrial fibrillation,  during an EP procedure     Cancer - colorectal Maternal Grandmother      onset age 88     HEART DISEASE Maternal Grandmother       age 96      Diabetes Maternal Grandfather      type 2     Diabetes Paternal Grandfather      type 2     Hypertension Sister      Lipids Sister      Lipids Brother      Hypertension Brother      Hypertension Son      Other - See Comments Cousin 68     Myotonic Dystrophy         Review Of Systems  Skin: negative  Eyes: negative  Ears/Nose/Throat: negative  Respiratory: No shortness of breath, dyspnea on exertion, cough, or hemoptysis  Cardiovascular: negative  Gastrointestinal: negative  Genitourinary: negative  Musculoskeletal: negative  Neurologic: negative  Psychiatric: negative  Hematologic/Lymphatic/Immunologic: negative  Endocrine: negative    B/P: 136/69, T: Data Unavailable, P: 80, R: Data Unavailable    Examination:  Normal affect and mood  No obvious labored respiration  No skin lesions noted   No obvious pitting edema  No abnormal psychiatric behavior  No obvious musculoskeletal abnormalities   Awake  Alert  Oriented x 3  Speech clear  Cranial nerves II - XII intact  PERRL  EOMI  Face symmetric  Tongue midline  Motor exam nonfocal with symmetric motor strength  Sensation intact  Finger to Nose normal  Pronator drift negative  DTR -1 +  Ambulation stable    Imaging:   CT and MRI brain revealed a 1.6 x 1 cm left frontal calcified meningioma      Assessment/Plan:   The patient is doing exceptionally well and this lesion was incidentally found.  Therefore we will follow-up with a MRI of her brain in 6 months to evaluate the status of the lesion and also at one year if he remains stable in size.      Hammad Dunne MD, MS, FAANS  Neurosurgeon  [facility]  [place] [address]  Tel [phone]    Again, thank you for allowing me to participate in the care of your patient.        Sincerely,        Hammad Dunne MD

## 2018-10-24 NOTE — MR AVS SNAPSHOT
After Visit Summary   10/24/2018    Zulema Nixon    MRN: [id number]           Patient Information     Date Of Birth          1945        Visit Information        Provider Department      10/24/2018 10:30 AM Hammad Dunne MD [facility]        Today's Diagnoses     Meningioma (H)    -  1      Care Instructions    1. Repeat Brain MRI in 6 months and follow up with Dr Dunne.    2.Please call [phone] to schedule MRI.        Please call our clinic with any questions or concerns: [phone]            Follow-ups after your visit        Your next 10 appointments already scheduled     Oct 30, 2018 10:30 AM CDT   Anticoagulation Visit with RV ANTICOAGULATION CLINIC   [facility] ([facility])    [address] [place] [id number] [phone]            Jan 14, 2019  8:40 AM CST   PHYSICAL with Madina Mercado MD   [facility] ([facility])    [address] [place] [id number] [phone]              Future tests that were ordered for you today     Open Future Orders        Priority Expected Expires Ordered    MR Brain w/o & w Contrast Routine 4/24/2019 10/24/2019 10/24/2018    MR Brain w/o & w Contrast Routine  10/23/2019 10/23/2018            Who to contact     If you have questions or need follow up information about today's clinic visit or your schedule please contact [facility] directly at [phone].  Normal or non-critical lab and imaging results will be communicated to you by MyChart, letter or phone within 4 business days after the clinic has received the results. If you do not hear from us within 7 days, please contact the clinic through Wordsterhart or phone. If you have a critical or abnormal lab result, we will notify you by phone as soon as possible.  Submit refill requests through [employer] or call your pharmacy and they  "will forward the refill request to us. Please allow 3 business days for your refill to be completed.          Additional Information About Your Visit        Vendhart Information     xPeerient gives you secure access to your electronic health record. If you see a primary care provider, you can also send messages to your care team and make appointments. If you have questions, please call your primary care clinic.  If you do not have a primary care provider, please call [phone] and they will assist you.        Care EveryWhere ID     This is your Care EveryWhere ID. This could be used by other organizations to access your [place] medical records  [id number]        Your Vitals Were     Pulse Height Pulse Oximetry BMI (Body Mass Index)          80 5' 3.75\" (1.619 m) 99% 41.87 kg/m2         Blood Pressure from Last 3 Encounters:   10/24/18 136/69   09/20/18 122/78   07/13/18 132/78    Weight from Last 3 Encounters:   10/24/18 242 lb (109.8 kg)   09/20/18 242 lb (109.8 kg)   07/13/18 248 lb 6.4 oz (112.7 kg)               Primary Care Provider Office Phone # Fax #    Madinajose l Mercado [phone]       [address] [place] [address]        Equal Access to Services     TREY WELCH AH: Hadii tawny ku hadasho Soomaali, waaxda luqadaha, qaybta kaalmada adeegyada, waxay idiin hayshern sandra gonzalez . So [facility] [phone].    ATENCIÓN: Si habla español, tiene a webster disposición servicios gratuitos de asistencia lingüística. Llame al [phone].    We comply with applicable federal civil rights laws and [place] laws. We do not discriminate on the basis of race, color, national origin, age, disability, sex, sexual orientation, or gender identity.            Thank you!     Thank you for choosing [facility]  for your care. Our goal is always to provide you with excellent care. Hearing back from our patients is one way we can continue to improve our services. Please take " a few minutes to complete the written survey that you may receive in the mail after your visit with us. Thank you!             Your Updated Medication List - Protect others around you: Learn how to safely use, store and throw away your medicines at www.disposemymeds.org.          This list is accurate as of 10/24/18 11:00 AM.  Always use your most recent med list.                   Brand Name Dispense Instructions for use Diagnosis    amLODIPine 5 MG tablet    NORVASC    90 tablet    Take 1 tablet (5 mg) by mouth daily    Essential hypertension with goal blood pressure less than 140/90       aspirin 81 MG tablet     0    1 tab po QD (Once per day)    Type II or unspecified type diabetes mellitus without mention of complication, not stated as uncontrolled       atenolol 100 MG tablet    TENORMIN    90 tablet    Take 1 tablet (100 mg) by mouth daily    Essential hypertension with goal blood pressure less than 140/90       Biotin 10 MG Tabs tablet      1 TABLET DAILY        blood glucose monitoring lancets     100 each    Use to test blood sugar 1 times daily or as directed.    Type 2 diabetes mellitus with diabetic polyneuropathy, without long-term current use of insulin (H)       * blood glucose monitoring test strip    no brand specified     Compact Plus test strips Use to test blood sugars 1 times daily or as directed        * blood glucose monitoring test strip    JAYDE CONTOUR NEXT    100 strip    Use to test blood sugar 1 times daily or as directed.    Type 2 diabetes mellitus with diabetic polyneuropathy, without long-term current use of insulin (H)       CALTRATE 600 + D 600-200 MG-IU Tabs     60    1 tablet twice daily        CENTRUM SILVER per tablet     3 MONTHS    ONE DAILY        hydrochlorothiazide 25 MG tablet    HYDRODIURIL    90 tablet    Take 1 tablet (25 mg) by mouth daily    Essential hypertension with goal blood pressure less than 140/90, CKD (chronic kidney disease) stage 2, GFR 60-89 ml/min        ketoconazole 2 % cream    NIZORAL    30 g    Apply topically 2 times daily Apply to affected nails daily    Onychomycosis, Type 2 diabetes mellitus with diabetic polyneuropathy, without long-term current use of insulin (H)       lisinopril 40 MG tablet    PRINIVIL/ZESTRIL    90 tablet    Take 1 tablet (40 mg) by mouth daily    Essential hypertension with goal blood pressure less than 140/90       metFORMIN 1000 MG tablet    GLUCOPHAGE    225 tablet    TAKE 1 AND 1/2 TABLETS BY MOUTH WITH BREAKFAST AND TAKE 1 TABLET BY MOUTH WITH SUPPER    Type 2 diabetes mellitus with diabetic polyneuropathy, without long-term current use of insulin (H), Type 2 diabetes mellitus with diabetic polyneuropathy, without long-term current use of insulin (H)       simvastatin 20 MG tablet    ZOCOR    90 tablet    TAKE ONE TABLET BY MOUTH EVERY NIGHT AT BEDTIME    Hyperlipidemia LDL goal <100       vitamin D 1000 units capsule     3 MONTHS    1 CAPSULE DAILY    Nutrition disorder       warfarin 5 MG tablet    COUMADIN    180 tablet    TAKE 1-2 TABLETS (5-10 MG) BY MOUTH DAILY AS INSTRUCTED    Persistent atrial fibrillation (H)       * Notice:  This list has 2 medication(s) that are the same as other medications prescribed for you. Read the directions carefully, and ask your doctor or other care provider to review them with you.

## 2018-10-24 NOTE — PATIENT INSTRUCTIONS
1. Repeat Brain MRI in 6 months and follow up with Dr Dunne.    2.Please call [phone] to schedule MRI.        Please call our clinic with any questions or concerns: [phone]

## 2018-10-24 NOTE — PROGRESS NOTES
[facility]   Neurosurgery Consult Note       CC: Frontal brain tumor    Primary care Provider: Madina Mercado    I was asked to see the patient by:   Lizy Mendoza PA-C  [id number] [place] [place] [address]      [place]: Zulema Nixon is a 73 year old female that presents to my office after being found to have a left frontal dural based calcified brain mass.  The patient developed a headache in September of this year and had a CT and MRI of her brain that revealed a calcified meningioma in the left frontal region.  She has had no new symptoms and no persistent headache.  She has no history of cancer.  She is doing exceptionally well at this time and is asymptomatic.      Past Medical History:   Diagnosis Date     Atrial fibrillation (H) 2006    cardioverted 2007, on chronic anticoagulation      CKD (chronic kidney disease) stage 2, GFR 60-89 ml/min      with microalbuminuria     Essential hypertension, benign      Hyperlipidemia LDL goal <100 10/31/2010    fish oil = IBS with diarrhea      Morbid obesity (H)      ARMAND (obstructive sleep apnea)     C PAP     Sensorineural hearing loss of both ears     using hearing aids     Status post laparoscopic cholecystectomy     cholecystectomy for cholelithiasis     Supervision of other normal pregnancy      - vaginal, one set of twins     Tubal ligation status      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)      Varicose veins of lower extremities with inflammation     excision varicose vein left lower extremity     Venous stasis of lower extremity     excision varicose vein left lower extremity        Past Surgical History:   Procedure Laterality Date     C CARDIOVERSION, ELECTIVE;INTERN  2007    Successful cardioversion from atrial fibrillation      C DEXA INTERPRETATION, AXIAL  2007    T score lumbar 3.7, femoral neck 2.8/2.0(all stable)     C DEXA INTERPRETATION, AXIAL  2010    T score lumbar 3.4  (marked degen changes), femoral neck 2.1/2.1 (sig dec lt femur)     C ECHO HEART XTHORACIC,COMPLETE, W/O DOPPLER  11/2006    normal LV/RV size and function, mild pulm ht(30-40mm Hg), mild TR     C ECHO HEART XTHORACIC,COMPLETE, W/O DOPPLER  10/2008    normal LV systolic function with EF 55-60%, impaired LV relaxation, mod to severe LAE     C LIGATE FALLOPIAN TUBE  1973    Tubal ligation     CARDIAC NUC ESHA STRESS TEST NL  11/2006    exercised 4 min, no inducible ischemia on ECG or perfusion images     COLONOSCOPY  11/2006    diverticulosis, repeat 10 years     FLEXIBLE SIGMOIDOSCOPY  10/2000     HC LAPAROSCOPY, SURGICAL; CHOLECYSTECTOMY  1991    Cholecystectomy, Laparoscopic     LIGATN/STRIP LONG & SHORT SAPHEN  1995    L varicose vein excision     REPAIR PTOSIS BILATERAL  2/2011     Bilateral upper eyelid blepharoplasty and internal browpexy brow ptosis repair, bilateral lower eyelid ectropion repair with snip punctoplasty     TRANSFUSION, DIRECT, BLOOD      pregnancy related       Current Outpatient Prescriptions   Medication     amLODIPine (NORVASC) 5 MG tablet     ASPIRIN 81 MG OR TABS     atenolol (TENORMIN) 100 MG tablet     BIOTIN 10 MG OR TABS     blood glucose monitoring (JAYDE CONTOUR NEXT) test strip     blood glucose monitoring (JAYDE MICROLET) lancets     blood glucose monitoring (NO BRAND SPECIFIED) test strip     CALTRATE 600 + D 600-200 MG-IU OR TABS     CENTRUM SILVER OR TABS     hydrochlorothiazide (HYDRODIURIL) 25 MG tablet     ketoconazole (NIZORAL) 2 % cream     lisinopril (PRINIVIL/ZESTRIL) 40 MG tablet     metFORMIN (GLUCOPHAGE) 1000 MG tablet     simvastatin (ZOCOR) 20 MG tablet     VITAMIN D 1000 UNIT OR CAPS     warfarin (COUMADIN) 5 MG tablet     No current facility-administered medications for this visit.        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      volunteers at [employer]      Social History Main Topics     Smoking status: Never Smoker     Smokeless tobacco: Never Used     Alcohol use Yes      Comment: 0-2 per month     Drug use: No     Sexual activity: Yes     Partners: Male      Comment: same partner since , only partner     Other Topics Concern      Service No     Blood Transfusions Yes     pregnancy     Caffeine Concern No     1-2 cups per day     Occupational Exposure No     Hobby Hazards No     Sleep Concern Yes     needing new equipment for her C pap     Stress Concern No     Weight Concern Yes     chronic obesity     Special Diet Yes     decreasing salt     Back Care No     Exercise Yes     walking 20-30 min 2 times weekly     Bike Helmet No     not ride     Seat Belt Yes     Self-Exams Yes     Social History Narrative    Lives with  and a dog.  6 grandchildren; previously did  for 3 of them.  Now volunteering in school and [employer].  Children ages 45 and twins age 42.      2012:  diagnosed with low grade prostate cancer- s/p cryotherapy surgery in 2016, then s/p radiation in 2017.            Family History   Problem Relation Age of Onset     Diabetes Mother      type 2     Hypertension Mother      Cardiovascular Mother      pulmonary embolus     Lipids Mother      HEART DISEASE Mother       atrial fibrillation     Diabetes Father      type 2     Cardiovascular Father      atrial fibrillation,  during an EP procedure     Cancer - colorectal Maternal Grandmother      onset age 88     HEART DISEASE Maternal Grandmother       age 96     Diabetes Maternal Grandfather      type 2     Diabetes Paternal Grandfather      type 2     Hypertension Sister      Lipids Sister      Lipids Brother      Hypertension Brother      Hypertension Son      Other - See Comments Cousin 68     Myotonic Dystrophy          Review Of Systems  Skin: negative  Eyes: negative  Ears/Nose/Throat: negative  Respiratory: No shortness of breath, dyspnea on exertion, cough, or hemoptysis  Cardiovascular: negative  Gastrointestinal: negative  Genitourinary: negative  Musculoskeletal: negative  Neurologic: negative  Psychiatric: negative  Hematologic/Lymphatic/Immunologic: negative  Endocrine: negative    B/P: 136/69, T: Data Unavailable, P: 80, R: Data Unavailable    Examination:  Normal affect and mood  No obvious labored respiration  No skin lesions noted   No obvious pitting edema  No abnormal psychiatric behavior  No obvious musculoskeletal abnormalities   Awake  Alert  Oriented x 3  Speech clear  Cranial nerves II - XII intact  PERRL  EOMI  Face symmetric  Tongue midline  Motor exam nonfocal with symmetric motor strength  Sensation intact  Finger to Nose normal  Pronator drift negative  DTR -1 +  Ambulation stable    Imaging:   CT and MRI brain revealed a 1.6 x 1 cm left frontal calcified meningioma      Assessment/Plan:   The patient is doing exceptionally well and this lesion was incidentally found.  Therefore we will follow-up with a MRI of her brain in 6 months to evaluate the status of the lesion and also at one year if he remains stable in size.      Hammad Dunne MD, MS, FAANS  Neurosurgeon  [facility]  [address] [address]  Tel [phone]

## 2018-10-24 NOTE — PROGRESS NOTES
Zulema  I have reviewed your recent labs. Here are the results:    -FIT test (screening test for colon cancer) was normal. ADVISE: rechecking this test in 1 year.      If you have any questions please do not hesitate to contact our office via phone ([phone]) or [email or web address].    Lizy Mendoza, MS, PA-C  [facility]

## 2018-10-30 ENCOUNTER — ANTICOAGULATION THERAPY VISIT (OUTPATIENT)
Dept: NURSING | Facility: CLINIC | Age: 73
End: 2018-10-30
Payer: COMMERCIAL

## 2018-10-30 DIAGNOSIS — I48.91 ATRIAL FIBRILLATION, UNSPECIFIED TYPE (H): ICD-10-CM

## 2018-10-30 LAB — INR POINT OF CARE: 4.1 (ref 0.86–1.14)

## 2018-10-30 PROCEDURE — 85610 PROTHROMBIN TIME: CPT | Mod: QW

## 2018-10-30 PROCEDURE — 36416 COLLJ CAPILLARY BLOOD SPEC: CPT

## 2018-10-30 NOTE — MR AVS SNAPSHOT
Zulema Nixon   10/30/2018 10:30 AM   Anticoagulation Therapy Visit    Description:  73 year old female   Provider:   ANTICOAGULATION CLINIC   Department:   Nurse           INR as of 10/30/2018     Today's INR 4.1!      Anticoagulation Summary as of 10/30/2018     INR goal 2.0-3.0   Today's INR 4.1!   Full warfarin instructions 10/30: Hold; Otherwise 5 mg on Wed, Sat; 7.5 mg all other days   Next INR check 11/6/2018    Indications   Long-term (current) use of anticoagulants [Z79.01] [Z79.01]  Atrial fibrillation  unspecified type (H) [I48.91]         Your next Anticoagulation Clinic appointment(s)     Nov 06, 2018 10:00 AM CST   Anticoagulation Visit with  ANTICOAGULATION CLINIC   [facility]    [address] [place] [address]   [phone]              Contact Numbers     Clinic Number:         October 2018 Details    Sun Mon Tue Wed Thu Fri Sat      1               2               3               4               5               6                 7               8               9               10               11               12               13                 14               15               16               17               18               19               20                 21               22               23               24               25               26               27                 28               29               30      Hold   See details      31      5 mg             Date Details   10/30 This INR check               How to take your warfarin dose     To take:  5 mg Take 1 of the 5 mg tablets.    Hold Do not take your warfarin dose. See the Details table to the right for additional instructions.                November 2018 Details    Sun Mon Tue Wed Thu Fri Sat         1      7.5 mg         2      7.5 mg         3      5 mg           4      7.5 mg         5      7.5 mg         6            7               8                9               10                 11               12               13               14               15               16               17                 18               19               20               21               22               23               24                 25               26               27               28               29               30                 Date Details   No additional details    Date of next INR:  11/6/2018         How to take your warfarin dose     To take:  5 mg Take 1 of the 5 mg tablets.    To take:  7.5 mg Take 1.5 of the 5 mg tablets.

## 2018-10-30 NOTE — PROGRESS NOTES
ANTICOAGULATION FOLLOW-UP CLINIC VISIT    Patient Name:  Zulema Nixon  Date:  10/30/2018  Contact Type:  Face to Face    SUBJECTIVE:     Patient Findings     Positives Change in diet/appetite (was on vaction for 11 days, decreased Vitamin K intake)           OBJECTIVE    INR Protime   Date Value Ref Range Status   10/30/2018 4.1 (A) 0.86 - 1.14 Final       ASSESSMENT / PLAN  INR assessment SUPRA    Recheck INR In: 1 WEEK    INR Location Clinic      Anticoagulation Summary as of 10/30/2018     INR goal 2.0-3.0   Today's INR 4.1!   Warfarin maintenance plan 5 mg (5 mg x 1) on Wed, Sat; 7.5 mg (5 mg x 1.5) all other days   Full warfarin instructions 10/30: Hold; Otherwise 5 mg on Wed, Sat; 7.5 mg all other days   Weekly warfarin total 47.5 mg   Plan last modified Nereyda Root, RN (3/1/2016)   Next INR check 11/6/2018   Target end date     Indications   Long-term (current) use of anticoagulants [Z79.01] [Z79.01]  Atrial fibrillation  unspecified type (H) [I48.91]         Anticoagulation Episode Summary     INR check location     Preferred lab     Send INR reminders to RV NURSE    Comments       Anticoagulation Care Providers     Provider Role Specialty Phone number    Madina Mercado MD [facility] [phone]            See the Encounter Report to view Anticoagulation Flowsheet and Dosing Calendar (Go to Encounters tab in chart review, and find the Anticoagulation Therapy Visit)    Dosage adjustment made based on physician directed care plan.    Torie Perales RN

## 2018-11-06 ENCOUNTER — ANTICOAGULATION THERAPY VISIT (OUTPATIENT)
Dept: NURSING | Facility: CLINIC | Age: 73
End: 2018-11-06
Payer: COMMERCIAL

## 2018-11-06 DIAGNOSIS — I48.91 ATRIAL FIBRILLATION, UNSPECIFIED TYPE (H): ICD-10-CM

## 2018-11-06 LAB — INR POINT OF CARE: 2 (ref 0.86–1.14)

## 2018-11-06 PROCEDURE — 36416 COLLJ CAPILLARY BLOOD SPEC: CPT

## 2018-11-06 PROCEDURE — 85610 PROTHROMBIN TIME: CPT | Mod: QW

## 2018-11-06 NOTE — PROGRESS NOTES
ANTICOAGULATION FOLLOW-UP CLINIC VISIT    Patient Name:  Zulema Nixon  Date:  11/6/2018  Contact Type:  Face to Face    SUBJECTIVE:     Patient Findings     Positives Intentional hold of therapy           OBJECTIVE    INR Protime   Date Value Ref Range Status   11/06/2018 2.0 (A) 0.86 - 1.14 Final       ASSESSMENT / PLAN  INR assessment THER    Recheck INR In: 2 WEEKS    INR Location Clinic      Anticoagulation Summary as of 11/6/2018     INR goal 2.0-3.0   Today's INR 2.0   Warfarin maintenance plan 5 mg (5 mg x 1) on Wed, Sat; 7.5 mg (5 mg x 1.5) all other days   Full warfarin instructions 5 mg on Wed, Sat; 7.5 mg all other days   Weekly warfarin total 47.5 mg   Plan last modified Nereyda Root RN (3/1/2016)   Next INR check 11/20/2018   Target end date     Indications   Long-term (current) use of anticoagulants [Z79.01] [Z79.01]  Atrial fibrillation  unspecified type (H) [I48.91]         Anticoagulation Episode Summary     INR check location     Preferred lab     Send INR reminders to RV NURSE    Comments       Anticoagulation Care Providers     Provider Role Specialty Phone number    Madina Mercado MD [facility] [phone]            See the Encounter Report to view Anticoagulation Flowsheet and Dosing Calendar (Go to Encounters tab in chart review, and find the Anticoagulation Therapy Visit)    Dosage adjustment made based on physician directed care plan.      If INR is elvated at next visit then will consider decreasing maintenance dose.  She will try to normalize her diet.    Torie Perales RN

## 2018-11-20 ENCOUNTER — ANTICOAGULATION THERAPY VISIT (OUTPATIENT)
Dept: NURSING | Facility: CLINIC | Age: 73
End: 2018-11-20
Payer: COMMERCIAL

## 2018-11-20 DIAGNOSIS — I48.91 ATRIAL FIBRILLATION, UNSPECIFIED TYPE (H): ICD-10-CM

## 2018-11-20 LAB — INR POINT OF CARE: 3.4 (ref 0.86–1.14)

## 2018-11-20 PROCEDURE — 36416 COLLJ CAPILLARY BLOOD SPEC: CPT

## 2018-11-20 PROCEDURE — 85610 PROTHROMBIN TIME: CPT | Mod: QW

## 2018-11-20 NOTE — PROGRESS NOTES
ANTICOAGULATION FOLLOW-UP CLINIC VISIT    Patient Name:  Zulema Nixon  Date:  11/20/2018  Contact Type:  Face to Face    SUBJECTIVE:     Patient Findings     Positives Change in diet/appetite (Decreased Vitamin K intake)           OBJECTIVE    INR Protime   Date Value Ref Range Status   11/20/2018 3.4 (A) 0.86 - 1.14 Final       ASSESSMENT / PLAN  INR assessment SUPRA    Recheck INR In: 2 WEEKS    INR Location Clinic      Anticoagulation Summary as of 11/20/2018     INR goal 2.0-3.0   Today's INR 3.4!   Warfarin maintenance plan 5 mg (5 mg x 1) on Tue, Thu, Sat; 7.5 mg (5 mg x 1.5) all other days   Full warfarin instructions 5 mg on Tue, Thu, Sat; 7.5 mg all other days   Weekly warfarin total 45 mg   Plan last modified Torie Perales RN (11/20/2018)   Next INR check 12/4/2018   Target end date     Indications   Long-term (current) use of anticoagulants [Z79.01] [Z79.01]  Atrial fibrillation  unspecified type (H) [I48.91]         Anticoagulation Episode Summary     INR check location     Preferred lab     Send INR reminders to RV NURSE    Comments       Anticoagulation Care Providers     Provider Role Specialty Phone number    Madina Mercado MD [facility] [phone]            See the Encounter Report to view Anticoagulation Flowsheet and Dosing Calendar (Go to Encounters tab in chart review, and find the Anticoagulation Therapy Visit)    Dosage adjustment made based on physician directed care plan.    Maintenance dose decreased today.    Torie Perales RN

## 2018-11-20 NOTE — MR AVS SNAPSHOT
Zulema Nixon   11/20/2018 10:30 AM   Anticoagulation Therapy Visit    Description:  73 year old female   Provider:   ANTICOAGULATION CLINIC   Department:  [facility] Nurse           INR as of 11/20/2018     Today's INR 3.4!      Anticoagulation Summary as of 11/20/2018     INR goal 2.0-3.0   Today's INR 3.4!   Full warfarin instructions 5 mg on Tue, Thu, Sat; 7.5 mg all other days   Next INR check 12/4/2018    Indications   Long-term (current) use of anticoagulants [Z79.01] [Z79.01]  Atrial fibrillation  unspecified type (H) [I48.91]         Your next Anticoagulation Clinic appointment(s)     Dec 04, 2018 10:45 AM CST   Anticoagulation Visit with  ANTICOAGULATION CLINIC   [facility]    [address] [place] [address]   [phone]              Contact Numbers     Clinic Number:         November 2018 Details    Sun Mon Tue Wed Thu Fri Sat         1               2               3                 4               5               6               7               8               9               10                 11               12               13               14               15               16               17                 18               19               20      5 mg   See details      21      7.5 mg         22      5 mg         23      7.5 mg         24      5 mg           25      7.5 mg         26      7.5 mg         27      5 mg         28      7.5 mg         29      5 mg         30      7.5 mg           Date Details   11/20 This INR check               How to take your warfarin dose     To take:  5 mg Take 1 of the 5 mg tablets.    To take:  7.5 mg Take 1.5 of the 5 mg tablets.           December 2018 Details    Sun Mon Tue Wed Thu Fri Sat           1      5 mg           2      7.5 mg         3      7.5 mg         4            5               6               7               8                 9               10               11                12               13               14               15                 16               17               18               19               20               21               22                 23               24               25               26               27               28               29                 30               31                     Date Details   No additional details    Date of next INR:  12/4/2018         How to take your warfarin dose     To take:  5 mg Take 1 of the 5 mg tablets.    To take:  7.5 mg Take 1.5 of the 5 mg tablets.

## 2018-12-04 ENCOUNTER — ANTICOAGULATION THERAPY VISIT (OUTPATIENT)
Dept: NURSING | Facility: CLINIC | Age: 73
End: 2018-12-04
Payer: COMMERCIAL

## 2018-12-04 DIAGNOSIS — I48.91 ATRIAL FIBRILLATION, UNSPECIFIED TYPE (H): ICD-10-CM

## 2018-12-04 LAB — INR POINT OF CARE: 2.9 (ref 0.86–1.14)

## 2018-12-04 PROCEDURE — 85610 PROTHROMBIN TIME: CPT | Mod: QW

## 2018-12-04 PROCEDURE — 36416 COLLJ CAPILLARY BLOOD SPEC: CPT

## 2018-12-04 NOTE — MR AVS SNAPSHOT
Zulema Nixon   12/4/2018 10:45 AM   Anticoagulation Therapy Visit    Description:  73 year old female   Provider:   ANTICOAGULATION CLINIC   Department:   Nurse           INR as of 12/4/2018     Today's INR 2.9      Anticoagulation Summary as of 12/4/2018     INR goal 2.0-3.0   Today's INR 2.9   Full warfarin instructions 5 mg on Tue, Thu, Sat; 7.5 mg all other days   Next INR check 12/18/2018    Indications   Long-term (current) use of anticoagulants [Z79.01] [Z79.01]  Atrial fibrillation  unspecified type (H) [I48.91]         Your next Anticoagulation Clinic appointment(s)     Dec 11, 2018 10:00 AM CST   Anticoagulation Visit with  ANTICOAGULATION CLINIC   [facility]    [address] [place] [id number]   [phone]              Contact Numbers     Clinic Number:         December 2018 Details    Sun Mon Tue Wed Thu Fri Sat           1                 2               3               4      5 mg   See details      5      7.5 mg         6      5 mg         7      7.5 mg         8      5 mg           9      7.5 mg         10      7.5 mg         11      5 mg         12      7.5 mg         13      5 mg         14      7.5 mg         15      5 mg           16      7.5 mg         17      7.5 mg         18            19               20               21               22                 23               24               25               26               27               28               29                 30               31                     Date Details   12/04 This INR check       Date of next INR:  12/18/2018         How to take your warfarin dose     To take:  5 mg Take 1 of the 5 mg tablets.    To take:  7.5 mg Take 1.5 of the 5 mg tablets.

## 2018-12-04 NOTE — PROGRESS NOTES
ANTICOAGULATION FOLLOW-UP CLINIC VISIT    Patient Name:  Zulema Nixon  Date:  12/4/2018  Contact Type:  Face to Face    SUBJECTIVE:     Patient Findings     Positives No Problem Findings           OBJECTIVE    INR Protime   Date Value Ref Range Status   12/04/2018 2.9 (A) 0.86 - 1.14 Final       ASSESSMENT / PLAN  No question data found.  Anticoagulation Summary as of 12/4/2018     INR goal 2.0-3.0   Today's INR 2.9   Warfarin maintenance plan 5 mg (5 mg x 1) on Tue, Thu, Sat; 7.5 mg (5 mg x 1.5) all other days   Full warfarin instructions 5 mg on Tue, Thu, Sat; 7.5 mg all other days   Weekly warfarin total 45 mg   No change documented Kylah Pierce RN   Plan last modified Torie Perales RN (11/20/2018)   Next INR check 12/18/2018   Target end date     Indications   Long-term (current) use of anticoagulants [Z79.01] [Z79.01]  Atrial fibrillation  unspecified type (H) [I48.91]         Anticoagulation Episode Summary     INR check location     Preferred lab     Send INR reminders to RV NURSE    Comments       Anticoagulation Care Providers     Provider Role Specialty Phone number    Madina Mercado MD [facility] [phone]            See the Encounter Report to view Anticoagulation Flowsheet and Dosing Calendar (Go to Encounters tab in chart review, and find the Anticoagulation Therapy Visit)    Dosage adjustment made based on physician directed care plan.    Kylah Pierce, RN

## 2018-12-17 ENCOUNTER — ANTICOAGULATION THERAPY VISIT (OUTPATIENT)
Dept: NURSING | Facility: CLINIC | Age: 73
End: 2018-12-17
Payer: COMMERCIAL

## 2018-12-17 DIAGNOSIS — I48.91 ATRIAL FIBRILLATION, UNSPECIFIED TYPE (H): ICD-10-CM

## 2018-12-17 LAB — INR POINT OF CARE: 3.2 (ref 0.86–1.14)

## 2018-12-17 PROCEDURE — 36416 COLLJ CAPILLARY BLOOD SPEC: CPT

## 2018-12-17 PROCEDURE — 85610 PROTHROMBIN TIME: CPT | Mod: QW

## 2018-12-17 NOTE — PROGRESS NOTES
ANTICOAGULATION FOLLOW-UP CLINIC VISIT    Patient Name:  Zulema Nixon  Date:  12/17/2018  Contact Type:  Face to Face    SUBJECTIVE:        OBJECTIVE    INR Protime   Date Value Ref Range Status   12/17/2018 3.2 (A) 0.86 - 1.14 Final       ASSESSMENT / PLAN  INR assessment THER    Recheck INR In: 3 WEEKS    INR Location Clinic      Anticoagulation Summary  As of 12/17/2018    INR goal:   2.0-3.0   TTR:   67.3 % (2.8 y)   INR used for dosing:   3.2! (12/17/2018)   Warfarin maintenance plan:   5 mg (5 mg x 1) every Tue, Thu, Sat; 7.5 mg (5 mg x 1.5) all other days   Full warfarin instructions:   12/21: 5 mg; 12/23: 5 mg; 12/28: 5 mg; 12/30: 5 mg; 1/4: 5 mg; 1/6: 5 mg; Otherwise 5 mg every Tue, Thu, Sat; 7.5 mg all other days   Weekly warfarin total:   45 mg   Plan last modified:   Torie Perales RN (11/20/2018)   Next INR check:   1/7/2019   Target end date:       Indications    Long-term (current) use of anticoagulants [Z79.01] [Z79.01]  Atrial fibrillation  unspecified type (H) [I48.91]             Anticoagulation Episode Summary     INR check location:       Preferred lab:       Send INR reminders to:    NURSE    Comments:         Anticoagulation Care Providers     Provider Role Specialty Phone number    Madina Mercado MD [facility] [phone]            See the Encounter Report to view Anticoagulation Flowsheet and Dosing Calendar (Go to Encounters tab in chart review, and find the Anticoagulation Therapy Visit)    Dosage adjustment made based on physician directed care plan.    Nereyda Root RN

## 2019-01-04 DIAGNOSIS — E11.42 TYPE 2 DIABETES MELLITUS WITH DIABETIC POLYNEUROPATHY, WITHOUT LONG-TERM CURRENT USE OF INSULIN (H): ICD-10-CM

## 2019-01-04 DIAGNOSIS — I10 ESSENTIAL HYPERTENSION WITH GOAL BLOOD PRESSURE LESS THAN 140/90: ICD-10-CM

## 2019-01-04 DIAGNOSIS — N18.2 CKD (CHRONIC KIDNEY DISEASE) STAGE 2, GFR 60-89 ML/MIN: ICD-10-CM

## 2019-01-04 DIAGNOSIS — E78.5 HYPERLIPIDEMIA LDL GOAL <100: ICD-10-CM

## 2019-01-07 RX ORDER — HYDROCHLOROTHIAZIDE 25 MG/1
TABLET ORAL
Qty: 30 TABLET | Refills: 0 | Status: SHIPPED | OUTPATIENT
Start: 2019-01-07 | End: 2019-01-14

## 2019-01-07 RX ORDER — ATENOLOL 100 MG/1
TABLET ORAL
Qty: 30 TABLET | Refills: 0 | Status: SHIPPED | OUTPATIENT
Start: 2019-01-07 | End: 2019-01-14

## 2019-01-07 RX ORDER — SIMVASTATIN 20 MG
TABLET ORAL
Qty: 30 TABLET | Refills: 0 | Status: SHIPPED | OUTPATIENT
Start: 2019-01-07 | End: 2019-01-14

## 2019-01-07 RX ORDER — LISINOPRIL 40 MG/1
TABLET ORAL
Qty: 30 TABLET | Refills: 0 | Status: SHIPPED | OUTPATIENT
Start: 2019-01-07 | End: 2019-01-14

## 2019-01-07 NOTE — TELEPHONE ENCOUNTER
Due for an Office visit for further refills, only fill for 30 days     Kylah Pierce RN, BSN  [facility]

## 2019-01-07 NOTE — TELEPHONE ENCOUNTER
Requested Prescriptions   Pending Prescriptions Disp Refills     metFORMIN (GLUCOPHAGE) 1000 MG tablet [Pharmacy Med Name: MetFORMIN HCl Oral Tablet 1000 MG] 225 tablet 0      Last Written Prescription Date:  7.13.18  Last Fill Quantity: 225,  # refills: 1   Last Office Visit: 9/20/2018   Future Office Visit:    Next 5 appointments (look out 90 days)    Jan 14, 2019  8:40 AM CST  PHYSICAL with Madina Mercado MD  [facility] ([facility]) [address] [place] [id number]  [phone]          Sig: TAKE 1 AND 1/2 TABLET BY MOUTH WITH BREAKFAST AND TAKE 1 TABLET BY MOUTH WITH SUPPER    Biguanide Agents Passed - 1/4/2019  5:10 PM       Passed - Blood pressure less than 140/90 in past 6 months    BP Readings from Last 3 Encounters:   10/24/18 136/69   09/20/18 122/78   07/13/18 132/78                Passed - Patient has documented LDL within the past 12 mos.    Recent Labs   Lab Test 07/13/18  0950   LDL 49            Passed - Patient has had a Microalbumin in the past 15 mos.    Recent Labs   Lab Test 07/13/18  0951   MICROL 25   UMALCR 17.93            Passed - Patient is age 10 or older       Passed - Patient has documented A1c within the specified period of time.    If HgbA1C is 8 or greater, it needs to be on file within the past 3 months.  If less than 8, must be on file within the past 6 months.     Recent Labs   Lab Test 07/13/18  0950   A1C 6.7*            Passed - Patient's CR is NOT>1.4 OR Patient's EGFR is NOT<45 within past 12 mos.    Recent Labs   Lab Test 10/24/18  0838   GFRESTIMATED 82   GFRESTBLACK >90       Recent Labs   Lab Test 09/21/18  1128   CR 0.58            Passed - Patient does NOT have a diagnosis of CHF.       Passed - Medication is active on med list       Passed - Patient is not pregnant       Passed - Patient has not had a positive pregnancy test within the past 12 mos.        Passed - Recent (6 mo) or future (30 days) visit  "within the authorizing provider's specialty    Patient had office visit in the last 6 months or has a visit in the next 30 days with authorizing provider or within the authorizing provider's specialty.  See \"Patient Info\" tab in inbasket, or \"Choose Columns\" in Meds & Orders section of the refill encounter.            atenolol (TENORMIN) 100 MG tablet [Pharmacy Med Name: Atenolol Oral Tablet 100 MG] 90 tablet 0      Last Written Prescription Date:  7.13.18  Last Fill Quantity: 90,  # refills: 1   Last Office Visit: 9/20/2018   Future Office Visit:    Next 5 appointments (look out 90 days)    Jan 14, 2019  8:40 AM CST  PHYSICAL with Madina Mercado MD  [facility] ([facility]) [address] [place] [phone]          Sig: TAKE ONE TABLET BY MOUTH ONCE DAILY    Beta-Blockers Protocol Passed - 1/4/2019  5:10 PM       Passed - Blood pressure under 140/90 in past 12 months    BP Readings from Last 3 Encounters:   10/24/18 136/69   09/20/18 122/78   07/13/18 132/78                Passed - Patient is age 6 or older       Passed - Recent (12 mo) or future (30 days) visit within the authorizing provider's specialty    Patient had office visit in the last 12 months or has a visit in the next 30 days with authorizing provider or within the authorizing provider's specialty.  See \"Patient Info\" tab in inbasket, or \"Choose Columns\" in Meds & Orders section of the refill encounter.             Passed - Medication is active on med list        simvastatin (ZOCOR) 20 MG tablet [Pharmacy Med Name: Simvastatin Oral Tablet 20 MG] 90 tablet 0      Last Written Prescription Date:  7.13.18  Last Fill Quantity: 90,  # refills: 1   Last Office Visit: 9/20/2018   Future Office Visit:    Next 5 appointments (look out 90 days)    Jan 14, 2019  8:40 AM CST  PHYSICAL with Madina Mercado MD  [facility] ([facility]) [address] [place] [address] " "[facility] [address]  [phone]          Sig: TAKE ONE TABLET BY MOUTH AT BEDTIME    Statins Protocol Passed - 1/4/2019  5:11 PM       Passed - LDL on file in past 12 months    Recent Labs   Lab Test 07/13/18  0950   LDL 49            Passed - No abnormal creatine kinase in past 12 months    Recent Labs   Lab Test 01/11/18  0912   CKT 52               Passed - Recent (12 mo) or future (30 days) visit within the authorizing provider's specialty    Patient had office visit in the last 12 months or has a visit in the next 30 days with authorizing provider or within the authorizing provider's specialty.  See \"Patient Info\" tab in inbasket, or \"Choose Columns\" in Meds & Orders section of the refill encounter.             Passed - Medication is active on med list       Passed - Patient is age 18 or older       Passed - No active pregnancy on record       Passed - No positive pregnancy test in past 12 months        lisinopril (PRINIVIL/ZESTRIL) 40 MG tablet [Pharmacy Med Name: Lisinopril Oral Tablet 40 MG] 90 tablet 0      Last Written Prescription Date:  7.13.18  Last Fill Quantity: 90,  # refills: 1   Last Office Visit: 9/20/2018   Future Office Visit:    Next 5 appointments (look out 90 days)    Jan 14, 2019  8:40 AM CST  PHYSICAL with Madina Mercado MD  [facility] ([facility]) [address] [place] [phone]  [phone]          Sig: TAKE ONE TABLET BY MOUTH ONCE DAILY    ACE Inhibitors (Including Combos) Protocol Passed - 1/4/2019  5:11 PM       Passed - Blood pressure under 140/90 in past 12 months    BP Readings from Last 3 Encounters:   10/24/18 136/69   09/20/18 122/78   07/13/18 132/78                Passed - Recent (12 mo) or future (30 days) visit within the authorizing provider's specialty    Patient had office visit in the last 12 months or has a visit in the next 30 days with authorizing provider or within the authorizing " "provider's specialty.  See \"Patient Info\" tab in inbasket, or \"Choose Columns\" in Meds & Orders section of the refill encounter.             Passed - Medication is active on med list       Passed - Patient is age 18 or older       Passed - No active pregnancy on record       Passed - Normal serum creatinine on file in past 12 months    Recent Labs   Lab Test 10/24/18  0838 09/21/18  1128   CR  --  0.58   CREAT 0.7  --             Passed - Normal serum potassium on file in past 12 months    Recent Labs   Lab Test 09/21/18  1128   POTASSIUM 4.1            Passed - No positive pregnancy test within past 12 months        hydrochlorothiazide (HYDRODIURIL) 25 MG tablet [Pharmacy Med Name: HydroCHLOROthiazide Oral Tablet 25 MG] 90 tablet 0     Sig: TAKE ONE TABLET BY MOUTH ONCE DAILY    Diuretics (Including Combos) Protocol Passed - 1/4/2019  5:11 PM       Passed - Blood pressure under 140/90 in past 12 months    BP Readings from Last 3 Encounters:   10/24/18 136/69   09/20/18 122/78   07/13/18 132/78                Passed - Recent (12 mo) or future (30 days) visit within the authorizing provider's specialty    Patient had office visit in the last 12 months or has a visit in the next 30 days with authorizing provider or within the authorizing provider's specialty.  See \"Patient Info\" tab in inbasket, or \"Choose Columns\" in Meds & Orders section of the refill encounter.             Passed - Medication is active on med list       Passed - Patient is age 18 or older       Passed - No active pregancy on record       Passed - Normal serum creatinine on file in past 12 months    Recent Labs   Lab Test 09/21/18  1128   CR 0.58             Passed - Normal serum potassium on file in past 12 months    Recent Labs   Lab Test 09/21/18  1128   POTASSIUM 4.1                   Passed - Normal serum sodium on file in past 12 months    Recent Labs   Lab Test 09/21/18  1128                Passed - No positive pregnancy test in past 12 " months

## 2019-01-14 ENCOUNTER — ANTICOAGULATION THERAPY VISIT (OUTPATIENT)
Dept: NURSING | Facility: CLINIC | Age: 74
End: 2019-01-14
Payer: MEDICARE

## 2019-01-14 ENCOUNTER — OFFICE VISIT (OUTPATIENT)
Dept: FAMILY MEDICINE | Facility: CLINIC | Age: 74
End: 2019-01-14
Payer: MEDICARE

## 2019-01-14 VITALS
BODY MASS INDEX: 41.15 KG/M2 | DIASTOLIC BLOOD PRESSURE: 78 MMHG | TEMPERATURE: 98.8 F | HEIGHT: 64 IN | WEIGHT: 241 LBS | HEART RATE: 75 BPM | SYSTOLIC BLOOD PRESSURE: 130 MMHG | OXYGEN SATURATION: 99 %

## 2019-01-14 DIAGNOSIS — I48.91 ATRIAL FIBRILLATION, UNSPECIFIED TYPE (H): ICD-10-CM

## 2019-01-14 DIAGNOSIS — E11.42 TYPE 2 DIABETES MELLITUS WITH DIABETIC POLYNEUROPATHY, WITHOUT LONG-TERM CURRENT USE OF INSULIN (H): ICD-10-CM

## 2019-01-14 DIAGNOSIS — Z71.89 ADVANCED CARE PLANNING/COUNSELING DISCUSSION: ICD-10-CM

## 2019-01-14 DIAGNOSIS — R60.0 BILATERAL LEG EDEMA: ICD-10-CM

## 2019-01-14 DIAGNOSIS — K58.0 IRRITABLE BOWEL SYNDROME WITH DIARRHEA: ICD-10-CM

## 2019-01-14 DIAGNOSIS — Z79.01 LONG TERM CURRENT USE OF ANTICOAGULANT THERAPY: ICD-10-CM

## 2019-01-14 DIAGNOSIS — Z00.00 MEDICARE ANNUAL WELLNESS VISIT, SUBSEQUENT: Primary | ICD-10-CM

## 2019-01-14 DIAGNOSIS — N18.2 CKD (CHRONIC KIDNEY DISEASE) STAGE 2, GFR 60-89 ML/MIN: ICD-10-CM

## 2019-01-14 DIAGNOSIS — E66.01 MORBID OBESITY DUE TO EXCESS CALORIES (H): ICD-10-CM

## 2019-01-14 DIAGNOSIS — I10 ESSENTIAL HYPERTENSION WITH GOAL BLOOD PRESSURE LESS THAN 140/90: ICD-10-CM

## 2019-01-14 DIAGNOSIS — M75.102 ROTATOR CUFF SYNDROME, LEFT: ICD-10-CM

## 2019-01-14 DIAGNOSIS — D32.9 MENINGIOMA (H): ICD-10-CM

## 2019-01-14 DIAGNOSIS — I48.19 PERSISTENT ATRIAL FIBRILLATION (H): ICD-10-CM

## 2019-01-14 DIAGNOSIS — E78.5 HYPERLIPIDEMIA LDL GOAL <100: ICD-10-CM

## 2019-01-14 DIAGNOSIS — B35.1 ONYCHOMYCOSIS: ICD-10-CM

## 2019-01-14 DIAGNOSIS — Z23 NEEDS FLU SHOT: ICD-10-CM

## 2019-01-14 DIAGNOSIS — E11.59 TYPE 2 DIABETES MELLITUS WITH OTHER CIRCULATORY COMPLICATION, WITHOUT LONG-TERM CURRENT USE OF INSULIN (H): ICD-10-CM

## 2019-01-14 DIAGNOSIS — Z12.11 SCREEN FOR COLON CANCER: ICD-10-CM

## 2019-01-14 LAB
ALBUMIN UR-MCNC: NEGATIVE MG/DL
APPEARANCE UR: CLEAR
BASOPHILS # BLD AUTO: 0 10E9/L (ref 0–0.2)
BASOPHILS NFR BLD AUTO: 0.3 %
BILIRUB UR QL STRIP: NEGATIVE
COLOR UR AUTO: YELLOW
DIFFERENTIAL METHOD BLD: NORMAL
EOSINOPHIL # BLD AUTO: 0.2 10E9/L (ref 0–0.7)
EOSINOPHIL NFR BLD AUTO: 2.2 %
ERYTHROCYTE [DISTWIDTH] IN BLOOD BY AUTOMATED COUNT: 13.3 % (ref 10–15)
GLUCOSE UR STRIP-MCNC: NEGATIVE MG/DL
HBA1C MFR BLD: 6.7 % (ref 0–5.6)
HCT VFR BLD AUTO: 39.6 % (ref 35–47)
HGB BLD-MCNC: 12.9 G/DL (ref 11.7–15.7)
HGB UR QL STRIP: NEGATIVE
INR POINT OF CARE: 1.8 (ref 0.86–1.14)
KETONES UR STRIP-MCNC: NEGATIVE MG/DL
LEUKOCYTE ESTERASE UR QL STRIP: NEGATIVE
LYMPHOCYTES # BLD AUTO: 1.8 10E9/L (ref 0.8–5.3)
LYMPHOCYTES NFR BLD AUTO: 26.4 %
MCH RBC QN AUTO: 27.7 PG (ref 26.5–33)
MCHC RBC AUTO-ENTMCNC: 32.6 G/DL (ref 31.5–36.5)
MCV RBC AUTO: 85 FL (ref 78–100)
MONOCYTES # BLD AUTO: 0.5 10E9/L (ref 0–1.3)
MONOCYTES NFR BLD AUTO: 6.8 %
NEUTROPHILS # BLD AUTO: 4.4 10E9/L (ref 1.6–8.3)
NEUTROPHILS NFR BLD AUTO: 64.3 %
NITRATE UR QL: NEGATIVE
PH UR STRIP: 8.5 PH (ref 5–7)
PLATELET # BLD AUTO: 247 10E9/L (ref 150–450)
RBC # BLD AUTO: 4.65 10E12/L (ref 3.8–5.2)
SOURCE: ABNORMAL
SP GR UR STRIP: 1.01 (ref 1–1.03)
UROBILINOGEN UR STRIP-ACNC: 0.2 EU/DL (ref 0.2–1)
WBC # BLD AUTO: 6.8 10E9/L (ref 4–11)

## 2019-01-14 PROCEDURE — 85610 PROTHROMBIN TIME: CPT | Mod: QW

## 2019-01-14 PROCEDURE — G0439 PPPS, SUBSEQ VISIT: HCPCS | Performed by: FAMILY MEDICINE

## 2019-01-14 PROCEDURE — 81003 URINALYSIS AUTO W/O SCOPE: CPT | Performed by: FAMILY MEDICINE

## 2019-01-14 PROCEDURE — 99214 OFFICE O/P EST MOD 30 MIN: CPT | Mod: 25 | Performed by: FAMILY MEDICINE

## 2019-01-14 PROCEDURE — 99207 C FOOT EXAM  NO CHARGE: CPT | Mod: 25 | Performed by: FAMILY MEDICINE

## 2019-01-14 PROCEDURE — 85025 COMPLETE CBC W/AUTO DIFF WBC: CPT | Performed by: FAMILY MEDICINE

## 2019-01-14 PROCEDURE — G0008 ADMIN INFLUENZA VIRUS VAC: HCPCS | Performed by: FAMILY MEDICINE

## 2019-01-14 PROCEDURE — 84443 ASSAY THYROID STIM HORMONE: CPT | Performed by: FAMILY MEDICINE

## 2019-01-14 PROCEDURE — 80061 LIPID PANEL: CPT | Performed by: FAMILY MEDICINE

## 2019-01-14 PROCEDURE — 82043 UR ALBUMIN QUANTITATIVE: CPT | Performed by: FAMILY MEDICINE

## 2019-01-14 PROCEDURE — 90662 IIV NO PRSV INCREASED AG IM: CPT | Performed by: FAMILY MEDICINE

## 2019-01-14 PROCEDURE — 80053 COMPREHEN METABOLIC PANEL: CPT | Performed by: FAMILY MEDICINE

## 2019-01-14 PROCEDURE — 36416 COLLJ CAPILLARY BLOOD SPEC: CPT

## 2019-01-14 PROCEDURE — 83036 HEMOGLOBIN GLYCOSYLATED A1C: CPT | Performed by: FAMILY MEDICINE

## 2019-01-14 RX ORDER — LISINOPRIL 40 MG/1
40 TABLET ORAL DAILY
Qty: 90 TABLET | Refills: 1 | Status: SHIPPED | OUTPATIENT
Start: 2019-01-14 | End: 2019-07-08

## 2019-01-14 RX ORDER — AMLODIPINE BESYLATE 5 MG/1
5 TABLET ORAL DAILY
Qty: 90 TABLET | Refills: 3 | Status: SHIPPED | OUTPATIENT
Start: 2019-01-14 | End: 2020-01-21

## 2019-01-14 RX ORDER — KETOCONAZOLE 20 MG/G
CREAM TOPICAL 2 TIMES DAILY
Qty: 30 G | Refills: 1 | Status: SHIPPED | OUTPATIENT
Start: 2019-01-14 | End: 2020-11-04

## 2019-01-14 RX ORDER — SIMVASTATIN 20 MG
TABLET ORAL
Qty: 90 TABLET | Refills: 1 | Status: SHIPPED | OUTPATIENT
Start: 2019-01-14 | End: 2019-07-08

## 2019-01-14 RX ORDER — HYDROCHLOROTHIAZIDE 25 MG/1
25 TABLET ORAL DAILY
Qty: 90 TABLET | Refills: 1 | Status: SHIPPED | OUTPATIENT
Start: 2019-01-14 | End: 2019-07-08

## 2019-01-14 RX ORDER — ATENOLOL 100 MG/1
100 TABLET ORAL DAILY
Qty: 90 TABLET | Refills: 1 | Status: SHIPPED | OUTPATIENT
Start: 2019-01-14 | End: 2019-07-08

## 2019-01-14 ASSESSMENT — MIFFLIN-ST. JEOR: SCORE: 1574.68

## 2019-01-14 ASSESSMENT — ANXIETY QUESTIONNAIRES
GAD7 TOTAL SCORE: 1
2. NOT BEING ABLE TO STOP OR CONTROL WORRYING: NOT AT ALL
7. FEELING AFRAID AS IF SOMETHING AWFUL MIGHT HAPPEN: NOT AT ALL
6. BECOMING EASILY ANNOYED OR IRRITABLE: NOT AT ALL
IF YOU CHECKED OFF ANY PROBLEMS ON THIS QUESTIONNAIRE, HOW DIFFICULT HAVE THESE PROBLEMS MADE IT FOR YOU TO DO YOUR WORK, TAKE CARE OF THINGS AT HOME, OR GET ALONG WITH OTHER PEOPLE: NOT DIFFICULT AT ALL
5. BEING SO RESTLESS THAT IT IS HARD TO SIT STILL: NOT AT ALL
3. WORRYING TOO MUCH ABOUT DIFFERENT THINGS: SEVERAL DAYS
1. FEELING NERVOUS, ANXIOUS, OR ON EDGE: NOT AT ALL

## 2019-01-14 ASSESSMENT — PATIENT HEALTH QUESTIONNAIRE - PHQ9
5. POOR APPETITE OR OVEREATING: NOT AT ALL
SUM OF ALL RESPONSES TO PHQ QUESTIONS 1-9: 1

## 2019-01-14 NOTE — PROGRESS NOTES
ANTICOAGULATION FOLLOW-UP CLINIC VISIT    Patient Name:  Zulema Nixon  Date:  2019  Contact Type:  Face to Face    SUBJECTIVE:     Patient Findings     Positives:   Unexplained INR or factor level change           OBJECTIVE    INR Protime   Date Value Ref Range Status   2019 1.8 (A) 0.86 - 1.14 Final       ASSESSMENT / PLAN  No question data found.  Anticoagulation Summary  As of 2019    INR goal:   2.0-3.0   TTR:   67.4 % (2.8 y)   INR used for dosin.8! (2019)   Warfarin maintenance plan:   7.5 mg (5 mg x 1.5) every Mon, Wed, Fri; 5 mg (5 mg x 1) all other days   Full warfarin instructions:   : 10 mg; : 5 mg; Otherwise 7.5 mg every Mon, Wed, Fri; 5 mg all other days   Weekly warfarin total:   42.5 mg   Plan last modified:   Kylah Pierce RN (2019)   Next INR check:   2019   Target end date:       Indications    Long-term (current) use of anticoagulants [Z79.01] [Z79.01]  Atrial fibrillation  unspecified type (H) [I48.91]             Anticoagulation Episode Summary     INR check location:       Preferred lab:       Send INR reminders to:   RV NURSE    Comments:         Anticoagulation Care Providers     Provider Role Specialty Phone number    Madina Mercado MD [facility] [phone]            See the Encounter Report to view Anticoagulation Flowsheet and Dosing Calendar (Go to Encounters tab in chart review, and find the Anticoagulation Therapy Visit)    Dosage adjustment made based on physician directed care plan.    Kylah Pierce RN

## 2019-01-14 NOTE — PROGRESS NOTES
"  SUBJECTIVE:   Zuleam Nixon is a 73 year old female who presents for Preventive Visit.    Was travelling in [facility] this past fall.  Pt did fine.  Her irritable bowel syndrome was well controlled and had no stool incontinence while there. Had a great time on her trip and no medical issues.     collapsed after not eating breakfast that am and had taken his medications first. Their grandson caught him after he fainted in parking lot there. Was fine.   Are you in the first 12 months of your Medicare Part B coverage?  No    Physical Health:    In general, how would you rate your overall physical health? good    Outside of work, how many days during the week do you exercise? 2-3 days/week    Outside of work, approximately how many minutes a day do you exercise?15-30 minutes  If you drink alcohol do you typically have >3 drinks per day or >7 drinks per week? Yes - AUDIT SCORE:     AUDIT - Alcohol Use Disorders Identification Test - Reproduced from the World Health Organization Audit 2001 (Second Edition) 1/14/2019   1.  How often do you have a drink containing alcohol? 2 to 4 times a month       Do you usually eat at least 4 servings of fruit and vegetables a day, include whole grains & fiber and avoid regularly eating high fat or \"junk\" foods? Yes    Do you have any problems taking medications regularly?  No    Do you have any side effects from medications? none    Needs assistance for the following daily activities: no assistance needed    Which of the following safety concerns are present in your home?  none identified     Hearing impairment: No    In the past 6 months, have you been bothered by leaking of urine? no    Mental Health:    In general, how would you rate your overall mental or emotional health? good  PHQ-2 Score:      Do you feel safe in your environment? Yes    Do you have a Health Care Directive? No: Advance care planning was reviewed with patient; patient declined at this time.    Additional " concerns to address?  YES left shoulder - patient states she did not follow up with physical therapy     INR today : 1.8 = saw INR nurse today - see anticoag note for recommendations and follow up on that.     Also here today for follow up on her chronic medical problems:     Hypertension:       BP checked outside of office? no; Range?: n/a     Taking medications as prescribed: YES    Side effects from medication: no          Chest pain: no          Shortness of Breath: no          Swelling: no    Are you exercising: YES- How many days a week= 3  Hyperlipidemia Followup        Watching cholesterol in diet?: yes    Any muscle aches?: no    Are you exercising outside of work or daily activities: YES- How many days a week= 3    Any problems taking your medications regularly? NO  Side effects?  no    Any concerns?: none     ROS: review:  General, CV, Resp, GI, MS          Her IBS - diarrhea dominant - diarrhea was fine while in [place].     Diabetes Follow-up      Patient is checking blood sugars: three times weekly.  Results: 160        am - 160           Symptoms of hypoglycemia (low blood sugar): felt light headed once and after breakfast patient felt fine     Paresthesias (numbness or burning in feet) or sores: No    Diabetic eye exam within the last year: February 2018     Breakfast eaten regularly: Yes    Patient counting carbs: No  Lab Results   Component Value Date    A1C 6.7 07/13/2018    A1C 7.2 01/11/2018    A1C 7.2 06/24/2017    A1C 7.2 12/12/2016    A1C 6.8 05/24/2016           Wt Readings from Last 5 Encounters:   01/14/19 109.3 kg (241 lb)   10/24/18 109.8 kg (242 lb)   09/20/18 109.8 kg (242 lb)   07/13/18 112.7 kg (248 lb 6.4 oz)   03/08/18 113.9 kg (251 lb)     She's been watching her diet a bit better and trying to exercise with walking 3x/week.  Still lives in a 2 story home.          Amount of exercise or daily activities, outside of work: 3 day(s) per week    Problems taking medications regularly  No  Medication side effects: No        Fall risk:  Fallen 2 or more times in the past year?: No  Any fall with injury in the past year?: No    Cognitive Screenin) Repeat 3 items (Leader, Season, Table)    2) Clock draw: NORMAL  3) 3 item recall: Recalls 2 objects   Results: NORMAL clock, 1-2 items recalled: COGNITIVE IMPAIRMENT LESS LIKELY    Mini-CogTM Copyright YARA Dasilva. Licensed by the author for use in [facility]; reprinted with permission (anjelica@[facility]). All rights reserved.      Do you have sleep apnea, excessive snoring or daytime drowsiness?: sleep apnea             Reviewed and updated as needed this visit by clinical staff  Tobacco  Allergies  Meds  Med Hx  Surg Hx  Fam Hx  Soc Hx        Reviewed and updated as needed this visit by Provider  Tobacco  Allergies        Social History     Tobacco Use     Smoking status: Never Smoker     Smokeless tobacco: Never Used   Substance Use Topics     Alcohol use: Yes     Comment: 0-2 per month                           Current providers sharing in care for this patient include:   Patient Care Team:  Madina Mercado MD as PCP - General (Family Practice)  Madina Mercado MD as PCP - Assigned PCP    The following health maintenance items are reviewed in Epic and correct as of today:  Health Maintenance   Topic Date Due     OP ANNUAL INR REFERRAL  2016     COLONOSCOPY Q10 YR  2016     MAMMO Q1 YR  2019     EYE EXAM Q1 YEAR  2019     FALL RISK ASSESSMENT  2019     A1C Q6 MO  2019     FOOT EXAM Q1 YEAR  2020     BMP Q1 YR  2020     LIPID MONITORING Q1 YEAR  2020     MICROALBUMIN Q1 YEAR  2020     PHQ-2 Q1 YR  2020     TSH W/ FREE T4 REFLEX Q2 YEAR  2021     DTAP/TDAP/TD IMMUNIZATION (4 - Td) 2023     ADVANCE DIRECTIVE PLANNING Q5 YRS  2024     DEXA SCAN SCREENING (SYSTEM ASSIGNED)  Completed     INFLUENZA VACCINE  Completed     ZOSTER IMMUNIZATION   Completed     IPV IMMUNIZATION  Aged Out     MENINGITIS IMMUNIZATION  Aged Out     Labs reviewed in EPIC  BP Readings from Last 3 Encounters:   01/14/19 130/78   10/24/18 136/69   09/20/18 122/78    Wt Readings from Last 3 Encounters:   01/14/19 109.3 kg (241 lb)   10/24/18 109.8 kg (242 lb)   09/20/18 109.8 kg (242 lb)                  Patient Active Problem List   Diagnosis     Morbid obesity due to excess calories (H)     ARMAND (obstructive sleep apnea)     Atrial fibrillation, unspecified type (H)     Hyperlipidemia LDL goal <100- fish oil = worse IBS with diarrhea      Status post laparoscopic cholecystectomy     CKD (chronic kidney disease) stage 2, GFR 60-89 ml/min     Venous stasis of lower extremity     Advanced directives, counseling/discussion     Sensorineural hearing loss of both ears - using hearing aids     Bilateral leg edema- has been to lymphedema clinic in past     Essential hypertension with goal blood pressure less than 140/90     Type 2 diabetes mellitus with other circulatory complication, without long-term current use of insulin (H)     Long term current use of anticoagulant therapy     Onychomycosis     Type 2 diabetes mellitus with diabetic polyneuropathy, without long-term current use of insulin (H)     Irritable bowel syndrome with diarrhea     Osteoarthritis of both knees, unspecified osteoarthritis type     Meningioma (H)- following up kimberly Dunne's office 4/2019 -will need MRI prior to that      Past Surgical History:   Procedure Laterality Date     C CARDIOVERSION, ELECTIVE;INTERN  2/2007    Successful cardioversion from atrial fibrillation      C DEXA INTERPRETATION, AXIAL  8/2007    T score lumbar 3.7, femoral neck 2.8/2.0(all stable)     C DEXA INTERPRETATION, AXIAL  6/2010    T score lumbar 3.4 (marked degen changes), femoral neck 2.1/2.1 (sig dec lt femur)     C ECHO HEART XTHORACIC,COMPLETE, W/O DOPPLER  11/2006    normal LV/RV size and function, mild pulm ht(30-40mm Hg),  mild TR     C ECHO HEART XTHORACIC,COMPLETE, W/O DOPPLER  10/2008    normal LV systolic function with EF 55-60%, impaired LV relaxation, mod to severe LAE     C LIGATE FALLOPIAN TUBE  1973    Tubal ligation     CARDIAC NUC ESHA STRESS TEST NL  2006    exercised 4 min, no inducible ischemia on ECG or perfusion images     COLONOSCOPY  2006    diverticulosis, repeat 10 years     FLEXIBLE SIGMOIDOSCOPY  10/2000     HC LAPAROSCOPY, SURGICAL; CHOLECYSTECTOMY      Cholecystectomy, Laparoscopic     LIGATN/STRIP LONG & SHORT SAPHEN      L varicose vein excision     REPAIR PTOSIS BILATERAL  2011     Bilateral upper eyelid blepharoplasty and internal browpexy brow ptosis repair, bilateral lower eyelid ectropion repair with snip punctoplasty     TRANSFUSION, DIRECT, BLOOD      pregnancy related       Social History     Tobacco Use     Smoking status: Never Smoker     Smokeless tobacco: Never Used   Substance Use Topics     Alcohol use: Yes     Comment: 0-2 per month     Family History   Problem Relation Age of Onset     Diabetes Mother         type 2     Hypertension Mother      Cardiovascular Mother         pulmonary embolus     Lipids Mother      Heart Disease Mother          atrial fibrillation     Diabetes Father         type 2     Cardiovascular Father         atrial fibrillation,  during an EP procedure     Cancer - colorectal Maternal Grandmother         onset age 88     Heart Disease Maternal Grandmother          age 96     Diabetes Maternal Grandfather         type 2     Diabetes Paternal Grandfather         type 2     Hypertension Sister      Lipids Sister      Lipids Brother      Hypertension Brother      Hypertension Son      Other - See Comments Cousin 68        Myotonic Dystrophy         Current Outpatient Medications   Medication Sig Dispense Refill     amLODIPine (NORVASC) 5 MG tablet Take 1 tablet (5 mg) by mouth daily 90 tablet 3     ASPIRIN 81 MG OR TABS 1 tab po QD (Once per day) 0 0      atenolol (TENORMIN) 100 MG tablet Take 1 tablet (100 mg) by mouth daily 90 tablet 1     BIOTIN 10 MG OR TABS 1 TABLET DAILY       CALTRATE 600 + D 600-200 MG-IU OR TABS 1 tablet twice daily 60 11     CENTRUM SILVER OR TABS ONE DAILY 3 MONTHS 1 YEAR     hydrochlorothiazide (HYDRODIURIL) 25 MG tablet Take 1 tablet (25 mg) by mouth daily 90 tablet 1     ketoconazole (NIZORAL) 2 % external cream Apply topically 2 times daily Apply to affected nails daily 30 g 1     lisinopril (PRINIVIL/ZESTRIL) 40 MG tablet Take 1 tablet (40 mg) by mouth daily 90 tablet 1     metFORMIN (GLUCOPHAGE) 1000 MG tablet TAKE 1 AND 1/2 TABLET BY MOUTH WITH BREAKFAST AND TAKE 1 TABLET BY MOUTH WITH SUPPER 135 tablet 1     simvastatin (ZOCOR) 20 MG tablet TAKE ONE TABLET BY MOUTH AT BEDTIME 90 tablet 1     VITAMIN D 1000 UNIT OR CAPS 1 CAPSULE DAILY 3 MONTHS 1 YEAR     warfarin (COUMADIN) 5 MG tablet TAKE 1-2 TABLETS (5-10 MG) BY MOUTH DAILY AS INSTRUCTED 180 tablet 3     blood glucose monitoring (JAYDE CONTOUR NEXT) test strip Use to test blood sugar 1 times daily or as directed. 100 strip 3     blood glucose monitoring (JAYDE MICROLET) lancets Use to test blood sugar 1 times daily or as directed. 100 each 3     Allergies   Allergen Reactions     Tetracycline      lightheaded     Recent Labs   Lab Test 01/14/19  1003 10/24/18  0838 09/21/18  1128 09/14/18 07/13/18  0950 01/11/18  0912 06/24/17  0946   A1C 6.7*  --   --   --  6.7* 7.2* 7.2*   LDL  --   --   --   --  49 49 65   HDL  --   --   --   --  33* 37* 33*   TRIG  --   --   --   --  116 117 119   ALT  --   --   --  16 26 22 22   CR  --   --  0.58  --  0.69 0.63 0.73   GFRESTIMATED  --  82 >90  --  84 >90 78   GFRESTBLACK  --  >90 >90  --  >90 >90 >90  African American GFR Calc     POTASSIUM  --   --  4.1 3.9 4.0 3.9 3.8   TSH  --   --   --   --  0.83 0.95 0.67      Pneumonia Vaccine:Adults age 65+ who received Pneumovax (PPSV23) at 65 years or older: Should be given PCV13 > 1 year after  "their most recent PPSV23  Mammogram Screening: Patient over age 75, has elected to continue with mammography screening.  History of abnormal Pap smear: NO - age 65 - see link Cervical Cytology Screening Guidelines    ROS:  Pt went to ER this past fall 10/2018 after having a terrible headache.  Had an MRI which showed a meningioma - seeing Dr. Hammad Dunne, will follow up with him in 4/2019 - 6 months.  He told her not to worry . No new symptoms. No new numbness, tingling, or weakness in upper or lower extremities. No bowel or bladder control problems. No changes in speech, swallowing, hearing, coordination  or vision.   Constitutional, HEENT, cardiovascular, pulmonary, GI, , musculoskeletal, neuro, skin, endocrine and psych systems are negative, except as otherwise noted.    OBJECTIVE:   /78   Pulse 75   Temp 98.8  F (37.1  C) (Oral)   Ht 1.62 m (5' 3.78\")   Wt 109.3 kg (241 lb)   SpO2 99%   BMI 41.65 kg/m   Estimated body mass index is 41.65 kg/m  as calculated from the following:    Height as of this encounter: 1.62 m (5' 3.78\").    Weight as of this encounter: 109.3 kg (241 lb).  EXAM:   GENERAL APPEARANCE: healthy, alert and no distress  EYES: Eyes grossly normal to inspection, PERRL and conjunctivae and sclerae normal  HENT: ear canals and TM's normal, nose and mouth without ulcers or lesions, oropharynx clear and oral mucous membranes moist  NECK: no adenopathy, no asymmetry, masses, or scars and thyroid normal to palpation  RESP: lungs clear to auscultation - no rales, rhonchi or wheezes  BREAST: normal without masses, tenderness or nipple discharge and no palpable axillary masses or adenopathy  CV: regular rate and rhythm, normal S1 S2, no S3 or S4, no murmur, click or rub, no peripheral edema and peripheral pulses strong  ABDOMEN: soft, nontender, no hepatosplenomegaly, no masses and bowel sounds normal  MS: no musculoskeletal defects are noted and gait is age appropriate without ataxia., " trace pitting edema to upper ankles bilaterally.   FEET: both sides normal; no swelling, tenderness or skin or vascular lesions.  Sensation is intact. Peripheral pulses are palpable. Toenails are normal. Except for left 2nd toenail. Skin - really well moisturized and without callous or lesions.     SKIN: no suspicious lesions or rashes  NEURO: Normal strength and tone, sensory exam grossly normal, mentation intact and speech normal  PSYCH: mentation appears normal and affect normal/bright  Noticed decreased rotator cuff movement of left shoulder motion with subscapularis and supraspinatus 4/5 muscle strength with tenderness of same on exam today . Pt cannot recall a specific injury.     Diagnostic Test Results:  Results for orders placed or performed in visit on 01/14/19 (from the past 24 hour(s))   Hemoglobin A1c   Result Value Ref Range    Hemoglobin A1C 6.7 (H) 0 - 5.6 %     See below as well.   ASSESSMENT / PLAN:       ICD-10-CM    1. Medicare annual wellness visit, subsequent Z00.00    2. Meningioma (H) D32.9 OFFICE/OUTPT VISIT,EST,LEVL IV   3. Persistent atrial fibrillation (H) I48.1 OFFICE/OUTPT VISIT,EST,LEVL IV   4. Morbid obesity due to excess calories (H) E66.01 OFFICE/OUTPT VISIT,EST,LEVL IV   5. Type 2 diabetes mellitus with diabetic polyneuropathy, without long-term current use of insulin (H) E11.42 Albumin Random Urine Quantitative with Creat Ratio     Comprehensive metabolic panel     Hemoglobin A1c     TSH with free T4 reflex     UA reflex to Microscopic and Culture     metFORMIN (GLUCOPHAGE) 1000 MG tablet     ketoconazole (NIZORAL) 2 % external cream     OFFICE/OUTPT VISIT,EST,LEVL IV     FOOT EXAM   6. Type 2 diabetes mellitus with other circulatory complication, without long-term current use of insulin (H) E11.59 Albumin Random Urine Quantitative with Creat Ratio     Comprehensive metabolic panel     Hemoglobin A1c     TSH with free T4 reflex     UA reflex to Microscopic and Culture      OFFICE/OUTPT VISIT,EST,LEVL IV     FOOT EXAM   7. Hyperlipidemia LDL goal <100- fish oil = worse IBS with diarrhea  E78.5 Albumin Random Urine Quantitative with Creat Ratio     Comprehensive metabolic panel     Lipid panel reflex to direct LDL Fasting     OFFICE/OUTPT VISIT,EST,LEVL IV   8. Essential hypertension with goal blood pressure less than 140/90 I10 Albumin Random Urine Quantitative with Creat Ratio     CBC with platelets differential     Comprehensive metabolic panel     TSH with free T4 reflex     atenolol (TENORMIN) 100 MG tablet     amLODIPine (NORVASC) 5 MG tablet     hydrochlorothiazide (HYDRODIURIL) 25 MG tablet     lisinopril (PRINIVIL/ZESTRIL) 40 MG tablet     OFFICE/OUTPT VISIT,EST,LEVL IV   9. CKD (chronic kidney disease) stage 2, GFR 60-89 ml/min N18.2 Albumin Random Urine Quantitative with Creat Ratio     CBC with platelets differential     Comprehensive metabolic panel     UA reflex to Microscopic and Culture     hydrochlorothiazide (HYDRODIURIL) 25 MG tablet     OFFICE/OUTPT VISIT,EST,LEVL IV   10. Atrial fibrillation, unspecified type (H) I48.91 OFFICE/OUTPT VISIT,EST,LEVL IV   11. Irritable bowel syndrome with diarrhea K58.0 OFFICE/OUTPT VISIT,EST,LEVL IV   12. Rotator cuff syndrome, left M75.102 PHYSICAL THERAPY REFERRAL   13. Bilateral leg edema- has been to lymphedema clinic in past R60.0 Comprehensive metabolic panel     OFFICE/OUTPT VISIT,EST,LEVL IV   14. Type 2 diabetes mellitus with diabetic polyneuropathy, without long-term current use of insulin (H)- elevated fasting sugars currently  E11.42 metFORMIN (GLUCOPHAGE) 1000 MG tablet   15. Hyperlipidemia LDL goal <100 E78.5 simvastatin (ZOCOR) 20 MG tablet   16. Needs flu shot Z23 HC FLU VACCINE, INCREASED ANTIGEN, PRESV FREE     VACCINE ADMINISTRATION, INITIAL   17. Onychomycosis B35.1 ketoconazole (NIZORAL) 2 % external cream     OFFICE/OUTPT VISIT,EST,LEVL IV   18. Screen for colon cancer Z12.11 CT Colonography Screening without  "Contrast   19. Long term current use of anticoagulant therapy Z79.01 OFFICE/OUTPT VISIT,EST,LEVL IV   20. Advanced care planning/counseling discussion Z71.89 HONORING CHOICES REFERRAL     Full Code       End of Life Planning:  Patient currently has an advanced directive: Yes.  Practitioner is supportive of decision. Pt desires full code status at this time. Honoring choices paperwork given today.     Pt requests referral to [facility] for her physical therapy for shoulder.     COUNSELING:  Reviewed preventive health counseling, as reflected in patient instructions    BP Readings from Last 1 Encounters:   01/14/19 130/78     Estimated body mass index is 41.65 kg/m  as calculated from the following:    Height as of this encounter: 1.62 m (5' 3.78\").    Weight as of this encounter: 109.3 kg (241 lb).      Weight management plan: Discussed healthy diet and exercise guidelines     reports that  has never smoked. she has never used smokeless tobacco.      Appropriate preventive services were discussed with this patient, including applicable screening as appropriate for cardiovascular disease, diabetes, osteopenia/osteoporosis, and glaucoma.  As appropriate for age/gender, discussed screening for colorectal cancer, prostate cancer, breast cancer, and cervical cancer. Checklist reviewing preventive services available has been given to the patient.    Reviewed patients plan of care and provided an AVS. The Complex Care Plan (for patients with higher acuity and needing more deliberate coordination of services) for Zulema meets the Care Plan requirement. This Care Plan has been established and reviewed with the Patient.    Return in about 6 months (around 7/14/2019) for BP Recheck, cholesterol recheck, diabetes recheck.     Counseling Resources:  ATP IV Guidelines  Pooled Cohorts Equation Calculator  Breast Cancer Risk Calculator  FRAX Risk Assessment  ICSI Preventive Guidelines  Dietary Guidelines for Americans, 2010  USDA's " MyPlate  ASA Prophylaxis  Lung CA Screening    Madina Mercado MD  [facility] [place]

## 2019-01-14 NOTE — PATIENT INSTRUCTIONS
Ok for tylenol 650mg by mouth - either 1 -650mg tablet or 2-325mg tablets every 4-6 hours as needed for pain. Maximum 3000-4000mg /day of tylenol.      No Ibuprofen or naproxen or aspirin secondary to your warfarin usage.       Preventive Health Recommendations    See your health care provider every year to    Review health changes.     Discuss preventive care.      Review your medicines if your doctor has prescribed any.      You no longer need a yearly Pap test unless you've had an abnormal Pap test in the past 10 years. If you have vaginal symptoms, such as bleeding or discharge, be sure to talk with your provider about a Pap test.      Every 1 to 2 years, have a mammogram.  If you are over 69, talk with your health care provider about whether or not you want to continue having screening mammograms.      Every 10 years, have a colonoscopy. Or, have a yearly FIT test (stool test). These exams will check for colon cancer.       Have a cholesterol test every 5 years, or more often if your doctor advises it.       Have a diabetes test (fasting glucose) every three years. If you are at risk for diabetes, you should have this test more often.       At age 65, have a bone density scan (DEXA) to check for osteoporosis (brittle bone disease).    Shots:    Get a flu shot each year.    Get a tetanus shot every 10 years.    Talk to your doctor about your pneumonia vaccines. There are now two you should receive - Pneumovax (PPSV 23) and Prevnar (PCV 13).    Talk to your pharmacist about the shingles vaccine.    Talk to your doctor about the hepatitis B vaccine.    Nutrition:     Eat at least 5 servings of fruits and vegetables each day.      Eat whole-grain bread, whole-wheat pasta and brown rice instead of white grains and rice.      Get adequate Calcium and Vitamin D.     Lifestyle    Exercise at least 150 minutes a week (30 minutes a day, 5 days a week). This will help you control your weight and prevent  disease.      Limit alcohol to one drink per day.      No smoking.       Wear sunscreen to prevent skin cancer.       See your dentist twice a year for an exam and cleaning.      See your eye doctor every 1 to 2 years to screen for conditions such as glaucoma, macular degeneration and cataracts.    Personalized Prevention Plan  You are due for the preventive services outlined below.  Your care team is available to assist you in scheduling these services.  If you have already completed any of these items, please share that information with your care team to update in your medical record.  Health Maintenance Due   Topic Date Due     INR CLINIC REFERRAL - yearly  04/22/2016     Colonoscopy - every 10 years  11/02/2016     Flu Vaccine (1) 09/01/2018     A1C (Diabetes) Lab - every 6 months  01/13/2019     Mammogram - yearly  01/17/2019                Thank you for choosing [facility]  for your Health Care. It was a pleasure seeing you at your visit today. Please contact us with any questions or concerns you may have.                   Madina Mercado MD                                  To reach your [facility] care team after hours call:   [phone]    Our clinic hours are:     Monday- 7:30 am - 7:00 pm                             Tuesday through Friday- 7:30 am - 5:00 pm                                        Saturday- 8:00 am - 12:00 pm                  Phone:  [phone]    Our pharmacy hours are:     Monday  8:00 am to 7:00 pm      Tuesday through Friday 8:00am to 6:00pm                        Saturday - 9:00 am to 1:00 pm      Sunday : Closed.              Phone:  [phone]      There is also information available at our web site:  [email or web address]    If your provider ordered any lab tests and you do not receive the results within 10 business days, please call the clinic.    If you need a medication refill please contact your pharmacy.  Please allow 2 business days  for your refill to be completed.    Our clinic offers telephone visits and e visits.  Please ask one of your team members to explain more.      Use [employer] (secure email communication and access to your chart) to send your primary care provider a message or make an appointment. Ask someone on your Team how to sign up for [employer].                                                  Rotator Cuff Injury   What is a rotator cuff injury?   A rotator cuff injury is a strain or tear in the group of tendons and muscles that hold your shoulder joint together and help move your shoulder.   How does it occur?   A rotator cuff injury may result from:     using your arm to break a fall     falling onto your arm     lifting a heavy object     use of your shoulder in sports with a repetitive overhead movement, such as swimming, baseball (mainly pitchers), football, and tennis, which gradually strains the tendon     manual labor such as painting, plastering, raking leaves, or housework   What are the symptoms?   The symptoms of a torn rotator cuff are:     arm and shoulder pain     shoulder weakness     shoulder tenderness     loss of shoulder movement, especially overhead   How is it diagnosed?   Your healthcare provider will examine you and check your shoulder for pain, tenderness, and loss of motion as you move your arm in all directions. Your provider will ask if your shoulder pain began suddenly or gradually. You may have an X-ray to make sure there are not any fractures or bone spurs.   Based on these results, you may have other tests or procedures right away or later, such as:     magnetic resonance imaging (MRI), which creates images of your shoulder and surrounding structures with sound waves     an arthrogram, which is an X-ray or MRI that is taken after a special dye has been injected into your shoulder joint to outline its soft structures     arthroscopy, a surgical procedure in which a small instrument is inserted into  your shoulder joint so your provider can look directly at your rotator cuff   What is the treatment?   A tendon in your shoulder can be inflamed, partially torn, or completely torn. What is done about it depends on how torn it is and how much it hurts.   If your tear is a minor one, it can be left to heal by itself if it does not interfere with your everyday activities. Your treatment plan should include:     proper sitting posture, in which your head and shoulders are balanced     rest for your shoulder, which means avoiding strenuous activity or any overhead motion that causes pain     ice packs at least once a day, and preferably 2 or 3 times a day     doing the exercises your healthcare provider gives you     anti-inflammatory drugs. Adults aged 65 years and older should not take non-steroidal anti-inflammatory medicine for more than 7 days without their healthcare provider's approval.     physical therapy to strengthen your shoulder as it heals   If you have a bad tear, you may need to have it repaired by arthroscopy. Arthroscopy can be used to perform surgery on a joint as well as to see inside the joint. The rough edges of a torn tendon can be trimmed and left to heal. Larger tears can be stitched back together. After surgery, your treatment plan will include physical therapy to strengthen your shoulder as it heals.   How long will the effects last?   Full recovery depends on what is torn and how it is treated.   When can I return to my normal activities?   Everyone recovers from an injury at a different rate. Return to your activities will be determined by how soon your shoulder recovers, not by how many days or weeks it has been since your injury has occurred. In general, the longer you have symptoms before you start treatment, the longer it will take to get better. The goal of rehabilitation is to return you to your normal activities as soon as is safely possible. If you return too soon you may worsen your  injury.   You may safely return to your normal activities when:     Your injured shoulder has full range of motion without pain.     Your injured shoulder has regained normal strength compared to the uninjured shoulder.   What can be done to help prevent this from recurring?   The best way to prevent a recurrence is to strengthen your shoulder muscles and keep them in peak condition with shoulder exercises.           Rotator Cuff Strain Rehabilitation Exercises                  You may do all of these exercises right away.   Isometric shoulder external rotation:  a doorway with your elbow bent 90 degrees and the back of the wrist on your injured side pressed against the door frame. Try to press your hand outward into the door frame. Hold for 5 seconds. Do 3 sets of 10.   Isometric shoulder internal rotation:  a doorway with your elbow bent 90 degrees and the front of the wrist on your injured side pressed against the door frame. Try to press your palm into the door frame. Hold for 5 seconds. Do 3 sets of 10.   Wand exercise: Flexion: Stand upright and hold a stick in both hands, palms down. Stretch your arms by lifting them over your head, keeping your arms straight. Hold for 5 seconds and return to the starting position. Repeat 10 times.   Wand exercise: Extension: Stand upright and hold a stick in both hands behind your back. Move the stick away from your back. Hold the end position for 5 seconds. Relax and return to the starting position. Repeat 10 times.   Wand exercise: External rotation: Lie on your back and hold a stick in both hands, palms up. Your upper arms should be resting on the floor with your elbows at your sides and bent 90 degrees. Use your uninjured arm to push your injured arm out away from your body. Keep the elbow of your injured arm at your side while it is being pushed. Hold the stretch for 5 seconds. Repeat 10 times.   Wand exercise: Shoulder abduction and adduction: Stand and  hold a stick with both hands, palms facing away from your body. Rest the stick against the front of your thighs. Use your uninjured arm to push your injured arm out to the side and up as high as possible. Keep your arms straight. Hold for 5 seconds. Repeat 10 times.   Resisted shoulder external rotation: Stand sideways next to a door with your injured arm farther from the door. Tie a knot in the end of the tubing and shut the knot in the door at waist level. Hold the other end of the tubing with the hand of your injured arm. Rest the hand of your injured arm across your stomach. Keeping your elbow in at your side, rotate your arm outward and away from your waist. Make sure you keep your elbow bent 90 degrees and your forearm parallel to the floor. Repeat 10 times. Build up to 3 sets of 10.   Resisted shoulder internal rotation: Stand sideways next to a door with your injured arm closest to the door. Tie a knot in the end of the tubing and shut the knot in the door at waist level. Hold the other end of the tubing with the hand of your injured arm. Bend the elbow of your injured arm 90 degrees. Keeping your elbow in at your side, rotate your forearm across your body and then back to the starting position. Make sure you keep your forearm parallel to the floor. Do 3 sets of 10.   Scaption: Stand with your arms at your sides and with your elbows straight. Slowly raise your arms to eye level. As you raise your arms, spread them apart so that they are only slightly in front of your body (at about a 30-degree angle to the front of your body). Point your thumbs toward the ceiling. Hold for 2 seconds and lower your arms slowly. Do 3 sets of 10. Progress to holding a soup can or light weight when you are doing the exercise and increase the weight as the exercise gets easier.   Side-lying external rotation: Lie on your uninjured side with your injured arm at your side and your elbow bent 90 degrees. Keeping your elbow against  "your side, raise your forearm toward the ceiling and hold for 2 seconds. Slowly lower your arm. Do 3 sets of 10. You can start doing this exercise holding a soup can or light weight and gradually increase the weight as long as there is no pain.   Horizontal abduction: Lie on your stomach on a table or the edge of a bed with the arm on your injured side hanging down over the edge. Raise your arm out to the side, with your thumb pointed toward the ceiling, until your arm is parallel to the floor. Hold for 2 seconds and then lower it slowly. Start this exercise with no weight. As you get stronger, add a light weight or hold a soup can. Do 3 sets of 10.   Push-up with a plus: Begin on the floor on your hands and knees. Keep your arms a shoulder width apart and lift your feet off the floor. Arch your back as high as possible and round your shoulders (this is the \"plus\" part or the exercise). Bend your elbows and lower your body to the floor. Return to the starting position and arch your back again. Do 3 sets of 10.   Published by [employer].  This content is reviewed periodically and is subject to change as new health information becomes available. The information is intended to inform and educate and is not a replacement for medical evaluation, advice, diagnosis or treatment by a healthcare professional.   Written by Norma Gutierrez MS, PT, and Rosanna Brandon PT, [facility], [facility], for [employer].   ? 2010 [facility] and/or its affiliates. All Rights Reserved.   Copyright   [employer] 2011  [employer]ult [employer]visor                "

## 2019-01-15 LAB
ALBUMIN SERPL-MCNC: 3.9 G/DL (ref 3.4–5)
ALP SERPL-CCNC: 45 U/L (ref 40–150)
ALT SERPL W P-5'-P-CCNC: 22 U/L (ref 0–50)
ANION GAP SERPL CALCULATED.3IONS-SCNC: 9 MMOL/L (ref 3–14)
AST SERPL W P-5'-P-CCNC: 15 U/L (ref 0–45)
BILIRUB SERPL-MCNC: 0.5 MG/DL (ref 0.2–1.3)
BUN SERPL-MCNC: 13 MG/DL (ref 7–30)
CALCIUM SERPL-MCNC: 9.7 MG/DL (ref 8.5–10.1)
CHLORIDE SERPL-SCNC: 98 MMOL/L (ref 94–109)
CHOLEST SERPL-MCNC: 115 MG/DL
CO2 SERPL-SCNC: 27 MMOL/L (ref 20–32)
CREAT SERPL-MCNC: 0.7 MG/DL (ref 0.52–1.04)
CREAT UR-MCNC: 83 MG/DL
GFR SERPL CREATININE-BSD FRML MDRD: 85 ML/MIN/{1.73_M2}
GLUCOSE SERPL-MCNC: 166 MG/DL (ref 70–99)
HDLC SERPL-MCNC: 42 MG/DL
LDLC SERPL CALC-MCNC: 54 MG/DL
MICROALBUMIN UR-MCNC: 7 MG/L
MICROALBUMIN/CREAT UR: 7.95 MG/G CR (ref 0–25)
NONHDLC SERPL-MCNC: 73 MG/DL
POTASSIUM SERPL-SCNC: 4.1 MMOL/L (ref 3.4–5.3)
PROT SERPL-MCNC: 7.1 G/DL (ref 6.8–8.8)
SODIUM SERPL-SCNC: 134 MMOL/L (ref 133–144)
TRIGL SERPL-MCNC: 96 MG/DL
TSH SERPL DL<=0.005 MIU/L-ACNC: 0.71 MU/L (ref 0.4–4)

## 2019-01-15 ASSESSMENT — ANXIETY QUESTIONNAIRES: GAD7 TOTAL SCORE: 1

## 2019-01-21 ENCOUNTER — ANTICOAGULATION THERAPY VISIT (OUTPATIENT)
Dept: NURSING | Facility: CLINIC | Age: 74
End: 2019-01-21
Payer: MEDICARE

## 2019-01-21 DIAGNOSIS — I48.91 ATRIAL FIBRILLATION, UNSPECIFIED TYPE (H): ICD-10-CM

## 2019-01-21 DIAGNOSIS — Z79.01 LONG TERM CURRENT USE OF ANTICOAGULANT THERAPY: ICD-10-CM

## 2019-01-21 LAB — INR POINT OF CARE: 2.4 (ref 0.86–1.14)

## 2019-01-21 PROCEDURE — 36416 COLLJ CAPILLARY BLOOD SPEC: CPT

## 2019-01-21 PROCEDURE — 85610 PROTHROMBIN TIME: CPT | Mod: QW

## 2019-01-21 NOTE — PROGRESS NOTES
ANTICOAGULATION FOLLOW-UP CLINIC VISIT    Patient Name:  Zulema Nixon  Date:  1/21/2019  Contact Type:  Face to Face    SUBJECTIVE:     Patient Findings     Positives:   No Problem Findings           OBJECTIVE    INR Protime   Date Value Ref Range Status   01/14/2019 1.8 (A) 0.86 - 1.14 Final       ASSESSMENT / PLAN  No question data found.  Anticoagulation Summary  As of 1/21/2019    INR goal:   2.0-3.0   TTR:   67.4 % (2.8 y)   INR used for dosing:      Warfarin maintenance plan:   7.5 mg (5 mg x 1.5) every Mon, Wed, Fri; 5 mg (5 mg x 1) all other days   Full warfarin instructions:   7.5 mg every Mon, Wed, Fri; 5 mg all other days   Weekly warfarin total:   42.5 mg   No change documented:   Kylah Pierce RN   Plan last modified:   Kylah Pierce RN (1/14/2019)   Next INR check:   2/4/2019   Target end date:       Indications    Long term current use of anticoagulant therapy [Z79.01]  Atrial fibrillation  unspecified type (H) [I48.91]             Anticoagulation Episode Summary     INR check location:       Preferred lab:       Send INR reminders to:   RV NURSE    Comments:         Anticoagulation Care Providers     Provider Role Specialty Phone number    Madina Mercado MD [facility] [phone]            See the Encounter Report to view Anticoagulation Flowsheet and Dosing Calendar (Go to Encounters tab in chart review, and find the Anticoagulation Therapy Visit)    Dosage adjustment made based on physician directed care plan.    Kylah Pierce RN

## 2019-02-04 ENCOUNTER — ANTICOAGULATION THERAPY VISIT (OUTPATIENT)
Dept: NURSING | Facility: CLINIC | Age: 74
End: 2019-02-04
Payer: MEDICARE

## 2019-02-04 DIAGNOSIS — Z79.01 LONG TERM CURRENT USE OF ANTICOAGULANT THERAPY: ICD-10-CM

## 2019-02-04 DIAGNOSIS — I48.91 ATRIAL FIBRILLATION, UNSPECIFIED TYPE (H): ICD-10-CM

## 2019-02-04 LAB — INR POINT OF CARE: 2.2 (ref 0.86–1.14)

## 2019-02-04 PROCEDURE — 85610 PROTHROMBIN TIME: CPT | Mod: QW

## 2019-02-04 PROCEDURE — 36416 COLLJ CAPILLARY BLOOD SPEC: CPT

## 2019-03-04 ENCOUNTER — ANTICOAGULATION THERAPY VISIT (OUTPATIENT)
Dept: NURSING | Facility: CLINIC | Age: 74
End: 2019-03-04
Payer: MEDICARE

## 2019-03-04 DIAGNOSIS — Z79.01 LONG TERM CURRENT USE OF ANTICOAGULANT THERAPY: ICD-10-CM

## 2019-03-04 DIAGNOSIS — I48.91 ATRIAL FIBRILLATION, UNSPECIFIED TYPE (H): ICD-10-CM

## 2019-03-04 LAB — INR POINT OF CARE: 2.1 (ref 0.86–1.14)

## 2019-03-04 PROCEDURE — 85610 PROTHROMBIN TIME: CPT | Mod: QW

## 2019-03-04 PROCEDURE — 36416 COLLJ CAPILLARY BLOOD SPEC: CPT

## 2019-03-04 NOTE — PROGRESS NOTES
ANTICOAGULATION FOLLOW-UP CLINIC VISIT    Patient Name:  Zulema Nixon  Date:  3/4/2019  Contact Type:  Face to Face    SUBJECTIVE:     Patient Findings     Positives:   No Problem Findings           OBJECTIVE    INR Protime   Date Value Ref Range Status   2019 2.1 (A) 0.86 - 1.14 Final       ASSESSMENT / PLAN  No question data found.  Anticoagulation Summary  As of 3/4/2019    INR goal:   2.0-3.0   TTR:   68.7 % (3 y)   INR used for dosin.1 (3/4/2019)   Warfarin maintenance plan:   7.5 mg (5 mg x 1.5) every Mon, Wed, Fri; 5 mg (5 mg x 1) all other days   Full warfarin instructions:   7.5 mg every Mon, Wed, Fri; 5 mg all other days   Weekly warfarin total:   42.5 mg   No change documented:   Kylah Pierce RN   Plan last modified:   Kylah Pierce RN (2019)   Next INR check:   4/15/2019   Target end date:       Indications    Long term current use of anticoagulant therapy [Z79.01]  Atrial fibrillation  unspecified type (H) [I48.91]             Anticoagulation Episode Summary     INR check location:       Preferred lab:       Send INR reminders to:    NURSE    Comments:         Anticoagulation Care Providers     Provider Role Specialty Phone number    Madina Mercado MD [facility] [phone]            See the Encounter Report to view Anticoagulation Flowsheet and Dosing Calendar (Go to Encounters tab in chart review, and find the Anticoagulation Therapy Visit)    Dosage adjustment made based on physician directed care plan.    yKlah Pierce RN

## 2019-03-29 ENCOUNTER — OFFICE VISIT (OUTPATIENT)
Dept: FAMILY MEDICINE | Facility: CLINIC | Age: 74
End: 2019-03-29
Payer: MEDICARE

## 2019-03-29 ENCOUNTER — ANCILLARY PROCEDURE (OUTPATIENT)
Dept: GENERAL RADIOLOGY | Facility: CLINIC | Age: 74
End: 2019-03-29
Attending: NURSE PRACTITIONER
Payer: MEDICARE

## 2019-03-29 ENCOUNTER — ANTICOAGULATION THERAPY VISIT (OUTPATIENT)
Dept: NURSING | Facility: CLINIC | Age: 74
End: 2019-03-29
Payer: MEDICARE

## 2019-03-29 VITALS
WEIGHT: 228 LBS | BODY MASS INDEX: 40.4 KG/M2 | SYSTOLIC BLOOD PRESSURE: 130 MMHG | TEMPERATURE: 98.1 F | HEART RATE: 101 BPM | OXYGEN SATURATION: 95 % | HEIGHT: 63 IN | DIASTOLIC BLOOD PRESSURE: 78 MMHG

## 2019-03-29 DIAGNOSIS — R05.9 COUGH: ICD-10-CM

## 2019-03-29 DIAGNOSIS — E11.59 TYPE 2 DIABETES MELLITUS WITH OTHER CIRCULATORY COMPLICATION, WITHOUT LONG-TERM CURRENT USE OF INSULIN (H): ICD-10-CM

## 2019-03-29 DIAGNOSIS — I48.19 PERSISTENT ATRIAL FIBRILLATION (H): ICD-10-CM

## 2019-03-29 DIAGNOSIS — J06.9 UPPER RESPIRATORY TRACT INFECTION, UNSPECIFIED TYPE: Primary | ICD-10-CM

## 2019-03-29 DIAGNOSIS — Z79.01 LONG TERM CURRENT USE OF ANTICOAGULANT THERAPY: ICD-10-CM

## 2019-03-29 DIAGNOSIS — I48.91 ATRIAL FIBRILLATION, UNSPECIFIED TYPE (H): ICD-10-CM

## 2019-03-29 LAB — INR POINT OF CARE: 5.3 (ref 0.86–1.14)

## 2019-03-29 PROCEDURE — 85610 PROTHROMBIN TIME: CPT | Mod: QW

## 2019-03-29 PROCEDURE — 99213 OFFICE O/P EST LOW 20 MIN: CPT | Performed by: NURSE PRACTITIONER

## 2019-03-29 PROCEDURE — 36416 COLLJ CAPILLARY BLOOD SPEC: CPT

## 2019-03-29 PROCEDURE — 71046 X-RAY EXAM CHEST 2 VIEWS: CPT | Mod: FY

## 2019-03-29 RX ORDER — ALBUTEROL SULFATE 90 UG/1
2 AEROSOL, METERED RESPIRATORY (INHALATION) EVERY 6 HOURS
Qty: 8.5 G | Refills: 1 | Status: SHIPPED | OUTPATIENT
Start: 2019-03-29 | End: 2019-07-08

## 2019-03-29 RX ORDER — BENZONATATE 200 MG/1
200 CAPSULE ORAL 3 TIMES DAILY PRN
Qty: 30 CAPSULE | Refills: 0 | Status: SHIPPED | OUTPATIENT
Start: 2019-03-29 | End: 2019-07-08

## 2019-03-29 ASSESSMENT — MIFFLIN-ST. JEOR: SCORE: 1503.33

## 2019-03-29 NOTE — PROGRESS NOTES
ANTICOAGULATION FOLLOW-UP CLINIC VISIT    Patient Name:  Zulema Nixon  Date:  3/29/2019  Contact Type:  Face to Face    SUBJECTIVE:     Patient Findings     Positives:   Change in health (flu like sx, diarrhea, cough, congestion)           OBJECTIVE    INR Protime   Date Value Ref Range Status   2019 5.3 (A) 0.86 - 1.14 Final       ASSESSMENT / PLAN  INR assessment SUPRA    Recheck INR In: 4 DAYS    INR Location Clinic      Anticoagulation Summary  As of 3/29/2019    INR goal:   2.0-3.0   TTR:   67.8 % (3 y)   INR used for dosin.3! (3/29/2019)   Warfarin maintenance plan:   7.5 mg (5 mg x 1.5) every Mon, Wed, Fri; 5 mg (5 mg x 1) all other days   Full warfarin instructions:   3/29: Hold; 3/30: Hold; Otherwise 7.5 mg every Mon, Wed, Fri; 5 mg all other days   Weekly warfarin total:   42.5 mg   Plan last modified:   Kylah Pierce RN (2019)   Next INR check:   2019   Target end date:       Indications    Long term current use of anticoagulant therapy [Z79.01]  Atrial fibrillation  unspecified type (H) [I48.91]             Anticoagulation Episode Summary     INR check location:       Preferred lab:       Send INR reminders to:    NURSE    Comments:         Anticoagulation Care Providers     Provider Role Specialty Phone number    Madina Mercado MD [facility] [phone]            See the Encounter Report to view Anticoagulation Flowsheet and Dosing Calendar (Go to Encounters tab in chart review, and find the Anticoagulation Therapy Visit)    Dosage adjustment made based on physician directed care plan.    Nereyda Root RN

## 2019-03-29 NOTE — PROGRESS NOTES
SUBJECTIVE:   Zulema Nixon is a 73 year old female who presents to clinic today for the following health issues:    Acute Illness   Acute illness concerns: URI  Onset: 1 week- 2 days ago patient felt better, feeling worse today.    Fever: YES- in the beginning; her fever has now resolved.    Chills/Sweats: YES    Headache (location?): YES- slight    Sinus Pressure:YES    Conjunctivitis:  no    Ear Pain: YES- plugged    Rhinorrhea: YES- some - greenish.      Congestion: YES    Sore Throat: no     Cough: YES-productive of yellow sputum, productive of green sputum    Wheeze: YES - declines that she is SOB.     Decreased Appetite: YES    Nausea: no    Vomiting: no    Diarrhea:  YES- in the begining    Dysuria/Freq.: no    Fatigue/Achiness: YES    Sick/Strep Exposure: YES-  has pneumonia     Therapies Tried and outcome: tylenol    PMH: Type 2 DM, HTN, CKD, afib - on coumadin.  Patient states she checks her blood glucoses once a week, but has yet to check it this week.  She has a decreased appetite and has not been eating. She has been drinking fluids.  She is concerned because her  was recently diagnosed with pneumonia.     Last INR was 3/4/2019.    Problem list and histories reviewed & adjusted, as indicated.  Additional history: as documented    Patient Active Problem List   Diagnosis     Morbid obesity due to excess calories (H)     ARMAND (obstructive sleep apnea)     Atrial fibrillation, unspecified type (H)     Hyperlipidemia LDL goal <100- fish oil = worse IBS with diarrhea      Status post laparoscopic cholecystectomy     CKD (chronic kidney disease) stage 2, GFR 60-89 ml/min     Venous stasis of lower extremity     Advanced directives, counseling/discussion     Sensorineural hearing loss of both ears - using hearing aids     Bilateral leg edema- has been to lymphedema clinic in past     Essential hypertension with goal blood pressure less than 140/90     Type 2 diabetes mellitus with other  circulatory complication, without long-term current use of insulin (H)     Long term current use of anticoagulant therapy     Onychomycosis     Type 2 diabetes mellitus with diabetic polyneuropathy, without long-term current use of insulin (H)     Irritable bowel syndrome with diarrhea     Osteoarthritis of both knees, unspecified osteoarthritis type     Meningioma (H)- following up wiandrea Dunne's office 4/2019 -will need MRI prior to that      Past Surgical History:   Procedure Laterality Date     C CARDIOVERSION, ELECTIVE;INTERN  2/2007    Successful cardioversion from atrial fibrillation      C DEXA INTERPRETATION, AXIAL  8/2007    T score lumbar 3.7, femoral neck 2.8/2.0(all stable)     C DEXA INTERPRETATION, AXIAL  6/2010    T score lumbar 3.4 (marked degen changes), femoral neck 2.1/2.1 (sig dec lt femur)     C ECHO HEART XTHORACIC,COMPLETE, W/O DOPPLER  11/2006    normal LV/RV size and function, mild pulm ht(30-40mm Hg), mild TR     C ECHO HEART XTHORACIC,COMPLETE, W/O DOPPLER  10/2008    normal LV systolic function with EF 55-60%, impaired LV relaxation, mod to severe LAE     C LIGATE FALLOPIAN TUBE  1973    Tubal ligation     CARDIAC NUC ESHA STRESS TEST NL  11/2006    exercised 4 min, no inducible ischemia on ECG or perfusion images     COLONOSCOPY  11/2006    diverticulosis, repeat 10 years     FLEXIBLE SIGMOIDOSCOPY  10/2000     HC LAPAROSCOPY, SURGICAL; CHOLECYSTECTOMY  1991    Cholecystectomy, Laparoscopic     LIGATN/STRIP LONG & SHORT SAPHEN  1995    L varicose vein excision     REPAIR PTOSIS BILATERAL  2/2011     Bilateral upper eyelid blepharoplasty and internal browpexy brow ptosis repair, bilateral lower eyelid ectropion repair with snip punctoplasty     TRANSFUSION, DIRECT, BLOOD      pregnancy related       Social History     Tobacco Use     Smoking status: Never Smoker     Smokeless tobacco: Never Used   Substance Use Topics     Alcohol use: Yes     Comment: 0-2 per month     Family  History   Problem Relation Age of Onset     Diabetes Mother         type 2     Hypertension Mother      Cardiovascular Mother         pulmonary embolus     Lipids Mother      Heart Disease Mother          atrial fibrillation     Diabetes Father         type 2     Cardiovascular Father         atrial fibrillation,  during an EP procedure     Cancer - colorectal Maternal Grandmother         onset age 88     Heart Disease Maternal Grandmother          age 96     Diabetes Maternal Grandfather         type 2     Diabetes Paternal Grandfather         type 2     Hypertension Sister      Lipids Sister      Lipids Brother      Hypertension Brother      Hypertension Son      Other - See Comments Cousin 68        Myotonic Dystrophy         Current Outpatient Medications   Medication Sig Dispense Refill     albuterol (PROAIR HFA/PROVENTIL HFA/VENTOLIN HFA) 108 (90 Base) MCG/ACT inhaler Inhale 2 puffs into the lungs every 6 hours 8.5 g 1     amLODIPine (NORVASC) 5 MG tablet Take 1 tablet (5 mg) by mouth daily 90 tablet 3     ASPIRIN 81 MG OR TABS 1 tab po QD (Once per day) 0 0     atenolol (TENORMIN) 100 MG tablet Take 1 tablet (100 mg) by mouth daily 90 tablet 1     benzonatate (TESSALON) 200 MG capsule Take 1 capsule (200 mg) by mouth 3 times daily as needed for cough 30 capsule 0     BIOTIN 10 MG OR TABS 1 TABLET DAILY       CALTRATE 600 + D 600-200 MG-IU OR TABS 1 tablet twice daily 60 11     hydrochlorothiazide (HYDRODIURIL) 25 MG tablet Take 1 tablet (25 mg) by mouth daily 90 tablet 1     lisinopril (PRINIVIL/ZESTRIL) 40 MG tablet Take 1 tablet (40 mg) by mouth daily 90 tablet 1     metFORMIN (GLUCOPHAGE) 1000 MG tablet TAKE 1 AND 1/2 TABLET BY MOUTH WITH BREAKFAST AND TAKE 1 TABLET BY MOUTH WITH SUPPER 135 tablet 1     simvastatin (ZOCOR) 20 MG tablet TAKE ONE TABLET BY MOUTH AT BEDTIME 90 tablet 1     VITAMIN D 1000 UNIT OR CAPS 1 CAPSULE DAILY 3 MONTHS 1 YEAR     warfarin (COUMADIN) 5 MG tablet TAKE 1-2 TABLETS  "(5-10 MG) BY MOUTH DAILY AS INSTRUCTED 180 tablet 3     blood glucose monitoring (JAYDE CONTOUR NEXT) test strip Use to test blood sugar 1 times daily or as directed. 100 strip 3     blood glucose monitoring (JAYDE MICROLET) lancets Use to test blood sugar 1 times daily or as directed. 100 each 3     CENTRUM SILVER OR TABS ONE DAILY 3 MONTHS 1 YEAR     ketoconazole (NIZORAL) 2 % external cream Apply topically 2 times daily Apply to affected nails daily 30 g 1     Allergies   Allergen Reactions     Tetracycline      lightheaded     Recent Labs   Lab Test 01/14/19  1003 10/24/18  0838 09/21/18  1128 09/14/18 07/13/18  0950 01/11/18  0912   A1C 6.7*  --   --   --  6.7* 7.2*   LDL 54  --   --   --  49 49   HDL 42*  --   --   --  33* 37*   TRIG 96  --   --   --  116 117   ALT 22  --   --  16 26 22   CR 0.70  --  0.58  --  0.69 0.63   GFRESTIMATED 85 82 >90  --  84 >90   GFRESTBLACK >90 >90 >90  --  >90 >90   POTASSIUM 4.1  --  4.1 3.9 4.0 3.9   TSH 0.71  --   --   --  0.83 0.95      BP Readings from Last 3 Encounters:   03/29/19 130/78   01/14/19 130/78   10/24/18 136/69    Wt Readings from Last 3 Encounters:   03/29/19 103.4 kg (228 lb)   01/14/19 109.3 kg (241 lb)   10/24/18 109.8 kg (242 lb)           Labs reviewed in EPIC    Reviewed and updated as needed this visit by clinical staff  Tobacco  Allergies  Med Hx  Surg Hx  Fam Hx  Soc Hx      Reviewed and updated as needed this visit by Provider         ROS:  Constitutional, HEENT, cardiovascular, pulmonary, gi and gu systems are negative, except as otherwise noted.    OBJECTIVE:     /78   Pulse 101   Temp 98.1  F (36.7  C) (Oral)   Ht 1.6 m (5' 3\")   Wt 103.4 kg (228 lb)   SpO2 95%   BMI 40.39 kg/m    Body mass index is 40.39 kg/m .  GENERAL: healthy, alert and no distress  EYES: Eyes grossly normal to inspection, PERRL and conjunctivae and sclerae normal  HENT: ear canals and TM's normal, nose and mouth without ulcers or lesions  NECK: no adenopathy, " no asymmetry, masses, or scars and thyroid normal to palpation  RESP: lungs clear to auscultation - no rales, rhonchi or wheezes  CV: regular rate and rhythm, normal S1 S2, no S3 or S4, no murmur, click or rub, no peripheral edema and peripheral pulses strong  LYMPH: no cervical, supraclavicular, axillary, or inguinal adenopathy    Diagnostic Test Results:  Results for orders placed or performed in visit on 03/04/19   INR point of care   Result Value Ref Range    INR Protime 2.1 (A) 0.86 - 1.14     CXR - negative - reviewed with patient at appointment.    ASSESSMENT/PLAN:   Zulema was seen today for uri.    Diagnoses and all orders for this visit:    Upper respiratory tract infection, unspecified type    Cough  -     XR Chest 2 Views; Future  -     albuterol (PROAIR HFA/PROVENTIL HFA/VENTOLIN HFA) 108 (90 Base) MCG/ACT inhaler; Inhale 2 puffs into the lungs every 6 hours  -     benzonatate (TESSALON) 200 MG capsule; Take 1 capsule (200 mg) by mouth 3 times daily as needed for cough    Persistent atrial fibrillation (H)    Type 2 diabetes mellitus with other circulatory complication, without long-term current use of insulin (H)    Other orders  -     Cancel: MA SCREENING DIGITAL BILAT - Future  (s+30); Future  -     Cancel: Influenza A/B antigen        See Patient Instructions  INR checked today - see RN notes.  CXR negative today - reviewed with appointment.  Patient with symptoms for approximately 7 days.  Discussed monitoring. Discussed s/s requiring urgent evaluation.  Trial of albuterol and tessalon pearls. Patient has INR recheck scheduled for Tuesday.  Return to clinic if no improvement or symptoms worsen.  Patient verbalized understanding & agreed with plan of care.    ZIENAB Ny, CNP  [facility] [place]

## 2019-03-29 NOTE — PATIENT INSTRUCTIONS
Patient Education     Understanding the Cold Virus  Colds are the most common illness that people get. Most adults get 2 or 3 colds per year, and most children get 5 to 7 colds per year. Colds may be caused by over 200 types of viruses. The most common of these are rhinoviruses ( rhino  refers to the nose).  What causes a cold virus?  All colds start with infection by a virus. You can be infected by more than one cold virus at a time. Infection with cold viruses happens when:    You breathe in a virus from the air. This can happen when someone with a cold sneezes or coughs near you.    You touch your eyes, nose, or mouth when your hand has a cold virus on it. This can happen if you touch an object that has the cold virus on it.  What are the symptoms of a cold virus?  Almost all colds involve a stuffy nose. Other common symptoms include:    Runny nose    Sneezing    Sore throat    Headache    Cough  How is a cold treated?  Colds usually last 5 to 10 days. Treatment focuses on relieving symptoms. Treatments may include:    Decongestant medicines. Several types of decongestants are available without prescription. These may help reduce stuffy or runny nose symptoms.    Prescription or over-the-counter nasal sprays. These may help reduce nasal symptoms, including stuffiness.    Prescription or over-the-counter pain medicines. These can help with headaches and sore throat.    Self-care. This includes extra rest, using humidifiers, and drinking more fluids. These help you feel better while you are getting over a cold.  Antibiotics are not helpful for a cold. They do not make a cold shorter or relieve symptoms. Taking antibiotics when you don t need them can make them work less well when you need them for another illness.  Follow all directions for using medicines, especially when giving them to children. Contact your healthcare provider if you have any questions about using cold medicines safely.  Can a cold be  prevented?  You can help reduce the spread of cold viruses. This can help both you and others avoid getting colds. Follow these tips:    Wash your hands well anytime you may have come into contact with cold viruses. Wash your hands for at least 20 seconds. When you can t wash with soap and water, use an alcohol-based hand .    Don t touch your nose, eyes, or mouth, especially after touching something that may have a cold virus on it.    Cover your mouth and nose when you cough or sneeze. Throw away tissues after using them.    Disinfect things you touch often, such as phones and keyboards.      Stay home when you have a cold.  What are the possible complications of a cold virus?  Colds usually go away by themselves. But it s not unusual to get another type of infection while you have a cold. These can include:    Sinus infection    Lung infection, such as bronchitis or pneumonia    Ear infection  If you have asthma or chronic bronchitis, a cold can make your condition worse.     When should I call my healthcare provider?  Call your healthcare provider right away if you have any of these:    Fever of 100.4 F (38 C) or higher, or as directed    Cough, chest pain, or shortness of breath that gets worse    Symptoms don t get better or get worse after about 10 days    Headache, sleepiness, or confusion that gets worse   Date Last Reviewed: 3/28/2016    6286-9931 [employer] [address] [place] [address], [place] [address]. All rights reserved. This information is not intended as a substitute for professional medical care. Always follow your healthcare professional's instructions.

## 2019-04-01 ENCOUNTER — TELEPHONE (OUTPATIENT)
Dept: FAMILY MEDICINE | Facility: CLINIC | Age: 74
End: 2019-04-01

## 2019-04-01 NOTE — TELEPHONE ENCOUNTER
Attempt #1.    Nereyda Root contacted Zulema on 04/01/19 and left a message. If patient calls back please contact RN team.    Peace Root RN  [facility]

## 2019-04-01 NOTE — TELEPHONE ENCOUNTER
Pt calling stating she is not any better she still has general achy ness and diarrhea     Cough is still there but its not as bad, has no appetite still     Denies: fever, wheezing, headaches    Pt stated she is resting, and drinking fluids  She will be coming in for an INR     Rn advised pt that she needs to continue on current cares, push fluids and try to eat a bland diet for the diarrhea and if things worsen she needs to be re seen     Patient stated an understanding and agreed with plan.    Kylah Pierce RN, BSN  [facility]

## 2019-04-02 ENCOUNTER — ANTICOAGULATION THERAPY VISIT (OUTPATIENT)
Dept: NURSING | Facility: CLINIC | Age: 74
End: 2019-04-02
Payer: MEDICARE

## 2019-04-02 DIAGNOSIS — Z79.01 LONG TERM CURRENT USE OF ANTICOAGULANT THERAPY: ICD-10-CM

## 2019-04-02 DIAGNOSIS — I48.91 ATRIAL FIBRILLATION, UNSPECIFIED TYPE (H): ICD-10-CM

## 2019-04-02 LAB — INR POINT OF CARE: 4 (ref 0.86–1.14)

## 2019-04-02 PROCEDURE — 36416 COLLJ CAPILLARY BLOOD SPEC: CPT

## 2019-04-02 PROCEDURE — 85610 PROTHROMBIN TIME: CPT | Mod: QW

## 2019-04-02 NOTE — PROGRESS NOTES
ANTICOAGULATION FOLLOW-UP CLINIC VISIT    Patient Name:  Zulema Nixon  Date:  2019  Contact Type:  Face to Face    SUBJECTIVE:     Patient Findings     Positives:   Change in health (Recent viral illness), Change in diet/appetite (Recently sick with virus and appetite is still decreased but improving)           OBJECTIVE    INR Protime   Date Value Ref Range Status   2019 4.0 (A) 0.86 - 1.14 Final       ASSESSMENT / PLAN  INR assessment SUPRA    Recheck INR In: 6 DAYS    INR Location Clinic      Anticoagulation Summary  As of 2019    INR goal:   2.0-3.0   TTR:   67.5 % (3.1 y)   INR used for dosin.0! (2019)   Warfarin maintenance plan:   7.5 mg (5 mg x 1.5) every Mon, Wed, Fri; 5 mg (5 mg x 1) all other days   Full warfarin instructions:   : Hold; 4/3: 5 mg; Otherwise 7.5 mg every Mon, Wed, Fri; 5 mg all other days   Weekly warfarin total:   42.5 mg   Plan last modified:   Kylah Pierce RN (2019)   Next INR check:   2019   Target end date:       Indications    Long term current use of anticoagulant therapy [Z79.01]  Atrial fibrillation  unspecified type (H) [I48.91]             Anticoagulation Episode Summary     INR check location:       Preferred lab:       Send INR reminders to:    NURSE    Comments:         Anticoagulation Care Providers     Provider Role Specialty Phone number    Madina Mercado MD [facility] [phone]            See the Encounter Report to view Anticoagulation Flowsheet and Dosing Calendar (Go to Encounters tab in chart review, and find the Anticoagulation Therapy Visit)    Dosage adjustment made based on physician directed care plan.      Patient educated on higher risk of bleeding at INR of 4.  I advised her to be careful with sharp objects and to take it easy over the next few days.    She is still recovering from viral illness and has not returned to eating greens yet.      Torie Perales, HAMMAD

## 2019-04-08 ENCOUNTER — ANTICOAGULATION THERAPY VISIT (OUTPATIENT)
Dept: NURSING | Facility: CLINIC | Age: 74
End: 2019-04-08
Payer: MEDICARE

## 2019-04-08 DIAGNOSIS — I48.91 ATRIAL FIBRILLATION, UNSPECIFIED TYPE (H): ICD-10-CM

## 2019-04-08 DIAGNOSIS — Z79.01 LONG TERM CURRENT USE OF ANTICOAGULANT THERAPY: ICD-10-CM

## 2019-04-08 LAB — INR POINT OF CARE: 2.7 (ref 0.86–1.14)

## 2019-04-08 PROCEDURE — 85610 PROTHROMBIN TIME: CPT | Mod: QW

## 2019-04-08 PROCEDURE — 36416 COLLJ CAPILLARY BLOOD SPEC: CPT

## 2019-04-08 NOTE — PROGRESS NOTES
ANTICOAGULATION FOLLOW-UP CLINIC VISIT    Patient Name:  Zulema Nixon  Date:  2019  Contact Type:  Face to Face    SUBJECTIVE:        OBJECTIVE    INR Protime   Date Value Ref Range Status   2019 2.7 (A) 0.86 - 1.14 Final       ASSESSMENT / PLAN  INR assessment THER    Recheck INR In: 3 WEEKS    INR Location Clinic      Anticoagulation Summary  As of 2019    INR goal:   2.0-3.0   TTR:   67.3 % (3.1 y)   INR used for dosin.7 (2019)   Warfarin maintenance plan:   7.5 mg (5 mg x 1.5) every Mon, Wed, Fri; 5 mg (5 mg x 1) all other days   Full warfarin instructions:   7.5 mg every Mon, Wed, Fri; 5 mg all other days   Weekly warfarin total:   42.5 mg   No change documented:   Nereyda Root RN   Plan last modified:   Kylah Pierce RN (2019)   Next INR check:   2019   Target end date:       Indications    Long term current use of anticoagulant therapy [Z79.01]  Atrial fibrillation  unspecified type (H) [I48.91]             Anticoagulation Episode Summary     INR check location:       Preferred lab:       Send INR reminders to:    NURSE    Comments:         Anticoagulation Care Providers     Provider Role Specialty Phone number    Madina Mercado MD [facility] [phone]            See the Encounter Report to view Anticoagulation Flowsheet and Dosing Calendar (Go to Encounters tab in chart review, and find the Anticoagulation Therapy Visit)    Dosage adjustment made based on physician directed care plan.    Nereyda Root RN

## 2019-04-25 ENCOUNTER — HOSPITAL ENCOUNTER (OUTPATIENT)
Dept: MRI IMAGING | Facility: CLINIC | Age: 74
Discharge: HOME | End: 2019-04-25
Attending: NEUROLOGICAL SURGERY | Admitting: NEUROLOGICAL SURGERY
Payer: MEDICARE

## 2019-04-25 DIAGNOSIS — D32.9 MENINGIOMA (H): ICD-10-CM

## 2019-04-25 LAB
CREAT BLD-MCNC: 0.9 MG/DL (ref 0.52–1.04)
GFR SERPL CREATININE-BSD FRML MDRD: 61 ML/MIN/{1.73_M2}

## 2019-04-25 PROCEDURE — A9585 GADOBUTROL INJECTION: HCPCS | Performed by: NEUROLOGICAL SURGERY

## 2019-04-25 PROCEDURE — 82565 ASSAY OF CREATININE: CPT

## 2019-04-25 PROCEDURE — 70553 MRI BRAIN STEM W/O & W/DYE: CPT

## 2019-04-25 PROCEDURE — 25500064 ZZH RX 255 OP 636: Performed by: NEUROLOGICAL SURGERY

## 2019-04-25 RX ORDER — GADOBUTROL 604.72 MG/ML
10 INJECTION INTRAVENOUS ONCE
Status: COMPLETED | OUTPATIENT
Start: 2019-04-25 | End: 2019-04-25

## 2019-04-25 RX ADMIN — GADOBUTROL 10 ML: 604.72 INJECTION INTRAVENOUS at 09:33

## 2019-04-30 ENCOUNTER — ANTICOAGULATION THERAPY VISIT (OUTPATIENT)
Dept: NURSING | Facility: CLINIC | Age: 74
End: 2019-04-30
Payer: MEDICARE

## 2019-04-30 DIAGNOSIS — I48.91 ATRIAL FIBRILLATION, UNSPECIFIED TYPE (H): ICD-10-CM

## 2019-04-30 DIAGNOSIS — Z79.01 LONG TERM CURRENT USE OF ANTICOAGULANT THERAPY: ICD-10-CM

## 2019-04-30 LAB — INR POINT OF CARE: 2.7 (ref 0.86–1.14)

## 2019-04-30 PROCEDURE — 36416 COLLJ CAPILLARY BLOOD SPEC: CPT

## 2019-04-30 PROCEDURE — 85610 PROTHROMBIN TIME: CPT | Mod: QW

## 2019-04-30 NOTE — PROGRESS NOTES
ANTICOAGULATION FOLLOW-UP CLINIC VISIT    Patient Name:  Zulema Nixon  Date:  2019  Contact Type:  Face to Face    SUBJECTIVE:     Patient Findings     Positives:   Missed doses (Held 2019 dose due to having tooth pulled on 2019)           OBJECTIVE    INR Protime   Date Value Ref Range Status   2019 2.7 (A) 0.86 - 1.14 Final       ASSESSMENT / PLAN  INR assessment THER    Recheck INR In: 6 WEEKS    INR Location Clinic      Anticoagulation Summary  As of 2019    INR goal:   2.0-3.0   TTR:   67.9 % (3.1 y)   INR used for dosin.7 (2019)   Warfarin maintenance plan:   7.5 mg (5 mg x 1.5) every Mon, Wed, Fri; 5 mg (5 mg x 1) all other days   Full warfarin instructions:   7.5 mg every Mon, Wed, Fri; 5 mg all other days   Weekly warfarin total:   42.5 mg   No change documented:   Torie Perales RN   Plan last modified:   Kylah Pierce RN (2019)   Next INR check:   2019   Target end date:       Indications    Long term current use of anticoagulant therapy [Z79.01]  Atrial fibrillation  unspecified type (H) [I48.91]             Anticoagulation Episode Summary     INR check location:       Preferred lab:       Send INR reminders to:    NURSE    Comments:         Anticoagulation Care Providers     Provider Role Specialty Phone number    Madina Mercado MD [facility] [phone]            See the Encounter Report to view Anticoagulation Flowsheet and Dosing Calendar (Go to Encounters tab in chart review, and find the Anticoagulation Therapy Visit)    Dosage adjustment made based on physician directed care plan.    Torie Perales, RN

## 2019-05-01 DIAGNOSIS — E11.42 TYPE 2 DIABETES MELLITUS WITH DIABETIC POLYNEUROPATHY, WITHOUT LONG-TERM CURRENT USE OF INSULIN (H): ICD-10-CM

## 2019-05-01 NOTE — TELEPHONE ENCOUNTER
Requested Prescriptions   Pending Prescriptions Disp Refills     metFORMIN (GLUCOPHAGE) 1000 MG tablet [Pharmacy Med Name: metFORMIN HCl Oral Tablet 1000 MG] 135 tablet 0     Sig: TAKE 1 AND 1/2 TABLET BY MOUTH WITH BREAKFAST AND TAKE 1 TABLET BY MOUTH WITH SUPPER       Last Written Prescription Date:  1/14/2019  Last Fill Quantity: 135,  # refills: 1   Last office visit: 3/29/2019 with prescribing provider:     Future Office Visit:   Next 5 appointments (look out 90 days)    Jul 08, 2019  8:40 AM CDT  Office Visit with Madina Mercado MD  [facility] [address] [place] [id number]  [phone]               Biguanide Agents Passed - 5/1/2019  1:02 PM        Passed - Blood pressure less than 140/90 in past 6 months     BP Readings from Last 3 Encounters:   03/29/19 130/78   01/14/19 130/78   10/24/18 136/69                 Passed - Patient has documented LDL within the past 12 mos.     Recent Labs   Lab Test 01/14/19  1003   LDL 54             Passed - Patient has had a Microalbumin in the past 15 mos.     Recent Labs   Lab Test 01/14/19  1014   MICROL 7   UMALCR 7.95             Passed - Patient is age 10 or older        Passed - Patient has documented A1c within the specified period of time.     If HgbA1C is 8 or greater, it needs to be on file within the past 3 months.  If less than 8, must be on file within the past 6 months.     Recent Labs   Lab Test 01/14/19  1003   A1C 6.7*             Passed - Patient's CR is NOT>1.4 OR Patient's EGFR is NOT<45 within past 12 mos.     Recent Labs   Lab Test 04/25/19  0939   GFRESTIMATED 61   GFRESTBLACK 74       Recent Labs   Lab Test 01/14/19  1003   CR 0.70             Passed - Patient does NOT have a diagnosis of CHF.        Passed - Medication is active on med list        Passed - Patient is not pregnant        Passed - Patient has not had a positive pregnancy test within the past 12 mos.      "    Passed - Recent (6 mo) or future (30 days) visit within the authorizing provider's specialty     Patient had office visit in the last 6 months or has a visit in the next 30 days with authorizing provider or within the authorizing provider's specialty.  See \"Patient Info\" tab in inbasket, or \"Choose Columns\" in Meds & Orders section of the refill encounter.            "

## 2019-05-02 NOTE — TELEPHONE ENCOUNTER
Prescription approved per [facility] Refill Protocol.  Peace Root RN  [facility]

## 2019-05-09 ENCOUNTER — TRANSFERRED RECORDS (OUTPATIENT)
Dept: HEALTH INFORMATION MANAGEMENT | Facility: CLINIC | Age: 74
End: 2019-05-09

## 2019-05-09 LAB — RETINOPATHY: NEGATIVE

## 2019-05-10 ENCOUNTER — TELEPHONE (OUTPATIENT)
Dept: FAMILY MEDICINE | Facility: CLINIC | Age: 74
End: 2019-05-10

## 2019-05-10 DIAGNOSIS — E11.42 TYPE 2 DIABETES MELLITUS WITH DIABETIC POLYNEUROPATHY, WITHOUT LONG-TERM CURRENT USE OF INSULIN (H): ICD-10-CM

## 2019-05-10 NOTE — TELEPHONE ENCOUNTER
Patient is requesting 180 quantity of the Metformin HCI 1000MG tablet, she was appproved for 180 by Dr. Mercado

## 2019-05-10 NOTE — TELEPHONE ENCOUNTER
Prescription approved per [facility] Refill Protocol.  90 day supply given.  Recent order was from 5/2 and only for #135.    PURVI Thorne, RN, N  [facility]) [phone]
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
## 2019-06-10 ENCOUNTER — ANTICOAGULATION THERAPY VISIT (OUTPATIENT)
Dept: NURSING | Facility: CLINIC | Age: 74
End: 2019-06-10
Payer: MEDICARE

## 2019-06-10 DIAGNOSIS — Z79.01 LONG TERM CURRENT USE OF ANTICOAGULANT THERAPY: ICD-10-CM

## 2019-06-10 DIAGNOSIS — I48.91 ATRIAL FIBRILLATION, UNSPECIFIED TYPE (H): ICD-10-CM

## 2019-06-10 LAB — INR POINT OF CARE: 1.8 (ref 0.86–1.14)

## 2019-06-10 PROCEDURE — 85610 PROTHROMBIN TIME: CPT | Mod: QW

## 2019-06-10 PROCEDURE — 36416 COLLJ CAPILLARY BLOOD SPEC: CPT

## 2019-06-10 NOTE — PROGRESS NOTES
ANTICOAGULATION FOLLOW-UP CLINIC VISIT    Patient Name:  Zulema Nixon  Date:  6/10/2019  Contact Type:  Face to Face    SUBJECTIVE:  Patient Findings     Positives:   Change in diet/appetite (Increased Vitamin K)             OBJECTIVE    INR Protime   Date Value Ref Range Status   06/10/2019 1.8 (A) 0.86 - 1.14 Final       ASSESSMENT / PLAN  INR assessment SUB    Recheck INR In: 2 WEEKS    INR Location Clinic      Anticoagulation Summary  As of 6/10/2019    INR goal:   2.0-3.0   TTR:   68.3 % (3.2 y)   INR used for dosin.8! (6/10/2019)   Warfarin maintenance plan:   7.5 mg (5 mg x 1.5) every Mon, Wed, Fri; 5 mg (5 mg x 1) all other days   Full warfarin instructions:   6/10: 10 mg; Otherwise 7.5 mg every Mon, Wed, Fri; 5 mg all other days   Weekly warfarin total:   42.5 mg   Plan last modified:   Kylah Pierce RN (2019)   Next INR check:   2019   Target end date:       Indications    Long term current use of anticoagulant therapy [Z79.01]  Atrial fibrillation  unspecified type (H) [I48.91]             Anticoagulation Episode Summary     INR check location:       Preferred lab:       Send INR reminders to:   RV NURSE    Comments:         Anticoagulation Care Providers     Provider Role Specialty Phone number    Madina Mercado MD [facility] [phone]            See the Encounter Report to view Anticoagulation Flowsheet and Dosing Calendar (Go to Encounters tab in chart review, and find the Anticoagulation Therapy Visit)    Dosage adjustment made based on physician directed care plan.    Torie Perales RN

## 2019-06-26 ENCOUNTER — ANTICOAGULATION THERAPY VISIT (OUTPATIENT)
Dept: NURSING | Facility: CLINIC | Age: 74
End: 2019-06-26
Payer: MEDICARE

## 2019-06-26 DIAGNOSIS — Z79.01 LONG TERM CURRENT USE OF ANTICOAGULANT THERAPY: ICD-10-CM

## 2019-06-26 DIAGNOSIS — I48.91 ATRIAL FIBRILLATION, UNSPECIFIED TYPE (H): ICD-10-CM

## 2019-06-26 LAB — INR POINT OF CARE: 2.1 (ref 0.86–1.14)

## 2019-06-26 PROCEDURE — 85610 PROTHROMBIN TIME: CPT | Mod: QW

## 2019-06-26 PROCEDURE — 36416 COLLJ CAPILLARY BLOOD SPEC: CPT

## 2019-06-26 NOTE — PROGRESS NOTES
ANTICOAGULATION FOLLOW-UP CLINIC VISIT    Patient Name:  Zulema Nixon  Date:  2019  Contact Type:  Face to Face    SUBJECTIVE:         OBJECTIVE    INR Protime   Date Value Ref Range Status   2019 2.1 (A) 0.86 - 1.14 Final       ASSESSMENT / PLAN  No question data found.  Anticoagulation Summary  As of 2019    INR goal:   2.0-3.0   TTR:   67.8 % (3.3 y)   INR used for dosin.1 (2019)   Warfarin maintenance plan:   7.5 mg (5 mg x 1.5) every Mon, Wed, Fri; 5 mg (5 mg x 1) all other days   Full warfarin instructions:   7.5 mg every Mon, Wed, Fri; 5 mg all other days   Weekly warfarin total:   42.5 mg   No change documented:   Kylah Pierce RN   Plan last modified:   Kylah Pierce RN (2019)   Next INR check:   7/10/2019   Target end date:       Indications    Long term current use of anticoagulant therapy [Z79.01]  Atrial fibrillation  unspecified type (H) [I48.91]             Anticoagulation Episode Summary     INR check location:       Preferred lab:       Send INR reminders to:   RV NURSE    Comments:         Anticoagulation Care Providers     Provider Role Specialty Phone number    Madina Mercado MD [facility] [phone]            See the Encounter Report to view Anticoagulation Flowsheet and Dosing Calendar (Go to Encounters tab in chart review, and find the Anticoagulation Therapy Visit)    Dosage adjustment made based on physician directed care plan.    Kylah Pierce RN

## 2019-07-08 ENCOUNTER — ANTICOAGULATION THERAPY VISIT (OUTPATIENT)
Dept: NURSING | Facility: CLINIC | Age: 74
End: 2019-07-08
Payer: MEDICARE

## 2019-07-08 ENCOUNTER — OFFICE VISIT (OUTPATIENT)
Dept: FAMILY MEDICINE | Facility: CLINIC | Age: 74
End: 2019-07-08
Payer: MEDICARE

## 2019-07-08 VITALS
HEIGHT: 63 IN | HEART RATE: 71 BPM | DIASTOLIC BLOOD PRESSURE: 84 MMHG | SYSTOLIC BLOOD PRESSURE: 128 MMHG | OXYGEN SATURATION: 96 % | BODY MASS INDEX: 40.93 KG/M2 | WEIGHT: 231 LBS | TEMPERATURE: 98.7 F

## 2019-07-08 DIAGNOSIS — D32.9 MENINGIOMA (H): ICD-10-CM

## 2019-07-08 DIAGNOSIS — N18.2 CKD (CHRONIC KIDNEY DISEASE) STAGE 2, GFR 60-89 ML/MIN: ICD-10-CM

## 2019-07-08 DIAGNOSIS — Z12.31 VISIT FOR SCREENING MAMMOGRAM: ICD-10-CM

## 2019-07-08 DIAGNOSIS — I48.91 ATRIAL FIBRILLATION, UNSPECIFIED TYPE (H): ICD-10-CM

## 2019-07-08 DIAGNOSIS — E11.59 TYPE 2 DIABETES MELLITUS WITH OTHER CIRCULATORY COMPLICATION, WITHOUT LONG-TERM CURRENT USE OF INSULIN (H): ICD-10-CM

## 2019-07-08 DIAGNOSIS — Z79.01 LONG TERM CURRENT USE OF ANTICOAGULANT THERAPY: ICD-10-CM

## 2019-07-08 DIAGNOSIS — I87.8 VENOUS STASIS OF LOWER EXTREMITY: ICD-10-CM

## 2019-07-08 DIAGNOSIS — E78.5 HYPERLIPIDEMIA LDL GOAL <100: ICD-10-CM

## 2019-07-08 DIAGNOSIS — I10 ESSENTIAL HYPERTENSION WITH GOAL BLOOD PRESSURE LESS THAN 140/90: ICD-10-CM

## 2019-07-08 DIAGNOSIS — I48.19 PERSISTENT ATRIAL FIBRILLATION (H): ICD-10-CM

## 2019-07-08 DIAGNOSIS — E11.42 TYPE 2 DIABETES MELLITUS WITH DIABETIC POLYNEUROPATHY, WITHOUT LONG-TERM CURRENT USE OF INSULIN (H): ICD-10-CM

## 2019-07-08 DIAGNOSIS — E66.01 MORBID OBESITY DUE TO EXCESS CALORIES (H): ICD-10-CM

## 2019-07-08 DIAGNOSIS — E11.42 TYPE 2 DIABETES MELLITUS WITH DIABETIC POLYNEUROPATHY, WITHOUT LONG-TERM CURRENT USE OF INSULIN (H): Primary | ICD-10-CM

## 2019-07-08 DIAGNOSIS — R60.0 BILATERAL LEG EDEMA: ICD-10-CM

## 2019-07-08 DIAGNOSIS — Z12.11 SCREEN FOR COLON CANCER: ICD-10-CM

## 2019-07-08 LAB
ALBUMIN SERPL-MCNC: 3.8 G/DL (ref 3.4–5)
ALP SERPL-CCNC: 51 U/L (ref 40–150)
ALT SERPL W P-5'-P-CCNC: 23 U/L (ref 0–50)
ANION GAP SERPL CALCULATED.3IONS-SCNC: 10 MMOL/L (ref 3–14)
AST SERPL W P-5'-P-CCNC: 16 U/L (ref 0–45)
BILIRUB SERPL-MCNC: 0.5 MG/DL (ref 0.2–1.3)
BUN SERPL-MCNC: 13 MG/DL (ref 7–30)
CALCIUM SERPL-MCNC: 9.2 MG/DL (ref 8.5–10.1)
CHLORIDE SERPL-SCNC: 102 MMOL/L (ref 94–109)
CHOLEST SERPL-MCNC: 116 MG/DL
CO2 SERPL-SCNC: 26 MMOL/L (ref 20–32)
CREAT SERPL-MCNC: 0.6 MG/DL (ref 0.52–1.04)
ERYTHROCYTE [DISTWIDTH] IN BLOOD BY AUTOMATED COUNT: 13.1 % (ref 10–15)
GFR SERPL CREATININE-BSD FRML MDRD: 89 ML/MIN/{1.73_M2}
GLUCOSE SERPL-MCNC: 158 MG/DL (ref 70–99)
HBA1C MFR BLD: 6.4 % (ref 0–5.6)
HCT VFR BLD AUTO: 38.2 % (ref 35–47)
HDLC SERPL-MCNC: 37 MG/DL
HGB BLD-MCNC: 12.9 G/DL (ref 11.7–15.7)
INR POINT OF CARE: 2.1 (ref 0.86–1.14)
LDLC SERPL CALC-MCNC: 57 MG/DL
MCH RBC QN AUTO: 28.6 PG (ref 26.5–33)
MCHC RBC AUTO-ENTMCNC: 33.8 G/DL (ref 31.5–36.5)
MCV RBC AUTO: 85 FL (ref 78–100)
NONHDLC SERPL-MCNC: 79 MG/DL
PLATELET # BLD AUTO: 267 10E9/L (ref 150–450)
POTASSIUM SERPL-SCNC: 4 MMOL/L (ref 3.4–5.3)
PROT SERPL-MCNC: 7.1 G/DL (ref 6.8–8.8)
RBC # BLD AUTO: 4.51 10E12/L (ref 3.8–5.2)
SODIUM SERPL-SCNC: 138 MMOL/L (ref 133–144)
TRIGL SERPL-MCNC: 108 MG/DL
TSH SERPL DL<=0.005 MIU/L-ACNC: 0.83 MU/L (ref 0.4–4)
WBC # BLD AUTO: 6.6 10E9/L (ref 4–11)

## 2019-07-08 PROCEDURE — 84443 ASSAY THYROID STIM HORMONE: CPT | Performed by: FAMILY MEDICINE

## 2019-07-08 PROCEDURE — 83036 HEMOGLOBIN GLYCOSYLATED A1C: CPT | Performed by: FAMILY MEDICINE

## 2019-07-08 PROCEDURE — 36416 COLLJ CAPILLARY BLOOD SPEC: CPT

## 2019-07-08 PROCEDURE — 80061 LIPID PANEL: CPT | Performed by: FAMILY MEDICINE

## 2019-07-08 PROCEDURE — 85610 PROTHROMBIN TIME: CPT | Mod: QW

## 2019-07-08 PROCEDURE — 80053 COMPREHEN METABOLIC PANEL: CPT | Performed by: FAMILY MEDICINE

## 2019-07-08 PROCEDURE — 99214 OFFICE O/P EST MOD 30 MIN: CPT | Performed by: FAMILY MEDICINE

## 2019-07-08 PROCEDURE — 85027 COMPLETE CBC AUTOMATED: CPT | Performed by: FAMILY MEDICINE

## 2019-07-08 RX ORDER — LISINOPRIL 40 MG/1
40 TABLET ORAL DAILY
Qty: 90 TABLET | Refills: 1 | Status: SHIPPED | OUTPATIENT
Start: 2019-07-08 | End: 2019-12-04

## 2019-07-08 RX ORDER — ATENOLOL 100 MG/1
100 TABLET ORAL DAILY
Qty: 90 TABLET | Refills: 1 | Status: SHIPPED | OUTPATIENT
Start: 2019-07-08 | End: 2019-12-04

## 2019-07-08 RX ORDER — SIMVASTATIN 20 MG
TABLET ORAL
Qty: 90 TABLET | Refills: 1 | Status: SHIPPED | OUTPATIENT
Start: 2019-07-08 | End: 2019-12-04

## 2019-07-08 RX ORDER — HYDROCHLOROTHIAZIDE 25 MG/1
25 TABLET ORAL DAILY
Qty: 90 TABLET | Refills: 1 | Status: SHIPPED | OUTPATIENT
Start: 2019-07-08 | End: 2019-12-04

## 2019-07-08 RX ORDER — WARFARIN SODIUM 5 MG/1
TABLET ORAL
Qty: 180 TABLET | Refills: 3 | Status: SHIPPED | OUTPATIENT
Start: 2019-07-08 | End: 2020-06-03

## 2019-07-08 ASSESSMENT — MIFFLIN-ST. JEOR: SCORE: 1520.9

## 2019-07-08 NOTE — PROGRESS NOTES
SUBJECTIVE:                                                    Zulema Nixon is a 73 year old female who presents to clinic today for the following health issues:    Diabetes Follow-up      How often are you checking your blood sugar? A few times a week    What time of day are you checking your blood sugars (select all that apply)?  Before meals    Have you had any blood sugars above 200?  No    Have you had any blood sugars below 70?  No    What symptoms do you notice when your blood sugar is low?  None    What concerns do you have today about your diabetes? None     Do you have any of these symptoms? (Select all that apply)  No numbness or tingling in feet.  No redness, sores or blisters on feet.  No complaints of excessive thirst.  No reports of blurry vision.  No significant changes to weight.     Have you had a diabetic eye exam in the last 12 months? Yes- Date of last eye exam: 5/09/2019  Diabetes Management Resources    Lab Results   Component Value Date    A1C 6.7 01/14/2019    A1C 6.7 07/13/2018    A1C 7.2 01/11/2018    A1C 7.2 06/24/2017    A1C 7.2 12/12/2016       Hyperlipidemia Follow-Up      Are you having any of the following symptoms? (Select all that apply)  Chest pain or pressure - felt tightness last week for a few seconds right before a thunderstorm came through.    Are you regularly taking any medication or supplement to lower your cholesterol?   Yes- Simvastatin    Are you having muscle aches or other side effects that you think could be caused by your cholesterol lowering medication?  Yes- possibly    Lab Results   Component Value Date    CHOL 115 01/14/2019    CHOL 105 07/13/2018     Lab Results   Component Value Date    HDL 42 01/14/2019    HDL 33 07/13/2018     Lab Results   Component Value Date    LDL 54 01/14/2019    LDL 49 07/13/2018     Lab Results   Component Value Date    TRIG 96 01/14/2019    TRIG 116 07/13/2018     Lab Results   Component Value Date    CHOLHDLRATIO 3.4 04/22/2015     "CHOLHDLRATIO 3.5 11/13/2014     Hypertension Follow-up      Do you check your blood pressure regularly outside of the clinic? Yes     Are you following a low salt diet? Yes    Are your blood pressures ever more than 140 on the top number (systolic) OR more   than 90 on the bottom number (diastolic), for example 140/90? No     She took BP medication just before coming to clinic today.    BP Readings from Last 5 Encounters:   03/29/19 130/78   01/14/19 130/78   10/24/18 136/69   09/20/18 122/78   07/13/18 132/78     Chronic Kidney Disease Follow-up      Current NSAID use?  Yes:   Aspirin 81 mg  Frequency: Daily    Creatinine   Date Value Ref Range Status   01/14/2019 0.70 0.52 - 1.04 mg/dL Final     Amount of exercise or physical activity: walking    Problems taking medications regularly: No    Medication side effects: none    Diet: low salt and low fat/cholesterol    Meningioma  Saw Dr. Dunne of neurosurgery regarding meningioma. Had MRI done on 04/25. Results showed mass that was consistent with meningioma and diffuse white matter change. Results are stable from MRI done 10/24/2018. Has not had symptoms from meningioma. Her headache the night she went into UC was unrelated to meningioma per pt. Has not had communication from Dr. Dunne' office since last OV.    Colon cancer screening  Pt has tortuous colon - detected at colonoscopy 2006 - was recommended for next testing to have CT colonography. Pt states she had a \"run around\" when trying to get colonography done. She went to [facility] pharmacy to get radioiodine drink, but was told they do not fill that prescription. Was told to go to hospital to get drink, but in the end she was not able to. She gets anxious with anesthesia. Has done FIT testing in past.    Atrial fibrillation  Takes atenolol and is chronically anticoagulated on warfarin. Her INR goal is between 2.0-3.0.     Leg edema  Leg swelling has been well during summer season. She wears compression socks " daily.     Balance issues  Pt has noticed her balance has been off lately. Sometimes has to grab onto something to balance herself.     Morbid obesity  Pt also notes she has lost 20-30 lbs since last OV. Has been trying to keep a more balanced diet.     Problem list and histories reviewed & adjusted, as indicated.  Additional history: as documented    Reviewed and updated as needed this visit by clinical staff       Reviewed and updated as needed this visit by Provider         Patient Active Problem List   Diagnosis     Morbid obesity due to excess calories (H)     ARMAND (obstructive sleep apnea)     Atrial fibrillation, unspecified type (H)     Hyperlipidemia LDL goal <100- fish oil = worse IBS with diarrhea      Status post laparoscopic cholecystectomy     CKD (chronic kidney disease) stage 2, GFR 60-89 ml/min     Venous stasis of lower extremity     Advanced directives, counseling/discussion     Sensorineural hearing loss of both ears - using hearing aids     Bilateral leg edema- has been to lymphedema clinic in past     Essential hypertension with goal blood pressure less than 140/90     Type 2 diabetes mellitus with other circulatory complication, without long-term current use of insulin (H)     Long term current use of anticoagulant therapy     Onychomycosis     Type 2 diabetes mellitus with diabetic polyneuropathy, without long-term current use of insulin (H)     Irritable bowel syndrome with diarrhea     Osteoarthritis of both knees, unspecified osteoarthritis type     Meningioma (H)--left frontal - MRI 4/25/2019 = stable from 10/2018 - needs follow up MRI yearly in April        Current Outpatient Medications   Medication Sig Dispense Refill     amLODIPine (NORVASC) 5 MG tablet Take 1 tablet (5 mg) by mouth daily 90 tablet 3     ASPIRIN 81 MG OR TABS 1 tab po QD (Once per day) 0 0     atenolol (TENORMIN) 100 MG tablet Take 1 tablet (100 mg) by mouth daily 90 tablet 1     BIOTIN 10 MG OR TABS 1 TABLET DAILY    "    blood glucose (JAYDE CONTOUR NEXT) test strip Use to test blood sugar 1 times daily or as directed. 100 strip 3     blood glucose monitoring (JAYDE MICROLET) lancets Use to test blood sugar 1 times daily or as directed. 100 each 3     CALTRATE 600 + D 600-200 MG-IU OR TABS 1 tablet twice daily 60 11     CENTRUM SILVER OR TABS ONE DAILY 3 MONTHS 1 YEAR     hydrochlorothiazide (HYDRODIURIL) 25 MG tablet Take 1 tablet (25 mg) by mouth daily 90 tablet 1     ketoconazole (NIZORAL) 2 % external cream Apply topically 2 times daily Apply to affected nails daily (Patient taking differently: Apply topically 2 times daily as needed Apply to affected nails daily) 30 g 1     lisinopril (PRINIVIL/ZESTRIL) 40 MG tablet Take 1 tablet (40 mg) by mouth daily 90 tablet 1     metFORMIN (GLUCOPHAGE) 1000 MG tablet TAKE 1 AND 1/2 TABLET BY MOUTH WITH BREAKFAST AND TAKE 1 TABLET BY MOUTH WITH SUPPER 225 tablet 0     simvastatin (ZOCOR) 20 MG tablet TAKE ONE TABLET BY MOUTH AT BEDTIME 90 tablet 1     VITAMIN D 1000 UNIT OR CAPS 1 CAPSULE DAILY 3 MONTHS 1 YEAR     warfarin (COUMADIN) 5 MG tablet TAKE 1-2 TABLETS (5-10 MG) BY MOUTH DAILY AS INSTRUCTED 180 tablet 3     Allergies   Allergen Reactions     Tetracycline      lightheaded     ROS:   ROS: 12 point ROS neg other than the symptoms noted above    This document serves as a record of the services and decisions personally performed and made by Madina Mercado MD. It was created on her behalf by Doris Carlson, a trained medical scribe. The creation of this document is based on the provider's statements to the medical scribe.  Doris Carlson 9:13 AM July 8, 2019    OBJECTIVE:                                                    /80 (BP Location: Right arm, Patient Position: Chair, Cuff Size: Adult Large)   Pulse 71   Temp 98.7  F (37.1  C) (Oral)   Ht 1.607 m (5' 3.25\")   Wt 104.8 kg (231 lb)   SpO2 96%   BMI 40.60 kg/m    Body mass index is 40.6 kg/m .     GENERAL: healthy, alert, well " nourished, well hydrated, no distress  EYES: Eyes grossly normal to inspection, extraocular movements - intact, and PERRL  HENT: ear canals- normal; TMs- normal; Nose- normal; Mouth- no ulcers, no lesions  NECK: no tenderness, no adenopathy, no asymmetry, no masses, no stiffness; thyroid- normal to palpation  RESP: lungs clear to auscultation - no rales, no rhonchi, no wheezes  CV: regular rates and rhythm, normal S1 S2, no S3 or S4 and no murmur, no click or rub -  ABDOMEN: soft, no tenderness, no  hepatosplenomegaly, no masses, normal bowel sounds  MS: Strong DP and PT pulses bilaterally, extremities- no gross deformities noted, no edema  SKIN: no suspicious lesions, no rashes  PSYCH: Alert and oriented times 3; speech- coherent , normal rate and volume; able to articulate logical thoughts, able to abstract reason, no tangential thoughts, no hallucinations or delusions, affect- normal    MRI Brain 04/25/2019                                                       IMPRESSION:  1. No change in the extra-axial mass in the left frontal region. This  is consistent with a meningioma.  2. There is diffuse parenchymal volume loss. White matter changes are  present in the cerebral hemispheres that are consistent with small  vessel ischemic disease in this age patient.    Diagnostic test results:  Results for orders placed or performed in visit on 07/08/19 (from the past 24 hour(s))   Lipid panel reflex to direct LDL Fasting   Result Value Ref Range    Cholesterol 116 <200 mg/dL    Triglycerides 108 <150 mg/dL    HDL Cholesterol 37 (L) >49 mg/dL    LDL Cholesterol Calculated 57 <100 mg/dL    Non HDL Cholesterol 79 <130 mg/dL   Comprehensive metabolic panel   Result Value Ref Range    Sodium 138 133 - 144 mmol/L    Potassium 4.0 3.4 - 5.3 mmol/L    Chloride 102 94 - 109 mmol/L    Carbon Dioxide 26 20 - 32 mmol/L    Anion Gap 10 3 - 14 mmol/L    Glucose 158 (H) 70 - 99 mg/dL    Urea Nitrogen 13 7 - 30 mg/dL    Creatinine 0.60  0.52 - 1.04 mg/dL    GFR Estimate 89 >60 mL/min/[1.73_m2]    GFR Estimate If Black >90 >60 mL/min/[1.73_m2]    Calcium 9.2 8.5 - 10.1 mg/dL    Bilirubin Total 0.5 0.2 - 1.3 mg/dL    Albumin 3.8 3.4 - 5.0 g/dL    Protein Total 7.1 6.8 - 8.8 g/dL    Alkaline Phosphatase 51 40 - 150 U/L    ALT 23 0 - 50 U/L    AST 16 0 - 45 U/L   CBC with platelets   Result Value Ref Range    WBC 6.6 4.0 - 11.0 10e9/L    RBC Count 4.51 3.8 - 5.2 10e12/L    Hemoglobin 12.9 11.7 - 15.7 g/dL    Hematocrit 38.2 35.0 - 47.0 %    MCV 85 78 - 100 fl    MCH 28.6 26.5 - 33.0 pg    MCHC 33.8 31.5 - 36.5 g/dL    RDW 13.1 10.0 - 15.0 %    Platelet Count 267 150 - 450 10e9/L   TSH with free T4 reflex   Result Value Ref Range    TSH 0.83 0.40 - 4.00 mU/L   Hemoglobin A1c   Result Value Ref Range    Hemoglobin A1C 6.4 (H) 0 - 5.6 %        ASSESSMENT/PLAN:                                                        ICD-10-CM    1. Type 2 diabetes mellitus with diabetic polyneuropathy, without long-term current use of insulin (H) E11.42 Hemoglobin A1c     metFORMIN (GLUCOPHAGE) 1000 MG tablet   2. Essential hypertension with goal blood pressure less than 140/90 - not well controlled today  I10    3. Hyperlipidemia LDL goal <100- fish oil = worse IBS with diarrhea  E78.5 Lipid panel reflex to direct LDL Fasting     Comprehensive metabolic panel     CBC with platelets     TSH with free T4 reflex   4. CKD (chronic kidney disease) stage 2, GFR 60-89 ml/min N18.2 hydrochlorothiazide (HYDRODIURIL) 25 MG tablet   5. Atrial fibrillation, unspecified type (H) I48.91 INR CLINIC REFERRAL   6. Screen for colon cancer Z12.11 GASTROENTEROLOGY ADULT REF PROCEDURE ONLY [facility]  [phone]; [facility]     CANCELED: GASTROENTEROLOGY ADULT REF PROCEDURE ONLY [facility]  [phone]; [facility]   7. Visit for screening mammogram Z12.31 MA Screen Bilateral w/Martin   8. Long term current use of anticoagulant therapy Z79.01    9.  Type 2 diabetes mellitus with other circulatory complication, without long-term current use of insulin (H) E11.59    10. Bilateral leg edema- has been to lymphedema clinic in past R60.0    11. Meningioma (H)--left frontal - MRI 4/25/2019 = stable from 10/2018 - needs follow up MRI yearly in April  D32.9    12. Morbid obesity due to excess calories (H) E66.01    13. Venous stasis of lower extremity I87.8    14. Essential hypertension with goal blood pressure less than 140/90 I10 atenolol (TENORMIN) 100 MG tablet     hydrochlorothiazide (HYDRODIURIL) 25 MG tablet     lisinopril (PRINIVIL/ZESTRIL) 40 MG tablet   15. Type 2 diabetes mellitus with diabetic polyneuropathy, without long-term current use of insulin (H)- elevated fasting sugars currently  E11.42 blood glucose (JAYDE CONTOUR NEXT) test strip     blood glucose monitoring (JAYDE MICROLET) lancets   16. Hyperlipidemia LDL goal <100 E78.5 simvastatin (ZOCOR) 20 MG tablet   17. Persistent atrial fibrillation (H) I48.1 warfarin (COUMADIN) 5 MG tablet     MRI brain from 04/25 was stable. Advised for yearly MRI, next due in April 2020.     BP rechecked in office and was 128/84.     Continue with weight loss - this can also help with joint pain.     Please continue with aspirin unless otherwise advised. Due to her risks (diabetes, hypertension, etc), would like her to continue with baby aspirin daily. Patient will get her INR checked today.     Referral for colonoscopy - she will schedule.     Briefly discussed pt try moving slowly to help with balance.     See Patient Instructions. Please, call or return to clinic or go to the ER immediately if signs or symptoms worsen or fail to improve as anticipated.     Return in about 6 months (around 1/8/2020) for Physical Exam, Lab Work, Routine Visit.    The information in this document, created by the medical scribe for me, accurately reflects the services I personally performed and the decisions made by me. I have reviewed  and approved this document for accuracy prior to leaving the patient care area.  July 8, 2019 9:46 AM         Madina Mercado MD    [facility]

## 2019-07-08 NOTE — PROGRESS NOTES
ANTICOAGULATION FOLLOW-UP CLINIC VISIT    Patient Name:  Zulema Nixon  Date:  2019  Contact Type:  Face to Face    SUBJECTIVE:  Patient Findings     Comments:   The patient was assessed for diet, medication, and activity level changes, missed or extra doses, bruising or bleeding, with no problem findings.          Clinical Outcomes     Negatives:   Major bleeding event, Thromboembolic event, Anticoagulation-related hospital admission, Anticoagulation-related ED visit, Anticoagulation-related fatality    Comments:   The patient was assessed for diet, medication, and activity level changes, missed or extra doses, bruising or bleeding, with no problem findings.             OBJECTIVE    INR Protime   Date Value Ref Range Status   2019 2.1 (A) 0.86 - 1.14 Final       ASSESSMENT / PLAN  No question data found.  Anticoagulation Summary  As of 2019    INR goal:   2.0-3.0   TTR:   68.1 % (3.3 y)   INR used for dosin.1 (2019)   Warfarin maintenance plan:   7.5 mg (5 mg x 1.5) every Mon, Wed, Fri; 5 mg (5 mg x 1) all other days   Full warfarin instructions:   7.5 mg every Mon, Wed, Fri; 5 mg all other days   Weekly warfarin total:   42.5 mg   No change documented:   Kylah Pierce RN   Plan last modified:   Kylah Pierce RN (2019)   Next INR check:   2019   Target end date:       Indications    Long term current use of anticoagulant therapy [Z79.01]  Atrial fibrillation  unspecified type (H) [I48.91]             Anticoagulation Episode Summary     INR check location:       Preferred lab:       Send INR reminders to:    NURSE    Comments:         Anticoagulation Care Providers     Provider Role Specialty Phone number    Madina Mercado MD [facility] [phone]            See the Encounter Report to view Anticoagulation Flowsheet and Dosing Calendar (Go to Encounters tab in chart review, and find the Anticoagulation Therapy Visit)    Dosage adjustment  made based on physician directed care plan.    Kylah Pierce RN

## 2019-07-08 NOTE — PATIENT INSTRUCTIONS
Strongly recommend help with weight loss with an organized program such as -  Medifast, weight watchers, nutrisystem, ideal protein, Slim-Genics, Gilda Jeferson, or  Overeaters Anonymous - HOW program or other organized independent nutritionally balanced weight loss program.     Dr. Mercado's recommended diet to rev-up metabolism for weight loss:   eat every 2-3 hours.    Try to keep your gram to gram ratio of carbs:protein to 12-15grams of each /small meal 5-6x/day.     Lean protein = 2 egg whites or 1 whole egg, or turkey, chicken, fish.  Low-glycemic fruits = strawberries, blueberries, raspberries, blackberries, nectarines, grapefruit, oranges,  apples.      2 oz. Lean protein + 1/2 cup  low-glycemic fruit --- breakfast and midmorning snack .     2 oz. Lean protein + 1/4 cup whole grain rice or sweet potato + 1 cup green veggie - lunch, dinner , and afternoon snack.     Evening snack : 1/2 cup of low glycemic fruit or an apple.        Try to keep carb: protein gram ratio about 1:1 with 12-15g of each per meal with small amount of monounsaturated fat -like olive oil.     If you can't kill it and grill it, or pick it off a plant and eat it - DON'T EAT IT!     For occasional pasta - try Barilla Plus - has protein in it. - keep to 1/2 cup instead of your 1/4 cup of rice or potato.     Take your fish oil capsules 2,000mg daily - with your probiotic, if you take one. Take a multivitamin daily.     Try to keep your gram to gram ratio of carbs:protein to 12-15grams of each /small meal 5-6x/day.     If 5-6 small meals/day - too labor intensive, then keep to 3 meals plus 1 snack with 3 oz lean protein per meal with 2- 3 oz protein in your snack.     AVOID DAIRY AND GRAINS, if you can.   Keep your sodium to 600-1000g per day or less.     Thank you so much for choosing [facility].  Please contact us with any questions that you may have.   We appreciate the opportunity to serve you now and look forward to  supporting your healthcare needs for a long time to come!    Most Sincerely,     Madina Mercado MD                   Thank you for choosing [facility]  for your Health Care. It was a pleasure seeing you at your visit today. Please contact us with any questions or concerns you may have.                   Madina Mercado MD                                  To reach your [facility] care team after hours call:   [phone]    Our clinic hours are:     Monday- 7:30 am - 7:00 pm                             Tuesday through Friday- 7:30 am - 5:00 pm                                        Saturday- 8:00 am - 12:00 pm                  Phone:  [phone]    Our pharmacy hours are:     Monday  8:00 am to 7:00 pm      Tuesday through Friday 8:00am to 6:00pm                        Saturday - 9:00 am to 1:00 pm      Sunday : Closed.              Phone:  [phone]      There is also information available at our web site:  [email or web address]    If your provider ordered any lab tests and you do not receive the results within 10 business days, please call the clinic.    If you need a medication refill please contact your pharmacy.  Please allow 2 business days for your refill to be completed.    Our clinic offers telephone visits and e visits.  Please ask one of your team members to explain more.      Use [email or web address] (secure email communication and access to your chart) to send your primary care provider a message or make an appointment. Ask someone on your Team how to sign up for TelePharmt.

## 2019-07-09 ENCOUNTER — HOSPITAL ENCOUNTER (OUTPATIENT)
Dept: MAMMOGRAPHY | Facility: CLINIC | Age: 74
Discharge: HOME | End: 2019-07-09
Attending: FAMILY MEDICINE | Admitting: FAMILY MEDICINE
Payer: MEDICARE

## 2019-07-09 DIAGNOSIS — Z12.31 VISIT FOR SCREENING MAMMOGRAM: ICD-10-CM

## 2019-07-09 PROCEDURE — 77063 BREAST TOMOSYNTHESIS BI: CPT

## 2019-07-09 NOTE — MR AVS SNAPSHOT
Zulema Nixon   11/6/2018 10:00 AM   Anticoagulation Therapy Visit    Description:  73 year old female   Provider:   ANTICOAGULATION CLINIC   Department:   Nurse           INR as of 11/6/2018     Today's INR 2.0      Anticoagulation Summary as of 11/6/2018     INR goal 2.0-3.0   Today's INR 2.0   Full warfarin instructions 5 mg on Wed, Sat; 7.5 mg all other days   Next INR check 11/20/2018    Indications   Long-term (current) use of anticoagulants [Z79.01] [Z79.01]  Atrial fibrillation  unspecified type (H) [I48.91]         Your next Anticoagulation Clinic appointment(s)     Nov 20, 2018 10:30 AM CST   Anticoagulation Visit with  ANTICOAGULATION CLINIC   [facility]    [address] [place] [id number]   [phone]              Contact Numbers     Clinic Number:         November 2018 Details    Sun Mon Tue Wed Thu Fri Sat         1               2               3                 4               5               6      7.5 mg   See details      7      5 mg         8      7.5 mg         9      7.5 mg         10      5 mg           11      7.5 mg         12      7.5 mg         13      7.5 mg         14      5 mg         15      7.5 mg         16      7.5 mg         17      5 mg           18      7.5 mg         19      7.5 mg         20            21               22               23               24                 25               26               27               28               29               30                 Date Details   11/06 This INR check       Date of next INR:  11/20/2018         How to take your warfarin dose     To take:  5 mg Take 1 of the 5 mg tablets.    To take:  7.5 mg Take 1.5 of the 5 mg tablets.           
No

## 2019-07-10 ENCOUNTER — MEDICAL CORRESPONDENCE (OUTPATIENT)
Dept: HEALTH INFORMATION MANAGEMENT | Facility: CLINIC | Age: 74
End: 2019-07-10

## 2019-07-10 NOTE — TELEPHONE ENCOUNTER
Form for diabetic testing supplies asking for diagnosis code and     Filled out and faxed back to [facility] pharmacy [phone]    Kylah Pierce RN, BSN  [place] [facility]

## 2019-07-15 ENCOUNTER — HOSPITAL ENCOUNTER (OUTPATIENT)
Facility: CLINIC | Age: 74
End: 2019-07-15
Attending: INTERNAL MEDICINE | Admitting: INTERNAL MEDICINE
Payer: MEDICARE

## 2019-07-15 NOTE — TELEPHONE ENCOUNTER
Additional info needed, filled out and refaxed to [facility] pharmacy at .      Sintia Mitchell

## 2019-07-22 NOTE — TELEPHONE ENCOUNTER
Form received a 3rd time noting that form is not filled out correctly.  I reviewed scanned form and all empty fields were completed and form was faxed back on 7/15/66492.  Pharmacy notes they did not received fax on 7/15/2019.    I printed form and re-faxed it to [phone].    PURVI Thorne, RN, PHN  [facility]) [phone]

## 2019-07-29 DIAGNOSIS — E11.42 TYPE 2 DIABETES MELLITUS WITH DIABETIC POLYNEUROPATHY, WITHOUT LONG-TERM CURRENT USE OF INSULIN (H): ICD-10-CM

## 2019-07-30 NOTE — TELEPHONE ENCOUNTER
Requested Prescriptions   Pending Prescriptions Disp Refills     metFORMIN (GLUCOPHAGE) 1000 MG tablet [Pharmacy Med Name: metFORMIN HCl Oral Tablet 1000 MG] 225 tablet 0     Sig: TAKE 1 AND 1/2 TABLET BY MOUTH WITH BREAKFAST AND TAKE 1 TABLET BY MOUTH WITH SUPPER       Last Refill:    Disp Refills Start End OTILIA   metFORMIN (GLUCOPHAGE) 1000 MG tablet 225 tablet 0 7/8/2019  No   Sig: TAKE 1 AND 1/2 TABLET BY MOUTH WITH BREAKFAST AND TAKE 1 TABLET BY MOUTH WITH SUPPER     Biguanide Agents Passed - 7/29/2019  8:44 PM        Passed - Blood pressure less than 140/90 in past 6 months     BP Readings from Last 3 Encounters:   07/08/19 128/84   03/29/19 130/78   01/14/19 130/78           Passed - Patient has documented LDL within the past 12 mos.     Recent Labs   Lab Test 07/08/19  0949   LDL 57           Passed - Patient has had a Microalbumin in the past 15 mos.     Recent Labs   Lab Test 01/14/19  1014   MICROL 7   UMALCR 7.95           Passed - Patient is age 10 or older        Passed - Patient has documented A1c within the specified period of time.     If HgbA1C is 8 or greater, it needs to be on file within the past 3 months.  If less than 8, must be on file within the past 6 months.     Recent Labs   Lab Test 07/08/19  0949   A1C 6.4*           Passed - Patient's CR is NOT>1.4 OR Patient's EGFR is NOT<45 within past 12 mos.     Recent Labs   Lab Test 07/08/19  0949   GFRESTIMATED 89   GFRESTBLACK >90     Recent Labs   Lab Test 07/08/19  0949   CR 0.60           Passed - Patient does NOT have a diagnosis of CHF.        Passed - Medication is active on med list        Passed - Patient is not pregnant        Passed - Patient has not had a positive pregnancy test within the past 12 mos.         Passed - Recent (6 mo) or future (30 days) visit within the authorizing provider's specialty     Patient had office visit in the last 6 months or has a visit in the next 30 days with authorizing provider or within the  "authorizing provider's specialty.  See \"Patient Info\" tab in inbasket, or \"Choose Columns\" in Meds & Orders section of the refill encounter.      LOV: 7/8/19          #225 x 0 refills sent on 7/8/19 - no pharmacy change    Rylie Evangelista RN  [place] [facility]  "

## 2019-08-01 DIAGNOSIS — E11.42 TYPE 2 DIABETES MELLITUS WITH DIABETIC POLYNEUROPATHY, WITHOUT LONG-TERM CURRENT USE OF INSULIN (H): ICD-10-CM

## 2019-08-01 NOTE — TELEPHONE ENCOUNTER
Requested Prescriptions   Pending Prescriptions Disp Refills     metFORMIN (GLUCOPHAGE) 1000 MG tablet [Pharmacy Med Name: metFORMIN HCl Oral Tablet 1000 MG] 225 tablet 0     Sig: TAKE 1 AND 1/2 TABLETS BY MOUTH WITH BREAKFAST AND TAKE 1 TABLET BY MOUTH WITH SUPPER       Last Refill:    Disp Refills Start End OTILIA   metFORMIN (GLUCOPHAGE) 1000 MG tablet 225 tablet 0 7/8/2019  No   Sig: TAKE 1 AND 1/2 TABLET BY MOUTH WITH BREAKFAST AND TAKE 1 TABLET BY MOUTH WITH SUPPER     Biguanide Agents Passed - 8/1/2019 10:23 AM        Passed - Blood pressure less than 140/90 in past 6 months     BP Readings from Last 3 Encounters:   07/08/19 128/84   03/29/19 130/78   01/14/19 130/78           Passed - Patient has documented LDL within the past 12 mos.     Recent Labs   Lab Test 07/08/19  0949   LDL 57           Passed - Patient has had a Microalbumin in the past 15 mos.     Recent Labs   Lab Test 01/14/19  1014   MICROL 7   UMALCR 7.95           Passed - Patient is age 10 or older        Passed - Patient has documented A1c within the specified period of time.     If HgbA1C is 8 or greater, it needs to be on file within the past 3 months.  If less than 8, must be on file within the past 6 months.     Recent Labs   Lab Test 07/08/19  0949   A1C 6.4*           Passed - Patient's CR is NOT>1.4 OR Patient's EGFR is NOT<45 within past 12 mos.     Recent Labs   Lab Test 07/08/19  0949   GFRESTIMATED 89   GFRESTBLACK >90     Recent Labs   Lab Test 07/08/19  0949   CR 0.60           Passed - Patient does NOT have a diagnosis of CHF.        Passed - Medication is active on med list        Passed - Patient is not pregnant        Passed - Patient has not had a positive pregnancy test within the past 12 mos.         Passed - Recent (6 mo) or future (30 days) visit within the authorizing provider's specialty     Patient had office visit in the last 6 months or has a visit in the next 30 days with authorizing provider or within the  "authorizing provider's specialty.  See \"Patient Info\" tab in inbasket, or \"Choose Columns\" in Meds & Orders section of the refill encounter.      LOV: 07/08/2019          Refilled per RN Protocol.     Rylie Evangelista RN  [place] [facility]    "

## 2019-08-05 ENCOUNTER — ANTICOAGULATION THERAPY VISIT (OUTPATIENT)
Dept: NURSING | Facility: CLINIC | Age: 74
End: 2019-08-05
Payer: MEDICARE

## 2019-08-05 DIAGNOSIS — Z79.01 LONG TERM CURRENT USE OF ANTICOAGULANT THERAPY: ICD-10-CM

## 2019-08-05 DIAGNOSIS — I48.91 ATRIAL FIBRILLATION, UNSPECIFIED TYPE (H): ICD-10-CM

## 2019-08-05 LAB — INR POINT OF CARE: 2 (ref 0.86–1.14)

## 2019-08-05 PROCEDURE — 36416 COLLJ CAPILLARY BLOOD SPEC: CPT

## 2019-08-05 PROCEDURE — 85610 PROTHROMBIN TIME: CPT | Mod: QW

## 2019-08-05 NOTE — PROGRESS NOTES
ANTICOAGULATION FOLLOW-UP CLINIC VISIT    Patient Name:  Zulema Nixon  Date:  2019  Contact Type:  Face to Face    SUBJECTIVE:  Patient Findings     Comments:   The patient was assessed for diet, medication, and activity level changes, missed or extra doses, bruising or bleeding, with no problem findings.          Clinical Outcomes     Negatives:   Major bleeding event, Thromboembolic event, Anticoagulation-related hospital admission, Anticoagulation-related ED visit, Anticoagulation-related fatality    Comments:   The patient was assessed for diet, medication, and activity level changes, missed or extra doses, bruising or bleeding, with no problem findings.             OBJECTIVE    INR Protime   Date Value Ref Range Status   2019 2.0 (A) 0.86 - 1.14 Final       ASSESSMENT / PLAN  No question data found.  Anticoagulation Summary  As of 2019    INR goal:   2.0-3.0   TTR:   68.8 % (3.4 y)   INR used for dosin.0 (2019)   Warfarin maintenance plan:   7.5 mg (5 mg x 1.5) every Mon, Wed, Fri; 5 mg (5 mg x 1) all other days   Full warfarin instructions:   7.5 mg every Mon, Wed, Fri; 5 mg all other days   Weekly warfarin total:   42.5 mg   No change documented:   Kylah Pierce RN   Plan last modified:   Kylah Pierce RN (2019)   Next INR check:   2019   Target end date:       Indications    Long term current use of anticoagulant therapy [Z79.01]  Atrial fibrillation  unspecified type (H) [I48.91]             Anticoagulation Episode Summary     INR check location:       Preferred lab:       Send INR reminders to:    NURSE    Comments:         Anticoagulation Care Providers     Provider Role Specialty Phone number    Madina Mercado MD [facility] [phone]            See the Encounter Report to view Anticoagulation Flowsheet and Dosing Calendar (Go to Encounters tab in chart review, and find the Anticoagulation Therapy Visit)    Dosage adjustment  made based on physician directed care plan.    Kylah Pierce RN

## 2019-09-16 ENCOUNTER — ANTICOAGULATION THERAPY VISIT (OUTPATIENT)
Dept: NURSING | Facility: CLINIC | Age: 74
End: 2019-09-16
Payer: MEDICARE

## 2019-09-16 DIAGNOSIS — Z79.01 LONG TERM CURRENT USE OF ANTICOAGULANT THERAPY: ICD-10-CM

## 2019-09-16 DIAGNOSIS — I48.91 ATRIAL FIBRILLATION, UNSPECIFIED TYPE (H): ICD-10-CM

## 2019-09-16 LAB — INR POINT OF CARE: 1.9 (ref 0.86–1.14)

## 2019-09-16 PROCEDURE — 36416 COLLJ CAPILLARY BLOOD SPEC: CPT

## 2019-09-16 PROCEDURE — 85610 PROTHROMBIN TIME: CPT | Mod: QW

## 2019-09-16 NOTE — PROGRESS NOTES
ANTICOAGULATION FOLLOW-UP CLINIC VISIT    Patient Name:  Zulema Nixon  Date:  2019  Contact Type:  Face to Face    SUBJECTIVE:  Patient Findings     Comments:   Patient denies any identifiable changes that caused the sub therapeutic INR.           Clinical Outcomes     Negatives:   Major bleeding event, Thromboembolic event, Anticoagulation-related hospital admission, Anticoagulation-related ED visit, Anticoagulation-related fatality    Comments:   Patient denies any identifiable changes that caused the sub therapeutic INR.              OBJECTIVE    INR Protime   Date Value Ref Range Status   2019 1.9 (A) 0.86 - 1.14 Final       ASSESSMENT / PLAN  No question data found.  Anticoagulation Summary  As of 2019    INR goal:   2.0-3.0   TTR:   66.6 % (3.5 y)   INR used for dosin.9! (2019)   Warfarin maintenance plan:   7.5 mg (5 mg x 1.5) every Mon, Wed, Fri; 5 mg (5 mg x 1) all other days   Full warfarin instructions:   7.5 mg every Mon, Wed, Fri; 5 mg all other days   Weekly warfarin total:   42.5 mg   No change documented:   Kylah Pierce RN   Plan last modified:   Kylah Pierce RN (2019)   Next INR check:   2019   Target end date:       Indications    Long term current use of anticoagulant therapy [Z79.01]  Atrial fibrillation  unspecified type (H) [I48.91]             Anticoagulation Episode Summary     INR check location:       Preferred lab:       Send INR reminders to:   RV NURSE    Comments:         Anticoagulation Care Providers     Provider Role Specialty Phone number    Madina Mercado MD [facility] [phone]            See the Encounter Report to view Anticoagulation Flowsheet and Dosing Calendar (Go to Encounters tab in chart review, and find the Anticoagulation Therapy Visit)    Dosage adjustment made based on physician directed care plan.    Kylah Pierce RN

## 2019-09-16 NOTE — PROGRESS NOTES
Reviewed how to hold coumadin IF pt goes to do the  Colonoscopy.     Pt stated she is pretty sure she will canacle because she does not want to do it     Reviewed holding wit pt     9/20 - hold   9/21 - hold   9/22 - hold   9/23- hold   9/24 - hold   9/25 - (DAY OF PROCEDURE) take 15 mg   9/26 - 10 mg   9/27 and beyond resume normal dose and recheck on 9/30    Patient stated an understanding and agreed with plan.    Kylah Pierce RN, BSN  [facility]

## 2019-09-30 ENCOUNTER — ANTICOAGULATION THERAPY VISIT (OUTPATIENT)
Dept: NURSING | Facility: CLINIC | Age: 74
End: 2019-09-30
Payer: MEDICARE

## 2019-09-30 DIAGNOSIS — Z79.01 LONG TERM CURRENT USE OF ANTICOAGULANT THERAPY: ICD-10-CM

## 2019-09-30 DIAGNOSIS — I48.91 ATRIAL FIBRILLATION, UNSPECIFIED TYPE (H): ICD-10-CM

## 2019-09-30 LAB — INR POINT OF CARE: 2.4 (ref 0.86–1.14)

## 2019-09-30 PROCEDURE — 36416 COLLJ CAPILLARY BLOOD SPEC: CPT

## 2019-09-30 PROCEDURE — 85610 PROTHROMBIN TIME: CPT | Mod: QW

## 2019-09-30 NOTE — PROGRESS NOTES
ANTICOAGULATION FOLLOW-UP CLINIC VISIT    Patient Name:  Zulema Nixon  Date:  2019  Contact Type:  Face to Face    SUBJECTIVE:  Patient Findings     Comments:   The patient was assessed for diet, medication, and activity level changes, missed or extra doses, bruising or bleeding, with no problem findings.            Clinical Outcomes     Negatives:   Major bleeding event, Thromboembolic event, Anticoagulation-related hospital admission, Anticoagulation-related ED visit, Anticoagulation-related fatality    Comments:   The patient was assessed for diet, medication, and activity level changes, missed or extra doses, bruising or bleeding, with no problem findings.               OBJECTIVE    INR Protime   Date Value Ref Range Status   2019 2.4 (A) 0.86 - 1.14 Final       ASSESSMENT / PLAN  No question data found.  Anticoagulation Summary  As of 2019    INR goal:   2.0-3.0   TTR:   66.7 % (3.6 y)   INR used for dosin.4 (2019)   Warfarin maintenance plan:   7.5 mg (5 mg x 1.5) every Mon, Wed, Fri; 5 mg (5 mg x 1) all other days   Full warfarin instructions:   7.5 mg every Mon, Wed, Fri; 5 mg all other days   Weekly warfarin total:   42.5 mg   No change documented:   Kylah Pierce RN   Plan last modified:   Kylah Pierce RN (2019)   Next INR check:   10/28/2019   Target end date:       Indications    Long term current use of anticoagulant therapy [Z79.01]  Atrial fibrillation  unspecified type (H) [I48.91]             Anticoagulation Episode Summary     INR check location:       Preferred lab:       Send INR reminders to:    NURSE    Comments:         Anticoagulation Care Providers     Provider Role Specialty Phone number    Madina Mercado MD [facility] [phone]            See the Encounter Report to view Anticoagulation Flowsheet and Dosing Calendar (Go to Encounters tab in chart review, and find the Anticoagulation Therapy Visit)    Dosage  adjustment made based on physician directed care plan.    Kylah Pierce RN

## 2019-10-28 ENCOUNTER — ANTICOAGULATION THERAPY VISIT (OUTPATIENT)
Dept: NURSING | Facility: CLINIC | Age: 74
End: 2019-10-28
Payer: MEDICARE

## 2019-10-28 DIAGNOSIS — Z79.01 LONG TERM CURRENT USE OF ANTICOAGULANT THERAPY: ICD-10-CM

## 2019-10-28 DIAGNOSIS — I48.91 ATRIAL FIBRILLATION, UNSPECIFIED TYPE (H): ICD-10-CM

## 2019-10-28 LAB — INR POINT OF CARE: 2.2 (ref 0.86–1.14)

## 2019-10-28 PROCEDURE — 36416 COLLJ CAPILLARY BLOOD SPEC: CPT

## 2019-10-28 PROCEDURE — 85610 PROTHROMBIN TIME: CPT | Mod: QW

## 2019-10-28 PROCEDURE — 99207 ZZC NO CHARGE NURSE ONLY: CPT

## 2019-10-28 NOTE — PROGRESS NOTES
ANTICOAGULATION FOLLOW-UP CLINIC VISIT    Patient Name:  Zulema Nixon  Date:  10/28/2019  Contact Type:  Face to Face    SUBJECTIVE:  Patient Findings     Comments:   The patient was assessed for diet, medication, and activity level changes, missed or extra doses, bruising or bleeding, with no problem findings.          Clinical Outcomes     Negatives:   Major bleeding event, Thromboembolic event, Anticoagulation-related hospital admission, Anticoagulation-related ED visit, Anticoagulation-related fatality    Comments:   The patient was assessed for diet, medication, and activity level changes, missed or extra doses, bruising or bleeding, with no problem findings.             OBJECTIVE    INR Protime   Date Value Ref Range Status   10/28/2019 2.2 (A) 0.86 - 1.14 Final       ASSESSMENT / PLAN  INR assessment THER    Recheck INR In: 4 WEEKS    INR Location Clinic      Anticoagulation Summary  As of 10/28/2019    INR goal:   2.0-3.0   TTR:   67.4 % (3.6 y)   INR used for dosin.2 (10/28/2019)   Warfarin maintenance plan:   7.5 mg (5 mg x 1.5) every Mon, Wed, Fri; 5 mg (5 mg x 1) all other days   Full warfarin instructions:   7.5 mg every Mon, Wed, Fri; 5 mg all other days   Weekly warfarin total:   42.5 mg   Plan last modified:   Kylah Pierce RN (2019)   Next INR check:   2019   Target end date:       Indications    Long term current use of anticoagulant therapy [Z79.01]  Atrial fibrillation  unspecified type (H) [I48.91]             Anticoagulation Episode Summary     INR check location:       Preferred lab:       Send INR reminders to:   RV NURSE    Comments:         Anticoagulation Care Providers     Provider Role Specialty Phone number    Madina Mercado MD [facility] [phone]            See the Encounter Report to view Anticoagulation Flowsheet and Dosing Calendar (Go to Encounters tab in chart review, and find the Anticoagulation Therapy Visit)    Dosage  adjustment made based on physician directed care plan.    Rylie Evangelista RN & Dr. Mercado

## 2019-10-28 NOTE — PROGRESS NOTES
Reviewed below and agree with plan.    Torie Perales, BS, RN, PHN  [facility]) [phone]

## 2019-10-29 DIAGNOSIS — E11.42 TYPE 2 DIABETES MELLITUS WITH DIABETIC POLYNEUROPATHY, WITHOUT LONG-TERM CURRENT USE OF INSULIN (H): ICD-10-CM

## 2019-10-30 NOTE — TELEPHONE ENCOUNTER
Refilled per RN Protocol.     Rylie Evangelista, RN  [facility]

## 2019-10-30 NOTE — TELEPHONE ENCOUNTER
Requested Prescriptions   Pending Prescriptions Disp Refills     metFORMIN (GLUCOPHAGE) 1000 MG tablet [Pharmacy Med Name: metFORMIN HCl Oral Tablet 1000 MG]        Last Written Prescription Date:  8.1.19  Last Fill Quantity: 225 TABLET,  # refills: 0   Last office visit: 7/8/2019 with prescribing provider:  Madina Mercado MD           Future Office Visit:       225 tablet 0     Sig: TAKE 1 AND 1/2 TABLETS BY MOUTH WITH BREAKFAST AND TAKE 1 TABLET BY MOUTH WITH SUPPER       Biguanide Agents Passed - 10/29/2019  1:43 PM        Passed - Blood pressure less than 140/90 in past 6 months     BP Readings from Last 3 Encounters:   07/08/19 128/84   03/29/19 130/78   01/14/19 130/78                 Passed - Patient has documented LDL within the past 12 mos.     Recent Labs   Lab Test 07/08/19  0949   LDL 57             Passed - Patient has had a Microalbumin in the past 15 mos.     Recent Labs   Lab Test 01/14/19  1014   MICROL 7   UMALCR 7.95             Passed - Patient is age 10 or older        Passed - Patient has documented A1c within the specified period of time.     If HgbA1C is 8 or greater, it needs to be on file within the past 3 months.  If less than 8, must be on file within the past 6 months.     Recent Labs   Lab Test 07/08/19  0949   A1C 6.4*             Passed - Patient's CR is NOT>1.4 OR Patient's EGFR is NOT<45 within past 12 mos.     Recent Labs   Lab Test 07/08/19  0949   GFRESTIMATED 89   GFRESTBLACK >90       Recent Labs   Lab Test 07/08/19  0949   CR 0.60             Passed - Patient does NOT have a diagnosis of CHF.        Passed - Medication is active on med list        Passed - Patient is not pregnant        Passed - Patient has not had a positive pregnancy test within the past 12 mos.         Passed - Recent (6 mo) or future (30 days) visit within the authorizing provider's specialty     Patient had office visit in the last 6 months or has a visit in the next 30 days with authorizing  "provider or within the authorizing provider's specialty.  See \"Patient Info\" tab in inbasket, or \"Choose Columns\" in Meds & Orders section of the refill encounter.            "

## 2019-11-02 ENCOUNTER — TRANSFERRED RECORDS (OUTPATIENT)
Dept: HEALTH INFORMATION MANAGEMENT | Facility: CLINIC | Age: 74
End: 2019-11-02

## 2019-11-02 LAB
CREAT SERPL-MCNC: 0.77 MG/DL (ref 0.57–1.11)
GFR SERPL CREATININE-BSD FRML MDRD: >60 ML/MIN/1.73M2
GLUCOSE SERPL-MCNC: 115 MG/DL (ref 65–100)
HBA1C MFR BLD: 6.8 % (ref 0–5.7)
INR PPP: 2.3
POTASSIUM SERPL-SCNC: 3.9 MMOL/L (ref 3.5–5)

## 2019-11-03 LAB
CHOLEST SERPL-MCNC: 98 MG/DL (ref 100–199)
HDLC SERPL-MCNC: 33 MG/DL
INR PPP: 2.3
LDLC SERPL CALC-MCNC: 51 MG/DL
NONHDLC SERPL-MCNC: 65 MG/DL
TRIGL SERPL-MCNC: 72 MG/DL

## 2019-11-04 ENCOUNTER — HEALTH MAINTENANCE LETTER (OUTPATIENT)
Age: 74
End: 2019-11-04

## 2019-11-25 ENCOUNTER — ANTICOAGULATION THERAPY VISIT (OUTPATIENT)
Dept: NURSING | Facility: CLINIC | Age: 74
End: 2019-11-25
Payer: MEDICARE

## 2019-11-25 DIAGNOSIS — Z79.01 LONG TERM CURRENT USE OF ANTICOAGULANT THERAPY: ICD-10-CM

## 2019-11-25 DIAGNOSIS — I48.91 ATRIAL FIBRILLATION, UNSPECIFIED TYPE (H): ICD-10-CM

## 2019-11-25 LAB — INR POINT OF CARE: 2.9 (ref 0.86–1.14)

## 2019-11-25 PROCEDURE — 85610 PROTHROMBIN TIME: CPT | Mod: QW

## 2019-11-25 PROCEDURE — 36416 COLLJ CAPILLARY BLOOD SPEC: CPT

## 2019-11-25 NOTE — PROGRESS NOTES
ANTICOAGULATION FOLLOW-UP CLINIC VISIT    Patient Name:  Zulema Nixon  Date:  2019  Contact Type:  Face to Face    SUBJECTIVE:  Patient Findings     Positives:   Change in health, Emergency department visit    Comments:   Had a TIA         Clinical Outcomes     Comments:   Had a TIA            OBJECTIVE    INR Protime   Date Value Ref Range Status   2019 2.9 (A) 0.86 - 1.14 Final       ASSESSMENT / PLAN  No question data found.  Anticoagulation Summary  As of 2019    INR goal:   2.0-3.0   TTR:   68.1 % (1 y)   INR used for dosin.9 (2019)   Warfarin maintenance plan:   7.5 mg (5 mg x 1.5) every Mon, Wed, Fri; 5 mg (5 mg x 1) all other days   Full warfarin instructions:   7.5 mg every Mon, Wed, Fri; 5 mg all other days   Weekly warfarin total:   42.5 mg   No change documented:   Kylah Pierce RN   Plan last modified:   Kylah Pierce RN (2019)   Next INR check:   2019   Priority:   Maintenance   Target end date:       Indications    Long term current use of anticoagulant therapy [Z79.01]  Atrial fibrillation  unspecified type (H) [I48.91]             Anticoagulation Episode Summary     INR check location:       Preferred lab:       Send INR reminders to:   [facility] [place]    Comments:         Anticoagulation Care Providers     Provider Role Specialty Phone number    Madina Mercado MD [facility] [phone]            See the Encounter Report to view Anticoagulation Flowsheet and Dosing Calendar (Go to Encounters tab in chart review, and find the Anticoagulation Therapy Visit)    Dosage adjustment made based on physician directed care plan.    Kylah Pierce RN

## 2019-12-02 ENCOUNTER — ANTICOAGULATION THERAPY VISIT (OUTPATIENT)
Dept: NURSING | Facility: CLINIC | Age: 74
End: 2019-12-02
Payer: MEDICARE

## 2019-12-02 ENCOUNTER — TELEPHONE (OUTPATIENT)
Dept: FAMILY MEDICINE | Facility: CLINIC | Age: 74
End: 2019-12-02

## 2019-12-02 DIAGNOSIS — N18.2 CKD (CHRONIC KIDNEY DISEASE) STAGE 2, GFR 60-89 ML/MIN: ICD-10-CM

## 2019-12-02 DIAGNOSIS — I48.91 ATRIAL FIBRILLATION, UNSPECIFIED TYPE (H): ICD-10-CM

## 2019-12-02 DIAGNOSIS — E78.5 HYPERLIPIDEMIA LDL GOAL <100: ICD-10-CM

## 2019-12-02 DIAGNOSIS — E11.42 TYPE 2 DIABETES MELLITUS WITH DIABETIC POLYNEUROPATHY, WITHOUT LONG-TERM CURRENT USE OF INSULIN (H): ICD-10-CM

## 2019-12-02 DIAGNOSIS — Z79.01 LONG TERM CURRENT USE OF ANTICOAGULANT THERAPY: ICD-10-CM

## 2019-12-02 DIAGNOSIS — E11.42 TYPE 2 DIABETES MELLITUS WITH DIABETIC POLYNEUROPATHY, WITHOUT LONG-TERM CURRENT USE OF INSULIN (H): Primary | ICD-10-CM

## 2019-12-02 LAB
ERYTHROCYTE [DISTWIDTH] IN BLOOD BY AUTOMATED COUNT: 13.6 % (ref 10–15)
HBA1C MFR BLD: 6.3 % (ref 0–5.6)
HCT VFR BLD AUTO: 38.6 % (ref 35–47)
HGB BLD-MCNC: 12.5 G/DL (ref 11.7–15.7)
INR POINT OF CARE: 2.3 (ref 0.86–1.14)
MCH RBC QN AUTO: 27.6 PG (ref 26.5–33)
MCHC RBC AUTO-ENTMCNC: 32.4 G/DL (ref 31.5–36.5)
MCV RBC AUTO: 85 FL (ref 78–100)
PLATELET # BLD AUTO: 225 10E9/L (ref 150–450)
RBC # BLD AUTO: 4.53 10E12/L (ref 3.8–5.2)
WBC # BLD AUTO: 7.1 10E9/L (ref 4–11)

## 2019-12-02 PROCEDURE — 83036 HEMOGLOBIN GLYCOSYLATED A1C: CPT | Performed by: FAMILY MEDICINE

## 2019-12-02 PROCEDURE — 80053 COMPREHEN METABOLIC PANEL: CPT | Performed by: FAMILY MEDICINE

## 2019-12-02 PROCEDURE — 85610 PROTHROMBIN TIME: CPT | Mod: QW

## 2019-12-02 PROCEDURE — 36416 COLLJ CAPILLARY BLOOD SPEC: CPT

## 2019-12-02 PROCEDURE — 80061 LIPID PANEL: CPT | Performed by: FAMILY MEDICINE

## 2019-12-02 PROCEDURE — 82043 UR ALBUMIN QUANTITATIVE: CPT | Performed by: FAMILY MEDICINE

## 2019-12-02 PROCEDURE — 85027 COMPLETE CBC AUTOMATED: CPT | Performed by: FAMILY MEDICINE

## 2019-12-02 NOTE — PROGRESS NOTES
ANTICOAGULATION FOLLOW-UP CLINIC VISIT    Patient Name:  Zulema Nixon  Date:  2019  Contact Type:  Face to Face    SUBJECTIVE:  Patient Findings     Comments:   The patient was assessed for diet, medication, and activity level changes, missed or extra doses, bruising or bleeding, with no problem findings.          Clinical Outcomes     Negatives:   Major bleeding event, Thromboembolic event, Anticoagulation-related hospital admission, Anticoagulation-related ED visit, Anticoagulation-related fatality    Comments:   The patient was assessed for diet, medication, and activity level changes, missed or extra doses, bruising or bleeding, with no problem findings.             OBJECTIVE    INR Protime   Date Value Ref Range Status   2019 2.3 (A) 0.86 - 1.14 Final       ASSESSMENT / PLAN  No question data found.  Anticoagulation Summary  As of 2019    INR goal:   2.0-3.0   TTR:   69.8 % (1 y)   INR used for dosin.3 (2019)   Warfarin maintenance plan:   7.5 mg (5 mg x 1.5) every Mon, Wed, Fri; 5 mg (5 mg x 1) all other days   Full warfarin instructions:   7.5 mg every Mon, Wed, Fri; 5 mg all other days   Weekly warfarin total:   42.5 mg   No change documented:   Kylah Pierce RN   Plan last modified:   Kylah Pierce RN (2019)   Next INR check:   2020   Priority:   Maintenance   Target end date:       Indications    Long term current use of anticoagulant therapy [Z79.01]  Atrial fibrillation  unspecified type (H) [I48.91]             Anticoagulation Episode Summary     INR check location:       Preferred lab:       Send INR reminders to:   [facility] [place]    Comments:         Anticoagulation Care Providers     Provider Role Specialty Phone number    Madina Mercado MD [facility] [phone]            See the Encounter Report to view Anticoagulation Flowsheet and Dosing Calendar (Go to Encounters tab in chart review, and find the Anticoagulation  Therapy Visit)    Dosage adjustment made based on physician directed care plan.    Kylah Pierce RN

## 2019-12-02 NOTE — TELEPHONE ENCOUNTER
Future orders placed.  Lab notified.      PURVI Thorne, RN, PHN  [facility]  Office: [phone]  Fax: [phone]

## 2019-12-03 LAB
ALBUMIN SERPL-MCNC: 3.7 G/DL (ref 3.4–5)
ALP SERPL-CCNC: 53 U/L (ref 40–150)
ALT SERPL W P-5'-P-CCNC: 22 U/L (ref 0–50)
ANION GAP SERPL CALCULATED.3IONS-SCNC: 9 MMOL/L (ref 3–14)
AST SERPL W P-5'-P-CCNC: 19 U/L (ref 0–45)
BILIRUB SERPL-MCNC: 0.4 MG/DL (ref 0.2–1.3)
BUN SERPL-MCNC: 13 MG/DL (ref 7–30)
CALCIUM SERPL-MCNC: 9.4 MG/DL (ref 8.5–10.1)
CHLORIDE SERPL-SCNC: 100 MMOL/L (ref 94–109)
CHOLEST SERPL-MCNC: 118 MG/DL
CO2 SERPL-SCNC: 28 MMOL/L (ref 20–32)
CREAT SERPL-MCNC: 0.64 MG/DL (ref 0.52–1.04)
CREAT UR-MCNC: 105 MG/DL
GFR SERPL CREATININE-BSD FRML MDRD: 87 ML/MIN/{1.73_M2}
GLUCOSE SERPL-MCNC: 122 MG/DL (ref 70–99)
HDLC SERPL-MCNC: 39 MG/DL
LDLC SERPL CALC-MCNC: 61 MG/DL
MICROALBUMIN UR-MCNC: 10 MG/L
MICROALBUMIN/CREAT UR: 9.27 MG/G CR (ref 0–25)
NONHDLC SERPL-MCNC: 79 MG/DL
POTASSIUM SERPL-SCNC: 4 MMOL/L (ref 3.4–5.3)
PROT SERPL-MCNC: 7.1 G/DL (ref 6.8–8.8)
SODIUM SERPL-SCNC: 137 MMOL/L (ref 133–144)
TRIGL SERPL-MCNC: 92 MG/DL

## 2019-12-04 ENCOUNTER — OFFICE VISIT (OUTPATIENT)
Dept: FAMILY MEDICINE | Facility: CLINIC | Age: 74
End: 2019-12-04
Payer: MEDICARE

## 2019-12-04 VITALS
HEART RATE: 75 BPM | HEIGHT: 63 IN | OXYGEN SATURATION: 99 % | BODY MASS INDEX: 40.75 KG/M2 | TEMPERATURE: 97.7 F | SYSTOLIC BLOOD PRESSURE: 132 MMHG | WEIGHT: 230 LBS | DIASTOLIC BLOOD PRESSURE: 78 MMHG

## 2019-12-04 DIAGNOSIS — I48.91 ATRIAL FIBRILLATION, UNSPECIFIED TYPE (H): ICD-10-CM

## 2019-12-04 DIAGNOSIS — E78.5 HYPERLIPIDEMIA LDL GOAL <100: ICD-10-CM

## 2019-12-04 DIAGNOSIS — R26.89 BALANCE PROBLEMS: ICD-10-CM

## 2019-12-04 DIAGNOSIS — D32.9 MENINGIOMA (H): ICD-10-CM

## 2019-12-04 DIAGNOSIS — E11.59 TYPE 2 DIABETES MELLITUS WITH OTHER CIRCULATORY COMPLICATION, WITHOUT LONG-TERM CURRENT USE OF INSULIN (H): ICD-10-CM

## 2019-12-04 DIAGNOSIS — Z09 HOSPITAL DISCHARGE FOLLOW-UP: Primary | ICD-10-CM

## 2019-12-04 DIAGNOSIS — G45.9 TIA (TRANSIENT ISCHEMIC ATTACK): ICD-10-CM

## 2019-12-04 DIAGNOSIS — Z12.11 SCREEN FOR COLON CANCER: ICD-10-CM

## 2019-12-04 DIAGNOSIS — K58.0 IRRITABLE BOWEL SYNDROME WITH DIARRHEA: ICD-10-CM

## 2019-12-04 DIAGNOSIS — E11.42 TYPE 2 DIABETES MELLITUS WITH DIABETIC POLYNEUROPATHY, WITHOUT LONG-TERM CURRENT USE OF INSULIN (H): ICD-10-CM

## 2019-12-04 DIAGNOSIS — E66.01 MORBID OBESITY DUE TO EXCESS CALORIES (H): ICD-10-CM

## 2019-12-04 DIAGNOSIS — N18.2 CKD (CHRONIC KIDNEY DISEASE) STAGE 2, GFR 60-89 ML/MIN: ICD-10-CM

## 2019-12-04 DIAGNOSIS — I10 ESSENTIAL HYPERTENSION WITH GOAL BLOOD PRESSURE LESS THAN 140/90: ICD-10-CM

## 2019-12-04 PROCEDURE — 99214 OFFICE O/P EST MOD 30 MIN: CPT | Performed by: FAMILY MEDICINE

## 2019-12-04 RX ORDER — HYDROCHLOROTHIAZIDE 25 MG/1
25 TABLET ORAL DAILY
Qty: 90 TABLET | Refills: 1 | Status: SHIPPED | OUTPATIENT
Start: 2019-12-04 | End: 2020-06-03

## 2019-12-04 RX ORDER — ATENOLOL 100 MG/1
100 TABLET ORAL DAILY
Qty: 90 TABLET | Refills: 1 | Status: SHIPPED | OUTPATIENT
Start: 2019-12-04 | End: 2020-06-03

## 2019-12-04 RX ORDER — SIMVASTATIN 20 MG
TABLET ORAL
Qty: 90 TABLET | Refills: 1 | Status: SHIPPED | OUTPATIENT
Start: 2019-12-04 | End: 2020-06-03

## 2019-12-04 RX ORDER — LISINOPRIL 40 MG/1
40 TABLET ORAL DAILY
Qty: 90 TABLET | Refills: 1 | Status: SHIPPED | OUTPATIENT
Start: 2019-12-04 | End: 2020-06-03

## 2019-12-04 ASSESSMENT — MIFFLIN-ST. JEOR: SCORE: 1511.36

## 2019-12-04 NOTE — PATIENT INSTRUCTIONS
Try [employer] or Chair yoga for seniors to help with your balance.     Start walking for exercise - If walking outside,   - rain or shine - walk a block, then come home, next day walk   2 blocks , then come home ; and then add a block further from home daily - work up to 30-45minutes daily or 3-4x/week - or can work  up to  3-4 miles briskly daily.       If walking on treadmill or at  the mall, start with 5 minutes first day, then 6 minutes next day , then 7 minutes next day, then 8 minutes next day, etc.   Gradually work up to the above goals.  No stopping.      Can also try marching in place - try to have your knees come up as high to your chest as possible.  Start with 1-2 minutes at a time, and gradually add 30-60 seconds each workout. Try to work up to 15-20 minutes of walking/marching in place  Daily - great for during those days, when you are not comfortable walking outside.            Return in about 6 months (around 6/4/2020) for BP Recheck, cholesterol recheck, diabetes recheck.    Thank you so much or choosing [facility]  for your Health Care. It was a pleasure seeing you at your visit today! Please contact us with any questions or concerns you may have.                   Madina Mercado MD                              To reach your [facility] care team after hours call:   [phone]    Our clinic hours are:     Monday- 7:30 am - 7:00 pm                             Tuesday through Friday- 7:30 am - 5:00 pm                                        Saturday- 8:00 am - 12:00 pm                  Phone:  [phone]    Our pharmacy hours are:     Monday  8:00 am to 7:00 pm      Tuesday through Friday 8:00am to 6:00pm                        Saturday - 9:00 am to 1:00 pm      Sunday : Closed.              Phone:  [phone]      There is also information available at our web site:  [email or web address]    If your provider ordered any lab tests and you do  not receive the results within 10 business days, please call the clinic.    If you need a medication refill please contact your pharmacy.  Please allow 2 business days for your refill to be completed.    Our clinic offers telephone visits and e visits.  Please ask one of your team members to explain more.      Use [employer] (secure email communication and access to your chart) to send your primary care provider a message or make an appointment. Ask someone on your Team how to sign up for [employer].

## 2019-12-04 NOTE — PROGRESS NOTES
"  SUBJECTIVE:   Zulema Nixon is a 74 year old female who presents to clinic today for the following health issues:        Hospital Follow-up Visit:    Hospital/Nursing Home/IP Rehab Facility: [facility]  Date of Admission: 11/02/2019  Date of Discharge: 11/03/2019  Reason(s) for Admission: / Difficulty with speech (Primary ) - Garbled speech despite her thinking she was speaking very clearly-and word finding/number finding to enter her phone numeric password.       felt like the right lower side of her face felt numb/tingling  and wasn't working well.     Was diagnosed with a TIA. Was observed overnight at [facility].        Problems taking medications regularly:  None       Medication changes since discharge: None       Problems adhering to non-medication therapy:  None    Per Neurosurgery follow up note from 11/11/2019: \"Brief History: Per Dr. Mmoin consult note 11/03/19:  \"This is a 74 y.o. female with history of atrial fibrillation on warfarin, hypertension, hyperlipidemia, obesity, obstructive sleep apnea, diabetes mellitus, venous stasis of lower extremities, chronic kidney disease, osteoarthritis, and sensorineural hearing loss - admitted for evaluation following an transient (10min) episode of language disturbance. We are asked to see at the request of Dr. Sandoval regarding possible transient ischemic attack or other neurological event.  Assessment:   Diagnosis: Transient ischemic attack   Cause: Suspect minor event related to atrial fibrillation / cardioembolism\"     Summary of hospitalization:  CareEverywhere information obtained and reviewed  Diagnostic Tests/Treatments reviewed.  Follow up needed: none  Other Healthcare Providers Involved in Patient s Care:         Specialist appointment - none   Update since discharge: improved.     Post Discharge Medication Reconciliation: discharge medications reconciled, continue medications without change.  Plan of care communicated with patient    Had an " "echocardiogram done on 11/3/2019:   \" Final Impressions:   1. Normal LV size, normal wall thickness, normal global systolic function with an estimated EF of 60 - 65%.   2. Technically difficult exam.   3. Mildly enlarged left atrium.   4. Right ventricular cavity size is normal, global systolic RV function is normal.   5. The aortic valve is sclerotic, no stenosis and trivial regurgitation.   6. The mitral valve is sclerotic, mild mitral regurgitation.   7. The inferior vena cava is normal sized, respiratory size variation less than 50%.   8. The ascending aorta is dilated with a maximal diameter of 4.0 cm.\"    Had an MRI of brain on 11/2/2019:   \"1. No acute infarction.  2. Ovoid 15 mm extra-axial mass at the left frontal convexity is not   significantly changed within exam limitations and most compatible with a   small meningioma. Stable mild associated parenchymal edema.  This is old.   3. Stable mild chronic microvascular ischemic changes and mild diffuse   cerebral volume loss.  4. No significant narrowing of the visualized intracranial or major   cervical arteries.\"     Had a neurology consultation while in hospital and they recommended no change in medications.      Coding guidelines for this visit:  Type of Medical   Decision Making Face-to-Face Visit       within 7 Days of discharge Face-to-Face Visit        within 14 days of discharge   Moderate Complexity 67874 33099   High Complexity 60631 18150            Just had her fasting lab work done last week:     Wt Readings from Last 5 Encounters:   12/04/19 104.3 kg (230 lb)   12/02/19 104.3 kg (230 lb)   07/08/19 104.8 kg (231 lb)   03/29/19 103.4 kg (228 lb)   01/14/19 109.3 kg (241 lb)     Pt weighed 298 at her maximum about 2001.      Re: her diabetes:   Lab Results   Component Value Date    A1C 6.3 12/02/2019    A1C 6.8 11/02/2019    A1C 6.4 07/08/2019    A1C 6.7 01/14/2019    A1C 6.7 07/13/2018     CBC RESULTS:   Recent Labs   Lab Test 12/02/19  1529 "   WBC 7.1   RBC 4.53   HGB 12.5   HCT 38.6   MCV 85   MCH 27.6   MCHC 32.4   RDW 13.6        Last Comprehensive Metabolic Panel:  Sodium   Date Value Ref Range Status   12/02/2019 137 133 - 144 mmol/L Final     Potassium   Date Value Ref Range Status   12/02/2019 4.0 3.4 - 5.3 mmol/L Final     Chloride   Date Value Ref Range Status   12/02/2019 100 94 - 109 mmol/L Final     Carbon Dioxide   Date Value Ref Range Status   12/02/2019 28 20 - 32 mmol/L Final     Anion Gap   Date Value Ref Range Status   12/02/2019 9 3 - 14 mmol/L Final     Glucose   Date Value Ref Range Status   12/02/2019 122 (H) 70 - 99 mg/dL Final     Urea Nitrogen   Date Value Ref Range Status   12/02/2019 13 7 - 30 mg/dL Final     Creatinine   Date Value Ref Range Status   12/02/2019 0.64 0.52 - 1.04 mg/dL Final     GFR Estimate   Date Value Ref Range Status   12/02/2019 87 >60 mL/min/[1.73_m2] Final     Comment:     Non  GFR Calc  Starting 12/18/2018, serum creatinine based estimated GFR (eGFR) will be   calculated using the Chronic Kidney Disease Epidemiology Collaboration   (CKD-EPI) equation.       Calcium   Date Value Ref Range Status   12/02/2019 9.4 8.5 - 10.1 mg/dL Final     Bilirubin Total   Date Value Ref Range Status   12/02/2019 0.4 0.2 - 1.3 mg/dL Final     Alkaline Phosphatase   Date Value Ref Range Status   12/02/2019 53 40 - 150 U/L Final     ALT   Date Value Ref Range Status   12/02/2019 22 0 - 50 U/L Final     AST   Date Value Ref Range Status   12/02/2019 19 0 - 45 U/L Final       Lab Results   Component Value Date    ALBUMIN 3.7 12/02/2019       Recent Labs   Lab Test 12/02/19  1529 11/03/19  04/22/15  1002 11/13/14  0957   CHOL 118 98*   < > 111 128   HDL 39* 33*   < > 33* 37*   LDL 61 51   < > 55 66   TRIG 92 72   < > 114 125   CHOLHDLRATIO  --   --   --  3.4 3.5    < > = values in this interval not displayed.      Problem list and histories reviewed & adjusted, as indicated.  Additional history: as  documented    Patient Active Problem List   Diagnosis     Morbid obesity due to excess calories (H)     ARMAND (obstructive sleep apnea)     Atrial fibrillation, unspecified type (H)     Hyperlipidemia LDL goal <100- fish oil = worse IBS with diarrhea      Status post laparoscopic cholecystectomy     CKD (chronic kidney disease) stage 2, GFR 60-89 ml/min     Venous stasis of lower extremity     Advanced directives, counseling/discussion     Sensorineural hearing loss of both ears - using hearing aids     Bilateral leg edema- has been to lymphedema clinic in past     Essential hypertension with goal blood pressure less than 140/90     Type 2 diabetes mellitus with other circulatory complication, without long-term current use of insulin (H)     Long term current use of anticoagulant therapy     Onychomycosis     Type 2 diabetes mellitus with diabetic polyneuropathy, without long-term current use of insulin (H)     Irritable bowel syndrome with diarrhea     Osteoarthritis of both knees, unspecified osteoarthritis type     Meningioma (H)--left frontal - MRI 4/25/2019 = stable from 10/2018 - needs follow up MRI yearly in April      Past Surgical History:   Procedure Laterality Date     C CARDIOVERSION, ELECTIVE;INTERN  2/2007    Successful cardioversion from atrial fibrillation      C DEXA INTERPRETATION, AXIAL  8/2007    T score lumbar 3.7, femoral neck 2.8/2.0(all stable)     C DEXA INTERPRETATION, AXIAL  6/2010    T score lumbar 3.4 (marked degen changes), femoral neck 2.1/2.1 (sig dec lt femur)     C ECHO HEART XTHORACIC,COMPLETE, W/O DOPPLER  11/2006    normal LV/RV size and function, mild pulm ht(30-40mm Hg), mild TR     C ECHO HEART XTHORACIC,COMPLETE, W/O DOPPLER  10/2008    normal LV systolic function with EF 55-60%, impaired LV relaxation, mod to severe LAE     C LIGATE FALLOPIAN TUBE  1973    Tubal ligation     C ORAL SURGERY SINGLE TOOTH  04/2019     had to have left upper rear tooth removed secondary to crown  breaking /root damage      CARDIAC NUC ESHA STRESS TEST NL  2006    exercised 4 min, no inducible ischemia on ECG or perfusion images     COLONOSCOPY  2006    diverticulosis, repeat 10 years     FLEXIBLE SIGMOIDOSCOPY  10/2000      LAPAROSCOPY, SURGICAL; CHOLECYSTECTOMY      Cholecystectomy, Laparoscopic     LIGATN/STRIP LONG & SHORT SAPHEN      L varicose vein excision     REPAIR PTOSIS BILATERAL  2011     Bilateral upper eyelid blepharoplasty and internal browpexy brow ptosis repair, bilateral lower eyelid ectropion repair with snip punctoplasty     TRANSFUSION, DIRECT, BLOOD      pregnancy related       Social History     Tobacco Use     Smoking status: Never Smoker     Smokeless tobacco: Never Used   Substance Use Topics     Alcohol use: Yes     Comment: 0-2 per month     Family History   Problem Relation Age of Onset     Diabetes Mother         type 2     Hypertension Mother      Cardiovascular Mother         pulmonary embolus     Lipids Mother      Heart Disease Mother          atrial fibrillation     Diabetes Father         type 2     Cardiovascular Father         atrial fibrillation,  during an EP procedure     Cancer - colorectal Maternal Grandmother         onset age 88     Heart Disease Maternal Grandmother          age 96     Diabetes Maternal Grandfather         type 2     Diabetes Paternal Grandfather         type 2     Hypertension Sister      Lipids Sister      Lipids Brother      Hypertension Brother      Hypertension Son      Other - See Comments Cousin 68        Myotonic Dystrophy         Current Outpatient Medications   Medication Sig Dispense Refill     amLODIPine (NORVASC) 5 MG tablet Take 1 tablet (5 mg) by mouth daily 90 tablet 3     ASPIRIN 81 MG OR TABS 1 tab po QD (Once per day) 0 0     atenolol (TENORMIN) 100 MG tablet Take 1 tablet (100 mg) by mouth daily 90 tablet 1     BIOTIN 10 MG OR TABS 1 TABLET DAILY       blood glucose (JAYDE CONTOUR NEXT) test strip Use  "to test blood sugar 1 times daily or as directed. 100 strip 3     blood glucose monitoring (JAYDE MICROLET) lancets Use to test blood sugar 1 times daily or as directed. 100 each 3     CALTRATE 600 + D 600-200 MG-IU OR TABS 1 tablet twice daily 60 11     CENTRUM SILVER OR TABS ONE DAILY 3 MONTHS 1 YEAR     hydrochlorothiazide (HYDRODIURIL) 25 MG tablet Take 1 tablet (25 mg) by mouth daily 90 tablet 1     ketoconazole (NIZORAL) 2 % external cream Apply topically 2 times daily Apply to affected nails daily (Patient taking differently: Apply topically 2 times daily as needed Apply to affected nails daily) 30 g 1     lisinopril (PRINIVIL/ZESTRIL) 40 MG tablet Take 1 tablet (40 mg) by mouth daily 90 tablet 1     metFORMIN (GLUCOPHAGE) 1000 MG tablet TAKE 1 AND 1/2 TABLETS BY MOUTH WITH BREAKFAST AND TAKE 1 TABLET BY MOUTH WITH SUPPER 225 tablet 0     simvastatin (ZOCOR) 20 MG tablet TAKE ONE TABLET BY MOUTH AT BEDTIME 90 tablet 1     VITAMIN D 1000 UNIT OR CAPS 1 CAPSULE DAILY 3 MONTHS 1 YEAR     warfarin (COUMADIN) 5 MG tablet TAKE 1-2 TABLETS (5-10 MG) BY MOUTH DAILY AS INSTRUCTED 180 tablet 3     Allergies   Allergen Reactions     Tetracycline      lightheaded       Reviewed and updated as needed this visit by clinical staff       Reviewed and updated as needed this visit by Provider         ROS:  Pt states her IBS diarrhea dominant - is much better on a lower fiber diet.   Has been having some problems with balance - no falls, but would like to try PT down in [place].   Constitutional, HEENT, cardiovascular, pulmonary, GI, , musculoskeletal, neuro, skin, endocrine and psych systems are negative, except as otherwise noted.      OBJECTIVE:   /78   Pulse 75   Temp 97.7  F (36.5  C)   Ht 1.607 m (5' 3.25\")   Wt 104.3 kg (230 lb)   SpO2 99%   BMI 40.42 kg/m   There is no height or weight on file to calculate BMI.    GENERAL: healthy, alert and no distress  EYES: Eyes grossly normal to inspection, PERRL " and conjunctivae and sclerae normal  HENT: ear canals and TM's normal, nose and mouth without ulcers or lesions  NECK: no adenopathy, no asymmetry, masses, or scars and thyroid normal to palpation  RESP: lungs clear to auscultation - no rales, rhonchi or wheezes  CV: regular rate and rhythm, normal S1 S2, no S3 or S4, no murmur, click or rub, no peripheral edema and peripheral pulses strong  ABDOMEN: soft, nontender, no hepatosplenomegaly, no masses and bowel sounds normal  MS: no gross musculoskeletal defects noted, no edema  SKIN: no suspicious lesions or rashes  NEURO: Normal strength and tone, mentation intact and speech normal  PSYCH: mentation appears normal, affect normal/bright    Diagnostic Test Results:  Labs reviewed in Epic and with patient in detail   ASSESSMENT/PLAN:       ICD-10-CM    1. Hospital discharge follow-up Z09    2. TIA (transient ischemic attack)- 11/2/2019 - seen and observed overnight at [facility]  G45.9    3. Atrial fibrillation, unspecified type (H) I48.91    4. CKD (chronic kidney disease) stage 2, GFR 60-89 ml/min N18.2 hydrochlorothiazide (HYDRODIURIL) 25 MG tablet   5. Essential hypertension with goal blood pressure less than 140/90 I10 atenolol (TENORMIN) 100 MG tablet     hydrochlorothiazide (HYDRODIURIL) 25 MG tablet     lisinopril (PRINIVIL/ZESTRIL) 40 MG tablet   6. Hyperlipidemia LDL goal <100- fish oil = worse IBS with diarrhea  E78.5    7. Morbid obesity due to excess calories (H) E66.01    8. Type 2 diabetes mellitus with diabetic polyneuropathy, without long-term current use of insulin (H) E11.42 metFORMIN (GLUCOPHAGE) 1000 MG tablet   9. Type 2 diabetes mellitus with other circulatory complication, without long-term current use of insulin (H) E11.59    10. Meningioma (H)--left frontal - MRI 4/25/2019 = stable from 10/2018 and 11/2/2019- needs follow up MRI yearly  D32.9    11. Irritable bowel syndrome with diarrhea- better with lower fiber diet  K58.0    12. Balance  problems R26.89 PHYSICAL THERAPY REFERRAL   13. Screen for colon cancer Z12.11 Fecal colorectal cancer screen FIT   14. Hyperlipidemia LDL goal <100 E78.5 simvastatin (ZOCOR) 20 MG tablet   15. Type 2 diabetes mellitus with diabetic polyneuropathy, without long-term current use of insulin (H)- elevated fasting sugars currently  E11.42 metFORMIN (GLUCOPHAGE) 1000 MG tablet        Return in about 6 months (around 6/4/2020) for BP Recheck, cholesterol recheck, diabetes recheck. Please, call or return to clinic or go to the ER immediately if signs or symptoms worsen or fail to improve as anticipated.     Ok to take 1000mg tylenol 2-3x/day - max dose recommended 3000mg /24 hour period.     Pt declines referral for colonoscopy reschedule today - she's just not ready for it with recent TIA and friend's stroke.   Will do FIT test instead.     Madina Mercado MD  [facility]

## 2019-12-04 NOTE — PROGRESS NOTES
"  SUBJECTIVE:                                                    Zulema Nixon is a 74 year old female who presents to clinic today for the following health issues:        Hospital Follow-up Visit:    Hospital/Nursing Home/ Rehab Facility: [facility]  Date of Admission: 11/02/2019  Date of Discharge: 11/03/2019  Reason(s) for Admission: ***            Problems taking medications regularly:  {NONE DEFAULTED:477798::\"None\"}       Medication changes since discharge: {NONE DEFAULTED:341481::\"None\"}       Problems adhering to non-medication therapy:  {NONE DEFAULTED:561401::\"None\"}  {roomer to stop here, delete this reminder}  Summary of hospitalization:  {HOSPITAL DISCHARGE SUMMARY INFO:571155::\"[place] hospital discharge summary reviewed\"}  Diagnostic Tests/Treatments reviewed.  Follow up needed: {NONE DEFAULTED:961159::\"none\"}  Other Healthcare Providers Involved in Patient s Care:         {those currently involved after discharge:428865::\"None\"}  Update since discharge: {IMPROVED DEFAULT:595532::\"improved.\"} {include information from family, SNF, care coordination}    Post Discharge Medication Reconciliation: {ACO Med Rec (Provider):012296}.  Plan of care communicated with {Communicate Plan to:859995::\"patient\"}     Coding guidelines for this visit:  Type of Medical   Decision Making Face-to-Face Visit       within 7 Days of discharge Face-to-Face Visit        within 14 days of discharge   Moderate Complexity 46560 66882   High Complexity 86871 83620              Problem list and histories reviewed & adjusted, as indicated.  Additional history: as documented    Reviewed and updated as needed this visit by clinical staff       Reviewed and updated as needed this visit by Provider         ROS:   ROS: 12 point ROS neg other than the symptoms noted above    OBJECTIVE:                                                    There were no vitals taken for this visit.  There is no height or weight on file to calculate BMI. " "  {EXAM - NORMAL- condensed - partially selected:260457::\"GENERAL: healthy, alert, well nourished, well hydrated, no distress\",\"HENT: ear canals- normal; TMs- normal; Nose- normal; Mouth- no ulcers, no lesions\",\"NECK: no tenderness, no adenopathy, no asymmetry, no masses, no stiffness; thyroid- normal to palpation\",\"RESP: lungs clear to auscultation - no rales, no rhonchi, no wheezes\",\"CV: regular rates and rhythm, normal S1 S2, no S3 or S4 and no murmur, no click or rub -\",\"ABDOMEN: soft, no tenderness, no  hepatosplenomegaly, no masses, normal bowel sounds\"}    {Diagnostic Test Results:511198}     ASSESSMENT/PLAN:                                                    No diagnosis found.    {FOLLOW UP CLINIC OPTIONS:454956}         Madina Mercado MD    [facility]    "

## 2019-12-09 DIAGNOSIS — Z12.11 SCREEN FOR COLON CANCER: ICD-10-CM

## 2019-12-10 PROCEDURE — 82274 ASSAY TEST FOR BLOOD FECAL: CPT | Performed by: FAMILY MEDICINE

## 2019-12-12 LAB — HEMOCCULT STL QL IA: NEGATIVE

## 2020-01-01 ENCOUNTER — TRANSFERRED RECORDS (OUTPATIENT)
Dept: MULTI SPECIALTY CLINIC | Facility: CLINIC | Age: 75
End: 2020-01-01

## 2020-01-01 LAB — RETINOPATHY: NORMAL

## 2020-01-13 ENCOUNTER — ANTICOAGULATION THERAPY VISIT (OUTPATIENT)
Dept: NURSING | Facility: CLINIC | Age: 75
End: 2020-01-13
Payer: MEDICARE

## 2020-01-13 DIAGNOSIS — I48.91 ATRIAL FIBRILLATION, UNSPECIFIED TYPE (H): ICD-10-CM

## 2020-01-13 DIAGNOSIS — Z79.01 LONG TERM CURRENT USE OF ANTICOAGULANT THERAPY: ICD-10-CM

## 2020-01-13 LAB — INR POINT OF CARE: 2.3 (ref 0.86–1.14)

## 2020-01-13 PROCEDURE — 85610 PROTHROMBIN TIME: CPT | Mod: QW

## 2020-01-13 PROCEDURE — 36416 COLLJ CAPILLARY BLOOD SPEC: CPT

## 2020-01-13 NOTE — PROGRESS NOTES
ANTICOAGULATION FOLLOW-UP CLINIC VISIT    Patient Name:  Zulema Nixon  Date:  2020  Contact Type:  Face to Face    SUBJECTIVE:  Patient Findings     Comments:   The patient was assessed for diet, medication, and activity level changes, missed or extra doses, bruising or bleeding, with no problem findings.          Clinical Outcomes     Negatives:   Major bleeding event, Thromboembolic event, Anticoagulation-related hospital admission, Anticoagulation-related ED visit, Anticoagulation-related fatality    Comments:   The patient was assessed for diet, medication, and activity level changes, missed or extra doses, bruising or bleeding, with no problem findings.             OBJECTIVE    INR Protime   Date Value Ref Range Status   2020 2.3 (A) 0.86 - 1.14 Final       ASSESSMENT / PLAN  No question data found.  Anticoagulation Summary  As of 2020    INR goal:   2.0-3.0   TTR:   74.1 % (1 y)   INR used for dosin.3 (2020)   Warfarin maintenance plan:   7.5 mg (5 mg x 1.5) every Mon, Wed, Fri; 5 mg (5 mg x 1) all other days   Full warfarin instructions:   7.5 mg every Mon, Wed, Fri; 5 mg all other days   Weekly warfarin total:   42.5 mg   No change documented:   Kylah Pierce RN   Plan last modified:   Kylah Pierce RN (2019)   Next INR check:   2020   Priority:   Maintenance   Target end date:       Indications    Long term current use of anticoagulant therapy [Z79.01]  Atrial fibrillation  unspecified type (H) [I48.91]             Anticoagulation Episode Summary     INR check location:       Preferred lab:       Send INR reminders to:   [facility]    Comments:         Anticoagulation Care Providers     Provider Role Specialty Phone number    Madina Mercado MD [facility] [phone]            See the Encounter Report to view Anticoagulation Flowsheet and Dosing Calendar (Go to Encounters tab in chart review, and find the Anticoagulation  Therapy Visit)    Dosage adjustment made based on physician directed care plan.    Kylah Pierce RN

## 2020-01-20 DIAGNOSIS — I10 ESSENTIAL HYPERTENSION WITH GOAL BLOOD PRESSURE LESS THAN 140/90: ICD-10-CM

## 2020-01-20 NOTE — TELEPHONE ENCOUNTER
"Requested Prescriptions   Pending Prescriptions Disp Refills     amLODIPine (NORVASC) 5 MG tablet [Pharmacy Med Name: amLODIPine Besylate Oral Tablet 5 MG] 90 tablet 2     Sig: TAKE ONE TABLET BY MOUTH ONCE DAILY       Last Written Prescription Date:  1/14/2019  Last Fill Quantity: 90,  # refills: 3   Last office visit: 12/4/2019 with prescribing provider:     Future Office Visit:          Calcium Channel Blockers Protocol  Passed - 1/20/2020  1:24 PM        Passed - Blood pressure under 140/90 in past 12 months     BP Readings from Last 3 Encounters:   12/04/19 132/78   12/02/19 124/68   07/08/19 128/84                 Passed - Recent (12 mo) or future (30 days) visit within the authorizing provider's specialty     Patient has had an office visit with the authorizing provider or a provider within the authorizing providers department within the previous 12 mos or has a future within next 30 days. See \"Patient Info\" tab in inbasket, or \"Choose Columns\" in Meds & Orders section of the refill encounter.              Passed - Medication is active on med list        Passed - Patient is age 18 or older        Passed - No active pregnancy on record        Passed - Normal serum creatinine on file in past 12 months     Recent Labs   Lab Test 12/02/19  1529  04/25/19  0939   CR 0.64   < >  --    CREAT  --   --  0.9    < > = values in this interval not displayed.             Passed - No positive pregnancy test in past 12 months        "

## 2020-01-21 RX ORDER — AMLODIPINE BESYLATE 5 MG/1
TABLET ORAL
Qty: 90 TABLET | Refills: 1 | Status: SHIPPED | OUTPATIENT
Start: 2020-01-21 | End: 2020-07-28

## 2020-01-21 NOTE — TELEPHONE ENCOUNTER
Prescription approved per [facility] Refill Protocol.    PURVI Thorne, RN, PHN  [facility]  Office: [phone]  Fax: [phone]

## 2020-02-03 ENCOUNTER — OFFICE VISIT (OUTPATIENT)
Dept: PODIATRY | Facility: CLINIC | Age: 75
End: 2020-02-03
Payer: MEDICARE

## 2020-02-03 VITALS
HEIGHT: 63 IN | DIASTOLIC BLOOD PRESSURE: 80 MMHG | BODY MASS INDEX: 40.75 KG/M2 | SYSTOLIC BLOOD PRESSURE: 138 MMHG | WEIGHT: 230 LBS

## 2020-02-03 DIAGNOSIS — L60.3 DYSTROPHIC NAIL: ICD-10-CM

## 2020-02-03 DIAGNOSIS — M79.672 LEFT FOOT PAIN: ICD-10-CM

## 2020-02-03 DIAGNOSIS — E11.42 TYPE 2 DIABETES MELLITUS WITH DIABETIC POLYNEUROPATHY, WITHOUT LONG-TERM CURRENT USE OF INSULIN (H): Primary | ICD-10-CM

## 2020-02-03 PROCEDURE — 99203 OFFICE O/P NEW LOW 30 MIN: CPT | Mod: GW | Performed by: PODIATRIST

## 2020-02-03 ASSESSMENT — MIFFLIN-ST. JEOR: SCORE: 1511.36

## 2020-02-03 NOTE — PROGRESS NOTES
PATIENT HISTORY:   Zulema Nixon is a 74 year old female who presents to clinic for thickened left 2nd toenail. Notes it is starting to rub in shoes and be sore. Pain si 2/10. She is diabetic.     Review of Systems:  Patient denies fever, chills, rash, wound, stiffness, limping, weakness, heart burn, blood in stool, chest pain with activity, calf pain when walking, shortness of breath with activity, chronic cough, easy bleeding/bruising, swelling of ankles, excessive thirst, fatigue, depression, anxiety.  Patient admits to numbness.     PAST MEDICAL HISTORY:   Past Medical History:   Diagnosis Date     Atrial fibrillation (H) 2006    cardioverted 2007, on chronic anticoagulation      CKD (chronic kidney disease) stage 2, GFR 60-89 ml/min      with microalbuminuria     Essential hypertension, benign      Hyperlipidemia LDL goal <100 10/31/2010    fish oil = IBS with diarrhea      Morbid obesity (H)      ARMAND (obstructive sleep apnea)     C PAP     Sensorineural hearing loss of both ears     using hearing aids     Status post laparoscopic cholecystectomy     cholecystectomy for cholelithiasis     Supervision of other normal pregnancy      - vaginal, one set of twins     Tortuous colon     detected at colonoscopy  - was recommended for next testing to have CT colonography      Tubal ligation status      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)      Varicose veins of lower extremities with inflammation     excision varicose vein left lower extremity     Venous stasis of lower extremity     excision varicose vein left lower extremity         PAST SURGICAL HISTORY:   Past Surgical History:   Procedure Laterality Date     C CARDIOVERSION, ELECTIVE;INTERN  2007    Successful cardioversion from atrial fibrillation      C DEXA INTERPRETATION, AXIAL  2007    T score lumbar 3.7, femoral neck 2.8/2.0(all stable)     C DEXA INTERPRETATION, AXIAL  2010    T score lumbar 3.4  (marked degen changes), femoral neck 2.1/2.1 (sig dec lt femur)     C ECHO HEART XTHORACIC,COMPLETE, W/O DOPPLER  11/2006    normal LV/RV size and function, mild pulm ht(30-40mm Hg), mild TR     C ECHO HEART XTHORACIC,COMPLETE, W/O DOPPLER  10/2008    normal LV systolic function with EF 55-60%, impaired LV relaxation, mod to severe LAE     C LIGATE FALLOPIAN TUBE  1973    Tubal ligation     C ORAL SURGERY SINGLE TOOTH  04/2019     had to have left upper rear tooth removed secondary to crown breaking /root damage      CARDIAC NUC ESHA STRESS TEST NL  11/2006    exercised 4 min, no inducible ischemia on ECG or perfusion images     COLONOSCOPY  11/2006    diverticulosis, repeat 10 years     FLEXIBLE SIGMOIDOSCOPY  10/2000     HC LAPAROSCOPY, SURGICAL; CHOLECYSTECTOMY  1991    Cholecystectomy, Laparoscopic     LIGATN/STRIP LONG & SHORT SAPHEN  1995    L varicose vein excision     REPAIR PTOSIS BILATERAL  2/2011     Bilateral upper eyelid blepharoplasty and internal browpexy brow ptosis repair, bilateral lower eyelid ectropion repair with snip punctoplasty     TRANSFUSION, DIRECT, BLOOD      pregnancy related        MEDICATIONS:   Current Outpatient Medications:      amLODIPine (NORVASC) 5 MG tablet, TAKE ONE TABLET BY MOUTH ONCE DAILY , Disp: 90 tablet, Rfl: 1     ASPIRIN 81 MG OR TABS, 1 tab po QD (Once per day), Disp: 0, Rfl: 0     atenolol (TENORMIN) 100 MG tablet, Take 1 tablet (100 mg) by mouth daily, Disp: 90 tablet, Rfl: 1     BIOTIN 10 MG OR TABS, 1 TABLET DAILY, Disp: , Rfl:      blood glucose (JAYDE CONTOUR NEXT) test strip, Use to test blood sugar 1 times daily or as directed., Disp: 100 strip, Rfl: 3     blood glucose monitoring (JAYDE MICROLET) lancets, Use to test blood sugar 1 times daily or as directed., Disp: 100 each, Rfl: 3     CALTRATE 600 + D 600-200 MG-IU OR TABS, 1 tablet twice daily, Disp: 60, Rfl: 11     CENTRUM SILVER OR TABS, ONE DAILY, Disp: 3 MONTHS, Rfl: 1 YEAR     hydrochlorothiazide  (HYDRODIURIL) 25 MG tablet, Take 1 tablet (25 mg) by mouth daily, Disp: 90 tablet, Rfl: 1     ketoconazole (NIZORAL) 2 % external cream, Apply topically 2 times daily Apply to affected nails daily (Patient taking differently: Apply topically 2 times daily as needed Apply to affected nails daily), Disp: 30 g, Rfl: 1     lisinopril (PRINIVIL/ZESTRIL) 40 MG tablet, Take 1 tablet (40 mg) by mouth daily, Disp: 90 tablet, Rfl: 1     metFORMIN (GLUCOPHAGE) 1000 MG tablet, TAKE 1 AND 1/2 TABLETS BY MOUTH WITH BREAKFAST AND TAKE 1 TABLET BY MOUTH WITH SUPPER, Disp: 300 tablet, Rfl: 1     simvastatin (ZOCOR) 20 MG tablet, TAKE ONE TABLET BY MOUTH AT BEDTIME, Disp: 90 tablet, Rfl: 1     VITAMIN D 1000 UNIT OR CAPS, 1 CAPSULE DAILY, Disp: 3 MONTHS, Rfl: 1 YEAR     warfarin (COUMADIN) 5 MG tablet, TAKE 1-2 TABLETS (5-10 MG) BY MOUTH DAILY AS INSTRUCTED, Disp: 180 tablet, Rfl: 3     ALLERGIES:    Allergies   Allergen Reactions     Tetracycline      lightheaded        SOCIAL HISTORY:   Social History     Socioeconomic History     Marital status:      Spouse name: Paras     Number of children: 3     Years of education: 12     Highest education level: Not on file   Occupational History     Occupation: clerical     Employer: RETIRED     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     Occupation: volunteers at [employer]   Social Needs     Financial resource strain: Not on file     Food insecurity:     Worry: Not on file     Inability: Not on file     Transportation needs:     Medical: Not on file     Non-medical: Not on file   Tobacco Use     Smoking status: Never Smoker     Smokeless tobacco: Never Used   Substance and Sexual Activity     Alcohol use: Yes     Comment: 0-2 per month     Drug use: No     Sexual activity: Yes     Partners: Male     Comment: same partner since 1965, only partner   Lifestyle     Physical activity:     Days per week: Not on file     Minutes per session: Not on file     Stress: Not on file   Relationships      Social connections:     Talks on phone: Not on file     Gets together: Not on file     Attends Hoahaoism service: Not on file     Active member of club or organization: Not on file     Attends meetings of clubs or organizations: Not on file     Relationship status: Not on file     Intimate partner violence:     Fear of current or ex partner: Not on file     Emotionally abused: Not on file     Physically abused: Not on file     Forced sexual activity: Not on file   Other Topics Concern      Service No     Blood Transfusions Yes     Comment: pregnancy     Caffeine Concern No     Comment: 1-2 cups per day     Occupational Exposure No     Hobby Hazards No     Sleep Concern Yes     Comment: needing new equipment for her C pap     Stress Concern No     Weight Concern Yes     Comment: chronic obesity     Special Diet Yes     Comment: decreasing salt     Back Care No     Exercise Yes     Comment: walking 20-30 min 2 times weekly     Bike Helmet No     Comment: not ride     Seat Belt Yes     Self-Exams Yes     Parent/sibling w/ CABG, MI or angioplasty before 65F 55M? Not Asked   Social History Narrative    Lives with  and a dog.  6 grandchildren; previously did  for 3 of them.  Now volunteering in school and [employer].  Children ages 45 and twins age 42.      2012:  diagnosed with low grade prostate cancer- s/p cryotherapy surgery in 2016, then s/p radiation in 2017.         FAMILY HISTORY:   Family History   Problem Relation Age of Onset     Diabetes Mother         type 2     Hypertension Mother      Cardiovascular Mother         pulmonary embolus     Lipids Mother      Heart Disease Mother          atrial fibrillation     Diabetes Father         type 2     Cardiovascular Father         atrial fibrillation,  during an EP procedure     Cancer - colorectal Maternal Grandmother         onset age 88     Heart Disease Maternal Grandmother          age 96     Diabetes Maternal  "Grandfather         type 2     Diabetes Paternal Grandfather         type 2     Hypertension Sister      Lipids Sister      Lipids Brother      Hypertension Brother      Hypertension Son      Other - See Comments Cousin 68        Myotonic Dystrophy        EXAM:Vitals: /80   Ht 1.607 m (5' 3.25\")   Wt 104.3 kg (230 lb)   BMI 40.42 kg/m    BMI= Body mass index is 40.42 kg/m .    General appearance: Patient is alert and fully cooperative with history & exam.  No sign of distress is noted during the visit.     Psychiatric: Affect is pleasant & appropriate.  Patient appears motivated to improve health.     Respiratory: Breathing is regular & unlabored while sitting.     HEENT: Hearing is intact to spoken word.  Speech is clear.  No gross evidence of visual impairment that would impact ambulation.     Dermatologic: left 2nd toenail is thickened, discolored, dystrophic and 80% onycholysis. No redness or signs of infection noted.        Vascular: DP & PT pulses are intact & regular bilaterally.  No significant edema or varicosities noted.  CFT and skin temperature is normal to both lower extremities.      Neurologic: Lower extremity sensation is diminished via monofilament testing.       Musculoskeletal: Patient is ambulatory without assistive device or brace.  Decrease arch height.     Assessment:   Type 2 diabetes mellitus with diabetic polyneuropathy, without long-term current use of insulin (H)  Dystrophic nail  Left foot pain     Plan:  She is not having much pain. Nail was debrided with nail clipper without incident. No charge Discussed diabetic foot care. Talked about removing the nail permanently. She is going to think about it. Will call if she would like that.        Larissa Sow DPM, Podiatry/Foot and Ankle Surgery    Weight management plan: Patient was referred to their PCP to discuss a diet and exercise plan.    Recommended to Zulema Nixon to follow up with Primary Care provider regarding elevated " blood pressure.

## 2020-02-03 NOTE — LETTER
2/3/2020         RE: Zulema Nixon  [address] [id number]        Dear Colleague,    Thank you for referring your patient, Zulema Nixon, to the [facility]. Please see a copy of my visit note below.    PATIENT HISTORY:   Zulema Nixon is a 74 year old female who presents to clinic for thickened left 2nd toenail. Notes it is starting to rub in shoes and be sore. Pain si 2/10. She is diabetic.     Review of Systems:  Patient denies fever, chills, rash, wound, stiffness, limping, weakness, heart burn, blood in stool, chest pain with activity, calf pain when walking, shortness of breath with activity, chronic cough, easy bleeding/bruising, swelling of ankles, excessive thirst, fatigue, depression, anxiety.  Patient admits to numbness.     PAST MEDICAL HISTORY:   Past Medical History:   Diagnosis Date     Atrial fibrillation (H) 2006    cardioverted 2007, on chronic anticoagulation      CKD (chronic kidney disease) stage 2, GFR 60-89 ml/min      with microalbuminuria     Essential hypertension, benign      Hyperlipidemia LDL goal <100 10/31/2010    fish oil = IBS with diarrhea      Morbid obesity (H)      ARMAND (obstructive sleep apnea)     C PAP     Sensorineural hearing loss of both ears     using hearing aids     Status post laparoscopic cholecystectomy     cholecystectomy for cholelithiasis     Supervision of other normal pregnancy      - vaginal, one set of twins     Tortuous colon     detected at colonoscopy  - was recommended for next testing to have CT colonography      Tubal ligation status      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)      Varicose veins of lower extremities with inflammation     excision varicose vein left lower extremity     Venous stasis of lower extremity     excision varicose vein left lower extremity         PAST SURGICAL HISTORY:   Past Surgical History:   Procedure Laterality Date     C CARDIOVERSION,  ELECTIVE;INTERN  2/2007    Successful cardioversion from atrial fibrillation      C DEXA INTERPRETATION, AXIAL  8/2007    T score lumbar 3.7, femoral neck 2.8/2.0(all stable)     C DEXA INTERPRETATION, AXIAL  6/2010    T score lumbar 3.4 (marked degen changes), femoral neck 2.1/2.1 (sig dec lt femur)     C ECHO HEART XTHORACIC,COMPLETE, W/O DOPPLER  11/2006    normal LV/RV size and function, mild pulm ht(30-40mm Hg), mild TR     C ECHO HEART XTHORACIC,COMPLETE, W/O DOPPLER  10/2008    normal LV systolic function with EF 55-60%, impaired LV relaxation, mod to severe LAE     C LIGATE FALLOPIAN TUBE  1973    Tubal ligation     C ORAL SURGERY SINGLE TOOTH  04/2019     had to have left upper rear tooth removed secondary to crown breaking /root damage      CARDIAC NUC ESHA STRESS TEST NL  11/2006    exercised 4 min, no inducible ischemia on ECG or perfusion images     COLONOSCOPY  11/2006    diverticulosis, repeat 10 years     FLEXIBLE SIGMOIDOSCOPY  10/2000     HC LAPAROSCOPY, SURGICAL; CHOLECYSTECTOMY  1991    Cholecystectomy, Laparoscopic     LIGATN/STRIP LONG & SHORT SAPHEN  1995    L varicose vein excision     REPAIR PTOSIS BILATERAL  2/2011     Bilateral upper eyelid blepharoplasty and internal browpexy brow ptosis repair, bilateral lower eyelid ectropion repair with snip punctoplasty     TRANSFUSION, DIRECT, BLOOD      pregnancy related        MEDICATIONS:   Current Outpatient Medications:      amLODIPine (NORVASC) 5 MG tablet, TAKE ONE TABLET BY MOUTH ONCE DAILY , Disp: 90 tablet, Rfl: 1     ASPIRIN 81 MG OR TABS, 1 tab po QD (Once per day), Disp: 0, Rfl: 0     atenolol (TENORMIN) 100 MG tablet, Take 1 tablet (100 mg) by mouth daily, Disp: 90 tablet, Rfl: 1     BIOTIN 10 MG OR TABS, 1 TABLET DAILY, Disp: , Rfl:      blood glucose (JAYDE CONTOUR NEXT) test strip, Use to test blood sugar 1 times daily or as directed., Disp: 100 strip, Rfl: 3     blood glucose monitoring (JAYDE MICROLET) lancets, Use to test blood  sugar 1 times daily or as directed., Disp: 100 each, Rfl: 3     CALTRATE 600 + D 600-200 MG-IU OR TABS, 1 tablet twice daily, Disp: 60, Rfl: 11     CENTRUM SILVER OR TABS, ONE DAILY, Disp: 3 MONTHS, Rfl: 1 YEAR     hydrochlorothiazide (HYDRODIURIL) 25 MG tablet, Take 1 tablet (25 mg) by mouth daily, Disp: 90 tablet, Rfl: 1     ketoconazole (NIZORAL) 2 % external cream, Apply topically 2 times daily Apply to affected nails daily (Patient taking differently: Apply topically 2 times daily as needed Apply to affected nails daily), Disp: 30 g, Rfl: 1     lisinopril (PRINIVIL/ZESTRIL) 40 MG tablet, Take 1 tablet (40 mg) by mouth daily, Disp: 90 tablet, Rfl: 1     metFORMIN (GLUCOPHAGE) 1000 MG tablet, TAKE 1 AND 1/2 TABLETS BY MOUTH WITH BREAKFAST AND TAKE 1 TABLET BY MOUTH WITH SUPPER, Disp: 300 tablet, Rfl: 1     simvastatin (ZOCOR) 20 MG tablet, TAKE ONE TABLET BY MOUTH AT BEDTIME, Disp: 90 tablet, Rfl: 1     VITAMIN D 1000 UNIT OR CAPS, 1 CAPSULE DAILY, Disp: 3 MONTHS, Rfl: 1 YEAR     warfarin (COUMADIN) 5 MG tablet, TAKE 1-2 TABLETS (5-10 MG) BY MOUTH DAILY AS INSTRUCTED, Disp: 180 tablet, Rfl: 3     ALLERGIES:    Allergies   Allergen Reactions     Tetracycline      lightheaded        SOCIAL HISTORY:   Social History     Socioeconomic History     Marital status:      Spouse name: Paras     Number of children: 3     Years of education: 12     Highest education level: Not on file   Occupational History     Occupation: clerical     Employer: RETIRED     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     Occupation: volunteers at [employer]   Social Needs     Financial resource strain: Not on file     Food insecurity:     Worry: Not on file     Inability: Not on file     Transportation needs:     Medical: Not on file     Non-medical: Not on file   Tobacco Use     Smoking status: Never Smoker     Smokeless tobacco: Never Used   Substance and Sexual Activity     Alcohol use: Yes     Comment: 0-2 per month     Drug use: No      Sexual activity: Yes     Partners: Male     Comment: same partner since 1965, only partner   Lifestyle     Physical activity:     Days per week: Not on file     Minutes per session: Not on file     Stress: Not on file   Relationships     Social connections:     Talks on phone: Not on file     Gets together: Not on file     Attends Anglican service: Not on file     Active member of club or organization: Not on file     Attends meetings of clubs or organizations: Not on file     Relationship status: Not on file     Intimate partner violence:     Fear of current or ex partner: Not on file     Emotionally abused: Not on file     Physically abused: Not on file     Forced sexual activity: Not on file   Other Topics Concern      Service No     Blood Transfusions Yes     Comment: pregnancy     Caffeine Concern No     Comment: 1-2 cups per day     Occupational Exposure No     Hobby Hazards No     Sleep Concern Yes     Comment: needing new equipment for her C pap     Stress Concern No     Weight Concern Yes     Comment: chronic obesity     Special Diet Yes     Comment: decreasing salt     Back Care No     Exercise Yes     Comment: walking 20-30 min 2 times weekly     Bike Helmet No     Comment: not ride     Seat Belt Yes     Self-Exams Yes     Parent/sibling w/ CABG, MI or angioplasty before 65F 55M? Not Asked   Social History Narrative    Lives with  and a dog.  6 grandchildren; previously did  for 3 of them.  Now volunteering in school and [employer] shop.  Children ages 45 and twins age 42.      2012:  diagnosed with low grade prostate cancer- s/p cryotherapy surgery in 2016, then s/p radiation in 2017.         FAMILY HISTORY:   Family History   Problem Relation Age of Onset     Diabetes Mother         type 2     Hypertension Mother      Cardiovascular Mother         pulmonary embolus     Lipids Mother      Heart Disease Mother          atrial fibrillation     Diabetes Father         type 2  "    Cardiovascular Father         atrial fibrillation,  during an EP procedure     Cancer - colorectal Maternal Grandmother         onset age 88     Heart Disease Maternal Grandmother          age 96     Diabetes Maternal Grandfather         type 2     Diabetes Paternal Grandfather         type 2     Hypertension Sister      Lipids Sister      Lipids Brother      Hypertension Brother      Hypertension Son      Other - See Comments Cousin 68        Myotonic Dystrophy        EXAM:Vitals: /80   Ht 1.607 m (5' 3.25\")   Wt 104.3 kg (230 lb)   BMI 40.42 kg/m     BMI= Body mass index is 40.42 kg/m .    General appearance: Patient is alert and fully cooperative with history & exam.  No sign of distress is noted during the visit.     Psychiatric: Affect is pleasant & appropriate.  Patient appears motivated to improve health.     Respiratory: Breathing is regular & unlabored while sitting.     HEENT: Hearing is intact to spoken word.  Speech is clear.  No gross evidence of visual impairment that would impact ambulation.     Dermatologic: left 2nd toenail is thickened, discolored, dystrophic and 80% onycholysis. No redness or signs of infection noted.        Vascular: DP & PT pulses are intact & regular bilaterally.  No significant edema or varicosities noted.  CFT and skin temperature is normal to both lower extremities.      Neurologic: Lower extremity sensation is diminished via monofilament testing.       Musculoskeletal: Patient is ambulatory without assistive device or brace.  Decrease arch height.     Assessment:   Type 2 diabetes mellitus with diabetic polyneuropathy, without long-term current use of insulin (H)  Dystrophic nail  Left foot pain     Plan:  She is not having much pain. Nail was debrided with nail clipper without incident. No charge Discussed diabetic foot care. Talked about removing the nail permanently. She is going to think about it. Will call if she would like that.        Larissa LEWIS" LAKSHMI Sow, Podiatry/Foot and Ankle Surgery    Weight management plan: Patient was referred to their PCP to discuss a diet and exercise plan.    Recommended to Zulema Nixon to follow up with Primary Care provider regarding elevated blood pressure.        Again, thank you for allowing me to participate in the care of your patient.        Sincerely,        Larissa Sow DPM, Podiatry/Foot and Ankle Surgery

## 2020-02-03 NOTE — PATIENT INSTRUCTIONS
"Thank you for choosing [facility] Podiatry / Foot & Ankle Surgery!    DR. KEITH'S CLINIC SCHEDULE  MONDAY AM - [place] TUESDAY - [place]   [address] [address] [address] [address] [place] [address]   [phone] / [phone] / [phone]       WEDNESDAY - [place] FRIDAY AM - [facility]   [address]   [place] [address] [place] [address]   [phone] / [phone]       FRIDAY PM - [place] SCHEDULE SURGERY: [phone] [address] BILLING QUESTIONS: [phone]   [place] [address] AFTER HOURS: [phone]   [phone] / [phone] APPOINTMENTS: [phone]     Consumer Price Line (CPL) [phone]     FOLLOW UP:  As needed      DIABETES AND YOUR FEET  Diabetes can result in several problems in the feet including ulcers (open sores) and amputations. Two of the most important reasons why people develop foot problems when they have diabetes is : 1. Neuropathy (loss of feeling)  2. Vascular disease (loss or decrease of blood flow).    Neuropathy is a term used to describe a loss of nerve function.  Patients with diabetes are at risk of developing neuropathy if their sugars continue to run high and are above the normal value. One theory for neuropathy is that the \"extra\" sugar in the body enters the nerves and is broken down. These by-products build up in the nerve causing it to swell and impairing nerve function. Often times, this can be prevented by controlling your sugars, dieting and exercise.    When a person develops neuropathy, they usually begin to feel numbness or tingling in their feet and sometime in their legs.  Other symptoms may include painful burning or hot feet, tingling or feeling like insects or ants are crawling on your feet or legs.  If the diabetes is sever and the sugars run high for long periods of time, neuropathy can also occur in the hands.    Vascular " disease  is a term used to describe a loss or decrease in circulation (blood flow). There is a problem in getting blood and oxygen to areas that need it. Similar to neuropathy, sugars can build up in the walls of the arteries (blood vessels) and cause them to become swollen, thickened and hardened. This decreases the amount of blood that can go to an area that needs it. Though this is common in the legs of diabetic patients, it can also affect other arteries (blood vessels) in the body such as in the heart and eyes.    In the legs, vascular disease usually results in cramping. Patients who develop leg cramps after walking the same distance every time (i.e. One block, half a mile, ect.) need to let their doctors know so that their circulation may be checked. Cramps causing severe pain in the feet and/or legs while sleeping and the cramps go away when you stand or hang your legs off the side of the bed, may also be a sign of poor blood circulation.  Occasional cramping in cold weather or on rare occasions with activity may not be due to poor circulation, but you should inform your doctor.    PREVENTION OF THESE DISEASES  The key to prevention is good blood sugar control. Poor blood sugar control is a big reason many of these problems start. Physical activity (exercise) is a very good way to help decrease your blood sugars. Exercise can lower your blood sugar, blood pressure, and cholesterol. It also reduces your risk for heart disease and stroke, relieves stress, and strengthens your heart, muscles and bones.  In addition, regular activity helps insulin work better, improves your blood circulation, and keeps your joints flexible. If you're trying to lose weight, a combination of exercise and wise food choices can help you reach your target weight and maintain it.      PAIN MANAGEMENT  1.Blood Sugar Control - Most important  2. Medications such as:  Amytriptylline, duloxetine, gabapentin, lyrica, tramadol  3.  Nutritional therapy:  Vitamin B6 (100mg daily), Vitamin B12 (75mcg daily), Vitamin D 2000 IU daily), Alpha-Lipoic Acid (600-1800mg daily), Acetyl-L-Carnitine (500-1000mg TID, L-methyl folate (1500mcg daily)    ** Metformin can block Vitamin B6 and B12 so it is important to supplement**    FOOT CARE RECOMMENDATIONS   1. Wash your feet with lukewarm water and a mild soap and then dry them thoroughly, especially between the toes.     2. Examine your feet daily looking for cuts, corns, blisters, cracks, ect, especially after wearing new shoes. Make sure to look between your toes. If you cannot see the bottom of your feet, set a mirror on the floor and hold your foot over it, or ask a spouse, friend or family member to examine your feet for you. Contact your doctor immediately if new problems are noted or if sores are not healing.     3. Immediately apply moisturizer to the tops and bottoms of your feet, avoiding areas between the toes. Hand lotion (Intesive Care, Shari, Eucerin, Neutrogena, Curel, ect) is sufficient unless your doctor prescribes a medicated lotion. Apply sunscreen to your feet when going swimming outside.     4. Use clean comfortable shoes, wear white socks (if you have any bleeding or drainage, you will see it on white socks). Socks should not have thick seams or cut off the circulation around the leg. Break in new shoes slowly and rotate with older shoes until broken in. Check the inside of your shoes with your hand to look for areas of irritation or objects that may have fallen into your shoes.       5. Keep slippers by the side of your bed for use during the night.     6.  Shoes should be fitted by a professional and should not cause areas of irritation.  Check your feet regularly when wearing a new pair of shoes and replace them as needed.     7.  Talk to your doctor about proper exercise. Exercise and stretching stimulate blood flow to your feet and maintain proper glucose levels.     8.  Monitor  your blood glucose level as instructed by your doctor. Notify your doctor immediately if your blood sugar is abnormally high or low.    9. Cut your nails straight across, but then gently round any sharp edges with a cardboard nail file. If you have neuropathy, peripheral vascular disease or cannot see that well to trim your own toenails contact [facility] ([phone]) or [facility] ([phone]).      THINGS TO AVOID DOING   1.  Do not soak your feet if you have an open sore. Use only lukewarm water and always check the temperature with your hand as hot water can easily burn your feet.       2.  Never use a hot water bottle or heating pad on your feet. Also do not apply cold compresses to your feet. With decreased sensation, you could burn or freeze your feet.       3.  Do not apply any of these to your feet:    -  Over the counter medicine for corns or warts    -  Harsh chemicals like boric acid    -  Do not self-treat corns, cuts, blisters or infections. Always consult your doctor.       4.  Do not wear sandals, slippers or walk barefoot, especially on hot sand or concrete or other harsh surfaces.     5.  If you smoke, stop!!!            ROUTINE FOOT CARE (NAIL TRIMMING / CALLUSES)    Go to afcna.org ([employer]) and search for providers near you.  Otherwise, this is a list we have complied of  recommended locations/providers in [place].    [facility]   [phone]   [facility]  [phone]  [email or web address]   [facility]  [phone]  [place] + [place] mile radius [facility]  [phone]  [facility]   Jillian Clark, DPM  [address] [place] [address] [phone] Donte Harper, SHAHEEDM  [address] [place] [address] [phone]   [facility]  [phone] [address] [place] [address]  [facility]  Dr. Sterling Gonsalves  [phone]  [place], [place]   [facility]  [phone]  [facility]  Locations  (does not take BCBS) FYI:  *Some providers accept insurance while others are out of pocket. Please contact them for details*         BODY WEIGHT AND YOUR FEET  The following information is included in the after visit summary for all patients. Body weight can be a sensitive issue to discuss in clinic, but we think the following information is very important. Although we focus on the feet and ankles, we do support the overall health of our patients.     Many things can cause foot and ankle problems. Foot structure, activity level, foot mechanics and injuries are common causes of pain. One very important issue that often goes unmentioned, is body weight. Extra weight can cause increased stress on muscles, ligaments, bones and tendons. Sometimes just a few extra pounds is all it takes to put one over her/his threshold. Without reducing that stress, it can be difficult to alleviate pain. As Foot & Ankle specialists, our job is addressing the lower extremity problem and possible causes. Regarding extra body weight, we encourage patients to discuss diet and weight management plans with their primary care doctors. It is this team approach that gives you the best opportunity for pain relief and getting you back on your feet.      [facility] has a Comprehensive Weight Management Program. This program includes counseling, education, non-surgical and surgical approaches to weight loss. If you are interested in learning more either talk to you primary care provider or call [phone].

## 2020-02-16 ENCOUNTER — HEALTH MAINTENANCE LETTER (OUTPATIENT)
Age: 75
End: 2020-02-16

## 2020-02-20 ENCOUNTER — ANTICOAGULATION THERAPY VISIT (OUTPATIENT)
Dept: NURSING | Facility: CLINIC | Age: 75
End: 2020-02-20
Payer: MEDICARE

## 2020-02-20 DIAGNOSIS — Z79.01 LONG TERM CURRENT USE OF ANTICOAGULANT THERAPY: ICD-10-CM

## 2020-02-20 DIAGNOSIS — I48.91 ATRIAL FIBRILLATION, UNSPECIFIED TYPE (H): ICD-10-CM

## 2020-02-20 LAB — INR POINT OF CARE: 2.2 (ref 0.86–1.14)

## 2020-02-20 PROCEDURE — 85610 PROTHROMBIN TIME: CPT | Mod: QW

## 2020-02-20 PROCEDURE — 36416 COLLJ CAPILLARY BLOOD SPEC: CPT

## 2020-02-20 NOTE — PROGRESS NOTES
ANTICOAGULATION FOLLOW-UP CLINIC VISIT    Patient Name:  Zulema Nixon  Date:  2020  Contact Type:  Face to Face    SUBJECTIVE:  Patient Findings     Comments:   The patient was assessed for diet, medication, and activity level changes, missed or extra doses, bruising or bleeding, with no problem findings.          Clinical Outcomes     Negatives:   Major bleeding event, Thromboembolic event, Anticoagulation-related hospital admission, Anticoagulation-related ED visit, Anticoagulation-related fatality    Comments:   The patient was assessed for diet, medication, and activity level changes, missed or extra doses, bruising or bleeding, with no problem findings.             OBJECTIVE    INR Protime   Date Value Ref Range Status   2020 2.2 (A) 0.86 - 1.14 Final       ASSESSMENT / PLAN  INR assessment THER    Recheck INR In: 6 WEEKS    INR Location Clinic      Anticoagulation Summary  As of 2020    INR goal:   2.0-3.0   TTR:   75.0 % (1 y)   INR used for dosin.2 (2020)   Warfarin maintenance plan:   7.5 mg (5 mg x 1.5) every Mon, Wed, Fri; 5 mg (5 mg x 1) all other days   Full warfarin instructions:   7.5 mg every Mon, Wed, Fri; 5 mg all other days   Weekly warfarin total:   42.5 mg   No change documented:   Torie Perales RN   Plan last modified:   Kylah Pierce RN (2019)   Next INR check:   2020   Priority:   Maintenance   Target end date:       Indications    Long term current use of anticoagulant therapy [Z79.01]  Atrial fibrillation  unspecified type (H) [I48.91]             Anticoagulation Episode Summary     INR check location:       Preferred lab:       Send INR reminders to:   [facility]    Comments:         Anticoagulation Care Providers     Provider Role Specialty Phone number    Madina Mercado MD [facility] [phone]            See the Encounter Report to view Anticoagulation Flowsheet and Dosing Calendar (Go to Encounters tab  in chart review, and find the Anticoagulation Therapy Visit)    Dosage adjustment made based on physician directed care plan.    Torie Perales RN

## 2020-03-24 DIAGNOSIS — I48.91 ATRIAL FIBRILLATION, UNSPECIFIED TYPE (H): Primary | ICD-10-CM

## 2020-03-24 DIAGNOSIS — Z79.01 LONG TERM CURRENT USE OF ANTICOAGULANT THERAPY: ICD-10-CM

## 2020-04-02 ENCOUNTER — ANTICOAGULATION THERAPY VISIT (OUTPATIENT)
Dept: FAMILY MEDICINE | Facility: CLINIC | Age: 75
End: 2020-04-02

## 2020-04-02 DIAGNOSIS — I48.91 ATRIAL FIBRILLATION, UNSPECIFIED TYPE (H): ICD-10-CM

## 2020-04-02 DIAGNOSIS — Z79.01 LONG TERM CURRENT USE OF ANTICOAGULANT THERAPY: ICD-10-CM

## 2020-04-02 LAB
CAPILLARY BLOOD COLLECTION: NORMAL
INR BLD: 1.9 (ref 0.86–1.14)

## 2020-04-02 PROCEDURE — 36416 COLLJ CAPILLARY BLOOD SPEC: CPT | Performed by: FAMILY MEDICINE

## 2020-04-02 PROCEDURE — 85610 PROTHROMBIN TIME: CPT | Mod: QW | Performed by: FAMILY MEDICINE

## 2020-04-02 NOTE — PROGRESS NOTES
ANTICOAGULATION FOLLOW-UP CLINIC VISIT    Patient Name:  Zulema Nixon  Date:  2020  Contact Type:  Telephone/ Detailed VM left with dosing instructions and recheck date. I asked that she call back to schedule lab only INR appointment in 2 weeks.    SUBJECTIVE:  Patient Findings     Comments:   The patient was assessed for diet, medication, and activity level changes, missed or extra doses, bruising or bleeding, with no problem findings.          Clinical Outcomes     Negatives:   Major bleeding event, Thromboembolic event, Anticoagulation-related hospital admission, Anticoagulation-related ED visit, Anticoagulation-related fatality    Comments:   The patient was assessed for diet, medication, and activity level changes, missed or extra doses, bruising or bleeding, with no problem findings.             OBJECTIVE    INR Point of Care   Date Value Ref Range Status   2020 1.9 (H) 0.86 - 1.14 Final     Comment:     This test is intended for monitoring Coumadin therapy.  Results are not   accurate in patients with prolonged INR due to factor deficiency.         ASSESSMENT / PLAN  INR assessment SUB    Recheck INR In: 2 WEEKS    INR Location Clinic      Anticoagulation Summary  As of 2020    INR goal:   2.0-3.0   TTR:   77.5 % (1 y)   INR used for dosin.9! (2020)   Warfarin maintenance plan:   7.5 mg (5 mg x 1.5) every Mon, Wed, Fri; 5 mg (5 mg x 1) all other days   Full warfarin instructions:   : 7.5 mg; Otherwise 7.5 mg every Mon, Wed, Fri; 5 mg all other days   Weekly warfarin total:   42.5 mg   Plan last modified:   Kylah Pierce RN (2019)   Next INR check:   2020   Priority:   Maintenance   Target end date:       Indications    Long term current use of anticoagulant therapy [Z79.01]  Atrial fibrillation  unspecified type (H) [I48.91]             Anticoagulation Episode Summary     INR check location:       Preferred lab:       Send INR reminders to:   [facility]     Comments:         Anticoagulation Care Providers     Provider Role Specialty Phone number    Madina Mercado MD [facility] [phone]            See the Encounter Report to view Anticoagulation Flowsheet and Dosing Calendar (Go to Encounters tab in chart review, and find the Anticoagulation Therapy Visit)    Dosage adjustment made based on physician directed care plan.    Torie Perales, RN

## 2020-04-16 ENCOUNTER — ANTICOAGULATION THERAPY VISIT (OUTPATIENT)
Dept: FAMILY MEDICINE | Facility: CLINIC | Age: 75
End: 2020-04-16

## 2020-04-16 DIAGNOSIS — I48.91 ATRIAL FIBRILLATION, UNSPECIFIED TYPE (H): ICD-10-CM

## 2020-04-16 DIAGNOSIS — Z79.01 LONG TERM CURRENT USE OF ANTICOAGULANT THERAPY: ICD-10-CM

## 2020-04-16 LAB
CAPILLARY BLOOD COLLECTION: NORMAL
INR BLD: 1.6 (ref 0.86–1.14)

## 2020-04-16 PROCEDURE — 85610 PROTHROMBIN TIME: CPT | Mod: QW | Performed by: FAMILY MEDICINE

## 2020-04-16 PROCEDURE — 36416 COLLJ CAPILLARY BLOOD SPEC: CPT | Performed by: FAMILY MEDICINE

## 2020-04-16 NOTE — PROGRESS NOTES
ANTICOAGULATION FOLLOW-UP CLINIC VISIT    Patient Name:  Zulema Nixon  Date:  2020  Contact Type:  Telephone/ with Zulema    SUBJECTIVE:  Patient Findings     Comments:   The patient was assessed for diet, medication, and activity level changes, missed or extra doses, bruising or bleeding, with no problem findings.          Clinical Outcomes     Negatives:   Major bleeding event, Thromboembolic event, Anticoagulation-related hospital admission, Anticoagulation-related ED visit, Anticoagulation-related fatality    Comments:   The patient was assessed for diet, medication, and activity level changes, missed or extra doses, bruising or bleeding, with no problem findings.             OBJECTIVE    INR Point of Care   Date Value Ref Range Status   2020 1.6 (H) 0.86 - 1.14 Final     Comment:     This test is intended for monitoring Coumadin therapy.  Results are not   accurate in patients with prolonged INR due to factor deficiency.         ASSESSMENT / PLAN  INR assessment SUB    Recheck INR In: 2 WEEKS    INR Location Clinic      Anticoagulation Summary  As of 2020    INR goal:   2.0-3.0   TTR:   74.6 % (1 y)   INR used for dosin.6! (2020)   Warfarin maintenance plan:   5 mg (5 mg x 1) every Tue, Thu; 7.5 mg (5 mg x 1.5) all other days   Full warfarin instructions:   : 10 mg; Otherwise 5 mg every Tue, Thu; 7.5 mg all other days   Weekly warfarin total:   47.5 mg   Plan last modified:   Torie Perales, RN (2020)   Next INR check:   2020   Priority:   Maintenance   Target end date:       Indications    Long term current use of anticoagulant therapy [Z79.01]  Atrial fibrillation  unspecified type (H) [I48.91]             Anticoagulation Episode Summary     INR check location:       Preferred lab:       Send INR reminders to:   [facility]    Comments:         Anticoagulation Care Providers     Provider Role Specialty Phone number    Madina Mercado MD Responsible  [facility] [phone]            See the Encounter Report to view Anticoagulation Flowsheet and Dosing Calendar (Go to Encounters tab in chart review, and find the Anticoagulation Therapy Visit)    Dosage adjustment made based on physician directed care plan.      Decision today made to increase maintenance dose by 10 % per protocol.    New maintenance dose is 47.5 mg per week.    Patient verbalized understanding and agreed with plan.            Torie Perales RN

## 2020-04-30 ENCOUNTER — ANTICOAGULATION THERAPY VISIT (OUTPATIENT)
Dept: FAMILY MEDICINE | Facility: CLINIC | Age: 75
End: 2020-04-30

## 2020-04-30 DIAGNOSIS — Z79.01 LONG TERM CURRENT USE OF ANTICOAGULANT THERAPY: ICD-10-CM

## 2020-04-30 DIAGNOSIS — I48.91 ATRIAL FIBRILLATION, UNSPECIFIED TYPE (H): ICD-10-CM

## 2020-04-30 LAB
CAPILLARY BLOOD COLLECTION: NORMAL
INR BLD: 2.3 (ref 0.86–1.14)

## 2020-04-30 PROCEDURE — 85610 PROTHROMBIN TIME: CPT | Mod: QW | Performed by: FAMILY MEDICINE

## 2020-04-30 PROCEDURE — 36416 COLLJ CAPILLARY BLOOD SPEC: CPT | Performed by: FAMILY MEDICINE

## 2020-04-30 NOTE — PROGRESS NOTES
ANTICOAGULATION FOLLOW-UP CLINIC VISIT    Patient Name:  Zulema Nixon  Date:  2020  Contact Type:  Telephone/ Detailed VM left with dosing instructions and recheck date    SUBJECTIVE:  Patient Findings     Comments:   The patient was assessed for diet, medication, and activity level changes, missed or extra doses, bruising or bleeding, with no problem findings.          Clinical Outcomes     Negatives:   Major bleeding event, Thromboembolic event, Anticoagulation-related hospital admission, Anticoagulation-related ED visit, Anticoagulation-related fatality    Comments:   The patient was assessed for diet, medication, and activity level changes, missed or extra doses, bruising or bleeding, with no problem findings.             OBJECTIVE    INR Point of Care   Date Value Ref Range Status   2020 2.3 (H) 0.86 - 1.14 Final     Comment:     This test is intended for monitoring Coumadin therapy.  Results are not   accurate in patients with prolonged INR due to factor deficiency.         ASSESSMENT / PLAN  INR assessment THER    Recheck INR In: 4 WEEKS    INR Location Clinic      Anticoagulation Summary  As of 2020    INR goal:   2.0-3.0   TTR:   72.4 % (1 y)   INR used for dosin.3 (2020)   Warfarin maintenance plan:   5 mg (5 mg x 1) every Tue, Thu; 7.5 mg (5 mg x 1.5) all other days   Full warfarin instructions:   5 mg every Tue, Thu; 7.5 mg all other days   Weekly warfarin total:   47.5 mg   No change documented:   Torie Perales, RN   Plan last modified:   Torie Perales RN (2020)   Next INR check:   2020   Priority:   Maintenance   Target end date:       Indications    Long term current use of anticoagulant therapy [Z79.01]  Atrial fibrillation  unspecified type (H) [I48.91]             Anticoagulation Episode Summary     INR check location:       Preferred lab:       Send INR reminders to:   [facility]    Comments:         Anticoagulation Care Providers     Provider  Role Specialty Phone number    Madina Mercado MD [facility] [phone]            See the Encounter Report to view Anticoagulation Flowsheet and Dosing Calendar (Go to Encounters tab in chart review, and find the Anticoagulation Therapy Visit)    Dosage adjustment made based on physician directed care plan.    Torie Perales, RN

## 2020-05-27 ENCOUNTER — ANTICOAGULATION THERAPY VISIT (OUTPATIENT)
Dept: FAMILY MEDICINE | Facility: CLINIC | Age: 75
End: 2020-05-27

## 2020-05-27 DIAGNOSIS — I48.91 ATRIAL FIBRILLATION, UNSPECIFIED TYPE (H): ICD-10-CM

## 2020-05-27 DIAGNOSIS — Z79.01 LONG TERM CURRENT USE OF ANTICOAGULANT THERAPY: ICD-10-CM

## 2020-05-27 LAB
CAPILLARY BLOOD COLLECTION: NORMAL
INR BLD: 2.6 (ref 0.86–1.14)

## 2020-05-27 PROCEDURE — 36416 COLLJ CAPILLARY BLOOD SPEC: CPT | Performed by: FAMILY MEDICINE

## 2020-05-27 PROCEDURE — 85610 PROTHROMBIN TIME: CPT | Mod: QW | Performed by: FAMILY MEDICINE

## 2020-05-27 NOTE — PROGRESS NOTES
ANTICOAGULATION FOLLOW-UP CLINIC VISIT    Patient Name:  Zulema Nixon  Date:  2020  Contact Type:  Telephone/ with pt     SUBJECTIVE:  Patient Findings     Comments:   The patient was assessed for diet, medication, and activity level changes, missed or extra doses, bruising or bleeding, with no problem findings.          Clinical Outcomes     Negatives:   Major bleeding event, Thromboembolic event, Anticoagulation-related hospital admission, Anticoagulation-related ED visit, Anticoagulation-related fatality    Comments:   The patient was assessed for diet, medication, and activity level changes, missed or extra doses, bruising or bleeding, with no problem findings.             OBJECTIVE    Recent labs: (last 7 days)     20  0948   INR 2.6*       ASSESSMENT / PLAN  No question data found.  Anticoagulation Summary  As of 2020    INR goal:   2.0-3.0   TTR:   72.4 % (1 y)   INR used for dosin.6 (2020)   Warfarin maintenance plan:   5 mg (5 mg x 1) every Tue, Thu; 7.5 mg (5 mg x 1.5) all other days   Full warfarin instructions:   5 mg every Tue, Thu; 7.5 mg all other days   Weekly warfarin total:   47.5 mg   No change documented:   Kylah Pierce RN   Plan last modified:   Torie Perales RN (2020)   Next INR check:   2020   Priority:   Maintenance   Target end date:       Indications    Long term current use of anticoagulant therapy [Z79.01]  Atrial fibrillation  unspecified type (H) [I48.91]             Anticoagulation Episode Summary     INR check location:       Preferred lab:       Send INR reminders to:   [facility]    Comments:         Anticoagulation Care Providers     Provider Role Specialty Phone number    Madina Mercado MD [facility] [phone]            See the Encounter Report to view Anticoagulation Flowsheet and Dosing Calendar (Go to Encounters tab in chart review, and find the Anticoagulation Therapy Visit)    Dosage  adjustment made based on physician directed care plan.    Kylah Pierce RN

## 2020-06-03 ENCOUNTER — VIRTUAL VISIT (OUTPATIENT)
Dept: FAMILY MEDICINE | Facility: CLINIC | Age: 75
End: 2020-06-03
Payer: MEDICARE

## 2020-06-03 DIAGNOSIS — I48.19 PERSISTENT ATRIAL FIBRILLATION (H): ICD-10-CM

## 2020-06-03 DIAGNOSIS — E11.42 TYPE 2 DIABETES MELLITUS WITH DIABETIC POLYNEUROPATHY, WITHOUT LONG-TERM CURRENT USE OF INSULIN (H): Primary | ICD-10-CM

## 2020-06-03 DIAGNOSIS — I10 ESSENTIAL HYPERTENSION WITH GOAL BLOOD PRESSURE LESS THAN 140/90: ICD-10-CM

## 2020-06-03 DIAGNOSIS — K58.0 IRRITABLE BOWEL SYNDROME WITH DIARRHEA: ICD-10-CM

## 2020-06-03 DIAGNOSIS — E78.5 HYPERLIPIDEMIA LDL GOAL <100: ICD-10-CM

## 2020-06-03 DIAGNOSIS — N18.2 CKD (CHRONIC KIDNEY DISEASE) STAGE 2, GFR 60-89 ML/MIN: ICD-10-CM

## 2020-06-03 DIAGNOSIS — E11.42 TYPE 2 DIABETES MELLITUS WITH DIABETIC POLYNEUROPATHY, WITHOUT LONG-TERM CURRENT USE OF INSULIN (H): ICD-10-CM

## 2020-06-03 DIAGNOSIS — Z86.73 PERSONAL HISTORY OF TIA (TRANSIENT ISCHEMIC ATTACK): ICD-10-CM

## 2020-06-03 PROCEDURE — 99442 ZZC PHYSICIAN TELEPHONE EVALUATION 11-20 MIN: CPT | Performed by: FAMILY MEDICINE

## 2020-06-03 RX ORDER — CHOLESTYRAMINE 4 G/9G
1 POWDER, FOR SUSPENSION ORAL 2 TIMES DAILY WITH MEALS
Qty: 120 EACH | Refills: 11 | Status: SHIPPED | OUTPATIENT
Start: 2020-06-03 | End: 2020-11-04

## 2020-06-03 RX ORDER — WARFARIN SODIUM 5 MG/1
TABLET ORAL
Qty: 180 TABLET | Refills: 3 | Status: SHIPPED | OUTPATIENT
Start: 2020-06-03 | End: 2021-05-03

## 2020-06-03 RX ORDER — SIMVASTATIN 20 MG
TABLET ORAL
Qty: 90 TABLET | Refills: 1 | Status: SHIPPED | OUTPATIENT
Start: 2020-06-03 | End: 2020-11-04

## 2020-06-03 RX ORDER — LISINOPRIL 40 MG/1
40 TABLET ORAL DAILY
Qty: 90 TABLET | Refills: 1 | Status: SHIPPED | OUTPATIENT
Start: 2020-06-03 | End: 2020-11-04

## 2020-06-03 RX ORDER — ATENOLOL 100 MG/1
100 TABLET ORAL DAILY
Qty: 90 TABLET | Refills: 1 | Status: SHIPPED | OUTPATIENT
Start: 2020-06-03 | End: 2020-11-04

## 2020-06-03 RX ORDER — HYDROCHLOROTHIAZIDE 25 MG/1
25 TABLET ORAL DAILY
Qty: 90 TABLET | Refills: 1 | Status: SHIPPED | OUTPATIENT
Start: 2020-06-03 | End: 2020-11-04

## 2020-06-03 NOTE — PATIENT INSTRUCTIONS
Return in about 5 days (around 6/8/2020) for cholesterol recheck, Lab Work - . fasting lab appt made. Recheck with me again in 5-6 months for annual medical physical and in person follow up on above medical problems. Future appt made for 11/4/2020 at 0800.     Look into [employer] on YouTube  for help with balance, etc. Try to walk Start walking for exercise - If walking outside,   - rain or shine - walk a block, then come home, next day walk   2 blocks , then come home ; and then add a block further from home daily - work up to 30-45minutes daily or 3-4x/week - or can work  up to  3-4 miles briskly daily.       If walking on treadmill or at  the mall, start with 5 minutes first day, then 6 minutes next day , then 7 minutes next day, then 8 minutes next day, etc.   Gradually work up to the above goals.  No stopping.      Can also try marching in place - try to have your knees come up as high to your chest as possible.  Start with 1-2 minutes at a time, and gradually add 30-60 seconds each workout. Try to work up to 15-20 minutes of walking/marching in place  Daily - great for during those days, when you are not comfortable walking outside.          Thank you so much for doing a telephone visit with us today. And, again, thank you  for choosing our [facility].  Please contact us with any questions that you may have.   We appreciate the opportunity to serve you now and look forward to supporting your healthcare needs for a long time to come!    Our clinic for the foreseeable future and until further notice , will continue to offer virtual visits, including telephone visits, video visits,  and e-visits, especially during this period of COVID-19 coronavirus pandemic.  Please call to schedule another one if you need it!        Most Sincerely,     Madina Mercado MD                                              To reach your [facility] care team  after hours call:   [phone]    PLEASE NOTE OUR HOURS HAVE CHANGED secondary to COVID-19 coronavirus pandemic, as we are trying to minimize patient exposure to the virus,  which is now widespread in the state.  These hours may change with very little notice.  We apologize for any inconvenience.       Our current in person clinic hours are:  Monday- Friday:  9:00am - 4:00pm                                                          Saturday and Sunday : Closed to in person visits    We have telephone and virtual visit times available between 7:40am - 6pm on Mondays,                                                      and 7:40am - 5pm Tuesdays through Fridays.                  Phone:  [phone]    Our pharmacy hours:   Monday  9:00 am to 6:00 pm              Tuesday through Friday 9:00am to 5:00pm                        Saturday - 9:00 am to 12 noon       Sunday : Closed.              Phone:  [phone]      There is also information available at our web site:  [email or web address]    If your provider ordered any lab tests and you do not receive the results within 10 business days, please call the clinic.    If you need a medication refill please contact your pharmacy.  Please allow 2 business days for your refill to be completed.    Our clinic offers telephone visits and e visits.  Please ask one of your team members to explain more.      Use [employer] (secure email communication and access to your chart) to send your primary care provider a message or make an appointment. Ask someone on your Team how to sign up for [employer].     Pharmacy is drive-thru only at this time secondary to the COVID-19 coronavirus pandemic, as we are trying to minimize patient exposure to the virus,  which is now widespread in the state.        There is also information available at our web site:  [email or web address]    If your provider ordered any lab tests and you do not receive the results within 10 business days, please call the  clinic.    If you need a medication refill please contact your pharmacy.  Please allow 2 business days for your refill to be completed.

## 2020-06-03 NOTE — PROGRESS NOTES
"  SUBJECTIVE:                                                    Zulema Nixon is a 74 year old female who is being evaluated via a telephone visit secondary to COVID-19 coronavirus pandemic, as we are trying to minimize patient exposure to the virus,  which is now widespread in the state.      The patient has been notified of following:     \"This telephone visit will be conducted via a call between you and your physician/provider. We have found that certain health care needs can be provided without the need for a physical exam.  This service lets us provide the care you need with a short phone conversation.  If a prescription is necessary we can send it directly to your pharmacy.  If lab work is needed we can place an order for that and you can then stop by our lab to have the test done at a later time.    Telephone visits are billed at different rates depending on your insurance coverage. During this emergency period, for some insurers they may be billed the same as an in-person visit.  Please reach out to your insurance provider with any questions.    If during the course of the call the physician/provider feels a telephone visit is not appropriate, you will not be charged for this service.\"     Patient has given verbal consent for Telephone visit?  Yes    Zulema Nixon complains of need to follow up on her diabetes, lipids and blood pressure.       ALLERGIES  Patient has no known allergies.    Diabetes Follow-up:     How often are you checking your blood sugar? A few times a week better than previous. - usually around 120 in the am fasting.   What time of day are you checking your blood sugars (select all that apply)?  Before meals  Have you had any blood sugars above 200?  No  Have you had any blood sugars below 70?  No    What symptoms do you notice when your blood sugar is low?  None    What concerns do you have today about your diabetes? None     Do you have any of these symptoms? (Select all that apply)  No " numbness or tingling in feet.  No redness, sores or blisters on feet.  No complaints of excessive thirst.  No reports of blurry vision.  No significant changes to weight.    Have you had a diabetic eye exam in the last 12 months? Yes- Date of last eye exam: june 2019,  Location: [facility]  Walking daily. Slowly, but surely.               Hyperlipidemia Follow-Up:       Are you regularly taking any medication or supplement to lower your cholesterol?   Yes- simvistatin    Are you having muscle aches or other side effects that you think could be caused by your cholesterol lowering medication?  Yes- muscle aches. unsure if its from meds or arthritis     Recent Labs   Lab Test 12/02/19  1529 11/03/19  04/22/15  1002 11/13/14  0957   CHOL 118 98*   < > 111 128   HDL 39* 33*   < > 33* 37*   LDL 61 51   < > 55 66   TRIG 92 72   < > 114 125   CHOLHDLRATIO  --   --   --  3.4 3.5    < > = values in this interval not displayed.        Hypertension Follow-up:   Has a BP cuff at home, but pt never really checks hers.  has it downstairs.     Do you check your blood pressure regularly outside of the clinic? No     Are you following a low salt diet? Yes    Are your blood pressures ever more than 140 on the top number (systolic) OR more   than 90 on the bottom number (diastolic), for example 140/90? No    BP Readings from Last 2 Encounters:   02/03/20 138/80   12/04/19 132/78     Hemoglobin A1C (%)   Date Value   12/02/2019 6.3 (H)   11/02/2019 6.8 (A)     LDL Cholesterol Calculated (mg/dL)   Date Value   12/02/2019 61   11/03/2019 51         How many servings of fruits and vegetables do you eat daily?  4 or more    On average, how many sweetened beverages do you drink each day (Examples: soda, juice, sweet tea, etc.  Do NOT count diet or artificially sweetened beverages)?   0    How many days per week do you exercise enough to make your heart beat faster? 3 or less does not exercise    How many minutes a day do you exercise  enough to make your heart beat faster? 9 or less    How many days per week do you miss taking your medication? 0    IBS - Diarrhea dominant:   Had a bad day last week = very loose stools x 7 in one day, then formed the next day without warning.   Requests rx for cholestyramine.   Discussed cholesterol binding mechanism.     TIA in 11/2019 - seen at [facility] in [place].  Had echocardiogram and carotid US = all normal.  MRI reassuring.         Patient Active Problem List   Diagnosis     Morbid obesity due to excess calories (H)     ARMAND (obstructive sleep apnea)     Atrial fibrillation, unspecified type (H)     Hyperlipidemia LDL goal <100- fish oil = worse IBS with diarrhea      Status post laparoscopic cholecystectomy     CKD (chronic kidney disease) stage 2, GFR 60-89 ml/min     Venous stasis of lower extremity     Advanced directives, counseling/discussion     Sensorineural hearing loss of both ears - using hearing aids     Bilateral leg edema- has been to lymphedema clinic in past     Essential hypertension with goal blood pressure less than 140/90     Type 2 diabetes mellitus with other circulatory complication, without long-term current use of insulin (H)     Long term current use of anticoagulant therapy     Onychomycosis     Type 2 diabetes mellitus with diabetic polyneuropathy, without long-term current use of insulin (H)     Irritable bowel syndrome with diarrhea     Osteoarthritis of both knees, unspecified osteoarthritis type     Meningioma (H)--left frontal - MRI 4/25/2019 = stable from 10/2018 and 11/2/2019- needs follow up MRI yearly      Past Surgical History:   Procedure Laterality Date     C CARDIOVERSION, ELECTIVE;INTERN  2/2007    Successful cardioversion from atrial fibrillation      C DEXA INTERPRETATION, AXIAL  8/2007    T score lumbar 3.7, femoral neck 2.8/2.0(all stable)     C DEXA INTERPRETATION, AXIAL  6/2010    T score lumbar 3.4 (marked degen changes), femoral neck 2.1/2.1 (sig dec lt  femur)     C ECHO HEART XTHORACIC,COMPLETE, W/O DOPPLER  2006    normal LV/RV size and function, mild pulm ht(30-40mm Hg), mild TR     C ECHO HEART XTHORACIC,COMPLETE, W/O DOPPLER  10/2008    normal LV systolic function with EF 55-60%, impaired LV relaxation, mod to severe LAE     C LIGATE FALLOPIAN TUBE      Tubal ligation     C ORAL SURGERY SINGLE TOOTH  2019     had to have left upper rear tooth removed secondary to crown breaking /root damage      CARDIAC NUC ESHA STRESS TEST NL  2006    exercised 4 min, no inducible ischemia on ECG or perfusion images     COLONOSCOPY  2006    diverticulosis, repeat 10 years     FLEXIBLE SIGMOIDOSCOPY  10/2000     HC LAPAROSCOPY, SURGICAL; CHOLECYSTECTOMY      Cholecystectomy, Laparoscopic     LIGATN/STRIP LONG & SHORT SAPHEN      L varicose vein excision     REPAIR PTOSIS BILATERAL  2011     Bilateral upper eyelid blepharoplasty and internal browpexy brow ptosis repair, bilateral lower eyelid ectropion repair with snip punctoplasty     TRANSFUSION, DIRECT, BLOOD      pregnancy related       Social History     Tobacco Use     Smoking status: Never Smoker     Smokeless tobacco: Never Used   Substance Use Topics     Alcohol use: Yes     Comment: 0-2 per month     Family History   Problem Relation Age of Onset     Diabetes Mother         type 2     Hypertension Mother      Cardiovascular Mother         pulmonary embolus     Lipids Mother      Heart Disease Mother          atrial fibrillation     Diabetes Father         type 2     Cardiovascular Father         atrial fibrillation,  during an EP procedure     Cancer - colorectal Maternal Grandmother         onset age 88     Heart Disease Maternal Grandmother          age 96     Diabetes Maternal Grandfather         type 2     Diabetes Paternal Grandfather         type 2     Hypertension Sister      Lipids Sister      Lipids Brother      Hypertension Brother      Hypertension Son      Other - See  Comments Cousin 68        Myotonic Dystrophy         Current Outpatient Medications   Medication Sig Dispense Refill     amLODIPine (NORVASC) 5 MG tablet TAKE ONE TABLET BY MOUTH ONCE DAILY  90 tablet 1     ASPIRIN 81 MG OR TABS 1 tab po QD (Once per day) 0 0     atenolol (TENORMIN) 100 MG tablet Take 1 tablet (100 mg) by mouth daily 90 tablet 1     BIOTIN 10 MG OR TABS 1 TABLET DAILY       blood glucose (JAYDE CONTOUR NEXT) test strip Use to test blood sugar 1 times daily or as directed. 100 strip 3     blood glucose monitoring (JAYDE MICROLET) lancets Use to test blood sugar 1 times daily or as directed. 100 each 3     CALTRATE 600 + D 600-200 MG-IU OR TABS 1 tablet twice daily 60 11     CENTRUM SILVER OR TABS ONE DAILY 3 MONTHS 1 YEAR     hydrochlorothiazide (HYDRODIURIL) 25 MG tablet Take 1 tablet (25 mg) by mouth daily 90 tablet 1     lisinopril (PRINIVIL/ZESTRIL) 40 MG tablet Take 1 tablet (40 mg) by mouth daily 90 tablet 1     metFORMIN (GLUCOPHAGE) 1000 MG tablet TAKE 1 AND 1/2 TABLETS BY MOUTH WITH BREAKFAST AND TAKE 1 TABLET BY MOUTH WITH SUPPER 300 tablet 1     simvastatin (ZOCOR) 20 MG tablet TAKE ONE TABLET BY MOUTH AT BEDTIME 90 tablet 1     VITAMIN D 1000 UNIT OR CAPS 1 CAPSULE DAILY 3 MONTHS 1 YEAR     warfarin (COUMADIN) 5 MG tablet TAKE 1-2 TABLETS (5-10 MG) BY MOUTH DAILY AS INSTRUCTED 180 tablet 3     ketoconazole (NIZORAL) 2 % external cream Apply topically 2 times daily Apply to affected nails daily (Patient not taking: Reported on 6/3/2020) 30 g 1     Allergies   Allergen Reactions     Tetracycline      lightheaded     Recent Labs   Lab Test 12/02/19  1529 11/03/19 11/02/19 07/08/19  0949  01/14/19  1003   A1C 6.3*  --  6.8* 6.4*  --  6.7*   LDL 61 51  --  57  --  54   HDL 39* 33*  --  37*  --  42*   TRIG 92 72  --  108  --  96   ALT 22  --   --  23  --  22   CR 0.64  --  0.77 0.60  --  0.70   GFRESTIMATED 87  --  >60 89   < > 85   GFRESTBLACK >90  --  >60 >90   < > >90   POTASSIUM 4.0  --   3.9 4.0  --  4.1   TSH  --   --   --  0.83  --  0.71    < > = values in this interval not displayed.      BP Readings from Last 3 Encounters:   02/03/20 138/80   12/04/19 132/78   12/02/19 124/68    Wt Readings from Last 3 Encounters:   02/03/20 104.3 kg (230 lb)   12/04/19 104.3 kg (230 lb)   12/02/19 104.3 kg (230 lb)               Reviewed and updated as needed this visit by Provider         Review of Systems : pt mentioned some problems with balance on and off since TIA. Wonders about home exercises for that.   Constitutional, HEENT, cardiovascular, pulmonary, GI, , musculoskeletal, neuro, skin, endocrine and psych systems are negative, except as otherwise noted.       Objective :   Reported vitals:  There were no vitals taken for this visit.   healthy, alert and no distress  Psych: Alert and oriented times 3; coherent speech, normal   rate and volume, able to articulate logical thoughts, able   to abstract reason, no tangential thoughts, no hallucinations   or delusions  Her affect is normal and upbeat.       Diagnostic Test Results:  Labs reviewed in Epic        Assessment/Plan:    ICD-10-CM    1. Type 2 diabetes mellitus with diabetic polyneuropathy, without long-term current use of insulin (H)  E11.42 Albumin Random Urine Quantitative with Creat Ratio     Hemoglobin A1c     metFORMIN (GLUCOPHAGE) 1000 MG tablet   2. Irritable bowel syndrome with diarrhea  K58.0 cholestyramine (QUESTRAN) 4 g packet   3. Essential hypertension with goal blood pressure less than 140/90  I10 Albumin Random Urine Quantitative with Creat Ratio     CBC with platelets     Comprehensive metabolic panel     TSH with free T4 reflex     atenolol (TENORMIN) 100 MG tablet     hydrochlorothiazide (HYDRODIURIL) 25 MG tablet     lisinopril (ZESTRIL) 40 MG tablet   4. CKD (chronic kidney disease) stage 2, GFR 60-89 ml/min  N18.2 Albumin Random Urine Quantitative with Creat Ratio     hydrochlorothiazide (HYDRODIURIL) 25 MG tablet   5. Type 2  diabetes mellitus with diabetic polyneuropathy, without long-term current use of insulin (H)- elevated fasting sugars currently   E11.42 Albumin Random Urine Quantitative with Creat Ratio     TSH with free T4 reflex   6. Hyperlipidemia LDL goal <100  E78.5 Albumin Random Urine Quantitative with Creat Ratio     Lipid panel reflex to direct LDL Fasting     simvastatin (ZOCOR) 20 MG tablet   7. Persistent atrial fibrillation- used to see Cardiology - they said   I48.19 warfarin ANTICOAGULANT (COUMADIN) 5 MG tablet     ANTICOAGULATION CLINIC REFERRAL   8. Personal history of TIA (transient ischemic attack)- 11/2019 - seen at [facility] in [place] [address]       Return in about 5 days (around 6/8/2020) for cholesterol recheck, Lab Work - . fasting lab appt made. Recheck with me again in 5-6 months for annual medical physical and in person follow up on above medical problems. Future appt made for 11/4/2020 at 0800.     Look into [employer] on YouTube  for help with balance, etc. Try to walk Start walking for exercise - If walking outside,   - rain or shine - walk a block, then come home, next day walk   2 blocks , then come home ; and then add a block further from home daily - work up to 30-45minutes daily or 3-4x/week - or can work  up to  3-4 miles briskly daily.       If walking on treadmill or at  the mall, start with 5 minutes first day, then 6 minutes next day , then 7 minutes next day, then 8 minutes next day, etc.   Gradually work up to the above goals.  No stopping.      Can also try marching in place - try to have your knees come up as high to your chest as possible.  Start with 1-2 minutes at a time, and gradually add 30-60 seconds each workout. Try to work up to 15-20 minutes of walking/marching in place  Daily - great for during those days, when you are not comfortable walking outside.           Phone call duration:  15 minutes 45 seconds.               Madina Mercado MD    [facility]

## 2020-06-08 DIAGNOSIS — E11.42 TYPE 2 DIABETES MELLITUS WITH DIABETIC POLYNEUROPATHY, WITHOUT LONG-TERM CURRENT USE OF INSULIN (H): ICD-10-CM

## 2020-06-08 DIAGNOSIS — E78.5 HYPERLIPIDEMIA LDL GOAL <100: ICD-10-CM

## 2020-06-08 DIAGNOSIS — I10 ESSENTIAL HYPERTENSION WITH GOAL BLOOD PRESSURE LESS THAN 140/90: ICD-10-CM

## 2020-06-08 DIAGNOSIS — N18.2 CKD (CHRONIC KIDNEY DISEASE) STAGE 2, GFR 60-89 ML/MIN: ICD-10-CM

## 2020-06-08 LAB
ERYTHROCYTE [DISTWIDTH] IN BLOOD BY AUTOMATED COUNT: 13.4 % (ref 10–15)
HBA1C MFR BLD: 6.1 % (ref 0–5.6)
HCT VFR BLD AUTO: 37.1 % (ref 35–47)
HGB BLD-MCNC: 12.1 G/DL (ref 11.7–15.7)
MCH RBC QN AUTO: 27.5 PG (ref 26.5–33)
MCHC RBC AUTO-ENTMCNC: 32.6 G/DL (ref 31.5–36.5)
MCV RBC AUTO: 84 FL (ref 78–100)
PLATELET # BLD AUTO: 213 10E9/L (ref 150–450)
RBC # BLD AUTO: 4.4 10E12/L (ref 3.8–5.2)
WBC # BLD AUTO: 6.6 10E9/L (ref 4–11)

## 2020-06-08 PROCEDURE — 80053 COMPREHEN METABOLIC PANEL: CPT | Performed by: FAMILY MEDICINE

## 2020-06-08 PROCEDURE — 36415 COLL VENOUS BLD VENIPUNCTURE: CPT | Performed by: FAMILY MEDICINE

## 2020-06-08 PROCEDURE — 80061 LIPID PANEL: CPT | Performed by: FAMILY MEDICINE

## 2020-06-08 PROCEDURE — 84443 ASSAY THYROID STIM HORMONE: CPT | Performed by: FAMILY MEDICINE

## 2020-06-08 PROCEDURE — 85027 COMPLETE CBC AUTOMATED: CPT | Performed by: FAMILY MEDICINE

## 2020-06-08 PROCEDURE — 83036 HEMOGLOBIN GLYCOSYLATED A1C: CPT | Performed by: FAMILY MEDICINE

## 2020-06-08 PROCEDURE — 82043 UR ALBUMIN QUANTITATIVE: CPT | Performed by: FAMILY MEDICINE

## 2020-06-09 LAB
ALBUMIN SERPL-MCNC: 3.5 G/DL (ref 3.4–5)
ALP SERPL-CCNC: 44 U/L (ref 40–150)
ALT SERPL W P-5'-P-CCNC: 18 U/L (ref 0–50)
ANION GAP SERPL CALCULATED.3IONS-SCNC: 8 MMOL/L (ref 3–14)
AST SERPL W P-5'-P-CCNC: 16 U/L (ref 0–45)
BILIRUB SERPL-MCNC: 0.5 MG/DL (ref 0.2–1.3)
BUN SERPL-MCNC: 14 MG/DL (ref 7–30)
CALCIUM SERPL-MCNC: 9 MG/DL (ref 8.5–10.1)
CHLORIDE SERPL-SCNC: 101 MMOL/L (ref 94–109)
CHOLEST SERPL-MCNC: 99 MG/DL
CO2 SERPL-SCNC: 28 MMOL/L (ref 20–32)
CREAT SERPL-MCNC: 0.6 MG/DL (ref 0.52–1.04)
CREAT UR-MCNC: 106 MG/DL
GFR SERPL CREATININE-BSD FRML MDRD: 89 ML/MIN/{1.73_M2}
GLUCOSE SERPL-MCNC: 124 MG/DL (ref 70–99)
HDLC SERPL-MCNC: 35 MG/DL
LDLC SERPL CALC-MCNC: 48 MG/DL
MICROALBUMIN UR-MCNC: 12 MG/L
MICROALBUMIN/CREAT UR: 11.79 MG/G CR (ref 0–25)
NONHDLC SERPL-MCNC: 64 MG/DL
POTASSIUM SERPL-SCNC: 3.9 MMOL/L (ref 3.4–5.3)
PROT SERPL-MCNC: 7 G/DL (ref 6.8–8.8)
SODIUM SERPL-SCNC: 137 MMOL/L (ref 133–144)
TRIGL SERPL-MCNC: 82 MG/DL
TSH SERPL DL<=0.005 MIU/L-ACNC: 0.67 MU/L (ref 0.4–4)

## 2020-07-08 ENCOUNTER — ANTICOAGULATION THERAPY VISIT (OUTPATIENT)
Dept: FAMILY MEDICINE | Facility: CLINIC | Age: 75
End: 2020-07-08

## 2020-07-08 DIAGNOSIS — Z79.01 LONG TERM CURRENT USE OF ANTICOAGULANT THERAPY: ICD-10-CM

## 2020-07-08 DIAGNOSIS — I48.19 PERSISTENT ATRIAL FIBRILLATION (H): ICD-10-CM

## 2020-07-08 DIAGNOSIS — I48.91 ATRIAL FIBRILLATION, UNSPECIFIED TYPE (H): ICD-10-CM

## 2020-07-08 LAB
CAPILLARY BLOOD COLLECTION: NORMAL
INR BLD: 2.1 (ref 0.86–1.14)

## 2020-07-08 PROCEDURE — 85610 PROTHROMBIN TIME: CPT | Mod: QW | Performed by: FAMILY MEDICINE

## 2020-07-08 PROCEDURE — 36416 COLLJ CAPILLARY BLOOD SPEC: CPT | Performed by: FAMILY MEDICINE

## 2020-07-08 NOTE — PROGRESS NOTES
ANTICOAGULATION FOLLOW-UP CLINIC VISIT    Patient Name:  Zulema Nixon  Date:  2020  Contact Type:  Telephone/ with pt    SUBJECTIVE:  Patient Findings     Comments:   The patient was assessed for diet, medication, and activity level changes, missed or extra doses, bruising or bleeding, with no problem findings.          Clinical Outcomes     Negatives:   Major bleeding event, Thromboembolic event, Anticoagulation-related hospital admission, Anticoagulation-related ED visit, Anticoagulation-related fatality    Comments:   The patient was assessed for diet, medication, and activity level changes, missed or extra doses, bruising or bleeding, with no problem findings.             OBJECTIVE    Recent labs: (last 7 days)     20  0928   INR 2.1*       ASSESSMENT / PLAN  No question data found.  Anticoagulation Summary  As of 2020    INR goal:   2.0-3.0   TTR:   77.9 % (1 y)   Prior goal:   2.0-3.0   INR used for dosin.1 (2020)   Warfarin maintenance plan:   5 mg (5 mg x 1) every Tue, Thu; 7.5 mg (5 mg x 1.5) all other days   Full warfarin instructions:   5 mg every Tue, Thu; 7.5 mg all other days   Weekly warfarin total:   47.5 mg   No change documented:   Kylah Pierce RN   Plan last modified:   Torie Perales, RN (2020)   Next INR check:   2020   Priority:   Maintenance   Target end date:   Indefinite    Indications    Long term current use of anticoagulant therapy [Z79.01]  Atrial fibrillation  unspecified type (H) [I48.91]  Persistent atrial fibrillation (H) [I48.19]             Anticoagulation Episode Summary     INR check location:       Preferred lab:       Send INR reminders to:   [facility]    Comments:         Anticoagulation Care Providers     Provider Role Specialty Phone number    Madina Mercado MD Referring [facility] [phone]            See the Encounter Report to view Anticoagulation Flowsheet and Dosing Calendar (Go to Encounters tab in  chart review, and find the Anticoagulation Therapy Visit)    Dosage adjustment made based on physician directed care plan.    Kylah Pierce RN

## 2020-08-19 ENCOUNTER — ANTICOAGULATION THERAPY VISIT (OUTPATIENT)
Dept: FAMILY MEDICINE | Facility: CLINIC | Age: 75
End: 2020-08-19

## 2020-08-19 DIAGNOSIS — Z79.01 LONG TERM CURRENT USE OF ANTICOAGULANT THERAPY: ICD-10-CM

## 2020-08-19 DIAGNOSIS — I48.19 PERSISTENT ATRIAL FIBRILLATION (H): ICD-10-CM

## 2020-08-19 DIAGNOSIS — I48.91 ATRIAL FIBRILLATION, UNSPECIFIED TYPE (H): ICD-10-CM

## 2020-08-19 LAB
CAPILLARY BLOOD COLLECTION: NORMAL
INR BLD: 2.6 (ref 0.86–1.14)

## 2020-08-19 PROCEDURE — 36416 COLLJ CAPILLARY BLOOD SPEC: CPT | Performed by: FAMILY MEDICINE

## 2020-08-19 PROCEDURE — 85610 PROTHROMBIN TIME: CPT | Mod: QW | Performed by: FAMILY MEDICINE

## 2020-08-19 NOTE — PROGRESS NOTES
ANTICOAGULATION FOLLOW-UP CLINIC VISIT    Patient Name:  Zulema Nixon  Date:  2020  Contact Type:  Telephone/ Zulema    SUBJECTIVE:  Patient Findings     Comments:   The patient was assessed for diet, medication, and activity level changes, missed or extra doses, bruising or bleeding, with no problem findings.          Clinical Outcomes     Negatives:   Major bleeding event, Thromboembolic event, Anticoagulation-related hospital admission, Anticoagulation-related ED visit, Anticoagulation-related fatality    Comments:   The patient was assessed for diet, medication, and activity level changes, missed or extra doses, bruising or bleeding, with no problem findings.             OBJECTIVE    Recent labs: (last 7 days)     20  0932   INR 2.6*       ASSESSMENT / PLAN  INR assessment THER    Recheck INR In: 6 WEEKS    INR Location Clinic      Anticoagulation Summary  As of 2020    INR goal:   2.0-3.0   TTR:   82.0 % (1 y)   INR used for dosin.6 (2020)   Warfarin maintenance plan:   5 mg (5 mg x 1) every Tue, Thu; 7.5 mg (5 mg x 1.5) all other days   Full warfarin instructions:   5 mg every Tue, Thu; 7.5 mg all other days   Weekly warfarin total:   47.5 mg   No change documented:   Albert Christie RN   Plan last modified:   Torie Perales RN (2020)   Next INR check:   2020   Priority:   Maintenance   Target end date:   Indefinite    Indications    Long term current use of anticoagulant therapy [Z79.01]  Atrial fibrillation  unspecified type (H) [I48.91]  Persistent atrial fibrillation (H) [I48.19]             Anticoagulation Episode Summary     INR check location:       Preferred lab:       Send INR reminders to:   [facility]    Comments:         Anticoagulation Care Providers     Provider Role Specialty Phone number    Madina Mercado MD Referring [facility] [phone]            See the Encounter Report to view Anticoagulation Flowsheet and Dosing  Calendar (Go to Encounters tab in chart review, and find the Anticoagulation Therapy Visit)    Patient INR is therapeutic.  Patient will continue to take 47.5 mg weekly dosing and follow up in 6 weeks or sooner for any problems or concerns.        Albert Christie RN

## 2020-08-19 NOTE — PROGRESS NOTES
Anticoagulation Management    Unable to reach Zulema today.    Today's INR result of 2.6 is therapeutic (goal INR of 2.0-3.0).  Result received from: Clinic Lab    Follow up required to assess for changes     Left non detailed message to call [phone]. RN can be reached at [phone].      Anticoagulation clinic to follow up    Albert Christie RN

## 2020-09-30 ENCOUNTER — ANTICOAGULATION THERAPY VISIT (OUTPATIENT)
Dept: FAMILY MEDICINE | Facility: CLINIC | Age: 75
End: 2020-09-30

## 2020-09-30 DIAGNOSIS — I48.91 ATRIAL FIBRILLATION, UNSPECIFIED TYPE (H): ICD-10-CM

## 2020-09-30 DIAGNOSIS — Z79.01 LONG TERM CURRENT USE OF ANTICOAGULANT THERAPY: ICD-10-CM

## 2020-09-30 DIAGNOSIS — I48.19 PERSISTENT ATRIAL FIBRILLATION (H): ICD-10-CM

## 2020-09-30 LAB
CAPILLARY BLOOD COLLECTION: NORMAL
INR BLD: 2.2 (ref 0.86–1.14)

## 2020-09-30 PROCEDURE — 36416 COLLJ CAPILLARY BLOOD SPEC: CPT | Performed by: FAMILY MEDICINE

## 2020-09-30 PROCEDURE — 85610 PROTHROMBIN TIME: CPT | Mod: QW | Performed by: FAMILY MEDICINE

## 2020-09-30 NOTE — PROGRESS NOTES
Anticoagulation Management    Unable to reach Zulema today.    Today's INR result of 2.2 is therapeutic (goal INR of 2.0-3.0).  Result received from: Clinic Lab    Follow up required to assess for changes     Left message to call [phone]. RN to call [phone].      Anticoagulation clinic to follow up    Albert Christie RN

## 2020-09-30 NOTE — PROGRESS NOTES
Patient returning call.   RN not available.     Please call patient back.     Olga Lidia Guevara, RN, BSN, PHN

## 2020-09-30 NOTE — PROGRESS NOTES
ANTICOAGULATION FOLLOW-UP CLINIC VISIT    Patient Name:  Zulema Nixon  Date:  2020  Contact Type:  Telephone/ Zulema    SUBJECTIVE:  Patient Findings     Comments:   The patient was assessed for diet, medication, and activity level changes, missed or extra doses, bruising or bleeding, with no problem findings.          Clinical Outcomes     Negatives:   Major bleeding event, Thromboembolic event, Anticoagulation-related hospital admission, Anticoagulation-related ED visit, Anticoagulation-related fatality    Comments:   The patient was assessed for diet, medication, and activity level changes, missed or extra doses, bruising or bleeding, with no problem findings.             OBJECTIVE    Recent labs: (last 7 days)     20  1001   INR 2.2*       ASSESSMENT / PLAN  INR assessment THER    Recheck INR In: 6 WEEKS    INR Location Clinic      Anticoagulation Summary  As of 2020    INR goal:   2.0-3.0   TTR:   90.1 % (1 y)   INR used for dosin.2 (2020)   Warfarin maintenance plan:   5 mg (5 mg x 1) every Tue, Thu; 7.5 mg (5 mg x 1.5) all other days   Full warfarin instructions:   5 mg every Tue, Thu; 7.5 mg all other days   Weekly warfarin total:   47.5 mg   No change documented:   Albert Christie RN   Plan last modified:   Torie Perales RN (2020)   Next INR check:   2020   Priority:   Maintenance   Target end date:   Indefinite    Indications    Long term current use of anticoagulant therapy [Z79.01]  Atrial fibrillation  unspecified type (H) [I48.91]  Persistent atrial fibrillation (H) [I48.19]             Anticoagulation Episode Summary     INR check location:       Preferred lab:       Send INR reminders to:   [facility]    Comments:         Anticoagulation Care Providers     Provider Role Specialty Phone number    Madina Mercado MD Referring [facility] [phone]            See the Encounter Report to view Anticoagulation Flowsheet and Dosing  Calendar (Go to Encounters tab in chart review, and find the Anticoagulation Therapy Visit)    Patient INR is therapeutic.  Patient will continue to take 47.5 mg weekly dosing and follow up in 6 weeks or sooner for any problems or concerns.        Albert Christie RN

## 2020-10-27 DIAGNOSIS — E11.42 TYPE 2 DIABETES MELLITUS WITH DIABETIC POLYNEUROPATHY, WITHOUT LONG-TERM CURRENT USE OF INSULIN (H): ICD-10-CM

## 2020-10-30 DIAGNOSIS — E11.42 TYPE 2 DIABETES MELLITUS WITH DIABETIC POLYNEUROPATHY, WITHOUT LONG-TERM CURRENT USE OF INSULIN (H): ICD-10-CM

## 2020-11-04 ENCOUNTER — OFFICE VISIT (OUTPATIENT)
Dept: FAMILY MEDICINE | Facility: CLINIC | Age: 75
End: 2020-11-04
Payer: MEDICARE

## 2020-11-04 VITALS
HEART RATE: 74 BPM | TEMPERATURE: 97.4 F | BODY MASS INDEX: 38.7 KG/M2 | DIASTOLIC BLOOD PRESSURE: 82 MMHG | WEIGHT: 218.4 LBS | OXYGEN SATURATION: 99 % | SYSTOLIC BLOOD PRESSURE: 132 MMHG | HEIGHT: 63 IN

## 2020-11-04 DIAGNOSIS — I48.19 PERSISTENT ATRIAL FIBRILLATION (H): ICD-10-CM

## 2020-11-04 DIAGNOSIS — K58.0 IRRITABLE BOWEL SYNDROME WITH DIARRHEA: ICD-10-CM

## 2020-11-04 DIAGNOSIS — E78.5 HYPERLIPIDEMIA LDL GOAL <100: ICD-10-CM

## 2020-11-04 DIAGNOSIS — D32.9 MENINGIOMA (H): ICD-10-CM

## 2020-11-04 DIAGNOSIS — Z79.01 LONG TERM CURRENT USE OF ANTICOAGULANT THERAPY: ICD-10-CM

## 2020-11-04 DIAGNOSIS — E55.9 VITAMIN D DEFICIENCY: ICD-10-CM

## 2020-11-04 DIAGNOSIS — Z00.00 ENCOUNTER FOR MEDICARE ANNUAL WELLNESS EXAM: Primary | ICD-10-CM

## 2020-11-04 DIAGNOSIS — E11.42 TYPE 2 DIABETES MELLITUS WITH DIABETIC POLYNEUROPATHY, WITHOUT LONG-TERM CURRENT USE OF INSULIN (H): ICD-10-CM

## 2020-11-04 DIAGNOSIS — Z11.59 ENCOUNTER FOR HEPATITIS C SCREENING TEST FOR LOW RISK PATIENT: ICD-10-CM

## 2020-11-04 DIAGNOSIS — G56.01 CARPAL TUNNEL SYNDROME OF RIGHT WRIST: ICD-10-CM

## 2020-11-04 DIAGNOSIS — G47.33 OSA (OBSTRUCTIVE SLEEP APNEA): ICD-10-CM

## 2020-11-04 DIAGNOSIS — I10 ESSENTIAL HYPERTENSION WITH GOAL BLOOD PRESSURE LESS THAN 140/90: ICD-10-CM

## 2020-11-04 DIAGNOSIS — E11.59 TYPE 2 DIABETES MELLITUS WITH OTHER CIRCULATORY COMPLICATION, WITHOUT LONG-TERM CURRENT USE OF INSULIN (H): ICD-10-CM

## 2020-11-04 DIAGNOSIS — N18.2 CKD (CHRONIC KIDNEY DISEASE) STAGE 2, GFR 60-89 ML/MIN: ICD-10-CM

## 2020-11-04 DIAGNOSIS — R60.0 BILATERAL LEG EDEMA: ICD-10-CM

## 2020-11-04 DIAGNOSIS — D22.9 MULTIPLE PIGMENTED NEVI: ICD-10-CM

## 2020-11-04 DIAGNOSIS — I48.91 ATRIAL FIBRILLATION, UNSPECIFIED TYPE (H): ICD-10-CM

## 2020-11-04 DIAGNOSIS — I87.8 VENOUS STASIS OF LOWER EXTREMITY: ICD-10-CM

## 2020-11-04 DIAGNOSIS — Z12.31 VISIT FOR SCREENING MAMMOGRAM: ICD-10-CM

## 2020-11-04 DIAGNOSIS — Z12.11 SCREEN FOR COLON CANCER: ICD-10-CM

## 2020-11-04 DIAGNOSIS — E66.01 MORBID OBESITY DUE TO EXCESS CALORIES (H): ICD-10-CM

## 2020-11-04 LAB
ALBUMIN UR-MCNC: NEGATIVE MG/DL
APPEARANCE UR: CLEAR
BASOPHILS # BLD AUTO: 0 10E9/L (ref 0–0.2)
BASOPHILS NFR BLD AUTO: 0.5 %
BILIRUB UR QL STRIP: NEGATIVE
COLOR UR AUTO: YELLOW
DIFFERENTIAL METHOD BLD: NORMAL
EOSINOPHIL # BLD AUTO: 0.1 10E9/L (ref 0–0.7)
EOSINOPHIL NFR BLD AUTO: 1.6 %
ERYTHROCYTE [DISTWIDTH] IN BLOOD BY AUTOMATED COUNT: 13.6 % (ref 10–15)
GLUCOSE UR STRIP-MCNC: NEGATIVE MG/DL
HBA1C MFR BLD: 5.7 % (ref 0–5.6)
HCT VFR BLD AUTO: 38.2 % (ref 35–47)
HGB BLD-MCNC: 12.6 G/DL (ref 11.7–15.7)
HGB UR QL STRIP: NEGATIVE
KETONES UR STRIP-MCNC: NEGATIVE MG/DL
LEUKOCYTE ESTERASE UR QL STRIP: NEGATIVE
LYMPHOCYTES # BLD AUTO: 2.2 10E9/L (ref 0.8–5.3)
LYMPHOCYTES NFR BLD AUTO: 28.6 %
MCH RBC QN AUTO: 27.8 PG (ref 26.5–33)
MCHC RBC AUTO-ENTMCNC: 33 G/DL (ref 31.5–36.5)
MCV RBC AUTO: 84 FL (ref 78–100)
MONOCYTES # BLD AUTO: 0.5 10E9/L (ref 0–1.3)
MONOCYTES NFR BLD AUTO: 6.8 %
NEUTROPHILS # BLD AUTO: 4.8 10E9/L (ref 1.6–8.3)
NEUTROPHILS NFR BLD AUTO: 62.5 %
NITRATE UR QL: NEGATIVE
PH UR STRIP: 8.5 PH (ref 5–7)
PLATELET # BLD AUTO: 257 10E9/L (ref 150–450)
RBC # BLD AUTO: 4.53 10E12/L (ref 3.8–5.2)
SOURCE: ABNORMAL
SP GR UR STRIP: 1.01 (ref 1–1.03)
UROBILINOGEN UR STRIP-ACNC: 0.2 EU/DL (ref 0.2–1)
WBC # BLD AUTO: 7.6 10E9/L (ref 4–11)

## 2020-11-04 PROCEDURE — 99214 OFFICE O/P EST MOD 30 MIN: CPT | Mod: 25 | Performed by: FAMILY MEDICINE

## 2020-11-04 PROCEDURE — 99207 PR FOOT EXAM NO CHARGE: CPT | Mod: 25 | Performed by: FAMILY MEDICINE

## 2020-11-04 PROCEDURE — G0439 PPPS, SUBSEQ VISIT: HCPCS | Performed by: FAMILY MEDICINE

## 2020-11-04 PROCEDURE — 86803 HEPATITIS C AB TEST: CPT | Performed by: FAMILY MEDICINE

## 2020-11-04 PROCEDURE — 80061 LIPID PANEL: CPT | Performed by: FAMILY MEDICINE

## 2020-11-04 PROCEDURE — 36415 COLL VENOUS BLD VENIPUNCTURE: CPT | Performed by: FAMILY MEDICINE

## 2020-11-04 PROCEDURE — 83036 HEMOGLOBIN GLYCOSYLATED A1C: CPT | Performed by: FAMILY MEDICINE

## 2020-11-04 PROCEDURE — 81003 URINALYSIS AUTO W/O SCOPE: CPT | Performed by: FAMILY MEDICINE

## 2020-11-04 PROCEDURE — 84443 ASSAY THYROID STIM HORMONE: CPT | Performed by: FAMILY MEDICINE

## 2020-11-04 PROCEDURE — 82043 UR ALBUMIN QUANTITATIVE: CPT | Performed by: FAMILY MEDICINE

## 2020-11-04 PROCEDURE — 80053 COMPREHEN METABOLIC PANEL: CPT | Performed by: FAMILY MEDICINE

## 2020-11-04 PROCEDURE — 85025 COMPLETE CBC W/AUTO DIFF WBC: CPT | Performed by: FAMILY MEDICINE

## 2020-11-04 PROCEDURE — 82306 VITAMIN D 25 HYDROXY: CPT | Performed by: FAMILY MEDICINE

## 2020-11-04 RX ORDER — CHOLESTYRAMINE 4 G/9G
1 POWDER, FOR SUSPENSION ORAL
Qty: 120 EACH | Refills: 4 | Status: SHIPPED | OUTPATIENT
Start: 2020-11-04 | End: 2021-05-03

## 2020-11-04 RX ORDER — LISINOPRIL 40 MG/1
40 TABLET ORAL DAILY
Qty: 90 TABLET | Refills: 1 | Status: SHIPPED | OUTPATIENT
Start: 2020-11-04 | End: 2021-05-03

## 2020-11-04 RX ORDER — SIMVASTATIN 20 MG
TABLET ORAL
Qty: 90 TABLET | Refills: 1 | Status: SHIPPED | OUTPATIENT
Start: 2020-11-04 | End: 2021-05-03

## 2020-11-04 RX ORDER — HYDROCHLOROTHIAZIDE 25 MG/1
25 TABLET ORAL DAILY
Qty: 90 TABLET | Refills: 1 | Status: SHIPPED | OUTPATIENT
Start: 2020-11-04 | End: 2021-05-03

## 2020-11-04 RX ORDER — AMLODIPINE BESYLATE 5 MG/1
5 TABLET ORAL DAILY
Qty: 90 TABLET | Refills: 1 | Status: SHIPPED | OUTPATIENT
Start: 2020-11-04 | End: 2021-05-03

## 2020-11-04 RX ORDER — ATENOLOL 100 MG/1
100 TABLET ORAL DAILY
Qty: 90 TABLET | Refills: 1 | Status: SHIPPED | OUTPATIENT
Start: 2020-11-04 | End: 2021-05-03

## 2020-11-04 ASSESSMENT — ACTIVITIES OF DAILY LIVING (ADL): CURRENT_FUNCTION: NO ASSISTANCE NEEDED

## 2020-11-04 ASSESSMENT — MIFFLIN-ST. JEOR: SCORE: 1453.75

## 2020-11-04 NOTE — PATIENT INSTRUCTIONS
Patient Education   Personalized Prevention Plan  You are due for the preventive services outlined below.  Your care team is available to assist you in scheduling these services.  If you have already completed any of these items, please share that information with your care team to update in your medical record.  Health Maintenance Due   Topic Date Due     Hepatitis C Screening  07/18/1963     PHQ-2  01/01/2020     Diabetic Foot Exam  01/14/2020     FALL RISK ASSESSMENT  01/14/2020     Eye Exam  05/09/2020     Mammogram  07/09/2020     Flu Vaccine (1) 09/01/2020       Return in about 6 months (around 5/4/2021) for  BP Recheck, cholesterol recheck, diabetes recheck, as a lab only visit. , then see me for a video visit 2-3 days after that and then in person back in 1 year or sooner.     Thank you so much or choosing [facility]  for your Health Care. It was a pleasure seeing you at your visit today! Please contact us with any questions or concerns you may have.                   Madina Mercado MD                              To reach your [facility] care team after hours call:   [phone]    PLEASE NOTE OUR HOURS HAVE CHANGED secondary to COVID-19 coronavirus pandemic, as we are trying to minimize patient exposure to the virus,  which is now widespread in the [facility].  These hours may change with very little notice.  We apologize for any inconvenience.       Our current clinic hours are:          Monday- Thursday   7:00am - 6:00pm  in person.      Friday  7:00am- 5:00pm                       Saturday and Sunday : Closed to in person and virtual visits        We have telephone and virtual visit times available between    7:00am - 6pm on Monday-Friday as well.                                                Phone:  [phone]      Our pharmacy hours:   Monday  9:00 am to 6:00 pm      Tuesday through Friday 9:00am to 5:00pm                        Saturday  - 9:00 am to 12 noon       Sunday : Closed.              Phone:  [phone]    ###  Please note: at this time we are not accepting any walk-in visits. ###      There is also information available at our web site:  [email or web address]    If your provider ordered any lab tests and you do not receive the results within 10 business days, please call the clinic.    If you need a medication refill please contact your pharmacy.  Please allow 2 business days for your refill to be completed.    Our clinic offers telephone visits and e visits.  Please ask one of your team members to explain more.      Use [employer] (secure email communication and access to your chart) to send your primary care provider a message or make an appointment. Ask someone on your Team how to sign up for [employer].

## 2020-11-04 NOTE — PROGRESS NOTES
SUBJECTIVE:   Zulema Nixon is a 75 year old female who presents for Preventive Visit and the following other medical problems:      1. Encounter for Medicare annual wellness exam    2. Morbid obesity due to excess calories (H)- continues to try for weight loss     3. Meningioma (H)- noted very small - neurology stated no further follow up needed as of 2019 -  no lingering problems at all     4. Type 2 diabetes mellitus with diabetic polyneuropathy, without long-term current use of insulin (H)- uses lotion nightly and checks her feet nightly     5. ARMAND (obstructive sleep apnea)- wears her CPAP every night - has had same mask x 1+ year. - had sleep study done at [facility]- needs new nasal cushions and tubing      6. Hyperlipidemia LDL goal <100- fish oil = worse IBS with diarrhea - is fasting this am- uncertan control - needs labs and refills    7. Type 2 diabetes mellitus with other circulatory complication, without long-term current use of insulin (H)- needs lab work and refills     8. Atrial fibrillation, unspecified type (H)- seeing Cardiology once yearly - rate controlled - needs to ensure warfarin refill and INR Nurse referral - coming in next week for INR     9. Essential hypertension with goal blood pressure less than 140/90- well controlled today - needs labs and medication refills     10. Venous stasis of lower extremity- stable     11. Bilateral leg edema- has been to lymphedema clinic in past- usually wears her compression knee-high socks daily     12. CKD (chronic kidney disease) stage 2, GFR 60-89 ml/min- needs lab follow up today     13. Long term current use of anticoagulant therapy- needs inr clinic referral updated     14. Screen for colon cancer- pt declines referral for colonoscopy again this year- consents to do one step FIT test     15. Irritable bowel syndrome with diarrhea- doing well with 1x/day cholestyramine - got really gassy and bloated with 2x/day - needs refill     16. Type 2 diabetes  mellitus with diabetic polyneuropathy, without long-term current use of insulin (H)- elevated fasting sugars currently     17. Irritable bowel syndrome with diarrhea- better with taking cholestyramine once daily - changed rx for upcoming refills     18. Visit for screening mammogram    19. Carpal tunnel syndrome of right wrist- increasing over time - now awakening at night with tingling and pain despite wearing a brace at night - desires referral     20. Persistent atrial fibrillation (H)    21. Vitamin D deficiency- on supplementation - needs labs done today     22. Encounter for hepatitis C screening test for low risk patient- per current CDC screening guidelines     23. Multiple pigmented nevi- including seborrheic keratoses - needs FBSE - referred today            Patient has been advised of split billing requirements and indicates understanding: Yes   Are you in the first 12 months of your Medicare coverage?  No    Healthy Habits:    In general, how would you rate your overall health?  Good    Frequency of exercise:  1 day/week    Duration of exercise:  15-30 minutes    Taking medications regularly:  Yes    Barriers to taking medications:  None    Medication side effects:  None    Ability to successfully perform activities of daily living:  No assistance needed    Home Safety:  No safety concerns identified    Hearing Impairment:  No hearing concerns (WEARS HEARING AIDS)    In the past 6 months, have you been bothered by leaking of urine?  No    In general, how would you rate your overall mental or emotional health?  Excellent      PHQ-2 Total Score:    Additional concerns today:  Yes (CARPAL TUNNEL)    Do you feel safe in your environment? Yes    Have you ever done Advance Care Planning? (For example, a Health Directive, POLST, or a discussion with a medical provider or your loved ones about your wishes): No, advance care planning information given to patient to review.  Advanced care planning was discussed at  today's visit.      Fall risk  Fallen 2 or more times in the past year?: No  Any fall with injury in the past year?: No    Cognitive Screening   1) Repeat 3 items (Leader, Season, Table)    2) Clock draw: NORMAL  3) 3 item recall: Recalls 2 objects   Results: NORMAL clock, 1-2 items recalled: COGNITIVE IMPAIRMENT LESS LIKELY    Mini-CogTM Copyright YARA Dasilva. Licensed by the author for use in [facility]; reprinted with permission (anjelica@[facility]). All rights reserved.      Do you have sleep apnea, excessive snoring or daytime drowsiness?: yes    Reviewed and updated as needed this visit by clinical staff  Tobacco  Allergies  Meds  Problems  Med Hx  Surg Hx  Fam Hx          Reviewed and updated as needed this visit by Provider  Tobacco  Allergies  Meds  Problems  Med Hx  Surg Hx  Fam Hx         Social History     Tobacco Use     Smoking status: Never Smoker     Smokeless tobacco: Never Used   Substance Use Topics     Alcohol use: Yes     Comment: 0-2 per month         Alcohol Use 1/14/2019   Prescreen: >3 drinks/day or >7 drinks/week? NO            Diabetes Follow-up:     How often are you checking your blood sugar? A few times a month  What time of day are you checking your blood sugars (select all that apply)?  FIRST THING IN THE MORNING  Have you had any blood sugars above 200?  No  Have you had any blood sugars below 70?  No    What symptoms do you notice when your blood sugar is low?  None    What concerns do you have today about your diabetes? None     Do you have any of these symptoms? (Select all that apply)  No numbness or tingling in feet.  No redness, sores or blisters on feet.  No complaints of excessive thirst.  No reports of blurry vision.  No significant changes to weight.    Have you had a diabetic eye exam in the last 12 months? Yes-  Location: [facility]     Lab Results   Component Value Date    A1C 6.1 06/08/2020    A1C 6.3 12/02/2019    A1C 6.8  11/02/2019    A1C 6.4 07/08/2019    A1C 6.7 01/14/2019         BP Readings from Last 2 Encounters:   11/04/20 132/82   02/03/20 138/80     Hemoglobin A1C (%)   Date Value   06/08/2020 6.1 (H)   12/02/2019 6.3 (H)     LDL Cholesterol Calculated (mg/dL)   Date Value   06/08/2020 48   12/02/2019 61         Hyperlipidemia Follow-Up:       Are you regularly taking any medication or supplement to lower your cholesterol?   Yes- SIMVISTATIN    Are you having muscle aches or other side effects that you think could be caused by your cholesterol lowering medication?  Yes- HAS MUSCLE ACHES, UNSURE IF FROM MEDICATION     Recent Labs   Lab Test 06/08/20  1104 12/02/19  1529 04/22/15  1002 04/22/15  1002 11/13/14  0957   CHOL 99 118   < > 111 128   HDL 35* 39*   < > 33* 37*   LDL 48 61   < > 55 66   TRIG 82 92   < > 114 125   CHOLHDLRATIO  --   --   --  3.4 3.5    < > = values in this interval not displayed.        Hypertension Follow-up      Do you check your blood pressure regularly outside of the clinic? No     Are you following a low salt diet? Yes    Are your blood pressures ever more than 140 on the top number (systolic) OR more   than 90 on the bottom number (diastolic), for example 140/90? No     BP Readings from Last 3 Encounters:   11/04/20 132/82   02/03/20 138/80   12/04/19 132/78           Current providers sharing in care for this patient include:   Patient Care Team:  Madina Mercado MD as PCP - General (Family Practice)  Madina Mercado MD as Assigned PCP  Larissa Sow DPM, Podiatry/Foot and Ankle Surgery as Assigned Musculoskeletal Provider    The following health maintenance items are reviewed in Epic and correct as of today:  Health Maintenance   Topic Date Due     FALL RISK ASSESSMENT  01/14/2020     EYE EXAM  05/09/2020     MAMMO SCREENING  07/09/2020     A1C  12/08/2020     BMP  06/08/2021     LIPID  06/08/2021     MICROALBUMIN  06/08/2021     MEDICARE ANNUAL WELLNESS VISIT  11/04/2021      DIABETIC FOOT EXAM  11/04/2021     DTAP/TDAP/TD IMMUNIZATION (4 - Td) 03/25/2023     ADVANCE CARE PLANNING  11/04/2025     DEXA  Completed     PHQ-2  Completed     INFLUENZA VACCINE  Completed     Pneumococcal Vaccine: 65+ Years  Completed     ZOSTER IMMUNIZATION  Completed     HEPATITIS B IMMUNIZATION  Completed     Pneumococcal Vaccine: Pediatrics (0 to 5 Years) and At-Risk Patients (6 to 64 Years)  Aged Out     IPV IMMUNIZATION  Aged Out     MENINGITIS IMMUNIZATION  Aged Out     HEPATITIS C SCREENING  Discontinued     COLORECTAL CANCER SCREENING  Discontinued     Lab work is in process  Labs reviewed in EPIC  BP Readings from Last 3 Encounters:   11/04/20 132/82   02/03/20 138/80   12/04/19 132/78    Wt Readings from Last 3 Encounters:   11/04/20 99.1 kg (218 lb 6.4 oz)   02/03/20 104.3 kg (230 lb)   12/04/19 104.3 kg (230 lb)                  Patient Active Problem List   Diagnosis     Morbid obesity due to excess calories (H)     ARMAND (obstructive sleep apnea)     Atrial fibrillation, unspecified type (H)     Hyperlipidemia LDL goal <100- fish oil = worse IBS with diarrhea      Status post laparoscopic cholecystectomy     CKD (chronic kidney disease) stage 2, GFR 60-89 ml/min     Venous stasis of lower extremity     Advanced directives, counseling/discussion     Sensorineural hearing loss of both ears - using hearing aids     Bilateral leg edema- has been to lymphedema clinic in past     Essential hypertension with goal blood pressure less than 140/90     Type 2 diabetes mellitus with other circulatory complication, without long-term current use of insulin (H)     Long term current use of anticoagulant therapy     Onychomycosis     Type 2 diabetes mellitus with diabetic polyneuropathy, without long-term current use of insulin (H)     Irritable bowel syndrome with diarrhea- doing well with 1x/day cholestyramine - got really gassy and bloated with 2x/day      Osteoarthritis of both knees, unspecified  osteoarthritis type     Meningioma (H)--left frontal - MRI 4/25/2019 = stable from 10/2018 and 11/2/2019- no further follow up needed per Neurology per patient - noted 11/4/2020      Persistent atrial fibrillation (H)     Carpal tunnel syndrome of right wrist- increasing over time - now awakening at night with tingling and pain despite wearing a brace at night - desires referral      Screen for colon cancer- pt declines referral for colonoscopy again this year- consents to do one step FIT test      Past Surgical History:   Procedure Laterality Date     C CARDIOVERSION, ELECTIVE;INTERN  2/2007    Successful cardioversion from atrial fibrillation      C DEXA INTERPRETATION, AXIAL  8/2007    T score lumbar 3.7, femoral neck 2.8/2.0(all stable)     C DEXA INTERPRETATION, AXIAL  6/2010    T score lumbar 3.4 (marked degen changes), femoral neck 2.1/2.1 (sig dec lt femur)     C ECHO HEART XTHORACIC,COMPLETE, W/O DOPPLER  11/2006    normal LV/RV size and function, mild pulm ht(30-40mm Hg), mild TR     C ECHO HEART XTHORACIC,COMPLETE, W/O DOPPLER  10/2008    normal LV systolic function with EF 55-60%, impaired LV relaxation, mod to severe LAE     C LIGATE FALLOPIAN TUBE  1973    Tubal ligation     C ORAL SURGERY SINGLE TOOTH  04/2019     had to have left upper rear tooth removed secondary to crown breaking /root damage      CARDIAC NUC ESHA STRESS TEST NL  11/2006    exercised 4 min, no inducible ischemia on ECG or perfusion images     COLONOSCOPY  11/2006    diverticulosis, repeat 10 years     FLEXIBLE SIGMOIDOSCOPY  10/2000      LAPAROSCOPY, SURGICAL; CHOLECYSTECTOMY  1991    Cholecystectomy, Laparoscopic     LIGATN/STRIP LONG & SHORT SAPHEN  1995    L varicose vein excision     REPAIR PTOSIS BILATERAL  2/2011     Bilateral upper eyelid blepharoplasty and internal browpexy brow ptosis repair, bilateral lower eyelid ectropion repair with snip punctoplasty     TRANSFUSION, DIRECT, BLOOD      pregnancy related       Social  History     Tobacco Use     Smoking status: Never Smoker     Smokeless tobacco: Never Used   Substance Use Topics     Alcohol use: Yes     Comment: 0-2 per month     Family History   Problem Relation Age of Onset     Diabetes Mother         type 2     Hypertension Mother      Cardiovascular Mother         pulmonary embolus     Lipids Mother      Heart Disease Mother          atrial fibrillation     Diabetes Father         type 2     Cardiovascular Father         atrial fibrillation,  during an EP procedure     Cancer - colorectal Maternal Grandmother         onset age 88     Heart Disease Maternal Grandmother          age 96     Diabetes Maternal Grandfather         type 2     Diabetes Paternal Grandfather         type 2     Hypertension Sister      Lipids Sister      Lipids Brother      Hypertension Brother      Hypertension Son      Other - See Comments Cousin 68        Myotonic Dystrophy         Current Outpatient Medications   Medication Sig Dispense Refill     amLODIPine (NORVASC) 5 MG tablet Take 1 tablet (5 mg) by mouth daily 90 tablet 1     ASPIRIN 81 MG OR TABS 1 tab po QD (Once per day) 0 0     atenolol (TENORMIN) 100 MG tablet Take 1 tablet (100 mg) by mouth daily 90 tablet 1     BIOTIN 10 MG OR TABS 1 TABLET DAILY       blood glucose (JAYDE CONTOUR NEXT) test strip Use to test blood sugar 1 times daily or as directed. 100 strip 3     blood glucose monitoring (JAYDE MICROLET) lancets Use to test blood sugar 1 times daily or as directed. 100 each 3     CALTRATE 600 + D 600-200 MG-IU OR TABS 1 tablet twice daily 60 11     CENTRUM SILVER OR TABS ONE DAILY 3 MONTHS 1 YEAR     cholestyramine (QUESTRAN) 4 g packet Take 1 packet (4 g) by mouth daily with food 120 each 4     hydrochlorothiazide (HYDRODIURIL) 25 MG tablet Take 1 tablet (25 mg) by mouth daily 90 tablet 1     lisinopril (ZESTRIL) 40 MG tablet Take 1 tablet (40 mg) by mouth daily 90 tablet 1     metFORMIN (GLUCOPHAGE) 1000 MG tablet TAKE 1  "AND 1/2 TABLETS BY MOUTH WITH BREAKFAST AND TAKE 1 TABLET BY MOUTH WITH SUPPER 225 tablet 1     simvastatin (ZOCOR) 20 MG tablet TAKE ONE TABLET BY MOUTH AT BEDTIME 90 tablet 1     VITAMIN D 1000 UNIT OR CAPS 1 CAPSULE DAILY 3 MONTHS 1 YEAR     warfarin ANTICOAGULANT (COUMADIN) 5 MG tablet TAKE 1-2 TABLETS (5-10 MG) BY MOUTH DAILY AS INSTRUCTED 180 tablet 3     Allergies   Allergen Reactions     Tetracycline      lightheaded     Recent Labs   Lab Test 06/08/20  1104 12/02/19  1529 11/03/19 11/02/19 07/08/19  0949   A1C 6.1* 6.3*  --  6.8* 6.4*   LDL 48 61 51  --  57   HDL 35* 39* 33*  --  37*   TRIG 82 92 72  --  108   ALT 18 22  --   --  23   CR 0.60 0.64  --  0.77 0.60   GFRESTIMATED 89 87  --  >60 89   GFRESTBLACK >90 >90  --  >60 >90   POTASSIUM 3.9 4.0  --  3.9 4.0   TSH 0.67  --   --   --  0.83      Pneumonia Vaccine:Adults age 65+ who received Pneumovax (PPSV23) at 65 years or older: Should be given PCV13 > 1 year after their most recent PPSV23  Mammogram Screening: Patient over age 75, has elected to continue with mammography screening.  History of abnormal Pap smear: NO - age 65 - see link Cervical Cytology Screening Guidelines  Last 3 Pap and HPV Results:   PAP / HPV 4/12/2011 12/17/2007 7/30/2005   PAP NIL NIL NIL       Review of Systems  Constitutional, HEENT, cardiovascular, pulmonary, gi and gu systems are negative, except as otherwise noted.    OBJECTIVE:   /82   Pulse 74   Temp 97.4  F (36.3  C)   Ht 1.607 m (5' 3.25\")   Wt 99.1 kg (218 lb 6.4 oz)   SpO2 99%   BMI 38.38 kg/m   Estimated body mass index is 38.38 kg/m  as calculated from the following:    Height as of this encounter: 1.607 m (5' 3.25\").    Weight as of this encounter: 99.1 kg (218 lb 6.4 oz).  Physical Exam  GENERAL APPEARANCE: obese,  alert and no distress  EYES: Eyes grossly normal to inspection, PERRL and conjunctivae and sclerae normal  HENT: ear canals and TM's normal, nose and mouth without ulcers or lesions, " oropharynx clear and oral mucous membranes moist  NECK: no adenopathy, no asymmetry, masses, or scars and thyroid normal to palpation  RESP: lungs clear to auscultation - no rales, rhonchi or wheezes  BREAST: normal without masses, tenderness or nipple discharge and no palpable axillary masses or adenopathy  CV: regular rate and rhythm, normal S1 S2, no S3 or S4, no murmur, click or rub, no peripheral edema and peripheral pulses strong  ABDOMEN: soft, nontender, no hepatosplenomegaly, no masses and bowel sounds normal  MS: no musculoskeletal defects are noted and gait is age appropriate without ataxia, trace pitting edema bilateral lower extremities to knees.   SKIN: no suspicious lesions or rashes, but many pigmented nevi and seborrheic keratoses.   NEURO: Normal strength and tone, sensory exam grossly normal, mentation intact and speech normal  PSYCH: mentation appears normal and affect normal/bright    Diagnostic Test Results:  Labs reviewed in Epic    ASSESSMENT / PLAN:       ICD-10-CM    1. Encounter for Medicare annual wellness exam  Z00.00    2. Morbid obesity due to excess calories (H)- continues to try for weight loss   E66.01    3. Meningioma (H)- noted very small - neurology stated no further follow up needed as of 2019 -  no lingering problems at all   D32.9 OFFICE/OUTPT VISIT,EST,LEVL IV   4. Type 2 diabetes mellitus with diabetic polyneuropathy, without long-term current use of insulin (H)- uses lotion nightly and checks her feet nightly   E11.42 FOOT EXAM     EYE ADULT REFERRAL     Albumin Random Urine Quantitative with Creat Ratio     Comprehensive metabolic panel     Hemoglobin A1c     TSH with free T4 reflex     UA reflex to Microscopic and Culture     OFFICE/OUTPT VISIT,EST,LEVL IV   5. ARMAND (obstructive sleep apnea)- wears her CPAP every night - has had same mask x 1+ year. - had sleep study done at [facility]- needs new nasal cushions and tubing    G47.33 SLEEP EVALUATION & MANAGEMENT REFERRAL -  ADULT -Other (Respond in commments) ([facility] )     OFFICE/OUTPT VISIT,EST,LEVL IV   6. Hyperlipidemia LDL goal <100- fish oil = worse IBS with diarrhea - is fasting this am- uncertan control - needs labs and refills  E78.5 Albumin Random Urine Quantitative with Creat Ratio     Lipid panel reflex to direct LDL Fasting     OFFICE/OUTPT VISIT,EST,LEVL IV   7. Type 2 diabetes mellitus with other circulatory complication, without long-term current use of insulin (H)- needs lab work and refills   E11.59 FOOT EXAM     EYE ADULT REFERRAL     Albumin Random Urine Quantitative with Creat Ratio     Comprehensive metabolic panel     Hemoglobin A1c     TSH with free T4 reflex     UA reflex to Microscopic and Culture     OFFICE/OUTPT VISIT,EST,LEVL IV   8. Atrial fibrillation, unspecified type (H)- seeing Cardiology once yearly - rate controlled - needs to ensure warfarin refill and INR Nurse referral - coming in next week for INR   I48.91 Albumin Random Urine Quantitative with Creat Ratio     CBC with platelets differential     Comprehensive metabolic panel     OFFICE/OUTPT VISIT,EST,LEVL IV     ANTICOAGULATION CLINIC REFERRAL   9. Essential hypertension with goal blood pressure less than 140/90- well controlled today - needs labs and medication refills   I10 atenolol (TENORMIN) 100 MG tablet     hydrochlorothiazide (HYDRODIURIL) 25 MG tablet     lisinopril (ZESTRIL) 40 MG tablet     amLODIPine (NORVASC) 5 MG tablet     Albumin Random Urine Quantitative with Creat Ratio     CBC with platelets differential     Comprehensive metabolic panel     OFFICE/OUTPT VISIT,EST,LEVL IV   10. Venous stasis of lower extremity- stable   I87.8 OFFICE/OUTPT VISIT,EST,LEVL IV   11. Bilateral leg edema- has been to lymphedema clinic in past- usually wears her compression knee-high socks daily   R60.0 OFFICE/OUTPT VISIT,EST,LEVL IV   12. CKD (chronic kidney disease) stage 2, GFR 60-89 ml/min- needs lab follow up  today   N18.2 hydrochlorothiazide (HYDRODIURIL) 25 MG tablet     Comprehensive metabolic panel     OFFICE/OUTPT VISIT,EST,LEVL IV   13. Long term current use of anticoagulant therapy- needs inr clinic referral updated   Z79.01 ANTICOAGULATION CLINIC REFERRAL   14. Screen for colon cancer- pt declines referral for colonoscopy again this year- consents to do one step FIT test   Z12.11 Fecal colorectal cancer screen (FIT)   15. Irritable bowel syndrome with diarrhea- doing well with 1x/day cholestyramine - got really gassy and bloated with 2x/day - needs refill   K58.0    16. Type 2 diabetes mellitus with diabetic polyneuropathy, without long-term current use of insulin (H)- elevated fasting sugars currently   E11.42 blood glucose (JAYDE CONTOUR NEXT) test strip     blood glucose monitoring ([employer] MICROLET) lancets   17. Irritable bowel syndrome with diarrhea- better with taking cholestyramine once daily - changed rx for upcoming refills   K58.0 cholestyramine (QUESTRAN) 4 g packet     OFFICE/OUTPT VISIT,EST,LEVL IV   18. Visit for screening mammogram  Z12.31 MA Screen Bilateral w/Martin   19. Carpal tunnel syndrome of right wrist- increasing over time - now awakening at night with tingling and pain despite wearing a brace at night - desires referral   G56.01 Orthopedic & Spine  Referral     OFFICE/OUTPT VISIT,EST,LEVL IV   20. Persistent atrial fibrillation (H)  I48.19 OFFICE/OUTPT VISIT,EST,LEVL IV   21. Vitamin D deficiency- on supplementation - needs labs done today   E55.9 25 Hydroxyvitamin D2 and D3     OFFICE/OUTPT VISIT,EST,LEVL IV   22. Encounter for hepatitis C screening test for low risk patient- per current CDC screening guidelines   Z11.59 Hepatitis C antibody     OFFICE/OUTPT VISIT,EST,LEVL IV   23. Multiple pigmented nevi- including seborrheic keratoses - needs FBSE - referred today   D22.9 SKIN CARE REFERRAL     OFFICE/OUTPT VISIT,EST,LEVL IV       Patient has been advised of split billing  "requirements and indicates understanding: Yes  COUNSELING:  Reviewed preventive health counseling, as reflected in patient instructions    Estimated body mass index is 38.38 kg/m  as calculated from the following:    Height as of this encounter: 1.607 m (5' 3.25\").    Weight as of this encounter: 99.1 kg (218 lb 6.4 oz).    Weight management plan: Discussed healthy diet and exercise guidelines    She reports that she has never smoked. She has never used smokeless tobacco.      Appropriate preventive services were discussed with this patient, including applicable screening as appropriate for cardiovascular disease, diabetes, osteopenia/osteoporosis, and glaucoma.  As appropriate for age/gender, discussed screening for colorectal cancer, prostate cancer, breast cancer, and cervical cancer. Checklist reviewing preventive services available has been given to the patient.    Reviewed patients plan of care and provided an AVS. The Complex Care Plan (for patients with higher acuity and needing more deliberate coordination of services) for Zulema meets the Care Plan requirement. This Care Plan has been established and reviewed with the Patient.    Counseling Resources:  ATP IV Guidelines  Pooled Cohorts Equation Calculator  Breast Cancer Risk Calculator  Breast Cancer: Medication to Reduce Risk  FRAX Risk Assessment  ICSI Preventive Guidelines  Dietary Guidelines for Americans, 2010  Senior Living's MyPlate  ASA Prophylaxis  Lung CA Screening    Madina Mercado MD  [facility]    Identified Health Risks:  "

## 2020-11-05 LAB
ALBUMIN SERPL-MCNC: 3.9 G/DL (ref 3.4–5)
ALP SERPL-CCNC: 51 U/L (ref 40–150)
ALT SERPL W P-5'-P-CCNC: 23 U/L (ref 0–50)
ANION GAP SERPL CALCULATED.3IONS-SCNC: 6 MMOL/L (ref 3–14)
AST SERPL W P-5'-P-CCNC: 17 U/L (ref 0–45)
BILIRUB SERPL-MCNC: 0.6 MG/DL (ref 0.2–1.3)
BUN SERPL-MCNC: 9 MG/DL (ref 7–30)
CALCIUM SERPL-MCNC: 9.7 MG/DL (ref 8.5–10.1)
CHLORIDE SERPL-SCNC: 98 MMOL/L (ref 94–109)
CHOLEST SERPL-MCNC: 105 MG/DL
CO2 SERPL-SCNC: 29 MMOL/L (ref 20–32)
CREAT SERPL-MCNC: 0.55 MG/DL (ref 0.52–1.04)
CREAT UR-MCNC: 98 MG/DL
GFR SERPL CREATININE-BSD FRML MDRD: >90 ML/MIN/{1.73_M2}
GLUCOSE SERPL-MCNC: 131 MG/DL (ref 70–99)
HCV AB SERPL QL IA: NONREACTIVE
HDLC SERPL-MCNC: 42 MG/DL
LDLC SERPL CALC-MCNC: 46 MG/DL
MICROALBUMIN UR-MCNC: 8 MG/L
MICROALBUMIN/CREAT UR: 8.61 MG/G CR (ref 0–25)
NONHDLC SERPL-MCNC: 63 MG/DL
POTASSIUM SERPL-SCNC: 4 MMOL/L (ref 3.4–5.3)
PROT SERPL-MCNC: 7.2 G/DL (ref 6.8–8.8)
SODIUM SERPL-SCNC: 133 MMOL/L (ref 133–144)
TRIGL SERPL-MCNC: 87 MG/DL
TSH SERPL DL<=0.005 MIU/L-ACNC: 0.7 MU/L (ref 0.4–4)

## 2020-11-06 LAB
DEPRECATED CALCIDIOL+CALCIFEROL SERPL-MC: <53 UG/L (ref 20–75)
VITAMIN D2 SERPL-MCNC: <5 UG/L
VITAMIN D3 SERPL-MCNC: 48 UG/L

## 2020-11-09 ENCOUNTER — TELEPHONE (OUTPATIENT)
Dept: FAMILY MEDICINE | Facility: CLINIC | Age: 75
End: 2020-11-09

## 2020-11-09 NOTE — TELEPHONE ENCOUNTER
Date Forms was received: November 9, 2020    Forms received by: Fax    Purpose of Form:  DM testing supplies--[facility]    When the form is due:  ASAP    How the form needs to be returned for patient:  Fax    Form currently placed  JV inbox

## 2020-11-10 ENCOUNTER — ANTICOAGULATION THERAPY VISIT (OUTPATIENT)
Dept: NURSING | Facility: CLINIC | Age: 75
End: 2020-11-10
Payer: MEDICARE

## 2020-11-10 DIAGNOSIS — Z79.01 LONG TERM CURRENT USE OF ANTICOAGULANT THERAPY: ICD-10-CM

## 2020-11-10 DIAGNOSIS — I48.91 ATRIAL FIBRILLATION, UNSPECIFIED TYPE (H): ICD-10-CM

## 2020-11-10 DIAGNOSIS — I48.19 PERSISTENT ATRIAL FIBRILLATION (H): ICD-10-CM

## 2020-11-10 LAB
CAPILLARY BLOOD COLLECTION: NORMAL
INR BLD: 2.9 (ref 0.86–1.14)

## 2020-11-10 PROCEDURE — 36416 COLLJ CAPILLARY BLOOD SPEC: CPT | Performed by: FAMILY MEDICINE

## 2020-11-10 PROCEDURE — 99207 PR NO CHARGE NURSE ONLY: CPT

## 2020-11-10 PROCEDURE — 85610 PROTHROMBIN TIME: CPT | Performed by: FAMILY MEDICINE

## 2020-11-10 NOTE — PROGRESS NOTES
ANTICOAGULATION FOLLOW-UP CLINIC VISIT    Patient Name:  Zulema Nixon  Date:  11/10/2020  Contact Type:  Telephone    SUBJECTIVE:  Patient Findings     Comments:  Called patient to discuss today's INR results: The patient was assessed for diet, medication, and activity level changes, missed or extra doses, bruising or bleeding, with no problem findings. She states she may have eaten less green veggies than usual, but plans to get back to regular intake. Reviewed maintenance warfarin dosing with patient. Patient will remain on the same dose until next INR check. No other questions or concerns. Advised next lab-only INR in 6 weeks, patient would like to come back in 5 weeks to avoid the holiday week.   Mariza MONSIVAIS RN  Anticoagulation Clinic  [facility]          Clinical Outcomes     Negatives:  Major bleeding event, Thromboembolic event, Anticoagulation-related hospital admission, Anticoagulation-related ED visit, Anticoagulation-related fatality    Comments:  Called patient to discuss today's INR results: The patient was assessed for diet, medication, and activity level changes, missed or extra doses, bruising or bleeding, with no problem findings. She states she may have eaten less green veggies than usual, but plans to get back to regular intake. Reviewed maintenance warfarin dosing with patient. Patient will remain on the same dose until next INR check. No other questions or concerns. Advised next lab-only INR in 6 weeks, patient would like to come back in 5 weeks to avoid the holiday week.   Mariza MONSIVAIS RN  Anticoagulation Clinic  [place]             OBJECTIVE    Recent labs: (last 7 days)     11/10/20  0936   INR 2.9*       ASSESSMENT / PLAN  INR assessment THER    Recheck INR In: 5 WEEKS    INR Location Clinic      Anticoagulation Summary  As of 11/10/2020    INR goal:  2.0-3.0   TTR:  90.1 % (1 y)   INR used for dosin.9 (11/10/2020)   Warfarin maintenance plan:  5 mg (5 mg x 1) every Tue, Thu; 7.5 mg (5  mg x 1.5) all other days   Full warfarin instructions:  5 mg every Tue, Thu; 7.5 mg all other days   Weekly warfarin total:  47.5 mg   No change documented:  Mariza Begum RN   Plan last modified:  Torie Perales RN (4/16/2020)   Next INR check:  12/15/2020   Priority:  Maintenance   Target end date:  Indefinite    Indications    Long term current use of anticoagulant therapy [Z79.01]  Atrial fibrillation  unspecified type (H) [I48.91]  Persistent atrial fibrillation (H) [I48.19]             Anticoagulation Episode Summary     INR check location:      Preferred lab:      Send INR reminders to:  [facility]    Comments:        Anticoagulation Care Providers     Provider Role Specialty Phone number    Madina Mercado MD Referring Family Medicine [phone]            See the Encounter Report to view Anticoagulation Flowsheet and Dosing Calendar (Go to Encounters tab in chart review, and find the Anticoagulation Therapy Visit)    Mariza Begum, RN

## 2020-11-11 NOTE — PROGRESS NOTES
[facility] - PRIMARY CARE SKIN    CC: skin cancer screening (full-body)  SUBJECTIVE:   Zulema Nixon is a(n) 75 year old female who presents to clinic today for a full-body skin exam.    Bothersome lesions noticed by the patient or other skin concerns :  Issue One: spot on the back and right lateral forehead. No bleeding or tenderness    Personal Medical History  Skin cancer: NO  Eczema Psoriasis Lupus   NO NO NO     Family Medical History  Skin cancer: NO  Eczema Psoriasis Lupus   NO NO NO         Occupation: retired).    Refer to electronic medical record (EMR) for past medical history and medications.      ROS: 14 point review of systems was negative except the symptoms listed above in the HPI.        OBJECTIVE:   GENERAL: healthy, alert and no distress.  HEENT: PERRL. Conjunctiva, sclera clear.  SKIN: Macedo Skin Type - I.  This patient was examined from the top of the head to the bottom of the feet  including scalp, face, neck, trunk, buttocks, both arms, both legs, both hands, both feet, and all fingers and toes. The dermatoscope was used to help evaluate pigmented lesions.  Skin Pertinent Findings:  Face: raised, coarse textured lesions    Trunk, arms, legs, :            Brown, macule(s) most consistent with benign solar lentigo          Many raised, coarse textured, stuck appearing lesion consistent with seborrheic keratosis .          Slightly raised, red lesion(s) consistent with capillary hemangioma          Brown macules of various sizes and shapes most consistent with (benign) melanocytic nevi        ASSESSMENT:     Encounter Diagnoses   Name Primary?     Seborrheic keratosis Yes     Capillary hemangioma      Melanocytic nevi of trunk          PLAN:   Patient Instructions   Skin exam in one year    For dry skin use Cerave in the jar - apply daily.    The patient was counseled about sunscreens and sun avoidance. The patient was counseled to check the skin regularly and report any lesion that is  new, changing, itching, scabbing, bleeding or otherwise bothersome. The patient was discharged ambulatory and in stable condition.        TT: 25 minutes.

## 2020-11-12 ENCOUNTER — OFFICE VISIT (OUTPATIENT)
Dept: FAMILY MEDICINE | Facility: CLINIC | Age: 75
End: 2020-11-12
Payer: MEDICARE

## 2020-11-12 VITALS — DIASTOLIC BLOOD PRESSURE: 80 MMHG | SYSTOLIC BLOOD PRESSURE: 134 MMHG

## 2020-11-12 DIAGNOSIS — L82.1 SEBORRHEIC KERATOSIS: Primary | ICD-10-CM

## 2020-11-12 DIAGNOSIS — I78.1 CAPILLARY HEMANGIOMA: ICD-10-CM

## 2020-11-12 DIAGNOSIS — D22.5 MELANOCYTIC NEVI OF TRUNK: ICD-10-CM

## 2020-11-12 LAB — HEMOCCULT STL QL IA: NEGATIVE

## 2020-11-12 PROCEDURE — 99213 OFFICE O/P EST LOW 20 MIN: CPT | Performed by: FAMILY MEDICINE

## 2020-11-12 PROCEDURE — 82274 ASSAY TEST FOR BLOOD FECAL: CPT | Performed by: FAMILY MEDICINE

## 2020-11-12 NOTE — LETTER
11/12/2020         RE: Zulema Nixon  [address] [address] [address]        Dear Colleague,    Thank you for referring your patient, Zulema Nixon, to the [facility]. Please see a copy of my visit note below.    [facility] - PRIMARY CARE SKIN    CC: skin cancer screening (full-body)  SUBJECTIVE:   Zulema Nixon is a(n) 75 year old female who presents to clinic today for a full-body skin exam.    Bothersome lesions noticed by the patient or other skin concerns :  Issue One: spot on the back and right lateral forehead. No bleeding or tenderness    Personal Medical History  Skin cancer: NO  Eczema Psoriasis Lupus   NO NO NO     Family Medical History  Skin cancer: NO  Eczema Psoriasis Lupus   NO NO NO         Occupation: retired).    Refer to electronic medical record (EMR) for past medical history and medications.      ROS: 14 point review of systems was negative except the symptoms listed above in the HPI.        OBJECTIVE:   GENERAL: healthy, alert and no distress.  HEENT: PERRL. Conjunctiva, sclera clear.  SKIN: Macedo Skin Type - I.  This patient was examined from the top of the head to the bottom of the feet  including scalp, face, neck, trunk, buttocks, both arms, both legs, both hands, both feet, and all fingers and toes. The dermatoscope was used to help evaluate pigmented lesions.  Skin Pertinent Findings:  Face: raised, coarse textured lesions    Trunk, arms, legs, :            Brown, macule(s) most consistent with benign solar lentigo          Many raised, coarse textured, stuck appearing lesion consistent with seborrheic keratosis .          Slightly raised, red lesion(s) consistent with capillary hemangioma          Brown macules of various sizes and shapes most consistent with (benign) melanocytic nevi        ASSESSMENT:     Encounter Diagnoses   Name Primary?     Seborrheic keratosis Yes     Capillary hemangioma      Melanocytic nevi of trunk           PLAN:   Patient Instructions   Skin exam in one year    For dry skin use Cerave in the jar - apply daily.    The patient was counseled about sunscreens and sun avoidance. The patient was counseled to check the skin regularly and report any lesion that is new, changing, itching, scabbing, bleeding or otherwise bothersome. The patient was discharged ambulatory and in stable condition.        TT: 25 minutes.        Again, thank you for allowing me to participate in the care of your patient.        Sincerely,        Elysia Mora MD

## 2020-11-13 ENCOUNTER — TELEPHONE (OUTPATIENT)
Dept: FAMILY MEDICINE | Facility: CLINIC | Age: 75
End: 2020-11-13

## 2020-11-13 NOTE — TELEPHONE ENCOUNTER
Completed forms faxed back to [facility] at [phone].   Originals sent to be scanned.       Sintia Mitchell        Reviewed CA 15-3 and Ca 27-29 results from today with Dr. Stallings 12/6/17. CA 15-3 20 from 17, CA 27-29 27.0 from 25.9. No new orders received or action required. Currently within normal range.

## 2020-11-13 NOTE — TELEPHONE ENCOUNTER
Pharmacy calling to report the medicare form received for part B medication did not have a dx code that could be used. Pharmacy noted E11 cannot be used. Pharmacy did fax back, will await reception of fax and correct.    Raleigh CARTER RN   [facility]

## 2020-11-18 ENCOUNTER — MYC MEDICAL ADVICE (OUTPATIENT)
Dept: FAMILY MEDICINE | Facility: CLINIC | Age: 75
End: 2020-11-18

## 2020-11-18 ENCOUNTER — VIRTUAL VISIT (OUTPATIENT)
Dept: FAMILY MEDICINE | Facility: OTHER | Age: 75
End: 2020-11-18
Payer: COMMERCIAL

## 2020-11-18 DIAGNOSIS — Z20.828 CONTACT WITH OR EXPOSURE TO VIRAL DISEASE: Primary | ICD-10-CM

## 2020-11-18 PROCEDURE — 99421 OL DIG E/M SVC 5-10 MIN: CPT | Performed by: FAMILY MEDICINE

## 2020-11-18 NOTE — PROGRESS NOTES
"Date: 2020 10:50:48  Clinician: Vaughn Wetzel  Clinician NPI: [id number]  Patient: Zulema Nixon  Patient : 1945  Patient Address: [address] [place] [address]  Patient Phone: [phone]  Visit Protocol: URI  Patient Summary:  Zulema is a 75 year old ( : 1945 ) female who initiated a [facility] Visit for COVID-19 (Coronavirus) evaluation and screening. When asked the question \"Please sign me up to receive news, health information and promotions from [facility].\", Zulema responded \"Yes\".    When asked when her symptoms started, Zulema reported that she does not have any symptoms.   She denies taking antibiotic medication in the past month and having recent facial or sinus surgery in the past 60 days.    Pertinent COVID-19 (Coronavirus) information  Zulema does not work or volunteer as healthcare worker or a . In the past 14 days, Zulema has not worked or volunteered at a healthcare facility or group living setting.   In the past 14 days, she also has not lived in a congregate living setting.   Zulema has had a close contact with a laboratory-confirmed COVID-19 patient in the last 14 days. She does not know when she was exposed to the laboratory-confirmed COVID-19 patient.   Additional information about contact with COVID-19 (Coronavirus) patient as reported by the patient (free text): My  tested positive 3 days ago when in the hospital for pneumonia.. we live together, so I was exposed.   Zulema is living in the same household with the COVID-19 positive patient. She was in an enclosed space for greater than 15 minutes with the COVID-19 patient.   During the encounter, neither were wearing masks.   Since 2019, Zulema has not been tested for COVID-19 and has not had upper respiratory infection or influenza-like illness.   Pertinent medical history  Zulema does not get yeast infections when she takes antibiotics.   Zulema does not need a return to work/school note.   Weight: 218 lbs "   Zulema does not smoke or use smokeless tobacco.   Weight: 218 lbs    MEDICATIONS: lisinopril oral, simvastatin oral, amiloride-hydrochlorothiazide oral, warfarin oral, atenolol-chlorthalidone oral, metformin oral, ALLERGIES: tetracycline  Clinician Response:  Dear Zulema,   Based on your exposure to COVID-19 (coronavirus), we would like to test you for this virus.  1. Please call [phone] to schedule your visit. Explain that you were referred by [facility] to have a COVID-19 test. Be ready to [facility].  * If you need to schedule in [facility] please call [phone] or for [employer] employees please call [phone].   * If you need to schedule in the [place] area please call [phone]. [employer] employees call [phone].   The following will serve as your written order for this COVID Test, ordered by me, for the indication of suspected COVID [Z20.828]: The test will be ordered in [employer], our electronic health record, after you are scheduled. It will show as ordered and authorized by Dash Sifuentes MD.  Order: COVID-19 (coronavirus) PCR for ASYMPTOMATIC EXPOSURE testing from [facility].   If you know you have had close contact with someone who tested positive, you should be quarantined for 14 days after this exposure. You should stay in quarantine for the14 days even if the covid test is negative, the optimal time to test after exposure is 5-7 days from the exposure  Quarantine means   What should I do?  For safety, it's very important to follow these rules. Do this for 14 days after the date you were last exposed to the virus..  Stay home and away from others. Don't go to school or anywhere else. Generally quarantine means staying home from work but there are some exceptions to this. Please contact your workplace.   No hugging, kissing or shaking hands.  Don't let anyone visit.  Cover your mouth and nose with a mask, tissue or washcloth to avoid spreading germs.  Wash your hands and face often.  Use soap and water.  What are the symptoms of COVID-19?  The most common symptoms are cough, fever and trouble breathing. Less common symptoms include headache, body aches, fatigue (feeling very tired), chills, sore throat, stuffy or runny nose, diarrhea (loose poop), loss of taste or smell, belly pain, and nausea or vomiting (feeling sick to your stomach or throwing up).  After 14 days, if you have still don't have symptoms, you likely don't have this virus.  If you develop symptoms, follow these guidelines.  If you're normally healthy: Please start another [facility] visit to report your symptoms. Go to [email or web address].  If you have a serious health problem (like cancer, heart failure, an organ transplant or kidney disease): Call your specialty clinic. Let them know that you might have COVID-19.  2. When it's time for your COVID test:  Stay at least 6 feet away from others. (If someone will drive you to your test, stay in the backseat, as far away from the  as you can.)  Cover your mouth and nose with a mask, tissue or washcloth.  Go straight to the testing site. Don't make any stops on the way there or back.  Please note  Caregivers in these groups are at risk for severe illness due to COVID-19:  o People 65 years and older  o People who live in a nursing home or long-term care facility  o People with chronic disease (lung, heart, cancer, diabetes, kidney, liver, immunologic)  o People who have a weakened immune system, including those who:  Are in cancer treatment  Take medicine that weakens the immune system, such as corticosteroids  Had a bone marrow or organ transplant  Have an immune deficiency  Have poorly controlled HIV or AIDS  Are obese (body mass index of 40 or higher)  Smoke regularly  Where can I get more information?   [facility] -- About COVID-19: [email or web address]  CDC -- What to Do If You're Sick: www.cdc.gov/coronavirus/2019-ncov/about/steps-when-sick.html  CDC -- Ending Home  Isolation: www.cdc.gov/coronavirus/2019-ncov/hcp/disposition-in-home-patients.html  CDC -- Caring for Someone: www.cdc.gov/coronavirus/2019-ncov/if-you-are-sick/care-for-someone.html  [facility] -- Interim Guidance for Hospital Discharge to Home: www.[place].mn./diseases/coronavirus/hcp/hospdischarge.pdf  [facility] clinical trials (COVID-19 research studies): clinicalaffairs.[facility]/n-clinical-trials  Below are the COVID-19 hotlines at the [place] Department of Health ([facility]). Interpreters are available.  For health questions: Call 726-623-4133 or 1-736.799.1982 (7 a.m. to 7 p.m.)  For questions about schools and childcare: Call 053-613-2780 or 1-347.219.1037 (7 a.m. to 7 p.m.)    Diagnosis: Contact with and (suspected) exposure to other viral communicable diseases  Diagnosis ICD: Z20.828

## 2020-11-18 NOTE — TELEPHONE ENCOUNTER
Please see my chart message below     Please review and advise     Thank you     Kylah Pierce RN, BSN  [facility]

## 2020-11-19 ENCOUNTER — TELEPHONE (OUTPATIENT)
Dept: FAMILY MEDICINE | Facility: CLINIC | Age: 75
End: 2020-11-19

## 2020-11-19 NOTE — TELEPHONE ENCOUNTER
General Call:     Who is calling:  Keli     Reason for Call: Looking for the Medicare part B form - was sent back to a different code - plz call back with update     What are your questions or concerns:      Date of last appointment with provider:     Okay to leave a detailed message:Yes at Other phone number:  [phone]

## 2020-11-22 ENCOUNTER — HEALTH MAINTENANCE LETTER (OUTPATIENT)
Age: 75
End: 2020-11-22

## 2020-12-18 ENCOUNTER — TELEPHONE (OUTPATIENT)
Dept: FAMILY MEDICINE | Facility: CLINIC | Age: 75
End: 2020-12-18

## 2020-12-18 ENCOUNTER — ANTICOAGULATION THERAPY VISIT (OUTPATIENT)
Dept: FAMILY MEDICINE | Facility: CLINIC | Age: 75
End: 2020-12-18

## 2020-12-18 DIAGNOSIS — I48.91 ATRIAL FIBRILLATION, UNSPECIFIED TYPE (H): ICD-10-CM

## 2020-12-18 DIAGNOSIS — I48.19 PERSISTENT ATRIAL FIBRILLATION (H): ICD-10-CM

## 2020-12-18 DIAGNOSIS — Z79.01 LONG TERM CURRENT USE OF ANTICOAGULANT THERAPY: ICD-10-CM

## 2020-12-18 LAB
CAPILLARY BLOOD COLLECTION: NORMAL
INR PPP: 5.6 (ref 0.86–1.14)

## 2020-12-18 PROCEDURE — 85610 PROTHROMBIN TIME: CPT | Performed by: FAMILY MEDICINE

## 2020-12-18 PROCEDURE — 99207 PR NO CHARGE NURSE ONLY: CPT | Performed by: FAMILY MEDICINE

## 2020-12-18 PROCEDURE — 36416 COLLJ CAPILLARY BLOOD SPEC: CPT | Performed by: FAMILY MEDICINE

## 2020-12-18 NOTE — PROGRESS NOTES
ANTICOAGULATION FOLLOW-UP CLINIC VISIT    Patient Name:  Zulema Nixon  Date:  2020  Contact Type:  Telephone/ Zulema    SUBJECTIVE:  Patient Findings     Positives:  Change in diet/appetite    Comments:   recently passed away,  was this week and is not eating well. Has one medium sized bruise on leg for a couple days but its not huge.         Clinical Outcomes     Negatives:  Major bleeding event, Thromboembolic event, Anticoagulation-related hospital admission, Anticoagulation-related ED visit, Anticoagulation-related fatality    Comments:   recently passed away,  was this week and is not eating well. Has one medium sized bruise on leg for a couple days but its not huge.            OBJECTIVE    Recent labs: (last 7 days)     20  1458   INR 5.60*       ASSESSMENT / PLAN  INR assessment SUPRA    Recheck INR In: 3 DAYS    INR Location Clinic      Anticoagulation Summary  As of 2020    INR goal:  2.0-3.0   TTR:  80.1 % (1 y)   INR used for dosin.60 (2020)   Warfarin maintenance plan:  5 mg (5 mg x 1) every Tue, Thu; 7.5 mg (5 mg x 1.5) all other days   Full warfarin instructions:  : Hold; : Hold; Otherwise 5 mg every Tue, Thu; 7.5 mg all other days   Weekly warfarin total:  47.5 mg   Plan last modified:  Torie Perales RN (2020)   Next INR check:  2020   Priority:  Maintenance   Target end date:  Indefinite    Indications    Long term current use of anticoagulant therapy [Z79.01]  Atrial fibrillation  unspecified type (H) [I48.91]  Persistent atrial fibrillation (H) [I48.19]             Anticoagulation Episode Summary     INR check location:      Preferred lab:      Send INR reminders to:  [facility]    Comments:        Anticoagulation Care Providers     Provider Role Specialty Phone number    Madina Mercado MD Referring Family Medicine [phone]            See the Encounter Report to view Anticoagulation Flowsheet and  Dosing Calendar (Go to Encounters tab in chart review, and find the Anticoagulation Therapy Visit)    Patient INR is supra therapeutic today.  Patient will adjust dosing today and tomorrow by holding and then resume maintenance dosing of 47.5 mg and follow up in 3 days or sooner if there are any concerns or problems.    Signs of bleeding and when appropriate to seek care for symptoms reviewed.    Adjustment Rational: Dosage adjustment made based on physician directed care plan.      Albert Christie RN

## 2020-12-18 NOTE — PROGRESS NOTES
Anticoagulation Management    Unable to reach Zulema today.    Today's INR result of 5.6 is supratherapeutic (goal INR of 2.0-3.0).  Result received from: Clinic Lab    Follow up required to discuss out of range INR     Left message to call Iftikhar. Transfer to [phone].      Anticoagulation clinic to follow up    Albert Christie RN

## 2020-12-18 NOTE — PROGRESS NOTES
Second attempt to contact patient. [employer] message also sent to patient.   Tammie Christie RN   [facility]  [place], [place], [place]

## 2020-12-21 ENCOUNTER — ANTICOAGULATION THERAPY VISIT (OUTPATIENT)
Dept: FAMILY MEDICINE | Facility: CLINIC | Age: 75
End: 2020-12-21

## 2020-12-21 DIAGNOSIS — I48.91 ATRIAL FIBRILLATION, UNSPECIFIED TYPE (H): ICD-10-CM

## 2020-12-21 DIAGNOSIS — I48.19 PERSISTENT ATRIAL FIBRILLATION (H): ICD-10-CM

## 2020-12-21 DIAGNOSIS — Z79.01 LONG TERM CURRENT USE OF ANTICOAGULANT THERAPY: ICD-10-CM

## 2020-12-21 LAB
CAPILLARY BLOOD COLLECTION: NORMAL
INR BLD: 1.8 (ref 0.86–1.14)

## 2020-12-21 PROCEDURE — 85610 PROTHROMBIN TIME: CPT | Performed by: FAMILY MEDICINE

## 2020-12-21 PROCEDURE — 36416 COLLJ CAPILLARY BLOOD SPEC: CPT | Performed by: FAMILY MEDICINE

## 2020-12-21 NOTE — PROGRESS NOTES
ANTICOAGULATION FOLLOW-UP CLINIC VISIT    Patient Name:  Zulema Nixon  Date:  2020  Contact Type:  Telephone/ Zulema    SUBJECTIVE:  Patient Findings     Comments:  Slightly subtherapeutic after two day hold        Clinical Outcomes     Negatives:  Major bleeding event, Thromboembolic event, Anticoagulation-related hospital admission, Anticoagulation-related ED visit, Anticoagulation-related fatality    Comments:  Slightly subtherapeutic after two day hold           OBJECTIVE    Recent labs: (last 7 days)     20  1348   INR 1.8*       ASSESSMENT / PLAN  INR assessment SUB    Recheck INR In: 1 WEEK    INR Location Clinic      Anticoagulation Summary  As of 2020    INR goal:  2.0-3.0   TTR:  79.5 % (1 y)   INR used for dosin.8 (2020)   Warfarin maintenance plan:  5 mg (5 mg x 1) every Tue, Thu; 7.5 mg (5 mg x 1.5) all other days   Full warfarin instructions:  5 mg every Tue, Thu; 7.5 mg all other days   Weekly warfarin total:  47.5 mg   No change documented:  Albert Christie RN   Plan last modified:  Torie Perales RN (2020)   Next INR check:  2020   Priority:  Maintenance   Target end date:  Indefinite    Indications    Long term current use of anticoagulant therapy [Z79.01]  Atrial fibrillation  unspecified type (H) [I48.91]  Persistent atrial fibrillation (H) [I48.19]             Anticoagulation Episode Summary     INR check location:      Preferred lab:      Send INR reminders to:  [facility]    Comments:        Anticoagulation Care Providers     Provider Role Specialty Phone number    Madina Mercado MD Referring Family Medicine [phone]            See the Encounter Report to view Anticoagulation Flowsheet and Dosing Calendar (Go to Encounters tab in chart review, and find the Anticoagulation Therapy Visit)    Patient INR is sub therapeutic today. Low after two day hold. Patient will continue weekly maintenance dose of 47.5 mg and follow up in  1 week or sooner if there are any concerns or problems.     Discussed signs of clotting with patient and when to seek care for concerns.  Patient verbalized understanding    Rationale to adjustments: Dosage adjustment made based on physician directed care plan.      Albert Christie RN

## 2020-12-28 ENCOUNTER — ANTICOAGULATION THERAPY VISIT (OUTPATIENT)
Dept: FAMILY MEDICINE | Facility: CLINIC | Age: 75
End: 2020-12-28

## 2020-12-28 DIAGNOSIS — Z79.01 LONG TERM CURRENT USE OF ANTICOAGULANT THERAPY: ICD-10-CM

## 2020-12-28 DIAGNOSIS — I48.91 ATRIAL FIBRILLATION, UNSPECIFIED TYPE (H): ICD-10-CM

## 2020-12-28 DIAGNOSIS — I48.19 PERSISTENT ATRIAL FIBRILLATION (H): ICD-10-CM

## 2020-12-28 LAB
CAPILLARY BLOOD COLLECTION: NORMAL
INR PPP: 3.2 (ref 0.86–1.14)

## 2020-12-28 PROCEDURE — 85610 PROTHROMBIN TIME: CPT | Performed by: FAMILY MEDICINE

## 2020-12-28 PROCEDURE — 36416 COLLJ CAPILLARY BLOOD SPEC: CPT | Performed by: FAMILY MEDICINE

## 2020-12-28 NOTE — PROGRESS NOTES
ANTICOAGULATION FOLLOW-UP CLINIC VISIT    Patient Name:  Zulema Nixon  Date:  12/28/2020  Contact Type:  Telephone    SUBJECTIVE:  Patient Findings     Positives:  Change in diet/appetite    Comments:  Called patient to discuss today's INR results: The patient was assessed for diet, medication, and activity level changes, missed or extra doses, bruising or bleeding, with no problem findings. Though patient states she is not back to her regular intake of green veggies since the passing of her . Reviewed maintenance warfarin dosing with patient. Patient will remain on the same dose until next INR check. No other questions or concerns. Scheduled next lab-only INR in 2 weeks. Patient states this maintenance dose has worked well for her for years and she would like to work on eating more greens per her prior usual. She understands if still elevated at next INR, we may reduce maintenance dose.   Mariza LOVE RN  Anticoagulation Team          Clinical Outcomes     Negatives:  Major bleeding event, Thromboembolic event, Anticoagulation-related hospital admission, Anticoagulation-related ED visit, Anticoagulation-related fatality    Comments:  Called patient to discuss today's INR results: The patient was assessed for diet, medication, and activity level changes, missed or extra doses, bruising or bleeding, with no problem findings. Though patient states she is not back to her regular intake of green veggies since the passing of her . Reviewed maintenance warfarin dosing with patient. Patient will remain on the same dose until next INR check. No other questions or concerns. Scheduled next lab-only INR in 2 weeks. Patient states this maintenance dose has worked well for her for years and she would like to work on eating more greens per her prior usual. She understands if still elevated at next INR, we may reduce maintenance dose.   Mariza LOVE RN  Anticoagulation Team             OBJECTIVE    Recent labs: (last 7  days)     12/28/20  1359   INR 3.20*       ASSESSMENT / PLAN  INR assessment SUPRA    Recheck INR In: 2 WEEKS    INR Location Clinic      Anticoagulation Summary  As of 12/28/2020    INR goal:  2.0-3.0   TTR:  78.9 % (1 y)   INR used for dosing:  3.20 (12/28/2020)   Warfarin maintenance plan:  5 mg (5 mg x 1) every Tue, Thu; 7.5 mg (5 mg x 1.5) all other days   Full warfarin instructions:  5 mg every Tue, Thu; 7.5 mg all other days   Weekly warfarin total:  47.5 mg   No change documented:  Mariza Begum RN   Plan last modified:  Torie Perales RN (4/16/2020)   Next INR check:  1/12/2021   Priority:  Maintenance   Target end date:  Indefinite    Indications    Long term current use of anticoagulant therapy [Z79.01]  Atrial fibrillation  unspecified type (H) [I48.91]  Persistent atrial fibrillation (H) [I48.19]             Anticoagulation Episode Summary     INR check location:      Preferred lab:      Send INR reminders to:  [facility]    Comments:        Anticoagulation Care Providers     Provider Role Specialty Phone number    Madina Mercado MD Referring Family Medicine [phone]            See the Encounter Report to view Anticoagulation Flowsheet and Dosing Calendar (Go to Encounters tab in chart review, and find the Anticoagulation Therapy Visit)    Mariza Begum, RN

## 2021-01-11 ENCOUNTER — TELEPHONE (OUTPATIENT)
Dept: PODIATRY | Facility: CLINIC | Age: 76
End: 2021-01-11

## 2021-01-11 NOTE — TELEPHONE ENCOUNTER
Received below Message in my inbasket:         Appointment Request From: Zulema Nixon     With Provider: Larissa Sow DPM, Podiatry/Foot and Ankle Surgery [[facility]]     Preferred Date Range: Any     Preferred Times: Any Time     Reason for visit: Request an Appointment     Comments:  I think I might have an infection in my toe.  I have a bad toenail that I've been seeing Dr. Sow for.         Please schedule patient an appointment.     Thanks,    Larissa Sow DPM

## 2021-01-11 NOTE — TELEPHONE ENCOUNTER
Phone call to patient. For the past week she has noticed her left 2nd toe becoming reddened where her shoe has been rubbing on it. Over the weekend, the whole end of her toe became very reddened and sore. Denies drainage but she reports a blood blister on her toe. Redness has since decreased.   Informed of Dr. Sow's change in locations and that she is out of the office today.   Offered an appointment today with Dr. Browning which she declined.   Appointment scheduled on 1/13/21 per her request with Dr. Sow.   Asked that she call back to be seen sooner if symptoms worsen.   Triage number provided.   She verbalized understanding.     DORIS Steward RN

## 2021-01-12 ENCOUNTER — ANTICOAGULATION THERAPY VISIT (OUTPATIENT)
Dept: FAMILY MEDICINE | Facility: CLINIC | Age: 76
End: 2021-01-12

## 2021-01-12 DIAGNOSIS — I48.91 ATRIAL FIBRILLATION, UNSPECIFIED TYPE (H): ICD-10-CM

## 2021-01-12 DIAGNOSIS — I48.19 PERSISTENT ATRIAL FIBRILLATION (H): ICD-10-CM

## 2021-01-12 DIAGNOSIS — Z79.01 LONG TERM CURRENT USE OF ANTICOAGULANT THERAPY: ICD-10-CM

## 2021-01-12 LAB
CAPILLARY BLOOD COLLECTION: NORMAL
INR PPP: 3 (ref 0.86–1.14)

## 2021-01-12 PROCEDURE — 85610 PROTHROMBIN TIME: CPT | Performed by: FAMILY MEDICINE

## 2021-01-12 PROCEDURE — 36416 COLLJ CAPILLARY BLOOD SPEC: CPT | Performed by: FAMILY MEDICINE

## 2021-01-12 NOTE — PROGRESS NOTES
Anticoagulation Management    Unable to reach Zulema today.    Today's INR result of 3.0 is therapeutic (goal INR of 2.0-3.0).  Result received from: Clinic Lab    Follow up required to assess for changes     LM to call Iftikhar. Transfer to [phone].      Anticoagulation clinic to follow up    Albert Christie RN

## 2021-01-12 NOTE — PROGRESS NOTES
ANTICOAGULATION FOLLOW-UP CLINIC VISIT    Patient Name:  Zulema Nixon  Date:  1/12/2021  Contact Type:  Telephone/ Zulema    SUBJECTIVE:  Patient Findings     Comments:  The patient was assessed for diet, medication, and activity level changes, missed or extra doses, bruising or bleeding, with no problem findings.          Clinical Outcomes     Negatives:  Major bleeding event, Thromboembolic event, Anticoagulation-related hospital admission, Anticoagulation-related ED visit, Anticoagulation-related fatality    Comments:  The patient was assessed for diet, medication, and activity level changes, missed or extra doses, bruising or bleeding, with no problem findings.             OBJECTIVE    Recent labs: (last 7 days)     01/12/21  1014   INR 3.00*       ASSESSMENT / PLAN  INR assessment THER    Recheck INR In: 3 WEEKS    INR Location Clinic      Anticoagulation Summary  As of 1/12/2021    INR goal:  2.0-3.0   TTR:  74.8 % (1 y)   INR used for dosing:  3.00 (1/12/2021)   Warfarin maintenance plan:  5 mg (5 mg x 1) every Tue, Thu; 7.5 mg (5 mg x 1.5) all other days   Full warfarin instructions:  5 mg every Tue, Thu; 7.5 mg all other days   Weekly warfarin total:  47.5 mg   No change documented:  Albert Christie RN   Plan last modified:  Torie Perales, RN (4/16/2020)   Next INR check:  2/2/2021   Priority:  Maintenance   Target end date:  Indefinite    Indications    Long term current use of anticoagulant therapy [Z79.01]  Atrial fibrillation  unspecified type (H) [I48.91]  Persistent atrial fibrillation (H) [I48.19]             Anticoagulation Episode Summary     INR check location:      Preferred lab:      Send INR reminders to:  [facility]    Comments:        Anticoagulation Care Providers     Provider Role Specialty Phone number    Madina Mercado MD Referring Family Medicine [phone]            See the Encounter Report to view Anticoagulation Flowsheet and Dosing Calendar (Go to  Encounters tab in chart review, and find the Anticoagulation Therapy Visit)    Patient INR is therapeutic.  Patient will continue to take 47.5 mg weekly dosing and follow up in 3 weeks or sooner for any problems or concerns.        Albert Christie RN

## 2021-01-13 ENCOUNTER — OFFICE VISIT (OUTPATIENT)
Dept: PODIATRY | Facility: CLINIC | Age: 76
End: 2021-01-13
Payer: MEDICARE

## 2021-01-13 VITALS
HEIGHT: 63 IN | BODY MASS INDEX: 37.92 KG/M2 | DIASTOLIC BLOOD PRESSURE: 86 MMHG | SYSTOLIC BLOOD PRESSURE: 138 MMHG | WEIGHT: 214 LBS

## 2021-01-13 DIAGNOSIS — E11.42 TYPE 2 DIABETES MELLITUS WITH DIABETIC POLYNEUROPATHY, WITHOUT LONG-TERM CURRENT USE OF INSULIN (H): ICD-10-CM

## 2021-01-13 DIAGNOSIS — E66.01 MORBID OBESITY DUE TO EXCESS CALORIES (H): ICD-10-CM

## 2021-01-13 DIAGNOSIS — M79.672 LEFT FOOT PAIN: Primary | ICD-10-CM

## 2021-01-13 DIAGNOSIS — L84 PRE-ULCERATIVE CALLUSES: ICD-10-CM

## 2021-01-13 DIAGNOSIS — L60.3 DYSTROPHIC NAIL: ICD-10-CM

## 2021-01-13 PROCEDURE — 99213 OFFICE O/P EST LOW 20 MIN: CPT | Performed by: PODIATRIST

## 2021-01-13 ASSESSMENT — MIFFLIN-ST. JEOR: SCORE: 1433.79

## 2021-01-13 NOTE — PROGRESS NOTES
"Podiatry / Foot and Ankle Surgery Progress Note    January 13, 2021    Subject: Patient was seen for concern for infection in her left second toe.  She states she noticed that there was a dark area to the distal aspect of the left second toe about 2 weeks ago.  States that she wore a tighter pair of shoes and that is when it occurred.  She denies fever, nausea, vomiting.  Notes that it was sore in no shoes about a 5 out of 10 at its worst.  Currently it is not painful but she does have baseline neuropathy.  She is wondering if it is infected and what can be done for it.    Objective:  Vitals: /86   Ht 1.607 m (5' 3.25\")   Wt 97.1 kg (214 lb)   BMI 37.61 kg/m    BMI= Body mass index is 37.61 kg/m .    A1C: 5.7 (11/2020)    General:  Patient is alert and orientated.  NAD.    Dermatologic: left 2nd toenail is thickened, discolored, dystrophic and 80% onycholysis. No redness or signs of infection noted.  localized hyperkeratotic lesion to distal left 2nd toe. No open lesion on debridement.       Vascular: DP & PT pulses are intact & regular bilaterally.  No significant edema or varicosities noted.  CFT and skin temperature is normal to both lower extremities.      Neurologic: Lower extremity sensation is diminished via monofilament testing.       Musculoskeletal: Patient is ambulatory without assistive device or brace.  Decrease arch height.     Assessment:    Type 2 diabetes mellitus with diabetic polyneuropathy, without long-term current use of insulin (H)  Morbid obesity due to excess calories (H)  Left foot pain  Pre-ulcerative calluses  Dystrophic nail    Medical Decision Making/Plan:   Discussed causes of keratomas.  They are due to areas of increase friction.  Hammertoes can create these as they put more pressure to the metatarsal head.  Discussed treatments such as using foot file, pumice stone, metatarsal pads, orthotics, and not walking barefoot.     We discussed the cost structure of callus care if " they were to come back and have it treated in the clinic if insurance does not cover it and explained that they would be billed. They were also provided information on places to get the callus treatment.    We discussed that these calluses are preulcerative lesions.  She notes that she was wearing a pair of tighter shoes and is no longer wearing them.  We will have her monitor the area.  Clinically there is no open areas or signs of infection.  She will keep some triple antibiotic ointment and a Band-Aid to the toe for the next week just to keep the area clean.  She was given information on where to get routine nail care as we do not do that here.  All questions were answered to patient satisfaction and she will call further questions or concerns.      Patient Risk Factor:  Patient is a medium risk factor for infection in foot given diabetes.     Larissa Sow DPM, Podiatry/Foot and Ankle Surgery    Recommended to Zulema Nixon to follow up with Primary Care provider regarding elevated blood pressure.

## 2021-01-13 NOTE — PATIENT INSTRUCTIONS
"Thank you for choosing [facility] Podiatry / Foot & Ankle Surgery!    DR. KEITH'S CLINIC:  [facility]   [address]   [place] [address]   [phone]  [phone]      SCHEDULE SURGERY: [phone]   BILLING QUESTIONS: [phone]   AFTER HOURS: [phone]   APPOINTMENTS: [phone]   CONSUMER PRICE LINE: [phone]      Follow up: as needed     Please read through the following handouts and if you have any questions, please feel free to call us or send a [employer] message!    DIABETES AND YOUR FEET  Diabetes can result in several problems in the feet including ulcers (open sores) and amputations. Two of the most important reasons why people develop foot problems when they have diabetes is : 1. Neuropathy (loss of feeling)  2. Vascular disease (loss or decrease of blood flow).    Neuropathy is a term used to describe a loss of nerve function.  Patients with diabetes are at risk of developing neuropathy if their sugars continue to run high and are above the normal value. One theory for neuropathy is that the \"extra\" sugar in the body enters the nerves and is broken down. These by-products build up in the nerve causing it to swell and impairing nerve function. Often times, this can be prevented by controlling your sugars, dieting and exercise.    When a person develops neuropathy, they usually begin to feel numbness or tingling in their feet and sometime in their legs.  Other symptoms may include painful burning or hot feet, tingling or feeling like insects or ants are crawling on your feet or legs.  If the diabetes is sever and the sugars run high for long periods of time, neuropathy can also occur in the hands.    Vascular disease  is a term used to describe a loss or decrease in circulation (blood flow). There is a problem in getting blood and oxygen to areas that need it. Similar to neuropathy, sugars can build up in the walls of the arteries (blood vessels) and " cause them to become swollen, thickened and hardened. This decreases the amount of blood that can go to an area that needs it. Though this is common in the legs of diabetic patients, it can also affect other arteries (blood vessels) in the body such as in the heart and eyes.    In the legs, vascular disease usually results in cramping. Patients who develop leg cramps after walking the same distance every time (i.e. One block, half a mile, ect.) need to let their doctors know so that their circulation may be checked. Cramps causing severe pain in the feet and/or legs while sleeping and the cramps go away when you stand or hang your legs off the side of the bed, may also be a sign of poor blood circulation.  Occasional cramping in cold weather or on rare occasions with activity may not be due to poor circulation, but you should inform your doctor.    PREVENTION OF THESE DISEASES  The key to prevention is good blood sugar control. Poor blood sugar control is a big reason many of these problems start. Physical activity (exercise) is a very good way to help decrease your blood sugars. Exercise can lower your blood sugar, blood pressure, and cholesterol. It also reduces your risk for heart disease and stroke, relieves stress, and strengthens your heart, muscles and bones.  In addition, regular activity helps insulin work better, improves your blood circulation, and keeps your joints flexible. If you're trying to lose weight, a combination of exercise and wise food choices can help you reach your target weight and maintain it.      PAIN MANAGEMENT (**Please speak with your primary doctor about any medications**)  1.Blood Sugar Control - Most important  2. Medications such as:  Amytriptylline, duloxetine, gabapentin, lyrica, tramadol  3. Nutritional therapy:  Vitamin B6 (100mg daily), Vitamin B12 (75mcg daily), Vitamin D 2000 IU daily), Alpha-Lipoic Acid (600-1800mg daily), Acetyl-L-Carnitine (500-1000mg TID, L-methyl  folate (1500mcg daily)    ** Metformin can block Vitamin B6 and B12 so it is important to supplement**    FOOT CARE RECOMMENDATIONS   1. Wash your feet with lukewarm water and a mild soap and then dry them thoroughly, especially between the toes.     2. Examine your feet daily looking for cuts, corns, blisters, cracks, ect, especially after wearing new shoes. Make sure to look between your toes. If you cannot see the bottom of your feet, set a mirror on the floor and hold your foot over it, or ask a spouse, friend or family member to examine your feet for you. Contact your doctor immediately if new problems are noted or if sores are not healing.     3. Immediately apply moisturizer to the tops and bottoms of your feet, avoiding areas between the toes. Hand lotion (Intesive Care, Shari, Eucerin, Neutrogena, Curel, ect) is sufficient unless your doctor prescribes a medicated lotion. Apply sunscreen to your feet when going swimming outside.     4. Use clean comfortable shoes, wear white socks (if you have any bleeding or drainage, you will see it on white socks). Socks should not have thick seams or cut off the circulation around the leg. Break in new shoes slowly and rotate with older shoes until broken in. Check the inside of your shoes with your hand to look for areas of irritation or objects that may have fallen into your shoes.       5. Keep slippers by the side of your bed for use during the night.     6.  Shoes should be fitted by a professional and should not cause areas of irritation.  Check your feet regularly when wearing a new pair of shoes and replace them as needed.     7.  Talk to your doctor about proper exercise. Exercise and stretching stimulate blood flow to your feet and maintain proper glucose levels.     8.  Monitor your blood glucose level as instructed by your doctor. Notify your doctor immediately if your blood sugar is abnormally high or low.    9. Cut your nails straight across, but then  gently round any sharp edges with a cardboard nail file. If you have neuropathy, peripheral vascular disease or cannot see that well to trim your own toenails contact [facility] ([phone]) or [facility] ([phone]).      THINGS TO AVOID DOING   1.  Do not soak your feet if you have an open sore. Use only lukewarm water and always check the temperature with your hand as hot water can easily burn your feet.       2.  Never use a hot water bottle or heating pad on your feet. Also do not apply cold compresses to your feet. With decreased sensation, you could burn or freeze your feet.       3.  Do not apply any of these to your feet:    -  Over the counter medicine for corns or warts    -  Harsh chemicals like boric acid    -  Do not self-treat corns, cuts, blisters or infections. Always consult your doctor.       4.  Do not wear sandals, slippers or walk barefoot, especially on hot sand or concrete or other harsh surfaces.     5.  If you smoke, stop!!!        ROUTINE FOOT CARE (NAIL TRIMMING / CALLUSES)    Go to afcna.org ([employer]) and search for providers near you.  Otherwise, this is a list we have gathered of  recommended locations/providers in [place].    [facility]   [phone]   [facility]  [phone]  [email or web address]   [facility]  [phone]  [facility] 15 mile radius [facility]  [phone]  [email or web address]   Jillian Clark, DPM  [address] [place] [address] [phone] Donte Harper, DPM  [address] [address] [phone]   [facility]  [phone] [address] [place] [address]  [facility]  Dr. Sterling Gonsalves  [phone]  [facility]  [phone]  [place] [facility] [place] Locations  (does not take BCBS) FYI:  *Some providers accept insurance while others are out of pocket. Please contact them for details*

## 2021-01-13 NOTE — LETTER
"    1/13/2021         RE: Zulema Nixon  [address] [facility] [address]        Dear Colleague,    Thank you for referring your patient, Zulema Nixon, to the [facility]. Please see a copy of my visit note below.    Podiatry / Foot and Ankle Surgery Progress Note    January 13, 2021    Subject: Patient was seen for concern for infection in her left second toe.  She states she noticed that there was a dark area to the distal aspect of the left second toe about 2 weeks ago.  States that she wore a tighter pair of shoes and that is when it occurred.  She denies fever, nausea, vomiting.  Notes that it was sore in no shoes about a 5 out of 10 at its worst.  Currently it is not painful but she does have baseline neuropathy.  She is wondering if it is infected and what can be done for it.    Objective:  Vitals: /86   Ht 1.607 m (5' 3.25\")   Wt 97.1 kg (214 lb)   BMI 37.61 kg/m    BMI= Body mass index is 37.61 kg/m .    A1C: 5.7 (11/2020)    General:  Patient is alert and orientated.  NAD.    Dermatologic: left 2nd toenail is thickened, discolored, dystrophic and 80% onycholysis. No redness or signs of infection noted.  localized hyperkeratotic lesion to distal left 2nd toe. No open lesion on debridement.       Vascular: DP & PT pulses are intact & regular bilaterally.  No significant edema or varicosities noted.  CFT and skin temperature is normal to both lower extremities.      Neurologic: Lower extremity sensation is diminished via monofilament testing.       Musculoskeletal: Patient is ambulatory without assistive device or brace.  Decrease arch height.     Assessment:    Type 2 diabetes mellitus with diabetic polyneuropathy, without long-term current use of insulin (H)  Morbid obesity due to excess calories (H)  Left foot pain  Pre-ulcerative calluses  Dystrophic nail    Medical Decision Making/Plan:   Discussed causes of keratomas.  They are due to areas of increase " friction.  Hammertoes can create these as they put more pressure to the metatarsal head.  Discussed treatments such as using foot file, pumice stone, metatarsal pads, orthotics, and not walking barefoot.     We discussed the cost structure of callus care if they were to come back and have it treated in the clinic if insurance does not cover it and explained that they would be billed. They were also provided information on places to get the callus treatment.    We discussed that these calluses are preulcerative lesions.  She notes that she was wearing a pair of tighter shoes and is no longer wearing them.  We will have her monitor the area.  Clinically there is no open areas or signs of infection.  She will keep some triple antibiotic ointment and a Band-Aid to the toe for the next week just to keep the area clean.  She was given information on where to get routine nail care as we do not do that here.  All questions were answered to patient satisfaction and she will call further questions or concerns.      Patient Risk Factor:  Patient is a medium risk factor for infection in foot given diabetes.     Larissa Sow DPM, Podiatry/Foot and Ankle Surgery    Recommended to Zulema Nixon to follow up with Primary Care provider regarding elevated blood pressure.        Again, thank you for allowing me to participate in the care of your patient.        Sincerely,        Larissa Sow DPM, Podiatry/Foot and Ankle Surgery

## 2021-01-24 ENCOUNTER — MYC MEDICAL ADVICE (OUTPATIENT)
Dept: FAMILY MEDICINE | Facility: CLINIC | Age: 76
End: 2021-01-24

## 2021-01-28 ENCOUNTER — IMMUNIZATION (OUTPATIENT)
Dept: NURSING | Facility: CLINIC | Age: 76
End: 2021-01-28
Payer: MEDICARE

## 2021-01-28 PROCEDURE — 91300 PR COVID VAC PFIZER DIL RECON 30 MCG/0.3 ML IM: CPT

## 2021-01-28 PROCEDURE — 0001A PR COVID VAC PFIZER DIL RECON 30 MCG/0.3 ML IM: CPT

## 2021-02-02 ENCOUNTER — ANTICOAGULATION THERAPY VISIT (OUTPATIENT)
Dept: FAMILY MEDICINE | Facility: CLINIC | Age: 76
End: 2021-02-02

## 2021-02-02 DIAGNOSIS — I48.19 PERSISTENT ATRIAL FIBRILLATION (H): ICD-10-CM

## 2021-02-02 DIAGNOSIS — I48.91 ATRIAL FIBRILLATION, UNSPECIFIED TYPE (H): ICD-10-CM

## 2021-02-02 DIAGNOSIS — Z79.01 LONG TERM CURRENT USE OF ANTICOAGULANT THERAPY: ICD-10-CM

## 2021-02-02 LAB
CAPILLARY BLOOD COLLECTION: NORMAL
INR PPP: 3.1 (ref 0.86–1.14)

## 2021-02-02 PROCEDURE — 85610 PROTHROMBIN TIME: CPT | Performed by: FAMILY MEDICINE

## 2021-02-02 PROCEDURE — 36416 COLLJ CAPILLARY BLOOD SPEC: CPT | Performed by: FAMILY MEDICINE

## 2021-02-02 NOTE — PROGRESS NOTES
ANTICOAGULATION FOLLOW-UP CLINIC VISIT    Patient Name:  Zulema Nixon  Date:  2/2/2021  Contact Type:  Telephone    SUBJECTIVE:  Patient Findings     Positives:  Change in diet/appetite (continues to have low appetite and less green veggies since the death of her )    Comments:  Called patient to discuss today's INR results: The patient was assessed for diet, medication, and activity level changes, missed or extra doses, bruising or bleeding, with no problem findings. Per protocol, for ongoing factors, will decrease patient's maintenance dose of warfarin by about 5% and recheck INR in 2 weeks. Patient stated understanding and is in agreement with plan.  Mariza LOVE RN  Anticoagulation Team          Clinical Outcomes     Negatives:  Major bleeding event, Thromboembolic event, Anticoagulation-related hospital admission, Anticoagulation-related ED visit, Anticoagulation-related fatality    Comments:  Called patient to discuss today's INR results: The patient was assessed for diet, medication, and activity level changes, missed or extra doses, bruising or bleeding, with no problem findings. Per protocol, for ongoing factors, will decrease patient's maintenance dose of warfarin by about 5% and recheck INR in 2 weeks. Patient stated understanding and is in agreement with plan.  Mariza LOVE RN  Anticoagulation Team             OBJECTIVE    Recent labs: (last 7 days)     02/02/21  1011   INR 3.10*       ASSESSMENT / PLAN  INR assessment SUPRA    Recheck INR In: 2 WEEKS    INR Location Clinic      Anticoagulation Summary  As of 2/2/2021    INR goal:  2.0-3.0   TTR:  69.1 % (1 y)   INR used for dosing:  3.10 (2/2/2021)   Warfarin maintenance plan:  5 mg (5 mg x 1) every Tue, Thu, Sat; 7.5 mg (5 mg x 1.5) all other days   Full warfarin instructions:  5 mg every Tue, Thu, Sat; 7.5 mg all other days   Weekly warfarin total:  45 mg   Plan last modified:  Mariza Begum RN (2/2/2021)   Next INR check:  2/16/2021    Priority:  Maintenance   Target end date:  Indefinite    Indications    Long term current use of anticoagulant therapy [Z79.01]  Atrial fibrillation  unspecified type (H) [I48.91]  Persistent atrial fibrillation (H) [I48.19]             Anticoagulation Episode Summary     INR check location:      Preferred lab:      Send INR reminders to:  [facility]    Comments:        Anticoagulation Care Providers     Provider Role Specialty Phone number    Madina Mercado MD Referring Family Medicine [phone]            See the Encounter Report to view Anticoagulation Flowsheet and Dosing Calendar (Go to Encounters tab in chart review, and find the Anticoagulation Therapy Visit)    Dosage adjustment made based on physician directed care plan.    Mariza Begum RN

## 2021-02-16 ENCOUNTER — ANTICOAGULATION THERAPY VISIT (OUTPATIENT)
Dept: FAMILY MEDICINE | Facility: CLINIC | Age: 76
End: 2021-02-16

## 2021-02-16 DIAGNOSIS — I48.91 ATRIAL FIBRILLATION, UNSPECIFIED TYPE (H): ICD-10-CM

## 2021-02-16 DIAGNOSIS — Z79.01 LONG TERM CURRENT USE OF ANTICOAGULANT THERAPY: ICD-10-CM

## 2021-02-16 DIAGNOSIS — I48.19 PERSISTENT ATRIAL FIBRILLATION (H): ICD-10-CM

## 2021-02-16 LAB
CAPILLARY BLOOD COLLECTION: NORMAL
INR BLD: 2.1 (ref 0.86–1.14)

## 2021-02-16 PROCEDURE — 85610 PROTHROMBIN TIME: CPT | Performed by: FAMILY MEDICINE

## 2021-02-16 PROCEDURE — 36416 COLLJ CAPILLARY BLOOD SPEC: CPT | Performed by: FAMILY MEDICINE

## 2021-02-16 NOTE — PROGRESS NOTES
ANTICOAGULATION MANAGEMENT     Patient Name:  Zulema Nixon  Date:  2021    ASSESSMENT /SUBJECTIVE:    Today's INR result of 2.1 is therapeutic. Goal INR of 2.0-3.0      Warfarin dose taken: Warfarin taken as instructed    Diet: No new diet changes affecting INR    Medication changes/ interactions: No new medications/supplements affecting INR    Previous INR: Supratherapeutic     S/S of bleeding or thromboembolism: No    New injury or illness: No    Upcoming surgery, procedure or cardioversion: No    Additional findings: Receiving second COVID vaccine this Thursday.      PLAN:    Telephone call with Zulema regarding INR result and instructed:     Warfarin Dosing Instructions: Continue your current warfarin dose 5 mg every Tue, Thu, Sat; 7.5 mg all other days    Instructed patient to follow up no later than: 2 weeks  Lab visit scheduled    Education provided: Please call back if any changes to your diet, medications or how you've been taking warfarin, Importance of therapeutic range, Monitoring for bleeding signs and symptoms, Monitoring for clotting signs and symptoms and Importance of notifying clinic for changes in medications; a sooner lab recheck maybe needed.      Patient verbalizes understanding and agrees to warfarin dosing plan.    Instructed to call the Anticoagulation Clinic for any changes, questions or concerns. (#[phone])        Rosa Maria Judge RN      OBJECTIVE:  Recent labs: (last 7 days)     21  1010   INR 2.1*         No question data found.  Anticoagulation Summary  As of 2021    INR goal:  2.0-3.0   TTR:  68.7 % (1 y)   INR used for dosin.1 (2021)   Warfarin maintenance plan:  5 mg (5 mg x 1) every Tue, Thu, Sat; 7.5 mg (5 mg x 1.5) all other days   Full warfarin instructions:  5 mg every Tue, Thu, Sat; 7.5 mg all other days   Weekly warfarin total:  45 mg   Plan last modified:  Mariza Begum RN (2021)   Next INR check:     Priority:  Maintenance   Target  end date:  Indefinite    Indications    Long term current use of anticoagulant therapy [Z79.01]  Atrial fibrillation  unspecified type (H) [I48.91]  Persistent atrial fibrillation (H) [I48.19]             Anticoagulation Episode Summary     INR check location:      Preferred lab:      Send INR reminders to:  [facility]    Comments:        Anticoagulation Care Providers     Provider Role Specialty Phone number    Madina Mercado MD Referring Family Medicine [phone]

## 2021-02-18 ENCOUNTER — IMMUNIZATION (OUTPATIENT)
Dept: NURSING | Facility: CLINIC | Age: 76
End: 2021-02-18
Attending: SURGERY
Payer: MEDICARE

## 2021-02-18 PROCEDURE — 0002A PR COVID VAC PFIZER DIL RECON 30 MCG/0.3 ML IM: CPT

## 2021-02-18 PROCEDURE — 91300 PR COVID VAC PFIZER DIL RECON 30 MCG/0.3 ML IM: CPT

## 2021-03-02 ENCOUNTER — ANTICOAGULATION THERAPY VISIT (OUTPATIENT)
Dept: FAMILY MEDICINE | Facility: CLINIC | Age: 76
End: 2021-03-02

## 2021-03-02 DIAGNOSIS — Z79.01 LONG TERM CURRENT USE OF ANTICOAGULANT THERAPY: ICD-10-CM

## 2021-03-02 DIAGNOSIS — I48.91 ATRIAL FIBRILLATION, UNSPECIFIED TYPE (H): ICD-10-CM

## 2021-03-02 DIAGNOSIS — I48.19 PERSISTENT ATRIAL FIBRILLATION (H): ICD-10-CM

## 2021-03-02 LAB — INR PPP: 2 (ref 0.86–1.14)

## 2021-03-02 PROCEDURE — 85610 PROTHROMBIN TIME: CPT | Performed by: FAMILY MEDICINE

## 2021-03-02 PROCEDURE — 36416 COLLJ CAPILLARY BLOOD SPEC: CPT | Performed by: FAMILY MEDICINE

## 2021-03-02 NOTE — PROGRESS NOTES
ANTICOAGULATION FOLLOW-UP CLINIC VISIT    Patient Name:  Zulema Nixon  Date:  3/2/2021  Contact Type:  Telephone/ Zulema    SUBJECTIVE:  Patient Findings     Comments:  The patient was assessed for diet, medication, and activity level changes, missed or extra doses, bruising or bleeding, with no problem findings.          Clinical Outcomes     Negatives:  Major bleeding event, Thromboembolic event, Anticoagulation-related hospital admission, Anticoagulation-related ED visit, Anticoagulation-related fatality    Comments:  The patient was assessed for diet, medication, and activity level changes, missed or extra doses, bruising or bleeding, with no problem findings.             OBJECTIVE    Recent labs: (last 7 days)     21  1108   INR 2.00*       ASSESSMENT / PLAN  INR assessment THER    Recheck INR In: 3 WEEKS    INR Location Clinic      Anticoagulation Summary  As of 3/2/2021    INR goal:  2.0-3.0   TTR:  68.7 % (1 y)   INR used for dosin.00 (3/2/2021)   Warfarin maintenance plan:  5 mg (5 mg x 1) every Tue, Thu, Sat; 7.5 mg (5 mg x 1.5) all other days   Full warfarin instructions:  5 mg every Tue, Thu, Sat; 7.5 mg all other days   Weekly warfarin total:  45 mg   No change documented:  Albert Christie RN   Plan last modified:  Mariza Begum RN (2021)   Next INR check:  3/23/2021   Priority:  Maintenance   Target end date:  Indefinite    Indications    Long term current use of anticoagulant therapy [Z79.01]  Atrial fibrillation  unspecified type (H) [I48.91]  Persistent atrial fibrillation (H) [I48.19]             Anticoagulation Episode Summary     INR check location:      Preferred lab:      Send INR reminders to:  [facility]    Comments:        Anticoagulation Care Providers     Provider Role Specialty Phone number    Madina Mercado MD Referring Family Medicine [phone]            See the Encounter Report to view Anticoagulation Flowsheet and Dosing Calendar (Go to  Encounters tab in chart review, and find the Anticoagulation Therapy Visit)    Patient INR is therapeutic.  Patient will continue to take 45 mg weekly dosing and follow up in 3 weeks or sooner for any problems or concerns.        Albert Christie RN

## 2021-03-23 ENCOUNTER — ANTICOAGULATION THERAPY VISIT (OUTPATIENT)
Dept: FAMILY MEDICINE | Facility: CLINIC | Age: 76
End: 2021-03-23

## 2021-03-23 DIAGNOSIS — Z79.01 LONG TERM CURRENT USE OF ANTICOAGULANT THERAPY: ICD-10-CM

## 2021-03-23 DIAGNOSIS — I48.19 PERSISTENT ATRIAL FIBRILLATION (H): ICD-10-CM

## 2021-03-23 DIAGNOSIS — I48.91 ATRIAL FIBRILLATION, UNSPECIFIED TYPE (H): ICD-10-CM

## 2021-03-23 LAB
CAPILLARY BLOOD COLLECTION: NORMAL
INR BLD: 1.8 (ref 0.86–1.14)

## 2021-03-23 PROCEDURE — 85610 PROTHROMBIN TIME: CPT | Performed by: FAMILY MEDICINE

## 2021-03-23 PROCEDURE — 36416 COLLJ CAPILLARY BLOOD SPEC: CPT | Performed by: FAMILY MEDICINE

## 2021-03-23 NOTE — PROGRESS NOTES
Anticoagulation Management    Unable to reach Zulema today.    Today's INR result of 1.8 is subtherapeutic (goal INR of 2.0-3.0).  Result received from: Clinic Lab    Follow up required to discuss out of range INR . A while back she had several high INRs and wasn't eating as many greens after the death of her , figured this would be an ongoing thing, so reduced the maintenance dose. Need to assess intake of green veggies (if there's been a change), or any other factors. Next INR in 2 weeks.    No answer. Left  to call [phone]. Can transfer to [facility] at [phone].    Anticoagulation clinic to follow up    Mariza Begum RN    ANTICOAGULATION FOLLOW-UP CLINIC VISIT    Patient Name:  Zulema Nixon  Date:  3/23/2021  Contact Type:  Telephone    SUBJECTIVE:  Patient Findings     Positives:  Change in diet/appetite    Comments:  Patient returned call. She has started to eat more green veggies and feels she would like to start eating a little healthier now that spring is coming. The patient was assessed for medication, and activity level changes, missed or extra doses, bruising or bleeding, with no problem findings. Per protocol, for ongoing factors, increased Zulema's maintenance dosing (starting today) by 5.6%. Next INR in 2 weeks.  Mariza LOVE RN  Anticoagulation Team            Clinical Outcomes     Negatives:  Major bleeding event, Thromboembolic event, Anticoagulation-related hospital admission, Anticoagulation-related ED visit, Anticoagulation-related fatality    Comments:  Patient returned call. She has started to eat more green veggies and feels she would like to start eating a little healthier now that spring is coming. The patient was assessed for medication, and activity level changes, missed or extra doses, bruising or bleeding, with no problem findings. Per protocol, for ongoing factors, increased Zulema's maintenance dosing (starting today) by 5.6%. Next INR in 2 weeks.  Mariza LOVE  RN  Anticoagulation Team               OBJECTIVE    Recent labs: (last 7 days)     21  1027   INR 1.8*       ASSESSMENT / PLAN  INR assessment SUB    Recheck INR In: 2 WEEKS    INR Location Clinic      Anticoagulation Summary  As of 3/23/2021    INR goal:  2.0-3.0   TTR:  64.0 % (1 y)   INR used for dosin.8 (3/23/2021)   Warfarin maintenance plan:  5 mg (5 mg x 1) every Mon, Fri; 7.5 mg (5 mg x 1.5) all other days   Full warfarin instructions:  5 mg every Mon, Fri; 7.5 mg all other days   Weekly warfarin total:  47.5 mg   Plan last modified:  Mariza Begum RN (3/23/2021)   Next INR check:  2021   Priority:  Maintenance   Target end date:  Indefinite    Indications    Long term current use of anticoagulant therapy [Z79.01]  Atrial fibrillation  unspecified type (H) [I48.91]  Persistent atrial fibrillation (H) [I48.19]             Anticoagulation Episode Summary     INR check location:      Preferred lab:      Send INR reminders to:  [facility]    Comments:        Anticoagulation Care Providers     Provider Role Specialty Phone number    Madina Mercado MD Referring Family Medicine [phone]            See the Encounter Report to view Anticoagulation Flowsheet and Dosing Calendar (Go to Encounters tab in chart review, and find the Anticoagulation Therapy Visit)    Dosage adjustment made based on physician directed care plan.    Mariza Begum RN

## 2021-04-06 ENCOUNTER — ANTICOAGULATION THERAPY VISIT (OUTPATIENT)
Dept: FAMILY MEDICINE | Facility: CLINIC | Age: 76
End: 2021-04-06

## 2021-04-06 DIAGNOSIS — I48.91 ATRIAL FIBRILLATION, UNSPECIFIED TYPE (H): ICD-10-CM

## 2021-04-06 DIAGNOSIS — I48.19 PERSISTENT ATRIAL FIBRILLATION (H): ICD-10-CM

## 2021-04-06 DIAGNOSIS — Z79.01 LONG TERM CURRENT USE OF ANTICOAGULANT THERAPY: ICD-10-CM

## 2021-04-06 DIAGNOSIS — I48.91 ATRIAL FIBRILLATION, UNSPECIFIED TYPE (H): Primary | ICD-10-CM

## 2021-04-06 LAB
CAPILLARY BLOOD COLLECTION: NORMAL
INR BLD: 1.7 (ref 0.86–1.14)

## 2021-04-06 PROCEDURE — 85610 PROTHROMBIN TIME: CPT | Performed by: FAMILY MEDICINE

## 2021-04-06 PROCEDURE — 36416 COLLJ CAPILLARY BLOOD SPEC: CPT | Performed by: FAMILY MEDICINE

## 2021-04-06 NOTE — PROGRESS NOTES
ANTICOAGULATION FOLLOW-UP CLINIC VISIT    Patient Name:  Zulema Nixon  Date:  2021  Contact Type:  Telephone/ Zulema    SUBJECTIVE:  Patient Findings     Comments:  Patient denies any identifiable changes that caused the subtherapeutic INR. Last INR was low as well.          Clinical Outcomes     Negatives:  Major bleeding event, Thromboembolic event, Anticoagulation-related hospital admission, Anticoagulation-related ED visit, Anticoagulation-related fatality    Comments:  Patient denies any identifiable changes that caused the subtherapeutic INR. Last INR was low as well.             OBJECTIVE    Recent labs: (last 7 days)     21  1042   INR 1.7*       ASSESSMENT / PLAN  INR assessment SUB    Recheck INR In: 1 WEEK    INR Location Clinic      Anticoagulation Summary  As of 2021    INR goal:  2.0-3.0   TTR:  64.0 % (1 y)   INR used for dosin.7 (2021)   Warfarin maintenance plan:  5 mg (5 mg x 1) every Mon; 7.5 mg (5 mg x 1.5) all other days   Full warfarin instructions:  5 mg every Mon; 7.5 mg all other days   Weekly warfarin total:  50 mg   Plan last modified:  Albert Christie RN (2021)   Next INR check:  2021   Priority:  Maintenance   Target end date:  Indefinite    Indications    Long term current use of anticoagulant therapy [Z79.01]  Atrial fibrillation  unspecified type (H) [I48.91]  Persistent atrial fibrillation (H) [I48.19]             Anticoagulation Episode Summary     INR check location:      Preferred lab:      Send INR reminders to:  [facility]    Comments:        Anticoagulation Care Providers     Provider Role Specialty Phone number    Madina Mercado MD Referring Family Medicine [phone]            See the Encounter Report to view Anticoagulation Flowsheet and Dosing Calendar (Go to Encounters tab in chart review, and find the Anticoagulation Therapy Visit)    Patient INR is sub therapeutic today.  Patient will increase weekly maintenance  dose to 50 mg and follow up in 1 week or sooner if there are any concerns or problems.     Discussed signs of clotting with patient and when to seek care for concerns.  Patient verbalized understanding    Rationale to adjustments: 5.3% Dosage adjustment made based on physician directed care plan.      Albert Christie RN

## 2021-04-08 ENCOUNTER — TRANSFERRED RECORDS (OUTPATIENT)
Dept: HEALTH INFORMATION MANAGEMENT | Facility: CLINIC | Age: 76
End: 2021-04-08

## 2021-04-08 LAB — RETINOPATHY: NEGATIVE

## 2021-04-13 ENCOUNTER — ANTICOAGULATION THERAPY VISIT (OUTPATIENT)
Dept: FAMILY MEDICINE | Facility: CLINIC | Age: 76
End: 2021-04-13

## 2021-04-13 DIAGNOSIS — Z79.01 LONG TERM CURRENT USE OF ANTICOAGULANT THERAPY: ICD-10-CM

## 2021-04-13 DIAGNOSIS — I48.19 PERSISTENT ATRIAL FIBRILLATION (H): ICD-10-CM

## 2021-04-13 DIAGNOSIS — I48.91 ATRIAL FIBRILLATION, UNSPECIFIED TYPE (H): ICD-10-CM

## 2021-04-13 LAB
CAPILLARY BLOOD COLLECTION: NORMAL
INR PPP: 1.9 (ref 0.86–1.14)

## 2021-04-13 PROCEDURE — 36416 COLLJ CAPILLARY BLOOD SPEC: CPT | Performed by: FAMILY MEDICINE

## 2021-04-13 PROCEDURE — 85610 PROTHROMBIN TIME: CPT | Performed by: FAMILY MEDICINE

## 2021-04-13 NOTE — PROGRESS NOTES
Anticoagulation Management    Left message to continue current dose of warfarin 7.5 mg tonight. Consider increasing maintenance dose to 52.5 mg based on assessment. Call Iftikhar, margo to [phone].      Anticoagulation clinic to follow up    Albert Christie RN

## 2021-04-13 NOTE — PROGRESS NOTES
Anticoagulation Management    Unable to reach Zulema today.    Today's INR result of 1.9 is subtherapeutic (goal INR of 2.0-3.0).  Result received from: Clinic Lab    Follow up required to discuss out of range INR     LM to call Iftikhar, margo to [phone].      Anticoagulation clinic to follow up    Albert Christie RN

## 2021-04-14 NOTE — PROGRESS NOTES
Patient returned call to [facility].    Stated she is available until 9:30 am or will be back home after 12:00 noon.    Please return call when able.     Julián Hills RN

## 2021-04-14 NOTE — PROGRESS NOTES
ANTICOAGULATION FOLLOW-UP CLINIC VISIT    Patient Name:  Zulema Nixon  Date:  2021  Contact Type:  Telephone/ Zulema    SUBJECTIVE:  Patient Findings     Comments:  Has had ongoing low INRs despite several dose increases.         Clinical Outcomes     Negatives:  Major bleeding event, Thromboembolic event, Anticoagulation-related hospital admission, Anticoagulation-related ED visit, Anticoagulation-related fatality    Comments:  Has had ongoing low INRs despite several dose increases.            OBJECTIVE    Recent labs: (last 7 days)     21  1032   INR 1.90*       ASSESSMENT / PLAN  INR assessment SUB    Recheck INR In: 2 WEEKS    INR Location Clinic      Anticoagulation Summary  As of 2021    INR goal:  2.0-3.0   TTR:  64.2 % (1 y)   INR used for dosin.90 (2021)   Warfarin maintenance plan:  7.5 mg (5 mg x 1.5) every day   Full warfarin instructions:  7.5 mg every day   Weekly warfarin total:  52.5 mg   Plan last modified:  Albert Christie RN (2021)   Next INR check:  2021   Priority:  Maintenance   Target end date:  Indefinite    Indications    Long term current use of anticoagulant therapy [Z79.01]  Atrial fibrillation  unspecified type (H) [I48.91]  Persistent atrial fibrillation (H) [I48.19]             Anticoagulation Episode Summary     INR check location:      Preferred lab:      Send INR reminders to:  [facility]    Comments:        Anticoagulation Care Providers     Provider Role Specialty Phone number    Madina Mercado MD Referring Family Medicine [phone]            See the Encounter Report to view Anticoagulation Flowsheet and Dosing Calendar (Go to Encounters tab in chart review, and find the Anticoagulation Therapy Visit)    Patient INR is sub therapeutic today.  Patient will increase weekly maintenance dose to 52.5 mg and follow up in 2 weeks or sooner if there are any concerns or problems.     Discussed signs of clotting with patient  and when to seek care for concerns.  Patient verbalized understanding    Rationale to adjustments: 5% Dosage adjustment made based on physician directed care plan.      Albert Christie RN

## 2021-04-27 ENCOUNTER — ANTICOAGULATION THERAPY VISIT (OUTPATIENT)
Dept: ANTICOAGULATION | Facility: CLINIC | Age: 76
End: 2021-04-27

## 2021-04-27 DIAGNOSIS — I48.91 ATRIAL FIBRILLATION, UNSPECIFIED TYPE (H): ICD-10-CM

## 2021-04-27 DIAGNOSIS — I48.19 PERSISTENT ATRIAL FIBRILLATION (H): ICD-10-CM

## 2021-04-27 DIAGNOSIS — Z79.01 LONG TERM CURRENT USE OF ANTICOAGULANT THERAPY: ICD-10-CM

## 2021-04-27 LAB
CAPILLARY BLOOD COLLECTION: NORMAL
INR PPP: 2.1 (ref 0.86–1.14)

## 2021-04-27 PROCEDURE — 36416 COLLJ CAPILLARY BLOOD SPEC: CPT | Performed by: FAMILY MEDICINE

## 2021-04-27 PROCEDURE — 85610 PROTHROMBIN TIME: CPT | Performed by: FAMILY MEDICINE

## 2021-04-27 NOTE — PROGRESS NOTES
Left message to call [phone]. Please transfer call to Jenni at [phone].  Jenni Nava RN, BSN  Anticoagulation Clinic

## 2021-04-27 NOTE — PROGRESS NOTES
ANTICOAGULATION FOLLOW-UP CLINIC VISIT    Patient Name:  Zulema Nixon  Date:  2021  Contact Type:  Telephone/ Called patient, she reports diet change, she denies any other changes. Orders discussed with the patient, she agrees with the plan. Lab INR appointment scheduled on 21    SUBJECTIVE:  Patient Findings     Positives:  Change in diet/appetite (Patient reports she recently has been getting back to cooking since  passed away in December from COVID. Patient reports she will probably increase green veggies. )    Comments:  The patient was assessed for diet, medication, and activity level changes, missed or extra doses, bruising or bleeding, with no problem findings. Maintenance warfarin dosing was reviewed with patient and will remain on the same dose until next INR check. Patient did not have any questions or concerns. Will have patient continue with 7.5 mg warfarin daily and recheck INR in 2 weeks. If patient increases green veggies, may need to increase warfarin dose again. Patient stated she was taking 7.5 mg warfarin daily years ago.           Clinical Outcomes     Comments:  The patient was assessed for diet, medication, and activity level changes, missed or extra doses, bruising or bleeding, with no problem findings. Maintenance warfarin dosing was reviewed with patient and will remain on the same dose until next INR check. Patient did not have any questions or concerns. Will have patient continue with 7.5 mg warfarin daily and recheck INR in 2 weeks. If patient increases green veggies, may need to increase warfarin dose again. Patient stated she was taking 7.5 mg warfarin daily years ago.              OBJECTIVE    Recent labs: (last 7 days)     21  1024   INR 2.10*       ASSESSMENT / PLAN  INR assessment THER    Recheck INR In: 2 WEEKS    INR Location Outside lab      Anticoagulation Summary  As of 2021    INR goal:  2.0-3.0   TTR:  65.2 % (1 y)   INR used for dosin.10  (4/27/2021)   Warfarin maintenance plan:  7.5 mg (5 mg x 1.5) every day   Full warfarin instructions:  7.5 mg every day   Weekly warfarin total:  52.5 mg   No change documented:  Jenni Nava RN   Plan last modified:  Albert Christie RN (4/13/2021)   Next INR check:  5/11/2021   Priority:  Maintenance   Target end date:  Indefinite    Indications    Long term current use of anticoagulant therapy [Z79.01]  Atrial fibrillation  unspecified type (H) [I48.91]  Persistent atrial fibrillation (H) [I48.19]             Anticoagulation Episode Summary     INR check location:      Preferred lab:      Send INR reminders to:  [facility]    Comments:        Anticoagulation Care Providers     Provider Role Specialty Phone number    Madina Mercado MD Referring Family Medicine [phone]            See the Encounter Report to view Anticoagulation Flowsheet and Dosing Calendar (Go to Encounters tab in chart review, and find the Anticoagulation Therapy Visit)    Dosage adjustment made based on physician directed care plan.    Jenni Nava, RN

## 2021-05-03 ENCOUNTER — TELEPHONE (OUTPATIENT)
Dept: FAMILY MEDICINE | Facility: CLINIC | Age: 76
End: 2021-05-03

## 2021-05-03 ENCOUNTER — OFFICE VISIT (OUTPATIENT)
Dept: FAMILY MEDICINE | Facility: CLINIC | Age: 76
End: 2021-05-03
Payer: MEDICARE

## 2021-05-03 VITALS
RESPIRATION RATE: 20 BRPM | TEMPERATURE: 97 F | BODY MASS INDEX: 38.8 KG/M2 | HEART RATE: 75 BPM | WEIGHT: 219 LBS | OXYGEN SATURATION: 99 % | SYSTOLIC BLOOD PRESSURE: 134 MMHG | DIASTOLIC BLOOD PRESSURE: 86 MMHG | HEIGHT: 63 IN

## 2021-05-03 DIAGNOSIS — E11.59 TYPE 2 DIABETES MELLITUS WITH OTHER CIRCULATORY COMPLICATION, WITHOUT LONG-TERM CURRENT USE OF INSULIN (H): ICD-10-CM

## 2021-05-03 DIAGNOSIS — I48.19 PERSISTENT ATRIAL FIBRILLATION (H): ICD-10-CM

## 2021-05-03 DIAGNOSIS — K58.0 IRRITABLE BOWEL SYNDROME WITH DIARRHEA: ICD-10-CM

## 2021-05-03 DIAGNOSIS — E55.9 VITAMIN D DEFICIENCY: ICD-10-CM

## 2021-05-03 DIAGNOSIS — I10 ESSENTIAL HYPERTENSION WITH GOAL BLOOD PRESSURE LESS THAN 140/90: ICD-10-CM

## 2021-05-03 DIAGNOSIS — E11.42 TYPE 2 DIABETES MELLITUS WITH DIABETIC POLYNEUROPATHY, WITHOUT LONG-TERM CURRENT USE OF INSULIN (H): Primary | ICD-10-CM

## 2021-05-03 DIAGNOSIS — E11.42 TYPE 2 DIABETES MELLITUS WITH DIABETIC POLYNEUROPATHY, WITHOUT LONG-TERM CURRENT USE OF INSULIN (H): ICD-10-CM

## 2021-05-03 DIAGNOSIS — D32.9 MENINGIOMA (H): ICD-10-CM

## 2021-05-03 DIAGNOSIS — F43.21 GRIEF REACTION: ICD-10-CM

## 2021-05-03 DIAGNOSIS — N18.2 CKD (CHRONIC KIDNEY DISEASE) STAGE 2, GFR 60-89 ML/MIN: ICD-10-CM

## 2021-05-03 DIAGNOSIS — Z79.01 LONG TERM CURRENT USE OF ANTICOAGULANT THERAPY: ICD-10-CM

## 2021-05-03 DIAGNOSIS — E78.5 HYPERLIPIDEMIA LDL GOAL <100: ICD-10-CM

## 2021-05-03 DIAGNOSIS — G56.01 CARPAL TUNNEL SYNDROME OF RIGHT WRIST: ICD-10-CM

## 2021-05-03 DIAGNOSIS — E66.01 MORBID OBESITY DUE TO EXCESS CALORIES (H): ICD-10-CM

## 2021-05-03 DIAGNOSIS — Z12.31 VISIT FOR SCREENING MAMMOGRAM: ICD-10-CM

## 2021-05-03 DIAGNOSIS — R60.0 BILATERAL LEG EDEMA: ICD-10-CM

## 2021-05-03 PROBLEM — F43.20 GRIEF REACTION: Status: ACTIVE | Noted: 2021-05-03

## 2021-05-03 LAB
ALBUMIN SERPL-MCNC: 3.8 G/DL (ref 3.4–5)
ALBUMIN UR-MCNC: NEGATIVE MG/DL
ALP SERPL-CCNC: 53 U/L (ref 40–150)
ALT SERPL W P-5'-P-CCNC: 27 U/L (ref 0–50)
ANION GAP SERPL CALCULATED.3IONS-SCNC: 4 MMOL/L (ref 3–14)
APPEARANCE UR: CLEAR
AST SERPL W P-5'-P-CCNC: 18 U/L (ref 0–45)
BASOPHILS # BLD AUTO: 0.1 10E9/L (ref 0–0.2)
BASOPHILS NFR BLD AUTO: 0.7 %
BILIRUB SERPL-MCNC: 0.6 MG/DL (ref 0.2–1.3)
BILIRUB UR QL STRIP: NEGATIVE
BUN SERPL-MCNC: 11 MG/DL (ref 7–30)
CALCIUM SERPL-MCNC: 9.3 MG/DL (ref 8.5–10.1)
CHLORIDE SERPL-SCNC: 99 MMOL/L (ref 94–109)
CHOLEST SERPL-MCNC: 119 MG/DL
CO2 SERPL-SCNC: 32 MMOL/L (ref 20–32)
COLOR UR AUTO: YELLOW
CREAT SERPL-MCNC: 0.63 MG/DL (ref 0.52–1.04)
DIFFERENTIAL METHOD BLD: NORMAL
EOSINOPHIL # BLD AUTO: 0.1 10E9/L (ref 0–0.7)
EOSINOPHIL NFR BLD AUTO: 1.6 %
ERYTHROCYTE [DISTWIDTH] IN BLOOD BY AUTOMATED COUNT: 14.3 % (ref 10–15)
GFR SERPL CREATININE-BSD FRML MDRD: 87 ML/MIN/{1.73_M2}
GLUCOSE SERPL-MCNC: 123 MG/DL (ref 70–99)
GLUCOSE UR STRIP-MCNC: NEGATIVE MG/DL
HBA1C MFR BLD: 6.2 % (ref 0–5.6)
HCT VFR BLD AUTO: 36.4 % (ref 35–47)
HDLC SERPL-MCNC: 43 MG/DL
HGB BLD-MCNC: 12 G/DL (ref 11.7–15.7)
HGB UR QL STRIP: NEGATIVE
KETONES UR STRIP-MCNC: NEGATIVE MG/DL
LDLC SERPL CALC-MCNC: 61 MG/DL
LEUKOCYTE ESTERASE UR QL STRIP: NEGATIVE
LYMPHOCYTES # BLD AUTO: 1.8 10E9/L (ref 0.8–5.3)
LYMPHOCYTES NFR BLD AUTO: 27.6 %
MCH RBC QN AUTO: 26.9 PG (ref 26.5–33)
MCHC RBC AUTO-ENTMCNC: 33 G/DL (ref 31.5–36.5)
MCV RBC AUTO: 82 FL (ref 78–100)
MONOCYTES # BLD AUTO: 0.5 10E9/L (ref 0–1.3)
MONOCYTES NFR BLD AUTO: 6.9 %
NEUTROPHILS # BLD AUTO: 4.2 10E9/L (ref 1.6–8.3)
NEUTROPHILS NFR BLD AUTO: 63.2 %
NITRATE UR QL: NEGATIVE
NONHDLC SERPL-MCNC: 76 MG/DL
PH UR STRIP: 8 PH (ref 5–7)
PLATELET # BLD AUTO: 242 10E9/L (ref 150–450)
POTASSIUM SERPL-SCNC: 4.1 MMOL/L (ref 3.4–5.3)
PROT SERPL-MCNC: 7.3 G/DL (ref 6.8–8.8)
RBC # BLD AUTO: 4.46 10E12/L (ref 3.8–5.2)
SODIUM SERPL-SCNC: 135 MMOL/L (ref 133–144)
SOURCE: ABNORMAL
SP GR UR STRIP: 1.02 (ref 1–1.03)
TRIGL SERPL-MCNC: 74 MG/DL
TSH SERPL DL<=0.005 MIU/L-ACNC: 0.52 MU/L (ref 0.4–4)
UROBILINOGEN UR STRIP-ACNC: 0.2 EU/DL (ref 0.2–1)
WBC # BLD AUTO: 6.7 10E9/L (ref 4–11)

## 2021-05-03 PROCEDURE — 85025 COMPLETE CBC W/AUTO DIFF WBC: CPT | Performed by: FAMILY MEDICINE

## 2021-05-03 PROCEDURE — 99207 PR FOOT EXAM NO CHARGE: CPT | Mod: 25 | Performed by: FAMILY MEDICINE

## 2021-05-03 PROCEDURE — 82043 UR ALBUMIN QUANTITATIVE: CPT | Performed by: FAMILY MEDICINE

## 2021-05-03 PROCEDURE — 83036 HEMOGLOBIN GLYCOSYLATED A1C: CPT | Performed by: FAMILY MEDICINE

## 2021-05-03 PROCEDURE — 82306 VITAMIN D 25 HYDROXY: CPT | Performed by: FAMILY MEDICINE

## 2021-05-03 PROCEDURE — 36415 COLL VENOUS BLD VENIPUNCTURE: CPT | Performed by: FAMILY MEDICINE

## 2021-05-03 PROCEDURE — 84443 ASSAY THYROID STIM HORMONE: CPT | Performed by: FAMILY MEDICINE

## 2021-05-03 PROCEDURE — 99215 OFFICE O/P EST HI 40 MIN: CPT | Performed by: FAMILY MEDICINE

## 2021-05-03 PROCEDURE — 81003 URINALYSIS AUTO W/O SCOPE: CPT | Performed by: FAMILY MEDICINE

## 2021-05-03 PROCEDURE — 80061 LIPID PANEL: CPT | Performed by: FAMILY MEDICINE

## 2021-05-03 PROCEDURE — 80053 COMPREHEN METABOLIC PANEL: CPT | Performed by: FAMILY MEDICINE

## 2021-05-03 RX ORDER — CHOLESTYRAMINE 4 G/9G
1 POWDER, FOR SUSPENSION ORAL
Qty: 120 EACH | Refills: 4 | Status: SHIPPED | OUTPATIENT
Start: 2021-05-03 | End: 2021-11-05

## 2021-05-03 RX ORDER — ATENOLOL 100 MG/1
100 TABLET ORAL DAILY
Qty: 90 TABLET | Refills: 1 | Status: SHIPPED | OUTPATIENT
Start: 2021-05-03 | End: 2021-11-05

## 2021-05-03 RX ORDER — AMLODIPINE BESYLATE 5 MG/1
5 TABLET ORAL DAILY
Qty: 90 TABLET | Refills: 1 | Status: SHIPPED | OUTPATIENT
Start: 2021-05-03 | End: 2021-11-05

## 2021-05-03 RX ORDER — HYDROCHLOROTHIAZIDE 25 MG/1
25 TABLET ORAL DAILY
Qty: 90 TABLET | Refills: 1 | Status: SHIPPED | OUTPATIENT
Start: 2021-05-03 | End: 2021-11-05

## 2021-05-03 RX ORDER — GLUCOSAMINE HCL/CHONDROITIN SU 500-400 MG
CAPSULE ORAL
Qty: 100 EACH | Refills: 3 | Status: SHIPPED | OUTPATIENT
Start: 2021-05-03

## 2021-05-03 RX ORDER — LISINOPRIL 40 MG/1
40 TABLET ORAL DAILY
Qty: 90 TABLET | Refills: 1 | Status: SHIPPED | OUTPATIENT
Start: 2021-05-03 | End: 2021-11-05

## 2021-05-03 RX ORDER — WARFARIN SODIUM 5 MG/1
TABLET ORAL
Qty: 180 TABLET | Refills: 3 | Status: SHIPPED | OUTPATIENT
Start: 2021-05-03 | End: 2022-05-06

## 2021-05-03 RX ORDER — SIMVASTATIN 20 MG
TABLET ORAL
Qty: 90 TABLET | Refills: 1 | Status: SHIPPED | OUTPATIENT
Start: 2021-05-03 | End: 2021-11-05

## 2021-05-03 ASSESSMENT — ANXIETY QUESTIONNAIRES
3. WORRYING TOO MUCH ABOUT DIFFERENT THINGS: NOT AT ALL
6. BECOMING EASILY ANNOYED OR IRRITABLE: NOT AT ALL
1. FEELING NERVOUS, ANXIOUS, OR ON EDGE: NOT AT ALL
GAD7 TOTAL SCORE: 0
IF YOU CHECKED OFF ANY PROBLEMS ON THIS QUESTIONNAIRE, HOW DIFFICULT HAVE THESE PROBLEMS MADE IT FOR YOU TO DO YOUR WORK, TAKE CARE OF THINGS AT HOME, OR GET ALONG WITH OTHER PEOPLE: NOT DIFFICULT AT ALL
7. FEELING AFRAID AS IF SOMETHING AWFUL MIGHT HAPPEN: NOT AT ALL
5. BEING SO RESTLESS THAT IT IS HARD TO SIT STILL: NOT AT ALL
2. NOT BEING ABLE TO STOP OR CONTROL WORRYING: NOT AT ALL

## 2021-05-03 ASSESSMENT — PATIENT HEALTH QUESTIONNAIRE - PHQ9: 5. POOR APPETITE OR OVEREATING: NOT AT ALL

## 2021-05-03 ASSESSMENT — MIFFLIN-ST. JEOR: SCORE: 1456.47

## 2021-05-03 NOTE — PROGRESS NOTES
Assessment & Plan :       ICD-10-CM    1. Type 2 diabetes mellitus with diabetic polyneuropathy, without long-term current use of insulin (H)  E11.42 REVIEW OF HEALTH MAINTENANCE PROTOCOL ORDERS     metFORMIN (GLUCOPHAGE) 1000 MG tablet     Albumin Random Urine Quantitative with Creat Ratio     CBC with platelets differential     Comprehensive metabolic panel     Hemoglobin A1c     UA reflex to Microscopic and Culture     TSH with free T4 reflex     blood glucose monitoring (NO BRAND SPECIFIED) meter device kit     blood glucose (NO BRAND SPECIFIED) test strip     blood glucose calibration (NO BRAND SPECIFIED) solution     alcohol swab prep pads     CANCELED: HEMOGLOBIN A1C   2. Type 2 diabetes mellitus with other circulatory complication, without long-term current use of insulin (H)  E11.59 REVIEW OF HEALTH MAINTENANCE PROTOCOL ORDERS     Albumin Random Urine Quantitative with Creat Ratio     CBC with platelets differential     Comprehensive metabolic panel     Hemoglobin A1c     blood glucose monitoring (NO BRAND SPECIFIED) meter device kit     blood glucose (NO BRAND SPECIFIED) test strip     blood glucose calibration (NO BRAND SPECIFIED) solution     alcohol swab prep pads     CANCELED: HEMOGLOBIN A1C   3. Essential hypertension with goal blood pressure less than 140/90  I10 REVIEW OF HEALTH MAINTENANCE PROTOCOL ORDERS     Albumin Random Urine Quantitative with Creat Ratio     CBC with platelets differential     Comprehensive metabolic panel   4. Hyperlipidemia LDL goal <100- fish oil = worse IBS with diarrhea   E78.5 REVIEW OF HEALTH MAINTENANCE PROTOCOL ORDERS     simvastatin (ZOCOR) 20 MG tablet     Albumin Random Urine Quantitative with Creat Ratio     Comprehensive metabolic panel     Lipid panel reflex to direct LDL Fasting   5. CKD (chronic kidney disease) stage 2, GFR 60-89 ml/min  N18.2 REVIEW OF HEALTH MAINTENANCE PROTOCOL ORDERS     Albumin Random Urine Quantitative with Creat Ratio      Comprehensive metabolic panel   6. Meningioma (H)  D32.9    7. Bilateral leg edema- has been to lymphedema clinic in past  R60.0    8. Irritable bowel syndrome with diarrhea- doing well with 1x/day cholestyramine - got really gassy and bloated with 2x/day   K58.0    9. Meningioma (H)--left frontal - MRI 4/25/2019 = stable from 10/2018 and 11/2/2019- no further follow up needed per Neurology per patient - noted 11/4/2020   D32.9    10. Persistent atrial fibrillation (H)  I48.19 ANTICOAGULATION CLINIC REFERRAL   11. Long term current use of anticoagulant therapy  Z79.01    12. Morbid obesity due to excess calories (H)  E66.01    13. Visit for screening mammogram  Z12.31 MA Screen Bilateral w/Martin   14. Carpal tunnel syndrome of right wrist- increasing over time - now awakening at night with tingling and pain despite wearing a brace at night - desires referral   G56.01 Orthopedic & Spine  Referral   15. Grief reaction - 's from COVID-19 complications 12/2/2020   F43.21    16. Essential hypertension with goal blood pressure less than 140/90- well controlled today - needs labs and medication refills   I10 amLODIPine (NORVASC) 5 MG tablet     atenolol (TENORMIN) 100 MG tablet     hydrochlorothiazide (HYDRODIURIL) 25 MG tablet     lisinopril (ZESTRIL) 40 MG tablet   17. CKD (chronic kidney disease) stage 2, GFR 60-89 ml/min- needs lab follow up today   N18.2 hydrochlorothiazide (HYDRODIURIL) 25 MG tablet   18. Type 2 diabetes mellitus with diabetic polyneuropathy, without long-term current use of insulin (H)- elevated fasting sugars currently   E11.42 metFORMIN (GLUCOPHAGE) 1000 MG tablet   19. Persistent atrial fibrillation- used to see Cardiology - they said   I48.19 warfarin ANTICOAGULANT (COUMADIN) 5 MG tablet   20. Irritable bowel syndrome with diarrhea- better with taking cholestyramine once daily - changed rx for upcoming refills   K58.0 cholestyramine (QUESTRAN) 4 g packet   21. Vitamin D deficiency  " E55.9 25 Hydroxyvitamin D2 and D3      Extensive time spent with patient today re: her grief and 's death from COVID-19 novel coronavirus pandemic last 2020.     Pt declines referral for grief counseling and anti-depressant medication at this time.     51 minutes spent on the date of the encounter doing chart review, history and exam, documentation and further activities per the note       BMI:   Estimated body mass index is 38.49 kg/m  as calculated from the following:    Height as of this encounter: 1.607 m (5' 3.25\").    Weight as of this encounter: 99.3 kg (219 lb).   Weight management plan: Discussed healthy diet and exercise guidelines    Regular exercise  See Patient Instructions    No follow-ups on file.    Madina Mercado MD  [facility] [place]    Subjective :   Zulema is a 75 year old who presents for the following health issues:   1. Type 2 diabetes mellitus with diabetic polyneuropathy, without long-term current use of insulin (H)    2. Type 2 diabetes mellitus with other circulatory complication, without long-term current use of insulin (H)    3. Essential hypertension with goal blood pressure less than 140/90    4. Hyperlipidemia LDL goal <100- fish oil = worse IBS with diarrhea     5. CKD (chronic kidney disease) stage 2, GFR 60-89 ml/min    6. Meningioma (H)    7. Bilateral leg edema- has been to lymphedema clinic in past    8. Irritable bowel syndrome with diarrhea- doing well with 1x/day cholestyramine - got really gassy and bloated with 2x/day     9. Meningioma (H)--left frontal - MRI 2019 = stable from 10/2018 and 2019- no further follow up needed per Neurology per patient - noted 2020     10. Persistent atrial fibrillation (H)    11. Long term current use of anticoagulant therapy    12. Morbid obesity due to excess calories (H)            HPI : actively grieving for her , Paras  -  of complications from COVID-19 novel coronavirus " pandemic 12/2/2020.  Discussed grief and no time frame , following instincts and talking with family, friends for support.   Playing piano every day.  Daughter Rosibel lives 1/4  Mile down the road from her and visits every day.      Diabetes Follow-up:     How often are you checking your blood sugar? A few times a week  What time of day are you checking your blood sugars (select all that apply)?  Before meals  Have you had any blood sugars above 200?  No  Have you had any blood sugars below 70?  No    What symptoms do you notice when your blood sugar is low?  None    What concerns do you have today about your diabetes? None     Do you have any of these symptoms? (Select all that apply)  No numbness or tingling in feet.  No redness, sores or blisters on feet.  No complaints of excessive thirst.  No reports of blurry vision.  No significant changes to weight.     Lab Results   Component Value Date    A1C 5.7 11/04/2020    A1C 6.1 06/08/2020    A1C 6.3 12/02/2019    A1C 6.8 11/02/2019    A1C 6.4 07/08/2019       Hyperlipidemia Follow-Up:       Are you regularly taking any medication or supplement to lower your cholesterol?   Yes- Simvistatin    Are you having muscle aches or other side effects that you think could be caused by your cholesterol lowering medication?  No     Recent Labs   Lab Test 11/04/20  0948 06/08/20  1104 04/22/15  1002 04/22/15  1002 11/13/14  0957   CHOL 105 99   < > 111 128   HDL 42* 35*   < > 33* 37*   LDL 46 48   < > 55 66   TRIG 87 82   < > 114 125   CHOLHDLRATIO  --   --   --  3.4 3.5    < > = values in this interval not displayed.        Hypertension Follow-up:       Do you check your blood pressure regularly outside of the clinic? No     Are you following a low salt diet? Yes    Are your blood pressures ever more than 140 on the top number (systolic) OR more   than 90 on the bottom number (diastolic), for example 140/90? No     Creatinine   Date Value Ref Range Status   11/04/2020 0.55 0.52  - 1.04 mg/dL Final      GFR Estimate   Date Value Ref Range Status   11/04/2020 >90 >60 mL/min/[1.73_m2] Final     Comment:     Non  GFR Calc  Starting 12/18/2018, serum creatinine based estimated GFR (eGFR) will be   calculated using the Chronic Kidney Disease Epidemiology Collaboration   (CKD-EPI) equation.     06/08/2020 89 >60 mL/min/[1.73_m2] Final     Comment:     Non  GFR Calc  Starting 12/18/2018, serum creatinine based estimated GFR (eGFR) will be   calculated using the Chronic Kidney Disease Epidemiology Collaboration   (CKD-EPI) equation.     12/02/2019 87 >60 mL/min/[1.73_m2] Final     Comment:     Non  GFR Calc  Starting 12/18/2018, serum creatinine based estimated GFR (eGFR) will be   calculated using the Chronic Kidney Disease Epidemiology Collaboration   (CKD-EPI) equation.           BP Readings from Last 2 Encounters:   05/03/21 134/86   01/13/21 138/86     Hemoglobin A1C (%)   Date Value   11/04/2020 5.7 (H)   06/08/2020 6.1 (H)     LDL Cholesterol Calculated (mg/dL)   Date Value   11/04/2020 46   06/08/2020 48     Wt Readings from Last 5 Encounters:   05/03/21 99.3 kg (219 lb)   01/13/21 97.1 kg (214 lb)   11/04/20 99.1 kg (218 lb 6.4 oz)   02/03/20 104.3 kg (230 lb)   12/04/19 104.3 kg (230 lb)       Meningioma: no headaches , No numbness, tingling, or weakness in upper or lower extremities. No bowel or bladder control problems.   Meningioma (H)--left frontal - MRI 4/25/2019 = stable from 10/2018 and last one done 11/2/2019- no further follow up needed per Neurology per patient - noted 11/4/2020  .         Persistent atrial fibrillation (H): no problems.   INR goal 2.0-3.0.     Her Carpal tunnel syndrome on the right hand is bothering her more than previous.  Desires referral.     Chronic Kidney Disease Follow-up      Do you take any over the counter pain medicine?: No      How many servings of fruits and vegetables do you eat daily?  2-3    On  "average, how many sweetened beverages do you drink each day (Examples: soda, juice, sweet tea, etc.  Do NOT count diet or artificially sweetened beverages)?   0    How many days per week do you exercise enough to make your heart beat faster? 3 or less    How many minutes a day do you exercise enough to make your heart beat faster? 20 - 29    How many days per week do you miss taking your medication? 0      Review of Systems   Constitutional, HEENT, cardiovascular, pulmonary, gi and gu systems are negative, except as otherwise noted.      Objective    /86   Pulse 75   Temp 97  F (36.1  C)   Resp 20   Ht 1.607 m (5' 3.25\")   Wt 99.3 kg (219 lb)   SpO2 99%   BMI 38.49 kg/m    Body mass index is 38.49 kg/m .   Wt Readings from Last 5 Encounters:   05/03/21 99.3 kg (219 lb)   01/13/21 97.1 kg (214 lb)   11/04/20 99.1 kg (218 lb 6.4 oz)   02/03/20 104.3 kg (230 lb)   12/04/19 104.3 kg (230 lb)       Physical Exam :   GENERAL: healthy, alert and no distress  EYES: Eyes grossly normal to inspection, PERRL and conjunctivae and sclerae normal  HENT: ear canals and TM's normal, nose and mouth without ulcers or lesions  NECK: no adenopathy, no asymmetry, masses, or scars and thyroid normal to palpation  RESP: lungs clear to auscultation - no rales, rhonchi or wheezes  CV: regular rate and rhythm, normal S1 S2, no S3 or S4, no murmur, click or rub, no peripheral edema and peripheral pulses strong  ABDOMEN: soft, nontender, no hepatosplenomegaly, no masses and bowel sounds normal  MS: no gross musculoskeletal defects noted, no edema  SKIN: no suspicious lesions or rashes  NEURO: Normal strength and tone, mentation intact and speech normal  PSYCH: mentation appears normal, affect normal/bright  Foot exam - both sides normal; no swelling, tenderness or skin or vascular lesions. Color and temperature is normal. Sensation is intact. Peripheral pulses are palpable. Toenails are normal.      Orders Only on 04/27/2021 "   Component Date Value Ref Range Status     INR 04/27/2021 2.10* 0.86 - 1.14 Final    Comment: This test is intended for monitoring Coumadin therapy.  Results are not   accurate in patients with prolonged INR due to factor deficiency.       Capillary Blood Collection 04/27/2021 Capillary collection performed   Final

## 2021-05-03 NOTE — TELEPHONE ENCOUNTER
Reason for Call:  Form, our goal is to have forms completed with 72 hours, however, some forms may require a visit or additional information.    Type of letter, form or note:  medical    Who is the form from?: [facility] (if other please explain)    Where did the form come from: form was faxed in    What clinic location was the form placed at?: [place]    Where the form was placed: Dr Mercado Box/Folder    What number is listed as a contact on the form?: [phone]       Additional comments:     Call taken on 5/3/2021 at 11:16 AM by Peace Valencia

## 2021-05-03 NOTE — PATIENT INSTRUCTIONS
[facility]  [address] [place] [id number]  Office: [phone]   Fax:    [phone]       If you desire to try  melatonin for sleep:   Try  Nature's Made or other USP certified Melatonin for sleep:  take 30-60 minutes prior to bedtime.  Start with 3-5mg at night x 2 nights, then increase to 6-10mg nightly for 2 nights, then 9mg nightly for 2 nights, then 12-15mg nightly x 2 nights, etc, increasing by 3-5 mg every 2 nights. Max dose is absolutely 18-20mg nightly.   You can stop at whatever milligram dosage prior to 18-20mg works well for you.                    Patient Education     Grief Reaction  Grief is the feeling that we all have when we lose someone or something that has been important in our life. Grief is an unavoidable and normal reaction to this loss. It can last from months to years. The amount of time depends on different factors. These include how close the person was to you and how much support you have through the grief process. Symptoms can be both physical and emotional.   Reactions to grief of a physical nature include:     Loss of appetite or overeating    Changes in weight    Trouble getting to sleep or staying asleep    Hair loss    Upset stomach, indigestion, heart burn, belly pain, cramping, diarrhea    Sense of trouble breathing    Trembling, shakiness  Reactions to grief of an emotional nature include:     Sadness    Anxiety    Feeling depressed or helpless    Difficulty concentrating    Detachment or withdrawal from those around you    Loss of interest in your normal life and work  Home care  Suggestions to care for yourself at home include the following:     Allow yourself to feel the pain of your loss. For some, this can be a key part of healing grief. Talk about your pain with others who understand. Share good memories that involve the person, pet, or possession you lost.    Take time for yourself. Make it a point to do things that you enjoy. This  might be gardening, walking in nature, or going to a movie.    Take care of your physical body. Eat a balanced diet (low in saturated fat and high in fruits and vegetables) and establish an exercise plan at least 3 times a week for 30 minutes. Even mild-moderate exercise such as brisk walking can make you feel better. Get plenty of sleep.    Don't use alcohol or drugs to cover your emotional pain. This only slows down the emotional healing process.    Don't isolate yourself from others. Have daily contact with family or friends. Talk about your loss to those closest to you.    For additional support, meet with your , , or rabbi, a counselor or therapist, or your own healthcare provider.    Consider joining a grief support group. Ask your healthcare provider or our staff for information on how to find one in your area.    If you have been prescribed a medicine to help with your symptoms, take it only as directed. Do not use it with alcohol.    Respect your feelings and your self-care needs. Friends or family members may try to be helpful but give unsolicited advice on how you should feel or what you should do. Sometimes this advice can cause more stress than comfort. Say no thank you to suggestions when you need to do so.  Follow-up care  Follow up with your healthcare provider, or as advised.  Call 911  Call  911 if any of these happen:     Trouble breathing    Very confused    Very drowsy or trouble awakening    Fainting or loss of consciousness    Rapid heart rate    Seizure    New chest pain that becomes more severe, lasts longer, or spreads into your shoulder, arm, neck, jaw, or back    Have suicidal thoughts, a suicide plan, and the means to carry out the plan    Have serious thoughts of hurting someone else   When to seek medical advice  Call your healthcare provider right away if any of these happen:    Worsening symptoms    Not eating or sleeping for 3 days in a row    Feeling extreme depression,  fear, anxiety, or anger toward yourself or others    Feeling out of control    Feeling that you may try to harm yourself    Having family or friends express concern over your behavior and ask you to get help  Stephanie last reviewed this educational content on 7/1/2020 2000-2021 [employer]. All rights reserved. This information is not intended as a substitute for professional medical care. Always follow your healthcare professional's instructions.         Thank you so much or choosing [facility]  for your Health Care. It was a pleasure seeing you at your visit today! Please contact us with any questions or concerns you may have.                   Madina Mercado MD                              To reach your [facility] care team after hours call:   [phone]    PLEASE NOTE OUR HOURS HAVE CHANGED secondary to COVID-19 coronavirus pandemic, as we are trying to minimize patient exposure to the virus,  which is now widespread in the [facility].  These hours may change with very little notice.  We apologize for any inconvenience.       Our current clinic hours are:          Monday- Thursday   7:00am - 6:00pm  in person.      Friday  7:00am- 5:00pm                       Saturday and Sunday : Closed to in person and virtual visits        We have telephone and virtual visit times available between    7:00am - 6pm on Monday-Friday as well.                                                Phone:  [phone]      Our pharmacy hours: Monday through Friday 9:00am to 5:00pm                        Saturday - 9:00 am to 12 noon       Sunday : Closed.              Phone:  [phone]              ###  Please note: at this time we are not accepting any walk-in visits. ###      There is also information available at our web site:  [email or web address]    If your provider ordered any lab tests and you do not receive the results within 10 business days, please call  the clinic.    If you need a medication refill please contact your pharmacy.  Please allow 2 business days for your refill to be completed.    Our clinic offers telephone visits and e visits.  Please ask one of your team members to explain more.      Use [employer] (secure email communication and access to your chart) to send your primary care provider a message or make an appointment. Ask someone on your Team how to sign up for [employer].

## 2021-05-04 LAB
CREAT UR-MCNC: 43 MG/DL
DEPRECATED CALCIDIOL+CALCIFEROL SERPL-MC: <56 UG/L (ref 20–75)
MICROALBUMIN UR-MCNC: 9 MG/L
MICROALBUMIN/CREAT UR: 20.14 MG/G CR (ref 0–25)
VITAMIN D2 SERPL-MCNC: <5 UG/L
VITAMIN D3 SERPL-MCNC: 51 UG/L

## 2021-05-04 ASSESSMENT — ANXIETY QUESTIONNAIRES: GAD7 TOTAL SCORE: 0

## 2021-05-05 NOTE — TELEPHONE ENCOUNTER
Completed forms faxed back to [facility]   at [phone].   Originals sent to be scanned.       Sintia Mitchell

## 2021-05-05 NOTE — TELEPHONE ENCOUNTER
completed/ signed - at [facility] coordinator's file box.  ---Madina Mercado MD

## 2021-05-11 ENCOUNTER — ANTICOAGULATION THERAPY VISIT (OUTPATIENT)
Dept: FAMILY MEDICINE | Facility: CLINIC | Age: 76
End: 2021-05-11

## 2021-05-11 DIAGNOSIS — Z79.01 LONG TERM CURRENT USE OF ANTICOAGULANT THERAPY: ICD-10-CM

## 2021-05-11 DIAGNOSIS — I48.91 ATRIAL FIBRILLATION, UNSPECIFIED TYPE (H): ICD-10-CM

## 2021-05-11 DIAGNOSIS — I48.19 PERSISTENT ATRIAL FIBRILLATION (H): ICD-10-CM

## 2021-05-11 LAB — INR PPP: 1.8 (ref 0.86–1.14)

## 2021-05-11 PROCEDURE — 85610 PROTHROMBIN TIME: CPT | Performed by: FAMILY MEDICINE

## 2021-05-11 PROCEDURE — 36416 COLLJ CAPILLARY BLOOD SPEC: CPT | Performed by: FAMILY MEDICINE

## 2021-05-11 NOTE — PROGRESS NOTES
Anticoagulation Management    Unable to reach Zulema today.    Today's INR result of 1.8 is subtherapeutic (goal INR of 2.0-3.0).  Result received from: Clinic Lab    Follow up required to discuss out of range INR     LM to call [facility], transfer to [phone].      Anticoagulation clinic to follow up    Albert Christie RN

## 2021-05-11 NOTE — PROGRESS NOTES
ANTICOAGULATION FOLLOW-UP CLINIC VISIT    Patient Name:  Zulema Nixon  Date:  2021  Contact Type:  Telephone/ Zulema    SUBJECTIVE:  Patient Findings     Comments:  INR has been running a little low lately. Would like to increase green intake as we are moving into nicer weather.         Clinical Outcomes     Negatives:  Major bleeding event, Thromboembolic event, Anticoagulation-related hospital admission, Anticoagulation-related ED visit, Anticoagulation-related fatality    Comments:  INR has been running a little low lately. Would like to increase green intake as we are moving into nicer weather.            OBJECTIVE    Recent labs: (last 7 days)     21  1029   INR 1.80*       ASSESSMENT / PLAN  INR assessment SUB    Recheck INR In: 2 WEEKS    INR Location Clinic      Anticoagulation Summary  As of 2021    INR goal:  2.0-3.0   TTR:  62.6 % (1 y)   INR used for dosin.80 (2021)   Warfarin maintenance plan:  10 mg (5 mg x 2) every Tue; 7.5 mg (5 mg x 1.5) all other days   Full warfarin instructions:  10 mg every Tue; 7.5 mg all other days   Weekly warfarin total:  55 mg   Plan last modified:  Albert Christie, RN (2021)   Next INR check:  2021   Priority:  Maintenance   Target end date:  Indefinite    Indications    Long term current use of anticoagulant therapy [Z79.01]  Atrial fibrillation  unspecified type (H) [I48.91]  Persistent atrial fibrillation (H) [I48.19]             Anticoagulation Episode Summary     INR check location:      Preferred lab:      Send INR reminders to:  [facility] [place]    Comments:        Anticoagulation Care Providers     Provider Role Specialty Phone number    Madina Mercado MD Referring Family Medicine [phone]            See the Encounter Report to view Anticoagulation Flowsheet and Dosing Calendar (Go to Encounters tab in chart review, and find the Anticoagulation Therapy Visit)    Patient INR is sub therapeutic today.  Patient  will increase weekly maintenance dose to 55 mg and follow up in 2 weeks or sooner if there are any concerns or problems.     Discussed signs of clotting with patient and when to seek care for concerns.  Patient verbalized understanding    Rationale to adjustments: 4.8% Dosage adjustment made based on physician directed care plan.      Albert Christie RN

## 2021-05-13 ENCOUNTER — TELEPHONE (OUTPATIENT)
Dept: FAMILY MEDICINE | Facility: CLINIC | Age: 76
End: 2021-05-13

## 2021-05-13 NOTE — TELEPHONE ENCOUNTER
Reason for Call:  Form, our goal is to have forms completed with 72 hours, however, some forms may require a visit or additional information.    Type of letter, form or note:  Medicare order for patient to get trained on meter    Who is the form from?: [facility] pharmacy (if other please explain)    Where did the form come from: form was faxed in    What clinic location was the form placed at?: [place]    Where the form was placed: Dr Mercado Box/Folder    What number is listed as a contact on the form?: [phone]             Call taken on 5/13/2021 at 10:11 AM by Mackenzie Johansen

## 2021-05-13 NOTE — TELEPHONE ENCOUNTER
Pharmacy says cannot fill RX until she is trained on how to use the glucose meter.  Who does the training?       Sintia Mitchell

## 2021-05-14 NOTE — TELEPHONE ENCOUNTER
Diabetic Education can do training, though any qualified RN should be able to as well.    [place]aleigh CARTER, HAMMAD   [facility]

## 2021-05-18 NOTE — TELEPHONE ENCOUNTER
We can certainly do BG meter training, however, it looks like patient already knows how to do BG testing and had a home BG monitor. Maybe double check that this is truly what is needed? If it is we will need a referral for diabetes education for her and will need to know what BG monitor they are dispensing for her.    Thank you,    Tamanna Cabrera RN, [facility]

## 2021-05-18 NOTE — TELEPHONE ENCOUNTER
Called # [phone]     Pt called, Pt does have BG monitor at home. Pt stated will not need training.   Writer will call pharmacy to advise.  Spoke to [place] [facility], noted the form that was sent over stated she has not been trained. Writer advised to fax form back to be corrected. Writer gave fax number. Awaiting fax.       Raleigh Boyd RN   [facility]

## 2021-05-19 NOTE — TELEPHONE ENCOUNTER
Form for diabetic testing supplies faxed back to [facility] at [phone]      Sintia Mitchell

## 2021-05-26 ENCOUNTER — ANTICOAGULATION THERAPY VISIT (OUTPATIENT)
Dept: FAMILY MEDICINE | Facility: CLINIC | Age: 76
End: 2021-05-26

## 2021-05-26 DIAGNOSIS — I48.91 ATRIAL FIBRILLATION, UNSPECIFIED TYPE (H): ICD-10-CM

## 2021-05-26 DIAGNOSIS — I48.19 PERSISTENT ATRIAL FIBRILLATION (H): ICD-10-CM

## 2021-05-26 DIAGNOSIS — Z79.01 LONG TERM CURRENT USE OF ANTICOAGULANT THERAPY: ICD-10-CM

## 2021-05-26 LAB
CAPILLARY BLOOD COLLECTION: NORMAL
INR PPP: 2.5 (ref 0.86–1.14)

## 2021-05-26 PROCEDURE — 36416 COLLJ CAPILLARY BLOOD SPEC: CPT | Performed by: FAMILY MEDICINE

## 2021-05-26 PROCEDURE — 85610 PROTHROMBIN TIME: CPT | Performed by: FAMILY MEDICINE

## 2021-05-26 NOTE — PROGRESS NOTES
ANTICOAGULATION FOLLOW-UP CLINIC VISIT    Patient Name:  Zulema Nixon  Date:  2021  Contact Type:  Telephone/ Zulema    SUBJECTIVE:  Patient Findings     Comments:  The patient was assessed for diet, medication, and activity level changes, missed or extra doses, bruising or bleeding, with no problem findings.          Clinical Outcomes     Negatives:  Major bleeding event, Thromboembolic event, Anticoagulation-related hospital admission, Anticoagulation-related ED visit, Anticoagulation-related fatality    Comments:  The patient was assessed for diet, medication, and activity level changes, missed or extra doses, bruising or bleeding, with no problem findings.             OBJECTIVE    Recent labs: (last 7 days)     21  0942   INR 2.50*       ASSESSMENT / PLAN  INR assessment THER    Recheck INR In: 3 WEEKS    INR Location Clinic      Anticoagulation Summary  As of 2021    INR goal:  2.0-3.0   TTR:  61.5 % (1 y)   INR used for dosin.50 (2021)   Warfarin maintenance plan:  10 mg (5 mg x 2) every Tue; 7.5 mg (5 mg x 1.5) all other days   Full warfarin instructions:  10 mg every Tue; 7.5 mg all other days   Weekly warfarin total:  55 mg   No change documented:  Albert Christie RN   Plan last modified:  Albert Christie RN (2021)   Next INR check:  2021   Priority:  Maintenance   Target end date:  Indefinite    Indications    Long term current use of anticoagulant therapy [Z79.01]  Atrial fibrillation  unspecified type (H) [I48.91]  Persistent atrial fibrillation (H) [I48.19]             Anticoagulation Episode Summary     INR check location:      Preferred lab:      Send INR reminders to:  [facility]    Comments:        Anticoagulation Care Providers     Provider Role Specialty Phone number    Madina Mercado MD Referring Family Medicine [phone]            See the Encounter Report to view Anticoagulation Flowsheet and Dosing Calendar (Go to Encounters  tab in chart review, and find the Anticoagulation Therapy Visit)    Patient INR is therapeutic.  Patient will continue to take 55 mg weekly dosing and follow up in 3 weeks or sooner for any problems or concerns.        Albert Christie RN

## 2021-05-26 NOTE — PROGRESS NOTES
Anticoagulation Management    Unable to reach Zulema today.    Today's INR result of 2.5 is therapeutic (goal INR of 2.0-3.0).  Result received from: Clinic Lab    Follow up required to assess for changes     LM to call [facility], transfer to [phone].      Anticoagulation clinic to follow up    Albert Christie RN

## 2021-06-16 ENCOUNTER — ANTICOAGULATION THERAPY VISIT (OUTPATIENT)
Dept: FAMILY MEDICINE | Facility: CLINIC | Age: 76
End: 2021-06-16

## 2021-06-16 DIAGNOSIS — I48.19 PERSISTENT ATRIAL FIBRILLATION (H): ICD-10-CM

## 2021-06-16 DIAGNOSIS — I48.91 ATRIAL FIBRILLATION, UNSPECIFIED TYPE (H): ICD-10-CM

## 2021-06-16 DIAGNOSIS — Z79.01 LONG TERM CURRENT USE OF ANTICOAGULANT THERAPY: ICD-10-CM

## 2021-06-16 LAB
CAPILLARY BLOOD COLLECTION: NORMAL
INR BLD: 2.9 (ref 0.86–1.14)

## 2021-06-16 PROCEDURE — 85610 PROTHROMBIN TIME: CPT | Performed by: FAMILY MEDICINE

## 2021-06-16 PROCEDURE — 36416 COLLJ CAPILLARY BLOOD SPEC: CPT | Performed by: FAMILY MEDICINE

## 2021-06-16 PROCEDURE — 99207 PR NO CHARGE NURSE ONLY: CPT | Performed by: FAMILY MEDICINE

## 2021-06-16 NOTE — PROGRESS NOTES
ANTICOAGULATION FOLLOW-UP CLINIC VISIT    Patient Name:  Zulema Nixon  Date:  2021  Contact Type:  Telephone/ Zulema    SUBJECTIVE:  Patient Findings     Comments:  The patient was assessed for diet, medication, and activity level changes, missed or extra doses, bruising or bleeding, with no problem findings.          Clinical Outcomes     Negatives:  Major bleeding event, Thromboembolic event, Anticoagulation-related hospital admission, Anticoagulation-related ED visit, Anticoagulation-related fatality    Comments:  The patient was assessed for diet, medication, and activity level changes, missed or extra doses, bruising or bleeding, with no problem findings.             OBJECTIVE    Recent labs: (last 7 days)     21  1023   INR 2.9*       ASSESSMENT / PLAN  INR assessment THER    Recheck INR In: 4 WEEKS    INR Location Clinic      Anticoagulation Summary  As of 2021    INR goal:  2.0-3.0   TTR:  61.5 % (1 y)   INR used for dosin.9 (2021)   Warfarin maintenance plan:  10 mg (5 mg x 2) every Tue; 7.5 mg (5 mg x 1.5) all other days   Full warfarin instructions:  10 mg every Tue; 7.5 mg all other days   Weekly warfarin total:  55 mg   No change documented:  Albert Christie RN   Plan last modified:  Albert Christie RN (2021)   Next INR check:  2021   Priority:  Maintenance   Target end date:  Indefinite    Indications    Long term current use of anticoagulant therapy [Z79.01]  Atrial fibrillation  unspecified type (H) [I48.91]  Persistent atrial fibrillation (H) [I48.19]             Anticoagulation Episode Summary     INR check location:      Preferred lab:      Send INR reminders to:  [facility]    Comments:        Anticoagulation Care Providers     Provider Role Specialty Phone number    Madina Mercado MD Referring Family Medicine [phone]            See the Encounter Report to view Anticoagulation Flowsheet and Dosing Calendar (Go to Encounters  tab in chart review, and find the Anticoagulation Therapy Visit)    Patient INR is therapeutic.  Patient will continue to take 55 mg weekly dosing and follow up in 4 weeks or sooner for any problems or concerns.        Albert Christie RN

## 2021-06-21 ENCOUNTER — OFFICE VISIT (OUTPATIENT)
Dept: ORTHOPEDICS | Facility: CLINIC | Age: 76
End: 2021-06-21
Attending: FAMILY MEDICINE
Payer: MEDICARE

## 2021-06-21 VITALS
DIASTOLIC BLOOD PRESSURE: 82 MMHG | WEIGHT: 217 LBS | BODY MASS INDEX: 38.45 KG/M2 | SYSTOLIC BLOOD PRESSURE: 134 MMHG | HEIGHT: 63 IN

## 2021-06-21 DIAGNOSIS — G56.01 CARPAL TUNNEL SYNDROME OF RIGHT WRIST: Primary | ICD-10-CM

## 2021-06-21 DIAGNOSIS — M18.0 PRIMARY OSTEOARTHRITIS OF BOTH FIRST CARPOMETACARPAL JOINTS: ICD-10-CM

## 2021-06-21 PROCEDURE — 99203 OFFICE O/P NEW LOW 30 MIN: CPT | Performed by: ORTHOPAEDIC SURGERY

## 2021-06-21 ASSESSMENT — MIFFLIN-ST. JEOR: SCORE: 1447.4

## 2021-06-21 NOTE — PATIENT INSTRUCTIONS
Right carpal tunnel release under local anesthesia is to be scheduled.  Please check with Dr. Mercado about not taking blood thinner for 4 days before the operation once the surgery is scheduled.  Please do not hesitate to contact our office with any questions

## 2021-06-21 NOTE — LETTER
2021         RE: Zulema Nixon  [address] [address] [address] [id number]        Dear Colleague,    Thank you for referring your patient, Zulema Nixon, to the [facility] [place]. Please see a copy of my visit note below.    HISTORY OF PRESENT ILLNESS:    Zulema Nixon is a 75 year old female who is seen in consultation at the request of Dr. Mercado for right carpal tunnel syndrome. Patient is right hand dominant, and reports on-going symptoms for years.  She reports worsening and progressing symptoms.     Present symptoms: Right hand numbness and tingling. She reports hand feels like it is falling asleep. She reports use of wrist brace. Increased symptoms with driving, and use of the hand. Patient states she plays piano, cross stitch, writing. Patient reports previous waking in the night due symptoms. Patient reports difficulties with fine motor tasks due to numbness. Occasional pain in the hand, but primary concern is paresthesia.   A1c: 6.2   Treatments tried to this point: bracing x 1 year  Orthopedic PMH: history of diabetes, no previous injury or trauma to the wrist or hands.      Past Medical History:   Diagnosis Date     Atrial fibrillation (H) 2006    cardioverted 2007, on chronic anticoagulation      Cerebral artery occlusion with cerebral infarction (H)      CKD (chronic kidney disease) stage 2, GFR 60-89 ml/min      with microalbuminuria     Essential hypertension, benign      Hyperlipidemia LDL goal <100 10/31/2010    fish oil = IBS with diarrhea      Morbid obesity (H)      ARMAND (obstructive sleep apnea)     C PAP     Sensorineural hearing loss of both ears     using hearing aids     Status post laparoscopic cholecystectomy     cholecystectomy for cholelithiasis     Supervision of other normal pregnancy      - vaginal, one set of twins     Tortuous colon     detected at colonoscopy  - was recommended for next testing to have CT  colonography      Tubal ligation status      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     Varicose veins of lower extremities with inflammation     excision varicose vein left lower extremity     Venous stasis of lower extremity     excision varicose vein left lower extremity        Past Surgical History:   Procedure Laterality Date     C CARDIOVERSION, ELECTIVE;INTERN  2/2007    Successful cardioversion from atrial fibrillation      C DEXA INTERPRETATION, AXIAL  8/2007    T score lumbar 3.7, femoral neck 2.8/2.0(all stable)     C DEXA INTERPRETATION, AXIAL  6/2010    T score lumbar 3.4 (marked degen changes), femoral neck 2.1/2.1 (sig dec lt femur)     C ECHO HEART XTHORACIC,COMPLETE, W/O DOPPLER  11/2006    normal LV/RV size and function, mild pulm ht(30-40mm Hg), mild TR     C ECHO HEART XTHORACIC,COMPLETE, W/O DOPPLER  10/2008    normal LV systolic function with EF 55-60%, impaired LV relaxation, mod to severe LAE     C LIGATE FALLOPIAN TUBE  1973    Tubal ligation     C ORAL SURGERY SINGLE TOOTH  04/2019     had to have left upper rear tooth removed secondary to crown breaking /root damage      CARDIAC NUC ESHA STRESS TEST NL  11/2006    exercised 4 min, no inducible ischemia on ECG or perfusion images     COLONOSCOPY  11/2006    diverticulosis, repeat 10 years     FLEXIBLE SIGMOIDOSCOPY  10/2000     HC LAPAROSCOPY, SURGICAL; CHOLECYSTECTOMY  1991    Cholecystectomy, Laparoscopic     LIGATN/STRIP LONG & SHORT SAPHEN  1995    L varicose vein excision     REPAIR PTOSIS BILATERAL  2/2011     Bilateral upper eyelid blepharoplasty and internal browpexy brow ptosis repair, bilateral lower eyelid ectropion repair with snip punctoplasty     TRANSFUSION, DIRECT, BLOOD      pregnancy related       Family History   Problem Relation Age of Onset     Diabetes Mother         type 2     Hypertension Mother      Cardiovascular Mother         pulmonary embolus     Lipids Mother      Heart Disease Mother           atrial fibrillation     Diabetes Father         type 2     Cardiovascular Father         atrial fibrillation,  during an EP procedure     Cancer - colorectal Maternal Grandmother         onset age 88     Heart Disease Maternal Grandmother          age 96     Diabetes Maternal Grandfather         type 2     Diabetes Paternal Grandfather         type 2     Hypertension Sister      Lipids Sister      Lipids Brother      Hypertension Brother      Hypertension Son      Other - See Comments Cousin 68        Myotonic Dystrophy       Social History     Socioeconomic History     Marital status:      Spouse name: Paras     Number of children: 3     Years of education: 12     Highest education level: Not on file   Occupational History     Occupation: clerical     Employer: RETIRED     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     Occupation: volunteers at [employer]   Social Needs     Financial resource strain: Not on file     Food insecurity     Worry: Not on file     Inability: Not on file     Transportation needs     Medical: Not on file     Non-medical: Not on file   Tobacco Use     Smoking status: Never Smoker     Smokeless tobacco: Never Used   Substance and Sexual Activity     Alcohol use: Yes     Comment: 0-2 per month     Drug use: No     Sexual activity: Yes     Partners: Male     Comment: same partner since , only partner   Lifestyle     Physical activity     Days per week: Not on file     Minutes per session: Not on file     Stress: Not on file   Relationships     Social connections     Talks on phone: Not on file     Gets together: Not on file     Attends Hoahaoism service: Not on file     Active member of club or organization: Not on file     Attends meetings of clubs or organizations: Not on file     Relationship status: Not on file     Intimate partner violence     Fear of current or ex partner: Not on file     Emotionally abused: Not on file     Physically abused: Not on file     Forced sexual  activity: Not on file   Other Topics Concern      Service No     Blood Transfusions Yes     Comment: pregnancy     Caffeine Concern No     Comment: 1-2 cups per day     Occupational Exposure No     Hobby Hazards No     Sleep Concern Yes     Comment: needing new equipment for her C pap     Stress Concern No     Weight Concern Yes     Comment: chronic obesity     Special Diet Yes     Comment: decreasing salt     Back Care No     Exercise Yes     Comment: walking 20-30 min 2 times weekly     Bike Helmet No     Comment: not ride     Seat Belt Yes     Self-Exams Yes     Parent/sibling w/ CABG, MI or angioplasty before 65F 55M? Not Asked   Social History Narrative    Lives with  and a dog.  6 grandchildren; previously did  for 3 of them.  Now volunteering in school and [employer].  Children ages 45 and twins age 42.      :  diagnosed with low grade prostate cancer- s/p cryotherapy surgery in 2016, then s/p radiation in 2017.         Has a 4 yr old great-granddaughter that lives in [place] - hasn't been able to see her secondary to COVID-19 novel coronavirus pandemic, which started in 3/2020. ---.2020 -Madina Mercado MD         2020 -  became ill and  of COVID-19 novel coronavirus complications with pneumonia - was never able to come off ventilator at [facility].  Grandchildren now are all grown. Still living in their family home and enjoying it.--Madina Mercado MD                  Current Outpatient Medications   Medication Sig Dispense Refill     alcohol swab prep pads Use to swab area of injection/dilma as directed. 100 each 3     amLODIPine (NORVASC) 5 MG tablet Take 1 tablet (5 mg) by mouth daily 90 tablet 1     ASPIRIN 81 MG OR TABS 1 tab po QD (Once per day) 0 0     atenolol (TENORMIN) 100 MG tablet Take 1 tablet (100 mg) by mouth daily 90 tablet 1     BIOTIN 10 MG OR TABS 1 TABLET DAILY       blood glucose  (JAYDE CONTOUR NEXT) test strip Use to test blood sugar 1 times daily or as directed. 100 strip 3     blood glucose (NO BRAND SPECIFIED) test strip Use to test blood sugar 1 times daily or as directed. To accompany: Blood Glucose Monitor Brands: per easiest for pt with her insurance. 100 strip 6     blood glucose calibration (NO BRAND SPECIFIED) solution To accompany: Blood Glucose Monitor Brands: per insurance. 1 each 11     blood glucose monitoring (JAYDE MICROLET) lancets Use to test blood sugar 1 times daily or as directed. 100 each 3     blood glucose monitoring (NO BRAND SPECIFIED) meter device kit Use to test blood sugar 1 times daily or as directed. Preferred blood glucose meter OR supplies to accompany: Blood Glucose Monitor Brands: easiest for patient per insurance. 1 kit 0     CALTRATE 600 + D 600-200 MG-IU OR TABS 1 tablet twice daily 60 11     CENTRUM SILVER OR TABS ONE DAILY 3 MONTHS 1 YEAR     cholestyramine (QUESTRAN) 4 g packet Take 1 packet (4 g) by mouth daily with food 120 each 4     hydrochlorothiazide (HYDRODIURIL) 25 MG tablet Take 1 tablet (25 mg) by mouth daily 90 tablet 1     lisinopril (ZESTRIL) 40 MG tablet Take 1 tablet (40 mg) by mouth daily 90 tablet 1     metFORMIN (GLUCOPHAGE) 1000 MG tablet TAKE 1 AND 1/2 TABLETS BY MOUTH WITH BREAKFAST AND TAKE 1 TABLET BY MOUTH WITH SUPPER 225 tablet 1     simvastatin (ZOCOR) 20 MG tablet TAKE ONE TABLET BY MOUTH AT BEDTIME 90 tablet 1     VITAMIN D 1000 UNIT OR CAPS 1 CAPSULE DAILY 3 MONTHS 1 YEAR     warfarin ANTICOAGULANT (COUMADIN) 5 MG tablet TAKE 1-2 TABLETS (5-10 MG) BY MOUTH DAILY AS INSTRUCTED 180 tablet 3       Allergies   Allergen Reactions     Tetracycline      lightheaded       REVIEW OF SYSTEMS:  CONSTITUTIONAL:  NEGATIVE for fever, chills, change in weight  INTEGUMENTARY/SKIN:  NEGATIVE for worrisome rashes, moles or lesions  EYES:  NEGATIVE for vision changes or irritation  ENT/MOUTH:  NEGATIVE for ear, mouth and throat  "problems  RESP:  NEGATIVE for significant cough or SOB  BREAST:  NEGATIVE for masses, tenderness or discharge  CV:  Hypertension, irregular heart beats  GI:  NEGATIVE for nausea, abdominal pain, heartburn, or change in bowel habits  :  Negative   MUSCULOSKELETAL:  See HPI above  NEURO:  NEGATIVE for weakness, dizziness or paresthesias  ENDOCRINE:  NEGATIVE for temperature intolerance, skin/hair changes  HEME/ALLERGY/IMMUNE:  NEGATIVE for bleeding problems  PSYCHIATRIC:  NEGATIVE for changes in mood or affect      PHYSICAL EXAM:  /82 (BP Location: Right arm, Patient Position: Chair, Cuff Size: Adult Regular)   Ht 1.607 m (5' 3.25\")   Wt 98.4 kg (217 lb)   BMI 38.14 kg/m    Body mass index is 38.14 kg/m .   GENERAL APPEARANCE: healthy, alert and no distress   HEENT: No apparent thyroid megaly. Clear sclera with normal ocular movement  RESPIRATORY: No labored breathing  SKIN: no suspicious lesions or rashes  NEURO: Normal strength and tone, mentation intact and speech normal  VASCULAR: Good pulses, and capillary refill   LYMPH: no lymphadenopathy   PSYCH:  mentation appears normal and affect normal/bright    MUSCULOSKELETAL:  Not in acute distress  Normal gait    Gross deformities involving thumb basal joints consistent with a arthritis  Decreased first webspace as result  No erythema or focal tenderness today  In fact she tolerated circumduction maneuver quite well on the right    Gross sensation is intact  Negative Tinel's sign, right  Positive Phalen test at 1 minute, right    Circulation is intact  No focal swelling noted  Gross  strength is intact    Significant thenar eminence atrophy, bilateral       ASSESSMENT:  Chronic carpal tunnel syndrome, right  CMC joint DJD of the thumbs, bilateral  Mild chronic underlying type 2 diabetes with diabetic polyneuropathy    PLAN:  Multiple pathologies as listed above were discussed.  Nature of the problem related carpal tunnel syndrome and diabetic neuropathy " were explained.  From the fact that she has significant thenar eminence atrophy, her carpal tunnel syndrome situation is rather advanced.  Despite the fact that the result from surgical intervention is unpredictable having had chronic duration of the problem to the point of having significant thenar eminence atrophy and because of the fact that she has diabetic neuropathy, surgical intervention at this point for carpal tunnel release still makes sense to lessen the overall pathology..    We talked about the choice of anesthesia and she feels comfortable doing the surgery under local anesthesia.  Unpredictable recovery time was explained in terms of recovery of the sensation    Potential risks were informed.  All the questions were answered.    Right carpal tunnel release under local anesthesia is to be scheduled.        Imaging Interpretation:   None taken today      Orlando Walsh MD  Department of Orthopedic Surgery        Disclaimer: This note consists of symbols derived from keyboarding, dictation and/or voice recognition software. As a result, there may be errors in the script that have gone undetected. Please consider this when interpreting information found in this chart.        Again, thank you for allowing me to participate in the care of your patient.        Sincerely,        Orlando Walsh MD

## 2021-06-21 NOTE — PROGRESS NOTES
HISTORY OF PRESENT ILLNESS:    Zulema Nixon is a 75 year old female who is seen in consultation at the request of Dr. Mercado for right carpal tunnel syndrome. Patient is right hand dominant, and reports on-going symptoms for years.  She reports worsening and progressing symptoms.     Present symptoms: Right hand numbness and tingling. She reports hand feels like it is falling asleep. She reports use of wrist brace. Increased symptoms with driving, and use of the hand. Patient states she plays piano, cross stitch, writing. Patient reports previous waking in the night due symptoms. Patient reports difficulties with fine motor tasks due to numbness. Occasional pain in the hand, but primary concern is paresthesia.   A1c: 6.2   Treatments tried to this point: bracing x 1 year  Orthopedic PMH: history of diabetes, no previous injury or trauma to the wrist or hands.      Past Medical History:   Diagnosis Date     Atrial fibrillation (H) 2006    cardioverted 2007, on chronic anticoagulation      Cerebral artery occlusion with cerebral infarction (H)      CKD (chronic kidney disease) stage 2, GFR 60-89 ml/min      with microalbuminuria     Essential hypertension, benign      Hyperlipidemia LDL goal <100 10/31/2010    fish oil = IBS with diarrhea      Morbid obesity (H)      ARMAND (obstructive sleep apnea)     C PAP     Sensorineural hearing loss of both ears     using hearing aids     Status post laparoscopic cholecystectomy     cholecystectomy for cholelithiasis     Supervision of other normal pregnancy      - vaginal, one set of twins     Tortuous colon     detected at colonoscopy  - was recommended for next testing to have CT colonography      Tubal ligation status      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)      Varicose veins of lower extremities with inflammation     excision varicose vein left lower extremity     Venous stasis of lower extremity     excision  varicose vein left lower extremity        Past Surgical History:   Procedure Laterality Date     C CARDIOVERSION, ELECTIVE;INTERN  2007    Successful cardioversion from atrial fibrillation      C DEXA INTERPRETATION, AXIAL  2007    T score lumbar 3.7, femoral neck 2.8/2.0(all stable)     C DEXA INTERPRETATION, AXIAL  2010    T score lumbar 3.4 (marked degen changes), femoral neck 2.1/2.1 (sig dec lt femur)     C ECHO HEART XTHORACIC,COMPLETE, W/O DOPPLER  2006    normal LV/RV size and function, mild pulm ht(30-40mm Hg), mild TR     C ECHO HEART XTHORACIC,COMPLETE, W/O DOPPLER  10/2008    normal LV systolic function with EF 55-60%, impaired LV relaxation, mod to severe LAE     C LIGATE FALLOPIAN TUBE      Tubal ligation     C ORAL SURGERY SINGLE TOOTH  2019     had to have left upper rear tooth removed secondary to crown breaking /root damage      CARDIAC NUC ESHA STRESS TEST NL  2006    exercised 4 min, no inducible ischemia on ECG or perfusion images     COLONOSCOPY  2006    diverticulosis, repeat 10 years     FLEXIBLE SIGMOIDOSCOPY  10/2000     HC LAPAROSCOPY, SURGICAL; CHOLECYSTECTOMY      Cholecystectomy, Laparoscopic     LIGATN/STRIP LONG & SHORT SAPHEN      L varicose vein excision     REPAIR PTOSIS BILATERAL  2011     Bilateral upper eyelid blepharoplasty and internal browpexy brow ptosis repair, bilateral lower eyelid ectropion repair with snip punctoplasty     TRANSFUSION, DIRECT, BLOOD      pregnancy related       Family History   Problem Relation Age of Onset     Diabetes Mother         type 2     Hypertension Mother      Cardiovascular Mother         pulmonary embolus     Lipids Mother      Heart Disease Mother          atrial fibrillation     Diabetes Father         type 2     Cardiovascular Father         atrial fibrillation,  during an EP procedure     Cancer - colorectal Maternal Grandmother         onset age 88     Heart Disease Maternal Grandmother           age 96     Diabetes Maternal Grandfather         type 2     Diabetes Paternal Grandfather         type 2     Hypertension Sister      Lipids Sister      Lipids Brother      Hypertension Brother      Hypertension Son      Other - See Comments Cousin 68        Myotonic Dystrophy       Social History     Socioeconomic History     Marital status:      Spouse name: Paras     Number of children: 3     Years of education: 12     Highest education level: Not on file   Occupational History     Occupation: clerical     Employer: RETIRED     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     Occupation: volunteers at [employer]   Social Needs     Financial resource strain: Not on file     Food insecurity     Worry: Not on file     Inability: Not on file     Transportation needs     Medical: Not on file     Non-medical: Not on file   Tobacco Use     Smoking status: Never Smoker     Smokeless tobacco: Never Used   Substance and Sexual Activity     Alcohol use: Yes     Comment: 0-2 per month     Drug use: No     Sexual activity: Yes     Partners: Male     Comment: same partner since 1965, only partner   Lifestyle     Physical activity     Days per week: Not on file     Minutes per session: Not on file     Stress: Not on file   Relationships     Social connections     Talks on phone: Not on file     Gets together: Not on file     Attends Sikhism service: Not on file     Active member of club or organization: Not on file     Attends meetings of clubs or organizations: Not on file     Relationship status: Not on file     Intimate partner violence     Fear of current or ex partner: Not on file     Emotionally abused: Not on file     Physically abused: Not on file     Forced sexual activity: Not on file   Other Topics Concern      Service No     Blood Transfusions Yes     Comment: pregnancy     Caffeine Concern No     Comment: 1-2 cups per day     Occupational Exposure No     Hobby Hazards No     Sleep Concern Yes     Comment:  needing new equipment for her C pap     Stress Concern No     Weight Concern Yes     Comment: chronic obesity     Special Diet Yes     Comment: decreasing salt     Back Care No     Exercise Yes     Comment: walking 20-30 min 2 times weekly     Bike Helmet No     Comment: not ride     Seat Belt Yes     Self-Exams Yes     Parent/sibling w/ CABG, MI or angioplasty before 65F 55M? Not Asked   Social History Narrative    Lives with  and a dog.  6 grandchildren; previously did  for 3 of them.  Now volunteering in school and [employer].  Children ages 45 and twins age 42.      2012:  diagnosed with low grade prostate cancer- s/p cryotherapy surgery in 2016, then s/p radiation in 2017.         Has a 4 yr old great-granddaughter that lives in [place] - hasn't been able to see her secondary to COVID-19 novel coronavirus pandemic, which started in 3/2020. ---.2020 -Madina Mercado MD         2020 -  became ill and  of COVID-19 novel coronavirus complications with pneumonia - was never able to come off ventilator at [facility].  Grandchildren now are all grown. Still living in their family home and enjoying it.--Madina Mercado MD                  Current Outpatient Medications   Medication Sig Dispense Refill     alcohol swab prep pads Use to swab area of injection/dilma as directed. 100 each 3     amLODIPine (NORVASC) 5 MG tablet Take 1 tablet (5 mg) by mouth daily 90 tablet 1     ASPIRIN 81 MG OR TABS 1 tab po QD (Once per day) 0 0     atenolol (TENORMIN) 100 MG tablet Take 1 tablet (100 mg) by mouth daily 90 tablet 1     BIOTIN 10 MG OR TABS 1 TABLET DAILY       blood glucose (JAYDE CONTOUR NEXT) test strip Use to test blood sugar 1 times daily or as directed. 100 strip 3     blood glucose (NO BRAND SPECIFIED) test strip Use to test blood sugar 1 times daily or as directed. To accompany: Blood Glucose Monitor Brands: per easiest for  pt with her insurance. 100 strip 6     blood glucose calibration (NO BRAND SPECIFIED) solution To accompany: Blood Glucose Monitor Brands: per insurance. 1 each 11     blood glucose monitoring (JAYDE MICROLET) lancets Use to test blood sugar 1 times daily or as directed. 100 each 3     blood glucose monitoring (NO BRAND SPECIFIED) meter device kit Use to test blood sugar 1 times daily or as directed. Preferred blood glucose meter OR supplies to accompany: Blood Glucose Monitor Brands: easiest for patient per insurance. 1 kit 0     CALTRATE 600 + D 600-200 MG-IU OR TABS 1 tablet twice daily 60 11     CENTRUM SILVER OR TABS ONE DAILY 3 MONTHS 1 YEAR     cholestyramine (QUESTRAN) 4 g packet Take 1 packet (4 g) by mouth daily with food 120 each 4     hydrochlorothiazide (HYDRODIURIL) 25 MG tablet Take 1 tablet (25 mg) by mouth daily 90 tablet 1     lisinopril (ZESTRIL) 40 MG tablet Take 1 tablet (40 mg) by mouth daily 90 tablet 1     metFORMIN (GLUCOPHAGE) 1000 MG tablet TAKE 1 AND 1/2 TABLETS BY MOUTH WITH BREAKFAST AND TAKE 1 TABLET BY MOUTH WITH SUPPER 225 tablet 1     simvastatin (ZOCOR) 20 MG tablet TAKE ONE TABLET BY MOUTH AT BEDTIME 90 tablet 1     VITAMIN D 1000 UNIT OR CAPS 1 CAPSULE DAILY 3 MONTHS 1 YEAR     warfarin ANTICOAGULANT (COUMADIN) 5 MG tablet TAKE 1-2 TABLETS (5-10 MG) BY MOUTH DAILY AS INSTRUCTED 180 tablet 3       Allergies   Allergen Reactions     Tetracycline      lightheaded       REVIEW OF SYSTEMS:  CONSTITUTIONAL:  NEGATIVE for fever, chills, change in weight  INTEGUMENTARY/SKIN:  NEGATIVE for worrisome rashes, moles or lesions  EYES:  NEGATIVE for vision changes or irritation  ENT/MOUTH:  NEGATIVE for ear, mouth and throat problems  RESP:  NEGATIVE for significant cough or SOB  BREAST:  NEGATIVE for masses, tenderness or discharge  CV:  Hypertension, irregular heart beats  GI:  NEGATIVE for nausea, abdominal pain, heartburn, or change in bowel habits  :  Negative   MUSCULOSKELETAL:  See  "HPI above  NEURO:  NEGATIVE for weakness, dizziness or paresthesias  ENDOCRINE:  NEGATIVE for temperature intolerance, skin/hair changes  HEME/ALLERGY/IMMUNE:  NEGATIVE for bleeding problems  PSYCHIATRIC:  NEGATIVE for changes in mood or affect      PHYSICAL EXAM:  /82 (BP Location: Right arm, Patient Position: Chair, Cuff Size: Adult Regular)   Ht 1.607 m (5' 3.25\")   Wt 98.4 kg (217 lb)   BMI 38.14 kg/m    Body mass index is 38.14 kg/m .   GENERAL APPEARANCE: healthy, alert and no distress   HEENT: No apparent thyroid megaly. Clear sclera with normal ocular movement  RESPIRATORY: No labored breathing  SKIN: no suspicious lesions or rashes  NEURO: Normal strength and tone, mentation intact and speech normal  VASCULAR: Good pulses, and capillary refill   LYMPH: no lymphadenopathy   PSYCH:  mentation appears normal and affect normal/bright    MUSCULOSKELETAL:  Not in acute distress  Normal gait    Gross deformities involving thumb basal joints consistent with a arthritis  Decreased first webspace as result  No erythema or focal tenderness today  In fact she tolerated circumduction maneuver quite well on the right    Gross sensation is intact  Negative Tinel's sign, right  Positive Phalen test at 1 minute, right    Circulation is intact  No focal swelling noted  Gross  strength is intact    Significant thenar eminence atrophy, bilateral       ASSESSMENT:  Chronic carpal tunnel syndrome, right  CMC joint DJD of the thumbs, bilateral  Mild chronic underlying type 2 diabetes with diabetic polyneuropathy    PLAN:  Multiple pathologies as listed above were discussed.  Nature of the problem related carpal tunnel syndrome and diabetic neuropathy were explained.  From the fact that she has significant thenar eminence atrophy, her carpal tunnel syndrome situation is rather advanced.  Despite the fact that the result from surgical intervention is unpredictable having had chronic duration of the problem to the point " of having significant thenar eminence atrophy and because of the fact that she has diabetic neuropathy, surgical intervention at this point for carpal tunnel release still makes sense to lessen the overall pathology..    We talked about the choice of anesthesia and she feels comfortable doing the surgery under local anesthesia.  Unpredictable recovery time was explained in terms of recovery of the sensation    Potential risks were informed.  All the questions were answered.    Right carpal tunnel release under local anesthesia is to be scheduled.        Imaging Interpretation:   None taken today      Orlando Walsh MD  Department of Orthopedic Surgery        Disclaimer: This note consists of symbols derived from keyboarding, dictation and/or voice recognition software. As a result, there may be errors in the script that have gone undetected. Please consider this when interpreting information found in this chart.

## 2021-06-29 ENCOUNTER — TELEPHONE (OUTPATIENT)
Dept: ORTHOPEDICS | Facility: CLINIC | Age: 76
End: 2021-06-29

## 2021-06-29 NOTE — TELEPHONE ENCOUNTER
Attempt to reach patient to schedule surgery for right carpal tunnel.  Left message for patient to call the surgery scheduling line at [phone].     Erick Sifuentes, Surgery Scheduler

## 2021-07-07 NOTE — TELEPHONE ENCOUNTER
Spoke to patient.  Scheduled surgery    No covid test needed, patient has been vaccinated and instructed to vaccination card.     Type of surgery: right carpal tunnel release  Location of surgery: Other: [facility]   Date and time of surgery: 7/28/21 @ 1400  Surgeon: Jillian  Pre-Op Appt Date: local  Post-Op Appt Date: 8/5/21   Packet sent out: Yes  Pre-cert/Authorization completed:  No  Date: 7/7/21      Erick Sifuentes Surgery Scheduler

## 2021-07-09 NOTE — TELEPHONE ENCOUNTER
Patient called to move surgery from 7/28/21 to 8/4/21.     Rescheduled PO appt.       Erick Sifuentes, Surgery Scheduler

## 2021-07-14 ENCOUNTER — ANTICOAGULATION THERAPY VISIT (OUTPATIENT)
Dept: FAMILY MEDICINE | Facility: CLINIC | Age: 76
End: 2021-07-14

## 2021-07-14 ENCOUNTER — LAB (OUTPATIENT)
Dept: LAB | Facility: CLINIC | Age: 76
End: 2021-07-14
Payer: MEDICARE

## 2021-07-14 ENCOUNTER — DOCUMENTATION ONLY (OUTPATIENT)
Dept: FAMILY MEDICINE | Facility: CLINIC | Age: 76
End: 2021-07-14

## 2021-07-14 DIAGNOSIS — G45.9 TIA (TRANSIENT ISCHEMIC ATTACK): ICD-10-CM

## 2021-07-14 DIAGNOSIS — I48.19 PERSISTENT ATRIAL FIBRILLATION (H): ICD-10-CM

## 2021-07-14 DIAGNOSIS — I48.91 ATRIAL FIBRILLATION, UNSPECIFIED TYPE (H): Primary | ICD-10-CM

## 2021-07-14 DIAGNOSIS — Z79.01 LONG TERM CURRENT USE OF ANTICOAGULANT THERAPY: Primary | ICD-10-CM

## 2021-07-14 DIAGNOSIS — I48.91 ATRIAL FIBRILLATION, UNSPECIFIED TYPE (H): ICD-10-CM

## 2021-07-14 DIAGNOSIS — Z79.01 LONG TERM CURRENT USE OF ANTICOAGULANT THERAPY: ICD-10-CM

## 2021-07-14 DIAGNOSIS — Z86.73 PERSONAL HISTORY OF TIA (TRANSIENT ISCHEMIC ATTACK): ICD-10-CM

## 2021-07-14 LAB — INR BLD: 3.8 (ref 0.9–1.1)

## 2021-07-14 PROCEDURE — 85610 PROTHROMBIN TIME: CPT

## 2021-07-14 PROCEDURE — 36416 COLLJ CAPILLARY BLOOD SPEC: CPT

## 2021-07-14 NOTE — PROGRESS NOTES
ANTICOAGULATION MANAGEMENT     Zulema Nixon 75 year old female is on warfarin with supratherapeutic INR result. (Goal INR 2.0-3.0)    Recent labs: (last 7 days)     07/14/21  1010   INR 3.8*       ASSESSMENT     Source(s): Chart Review and Patient/Caregiver Call       Warfarin doses taken: Warfarin taken as instructed    Diet: Decreased greens/vitamin K in diet; plans to resume previous intake    New illness, injury, or hospitalization: No    Medication/supplement changes: None noted    Signs or symptoms of bleeding or clotting: No    Previous INR: Therapeutic last 2(+) visits    Additional findings: Patient is having carpal tunnel surgery on 8/5/21.   Reports that her IBS has been flaring up the last couple of days.      PLAN     Recommended plan for temporary change(s) affecting INR     Dosing Instructions: Partial hold then continue your current warfarin dose with next INR in 2 weeks       Summary  As of 7/14/2021    Full warfarin instructions:  7/14: 2.5 mg; Otherwise 10 mg every Tue; 7.5 mg all other days   Next INR check:  7/28/2021             Telephone call with Zulema who verbalizes understanding and agrees to plan    Lab visit scheduled    Education provided: Target INR goal and significance of current INR result, Importance of therapeutic range and Importance of notifying clinic of upcoming surgeries and procedures 2 weeks in advance    Plan made per [facility] anticoagulation protocol    Julián Hills RN  Anticoagulation Clinic  7/14/2021    _______________________________________________________________________     Anticoagulation Episode Summary     Current INR goal:  2.0-3.0   TTR:  54.7 % (1 y)   Target end date:  Indefinite   Send INR reminders to:  [facility]    Indications    Long term current use of anticoagulant therapy [Z79.01]  Atrial fibrillation  unspecified type (H) [I48.91]  Persistent atrial fibrillation (H) [I48.19]           Comments:           Anticoagulation Care Providers     Provider  Role Specialty Phone number    Madina Mercado MD Referring Family Medicine [phone]

## 2021-07-14 NOTE — PROGRESS NOTES
[facility] ANTICOAGULATION  MANAGEMENT    Assessment     Warfarin interruption plan for carpal tunnel surgery on 2021.    A. Fib and TIA      Risk stratification for thromboembolism: moderate (2017 ACC periprocedure pathway for NVAF Expert Consensus)      [id number] = 6 (Age++, DM, TIA++, female)    NVAF: 2017 ACC periprocedure pathway for NVAF advises likely bridge for moderate risk stratification ([id number] score 5-6)  with a hx of stroke, TIA or systemic embolism    Low VTE risk with local anesthesia, reasonable to consider no bridge    Plan       Pre-Procedure:    Hold warfarin until after procedure startin2021     If bridge, consider adjusted dose 80mg BID due to BMI 38.4kg/m2       Post-Procedure:    Resume home warfarin dose if okay with provider doing procedure on night of procedure, 2021 PM: 10mg    Recheck INR ~7 days after resuming warfarin   ?   Fernanda Nava [facility]    Subjective/Objective:      Zulema Nixon, a 75 year old female    Reason for Anticoagulation: A. Fib and TIA    Goal INR Range: 2.0-3.0    Patient bridged in past: No    Pertinent History: TIA  while INR 2.3    Wt Readings from Last 3 Encounters:   21 98.4 kg (217 lb)   21 99.3 kg (219 lb)   21 97.1 kg (214 lb)        Ideal body weight: 53 kg (116 lb 12.6 oz)  Adjusted ideal body weight: 71.2 kg (156 lb 14 oz)     Lab Results   Component Value Date    INR 3.8 (H) 2021    INR 2.9 (H) 2021    INR 2.50 (H) 2021     Lab Results   Component Value Date    HGB 12.0 2021    HCT 36.4 2021     2021     Lab Results   Component Value Date    CR 0.63 2021    CR 0.55 2020    CR 0.60 2020     CrCl cannot be calculated (Patient's most recent lab result is older than the maximum 30 days allowed.).

## 2021-07-14 NOTE — PROGRESS NOTES
Patient is having carpal tunnel surgery scheduled on 8/4/21 with Dr. Orlando Walsh @ [facility].     Will route to [facility] to assess if patient needs to hold warfarin/bridge.     Julián Hills RN  [facility]

## 2021-07-16 NOTE — PROGRESS NOTES
To clarify, pre and post bridge with Eliquis & Eliquis 2.5mg once daily (Eliquis usually dosed BID).    Fernanda Nava, PharmD BCACP  Anticoagulation Clinical Pharmacist

## 2021-07-16 NOTE — PROGRESS NOTES
Since lovenox difficult and low  VTE risk  - recommend  elliquis 2.5 mg po  /day bridge - take last dose 24 hours prior to procedure.

## 2021-07-17 NOTE — PROGRESS NOTES
"Upon further examination - pt has excellent kidney function:   Last   Creatinine   Date Value Ref Range Status   05/03/2021 0.63 0.52 - 1.04 mg/dL Final      GFR Estimate   Date Value Ref Range Status   05/03/2021 87 >60 mL/min/[1.73_m2] Final     She is less than 80yo and she weighs >60 kg. , Estimated body mass index is 38.14 kg/m  as calculated from the following:    Height as of 6/21/21: 1.607 m (5' 3.25\").    Weight as of 6/21/21: 98.4 kg (217 lb). ,    Would recommend  elliquis 5mg po bid with last dose = 24 hours prior to surgtery , as pt had a TIA in 2019 when INR = 2.3    "

## 2021-07-19 NOTE — PROGRESS NOTES
Last warfarin dose: 07/29/2021 07/30/2021, NO warfarin  07/31/2021, NO warfarin  08/01/2021, NO warfarin, begin Eliquis every 12 hours (AM and PM)  08/02/2021, NO warfarin,Eliquis every 12 hours (AM and PM)  08/03/2021, NO warfarin, NO Eliquis  08/04/2021, DAY OF PROCEDURE,. Restart warfarin 10mg (2 tabs) in the evening, unless instructed otherwise by the physician.  08/05/2021, Restart Eliquis every 12 hours (AM and PM), unless instructed otherwise by the physician, and 7.5mg (1.5 tabs) warfarin in the evening.   08/06/2021, [id number] hours (AM and PM) and 7.5/mg (1.5 tabs) warfarin in the evening.  08/07/2021, [id number] hours (AM and PM) and 7.5mg (1.5 tabs) warfarin in the evening  08/08/2021, [id number] hours (AM and PM) and 7.5mg (1.5 tabs) warfarin in the evening.  08/09/2021, [id number] hours (AM and PM) and 7.5mg (1.5tabs) warfarin in the evening  08/10/2021, [id number] hours (AM and PM) and 10mg (2 tabs) warfarin in the evening.  08/11/2021, [id number] hours (AM and PM) and 7.5mg (1.5 tabs) warfarin in the evening.  08/12/2021 INR check in AM, before taking Eliquis dose  If you have any questions please call the Anticoagulation Clinic at [phone].

## 2021-07-21 NOTE — PROGRESS NOTES
Called Zulema to review.Date of procedure is 8/5/21, corrected dates in instructions below. Patient would like Eliquis sent to [facility] and will ask for the 30-day free voucher card. Dosing below was reviewed over the phone, but patient would like written instructions to be sent via MyC as well. Done.    Last warfarin dose: 07/30/2021 07/31/2021, NO warfarin    08/01/2021, NO warfarin    08/02/2021, NO warfarin, begin Eliquis every 12 hours (AM and PM)    08/03/2021, NO warfarin,Eliquis every 12 hours (AM and PM)    08/04/2021, NO warfarin, NO Eliquis    08/05/2021, DAY OF PROCEDURE,. Restart warfarin 10mg (2 tabs) in the evening, unless instructed otherwise by the physician.    08/06/2021, Restart Eliquis every 12 hours (AM and PM), unless instructed otherwise by the physician, and 7.5mg (1.5 tabs) warfarin in the evening.     08/07/2021, [id number] hours (AM and PM) and 7.5/mg (1.5 tabs) warfarin in the evening.    08/08/2021, [id number] hours (AM and PM) and 7.5mg (1.5 tabs) warfarin in the evening    08/09/2021, [id number] hours (AM and PM) and 7.5mg (1.5 tabs) warfarin in the evening.    08/10/2021, Artvufb85 hours (AM and PM) and 10 mg (1.5tabs) warfarin in the evening    08/11/2021, [id number] hours (AM and PM) and 10mg (2 tabs) warfarin in the evening.    08/12/2021, [id number] hours (AM and PM) and 7.5mg (1.5 tabs) warfarin in the evening.    08/13/2021 INR check in AM, before taking Eliquis dose  If you have any questions please call the Anticoagulation Clinic at [phone].

## 2021-07-28 ENCOUNTER — ANTICOAGULATION THERAPY VISIT (OUTPATIENT)
Dept: ANTICOAGULATION | Facility: CLINIC | Age: 76
End: 2021-07-28

## 2021-07-28 ENCOUNTER — LAB (OUTPATIENT)
Dept: LAB | Facility: CLINIC | Age: 76
End: 2021-07-28
Payer: MEDICARE

## 2021-07-28 DIAGNOSIS — Z79.01 LONG TERM CURRENT USE OF ANTICOAGULANT THERAPY: ICD-10-CM

## 2021-07-28 DIAGNOSIS — I48.91 ATRIAL FIBRILLATION, UNSPECIFIED TYPE (H): ICD-10-CM

## 2021-07-28 DIAGNOSIS — Z79.01 LONG TERM CURRENT USE OF ANTICOAGULANT THERAPY: Primary | ICD-10-CM

## 2021-07-28 DIAGNOSIS — I48.19 PERSISTENT ATRIAL FIBRILLATION (H): ICD-10-CM

## 2021-07-28 LAB — INR BLD: 3.6 (ref 0.9–1.1)

## 2021-07-28 PROCEDURE — 36416 COLLJ CAPILLARY BLOOD SPEC: CPT

## 2021-07-28 PROCEDURE — 85610 PROTHROMBIN TIME: CPT

## 2021-07-28 NOTE — PROGRESS NOTES
ANTICOAGULATION MANAGEMENT     Zulema Nixon 76 year old female is on warfarin with supratherapeutic INR result. (Goal INR 2.0-3.0)    Recent labs: (last 7 days)     07/28/21  1012   INR 3.6*       ASSESSMENT     Source(s): Patient/Caregiver Call       Warfarin doses taken: Warfarin taken as instructed    Diet: Still eating less greens overall than she had been.    New illness, injury, or hospitalization: No    Medication/supplement changes: None noted    Signs or symptoms of bleeding or clotting: No    Previous INR: Supratherapeutic    Additional findings: Still having IBS flare from last INR check. Has upcoming carpal tunnel surgery 8/5 where she will hold and bridge with Eliquis.     PLAN      Recommended plan for ongoing change(s) affecting INR. Since she is still having IBS flare, in addition to upcoming warfarin hold for surgery, no maintenance dose change made today.     Dosing Instructions: Partial hold then continue your current warfarin dose with next INR in 2 weeks       Summary  As of 7/28/2021    Full warfarin instructions:  7/28: 2.5 mg; 7/31: Hold; 8/1: Hold; 8/2: Hold; 8/3: Hold; 8/4: Hold; 8/5: 10 mg; Otherwise 10 mg every Tue; 7.5 mg all other days   Next INR check:  8/13/2021             Telephone call with Zulema who verbalizes understanding and agrees to plan    Lab visit scheduled    Education provided: Please call back if any changes to your diet, medications or how you've been taking warfarin    Plan made per [facility] anticoagulation protocol    Albert Christie RN  Anticoagulation Clinic  7/28/2021    _______________________________________________________________________     Anticoagulation Episode Summary     Current INR goal:  2.0-3.0   TTR:  50.8 % (1 y)   Target end date:  Indefinite   Send INR reminders to:  [facility]    Indications    Long term current use of anticoagulant therapy [Z79.01]  Atrial fibrillation  unspecified type (H) [I48.91]  Persistent atrial fibrillation (H)  [I48.19]           Comments:           Anticoagulation Care Providers     Provider Role Specialty Phone number    Madina Mercado MD Referring Family Medicine [phone]

## 2021-08-04 ENCOUNTER — TRANSFERRED RECORDS (OUTPATIENT)
Dept: HEALTH INFORMATION MANAGEMENT | Facility: CLINIC | Age: 76
End: 2021-08-04
Payer: MEDICARE

## 2021-08-13 ENCOUNTER — OFFICE VISIT (OUTPATIENT)
Dept: ORTHOPEDICS | Facility: CLINIC | Age: 76
End: 2021-08-13
Payer: MEDICARE

## 2021-08-13 ENCOUNTER — LAB (OUTPATIENT)
Dept: LAB | Facility: CLINIC | Age: 76
End: 2021-08-13
Payer: MEDICARE

## 2021-08-13 ENCOUNTER — ANTICOAGULATION THERAPY VISIT (OUTPATIENT)
Dept: ANTICOAGULATION | Facility: CLINIC | Age: 76
End: 2021-08-13

## 2021-08-13 VITALS
SYSTOLIC BLOOD PRESSURE: 104 MMHG | WEIGHT: 217 LBS | HEIGHT: 63 IN | DIASTOLIC BLOOD PRESSURE: 82 MMHG | BODY MASS INDEX: 38.45 KG/M2

## 2021-08-13 DIAGNOSIS — Z79.01 LONG TERM CURRENT USE OF ANTICOAGULANT THERAPY: ICD-10-CM

## 2021-08-13 DIAGNOSIS — I48.19 PERSISTENT ATRIAL FIBRILLATION (H): ICD-10-CM

## 2021-08-13 DIAGNOSIS — Z47.89 ORTHOPEDIC AFTERCARE: Primary | ICD-10-CM

## 2021-08-13 DIAGNOSIS — I48.91 ATRIAL FIBRILLATION, UNSPECIFIED TYPE (H): ICD-10-CM

## 2021-08-13 DIAGNOSIS — Z79.01 LONG TERM CURRENT USE OF ANTICOAGULANT THERAPY: Primary | ICD-10-CM

## 2021-08-13 LAB — INR BLD: 3.2 (ref 0.9–1.1)

## 2021-08-13 PROCEDURE — 36416 COLLJ CAPILLARY BLOOD SPEC: CPT

## 2021-08-13 PROCEDURE — 85610 PROTHROMBIN TIME: CPT

## 2021-08-13 PROCEDURE — 99024 POSTOP FOLLOW-UP VISIT: CPT | Performed by: PHYSICIAN ASSISTANT

## 2021-08-13 ASSESSMENT — MIFFLIN-ST. JEOR: SCORE: 1442.4

## 2021-08-13 NOTE — PROGRESS NOTES
HISTORY OF PRESENT ILLNESS:    Zulema Nixon is a 76 year old female who is seen in follow up for Right Carpal tunnel release, DOS 8/4/21, Dr. Walsh.  Present symptoms: Pt reports mild improvement to CT symptoms.  Improved but tingling at index and middle finger tips.  Is  keeping covered. Pt is  using hand for activities. No new complaints.  Denies Chest pain, Calve pain, Fever, Chills.    Current Treatment: Postop.    PHYSICAL EXAM:  There were no vitals taken for this visit.  There is no weight on file to calculate BMI.   GENERAL APPEARANCE: healthy, alert and no distress   PSYCH: mentation appears normal and affect normal/bright    MSK:  Right:  Volar wrist.  Incision clean and dry, Sutures present, healing.  no incisional erythema.   No Ecchymosis.  Edema min at wrist, hand and digits.  CMS: eddie incisional numbness, otherwise grossly intact to digits.  AROM: mild restriction in palmar wrist  flexion, otherwise WNL.      ASSESSMENT:  Zulema Nixon is a 76 year old female  S/P Right CTR, DOS 5/04/2021, DR. Walsh- CTR.  Symptom improvement. Healing.  We discussed that CT symptoms may improve for several months after surgery.    PLAN:  - Surgery discussed, images reviewed if applicable, and all questions were answered at this time.  - Sutures removed with sterile technique, steri-strips applied in usual fashion, care instructions given and verbally acknowledged.  - Medications: Taper RX pain meds, OTC PRN.  - Physical Therapy: As instructed / RICE and PROM.  - AAT    Return to clinic PRN.    Handy Bangura PA-C    Dept. Orthopedic Surgery  [facility]     8/13/2021

## 2021-08-13 NOTE — PROGRESS NOTES
ANTICOAGULATION MANAGEMENT     Zulema Nixon 76 year old female is on warfarin with supratherapeutic INR result. (Goal INR 2.0-3.0)    Recent labs: (last 7 days)     08/13/21  0858   INR 3.2*       ASSESSMENT     Source(s): Chart Review and Patient/Caregiver Call       Warfarin doses taken: Warfarin taken as instructed    Diet: No new diet changes identified    New illness, injury, or hospitalization: No - IBS flare has resolved for now    Medication/supplement changes: None noted    Signs or symptoms of bleeding or clotting: No    Previous INR: Supratherapeutic    Additional findings: Bridging with Eliquis until INR >= 2.0. Okay to discontinue per PharmD. INR likely supra due to both warfarin and eliquis on board.     PLAN     Recommended plan for no diet, medication or health factor changes affecting INR     Dosing Instructions: Continue your current warfarin dose with next INR in 2 weeks       Summary  As of 8/13/2021    Full warfarin instructions:  10 mg every Tue; 7.5 mg all other days   Next INR check:  8/27/2021             Telephone call with Zulema who verbalizes understanding and agrees to plan    Lab visit scheduled    Education provided: Please call back if any changes to your diet, medications or how you've been taking warfarin    Plan made with [facility] Pharmacist Lindsey Christie RN  Anticoagulation Clinic  8/13/2021    _______________________________________________________________________     Anticoagulation Episode Summary     Current INR goal:  2.0-3.0   TTR:  46.5 % (1 y)   Target end date:  Indefinite   Send INR reminders to:  [facility]    Indications    Long term current use of anticoagulant therapy [Z79.01]  Atrial fibrillation  unspecified type (H) [I48.91]  Persistent atrial fibrillation (H) [I48.19]           Comments:           Anticoagulation Care Providers     Provider Role Specialty Phone number    Madina Mercado MD Referring Family Medicine [phone]

## 2021-08-26 ENCOUNTER — ANTICOAGULATION THERAPY VISIT (OUTPATIENT)
Dept: ANTICOAGULATION | Facility: CLINIC | Age: 76
End: 2021-08-26

## 2021-08-26 ENCOUNTER — TELEPHONE (OUTPATIENT)
Dept: FAMILY MEDICINE | Facility: CLINIC | Age: 76
End: 2021-08-26

## 2021-08-26 ENCOUNTER — LAB (OUTPATIENT)
Dept: LAB | Facility: CLINIC | Age: 76
End: 2021-08-26
Payer: MEDICARE

## 2021-08-26 DIAGNOSIS — I48.91 ATRIAL FIBRILLATION, UNSPECIFIED TYPE (H): ICD-10-CM

## 2021-08-26 DIAGNOSIS — I48.19 PERSISTENT ATRIAL FIBRILLATION (H): ICD-10-CM

## 2021-08-26 DIAGNOSIS — Z79.01 LONG TERM CURRENT USE OF ANTICOAGULANT THERAPY: ICD-10-CM

## 2021-08-26 DIAGNOSIS — Z79.01 LONG TERM CURRENT USE OF ANTICOAGULANT THERAPY: Primary | ICD-10-CM

## 2021-08-26 LAB — INR BLD: 3 (ref 0.9–1.1)

## 2021-08-26 PROCEDURE — 85610 PROTHROMBIN TIME: CPT

## 2021-08-26 NOTE — TELEPHONE ENCOUNTER
See [facility] encounter  Tammie Christie RN   [facility]  [place], [place], [place], [facility]

## 2021-08-26 NOTE — PROGRESS NOTES
ANTICOAGULATION MANAGEMENT     Zulema Nixon 76 year old female is on warfarin with therapeutic INR result. (Goal INR 2.0-3.0)    Recent labs: (last 7 days)     08/26/21  0918   INR 3.0*       ASSESSMENT     Source(s): Chart Review and Patient/Caregiver Call       Warfarin doses taken: Warfarin taken as instructed    Diet: No new diet changes identified    New illness, injury, or hospitalization: No    Medication/supplement changes: None noted    Signs or symptoms of bleeding or clotting: No    Previous INR: Supratherapeutic    Additional findings: None     PLAN     Recommended plan for no diet, medication or health factor changes affecting INR     Dosing Instructions: Continue your current warfarin dose with next INR in 3 weeks       Summary  As of 8/26/2021    Full warfarin instructions:  10 mg every Tue; 7.5 mg all other days   Next INR check:  9/16/2021             Telephone call with Zulema who verbalizes understanding and agrees to plan    Lab visit scheduled    Education provided: Please call back if any changes to your diet, medications or how you've been taking warfarin    Plan made per [facility] anticoagulation protocol    Albert Christie RN  Anticoagulation Clinic  8/26/2021    _______________________________________________________________________     Anticoagulation Episode Summary     Current INR goal:  2.0-3.0   TTR:  42.9 % (1 y)   Target end date:  Indefinite   Send INR reminders to:  [facility]    Indications    Long term current use of anticoagulant therapy [Z79.01]  Atrial fibrillation  unspecified type (H) [I48.91]  Persistent atrial fibrillation (H) [I48.19]           Comments:           Anticoagulation Care Providers     Provider Role Specialty Phone number    Madina Mercado MD Referring Family Medicine [phone]

## 2021-08-26 NOTE — TELEPHONE ENCOUNTER
Reason for Call:  Other call back    Detailed comments: Returning call    Phone Number Patient can be reached at: Home number on file [phone] (home)    Best Time: anytime    Can we leave a detailed message on this number? YES    Call taken on 8/26/2021 at 2:34 PM by Heaven Elizalde

## 2021-08-26 NOTE — PROGRESS NOTES
Anticoagulation Management    Spoke with Zulema, she is on the other line and will call me back.      Anticoagulation clinic to follow up    Albert Christie RN

## 2021-09-16 ENCOUNTER — ANTICOAGULATION THERAPY VISIT (OUTPATIENT)
Dept: ANTICOAGULATION | Facility: CLINIC | Age: 76
End: 2021-09-16

## 2021-09-16 ENCOUNTER — LAB (OUTPATIENT)
Dept: LAB | Facility: CLINIC | Age: 76
End: 2021-09-16
Payer: MEDICARE

## 2021-09-16 DIAGNOSIS — Z79.01 LONG TERM CURRENT USE OF ANTICOAGULANT THERAPY: ICD-10-CM

## 2021-09-16 DIAGNOSIS — Z79.01 LONG TERM CURRENT USE OF ANTICOAGULANT THERAPY: Primary | ICD-10-CM

## 2021-09-16 DIAGNOSIS — I48.19 PERSISTENT ATRIAL FIBRILLATION (H): ICD-10-CM

## 2021-09-16 DIAGNOSIS — I48.91 ATRIAL FIBRILLATION, UNSPECIFIED TYPE (H): ICD-10-CM

## 2021-09-16 LAB — INR BLD: 4 (ref 0.9–1.1)

## 2021-09-16 PROCEDURE — 36416 COLLJ CAPILLARY BLOOD SPEC: CPT

## 2021-09-16 PROCEDURE — 85610 PROTHROMBIN TIME: CPT

## 2021-09-16 NOTE — PROGRESS NOTES
ANTICOAGULATION MANAGEMENT     Zulema Nixon 76 year old female is on warfarin with supratherapeutic INR result. (Goal INR 2.0-3.0)    Recent labs: (last 7 days)     09/16/21  0930   INR 4.0*       ASSESSMENT     Source(s): Chart Review and Patient/Caregiver Call       Warfarin doses taken: Warfarin taken as instructed    Diet: No new diet changes identified    New illness, injury, or hospitalization: No    Medication/supplement changes: None noted    Signs or symptoms of bleeding or clotting: No    Previous INR: Therapeutic last visit; previously outside of goal range    Additional findings: Was running supra prior to surgery and now supra again without cause.      PLAN     Recommended plan for temporary change(s) affecting INR     Dosing Instructions: Hold dose then Decrease your warfarin dose (9.1% change) with next INR in 10 days       Summary  As of 9/16/2021    Full warfarin instructions:  9/16: Hold; Otherwise 5 mg every Fri; 7.5 mg all other days   Next INR check:  9/27/2021             Telephone call with Zulema who verbalizes understanding and agrees to plan    Lab visit scheduled    Education provided: Please call back if any changes to your diet, medications or how you've been taking warfarin    Plan made per [facility] anticoagulation protocol    Albert Christie RN  Anticoagulation Clinic  9/16/2021    _______________________________________________________________________     Anticoagulation Episode Summary     Current INR goal:  2.0-3.0   TTR:  37.1 % (1 y)   Target end date:  Indefinite   Send INR reminders to:  [facility]    Indications    Long term current use of anticoagulant therapy [Z79.01]  Atrial fibrillation  unspecified type (H) [I48.91]  Persistent atrial fibrillation (H) [I48.19]           Comments:           Anticoagulation Care Providers     Provider Role Specialty Phone number    Madina Mercado MD Referring Family Medicine [phone]

## 2021-09-16 NOTE — PROGRESS NOTES
Anticoagulation Management    Unable to reach Zulema today.    Today's INR result of 4.0 is supratherapeutic (goal INR of 2.0-3.0).  Result received from: Clinic Lab    Follow up required to discuss out of range INR     LM to call [facility].      Anticoagulation clinic to follow up    Albert Christie RN

## 2021-09-19 ENCOUNTER — HEALTH MAINTENANCE LETTER (OUTPATIENT)
Age: 76
End: 2021-09-19

## 2021-09-27 ENCOUNTER — ANTICOAGULATION THERAPY VISIT (OUTPATIENT)
Dept: ANTICOAGULATION | Facility: CLINIC | Age: 76
End: 2021-09-27

## 2021-09-27 ENCOUNTER — LAB (OUTPATIENT)
Dept: LAB | Facility: CLINIC | Age: 76
End: 2021-09-27
Payer: MEDICARE

## 2021-09-27 DIAGNOSIS — Z79.01 LONG TERM CURRENT USE OF ANTICOAGULANT THERAPY: Primary | ICD-10-CM

## 2021-09-27 DIAGNOSIS — I48.91 ATRIAL FIBRILLATION, UNSPECIFIED TYPE (H): ICD-10-CM

## 2021-09-27 DIAGNOSIS — Z79.01 LONG TERM CURRENT USE OF ANTICOAGULANT THERAPY: ICD-10-CM

## 2021-09-27 DIAGNOSIS — I48.19 PERSISTENT ATRIAL FIBRILLATION (H): ICD-10-CM

## 2021-09-27 LAB — INR BLD: 2.4 (ref 0.9–1.1)

## 2021-09-27 PROCEDURE — 36416 COLLJ CAPILLARY BLOOD SPEC: CPT

## 2021-09-27 PROCEDURE — 85610 PROTHROMBIN TIME: CPT

## 2021-09-27 NOTE — PROGRESS NOTES
ANTICOAGULATION MANAGEMENT     Zulema Nixon 76 year old female is on warfarin with therapeutic INR result. (Goal INR 2.0-3.0)    Recent labs: (last 7 days)     09/27/21  1035   INR 2.4*       ASSESSMENT     Source(s): Chart Review and Patient/Caregiver Call       Warfarin doses taken: Warfarin taken as instructed    Diet: No new diet changes identified    New illness, injury, or hospitalization: No    Medication/supplement changes: None noted    Signs or symptoms of bleeding or clotting: No    Previous INR: Supratherapeutic    Additional findings: None     PLAN     Recommended plan for no diet, medication or health factor changes affecting INR     Dosing Instructions: Continue your current warfarin dose with next INR in 2 weeks       Summary  As of 9/27/2021    Full warfarin instructions:  5 mg every Fri; 7.5 mg all other days   Next INR check:  10/14/2021             Telephone call with Zulema who verbalizes understanding and agrees to plan    Lab visit scheduled    Education provided: Please call back if any changes to your diet, medications or how you've been taking warfarin    Plan made per [facility] anticoagulation protocol    Albert Christie RN  Anticoagulation Clinic  9/27/2021    _______________________________________________________________________     Anticoagulation Episode Summary     Current INR goal:  2.0-3.0   TTR:  35.3 % (1 y)   Target end date:  Indefinite   Send INR reminders to:  [facility]    Indications    Long term current use of anticoagulant therapy [Z79.01]  Atrial fibrillation  unspecified type (H) [I48.91]  Persistent atrial fibrillation (H) [I48.19]           Comments:           Anticoagulation Care Providers     Provider Role Specialty Phone number    Madina Mercado MD Referring Family Medicine [phone]

## 2021-10-14 ENCOUNTER — LAB (OUTPATIENT)
Dept: LAB | Facility: CLINIC | Age: 76
End: 2021-10-14
Payer: MEDICARE

## 2021-10-14 ENCOUNTER — ANTICOAGULATION THERAPY VISIT (OUTPATIENT)
Dept: ANTICOAGULATION | Facility: CLINIC | Age: 76
End: 2021-10-14

## 2021-10-14 DIAGNOSIS — I48.19 PERSISTENT ATRIAL FIBRILLATION (H): ICD-10-CM

## 2021-10-14 DIAGNOSIS — I48.91 ATRIAL FIBRILLATION, UNSPECIFIED TYPE (H): ICD-10-CM

## 2021-10-14 DIAGNOSIS — Z79.01 LONG TERM CURRENT USE OF ANTICOAGULANT THERAPY: ICD-10-CM

## 2021-10-14 DIAGNOSIS — Z79.01 LONG TERM CURRENT USE OF ANTICOAGULANT THERAPY: Primary | ICD-10-CM

## 2021-10-14 LAB — INR BLD: 3 (ref 0.9–1.1)

## 2021-10-14 PROCEDURE — 85610 PROTHROMBIN TIME: CPT

## 2021-10-14 PROCEDURE — 36416 COLLJ CAPILLARY BLOOD SPEC: CPT

## 2021-10-14 NOTE — PROGRESS NOTES
ANTICOAGULATION MANAGEMENT     Zulema Nixon 76 year old female is on warfarin with therapeutic INR result. (Goal INR 2.0-3.0)    Recent labs: (last 7 days)     10/14/21  0944   INR 3.0*       ASSESSMENT     Source(s): Chart Review and Patient/Caregiver Call       Warfarin doses taken: Warfarin taken as instructed    Diet: No new diet changes identified    New illness, injury, or hospitalization: No    Medication/supplement changes: None noted    Signs or symptoms of bleeding or clotting: No    Previous INR: Therapeutic last visit; previously outside of goal range    Additional findings: None     PLAN     Recommended plan for no diet, medication or health factor changes affecting INR     Dosing Instructions: Continue your current warfarin dose with next INR in 3 weeks       Summary  As of 10/14/2021    Full warfarin instructions:  5 mg every Fri; 7.5 mg all other days   Next INR check:  11/5/2021             Telephone call with Zulema who verbalizes understanding and agrees to plan    Lab visit scheduled    Education provided: Please call back if any changes to your diet, medications or how you've been taking warfarin    Plan made per [facility] anticoagulation protocol    Albert Christie RN  Anticoagulation Clinic  10/14/2021    _______________________________________________________________________     Anticoagulation Episode Summary     Current INR goal:  2.0-3.0   TTR:  35.3 % (1 y)   Target end date:  Indefinite   Send INR reminders to:  [facility]    Indications    Long term current use of anticoagulant therapy [Z79.01]  Atrial fibrillation  unspecified type (H) [I48.91]  Persistent atrial fibrillation (H) [I48.19]           Comments:           Anticoagulation Care Providers     Provider Role Specialty Phone number    Madina Mercado MD Referring Family Medicine [phone]

## 2021-10-14 NOTE — PROGRESS NOTES
Anticoagulation Management    Unable to reach Zulema today.    Today's INR result of 3.0 is therapeutic (goal INR of 2.0-3.0).  Result received from: Clinic Lab    Follow up required to assess for changes     LM to call [facility].      Anticoagulation clinic to follow up    Albert Christie RN

## 2021-11-04 NOTE — PROGRESS NOTES
"SUBJECTIVE:   Zulema Nixon is a 76 year old female who presents for Preventive Visit and the following other medical problems:      1. Encounter for Medicare annual wellness exam    2. Essential hypertension with goal blood pressure less than 140/90- well controlled today - needs labs and medication refills     3. Type 2 diabetes mellitus with diabetic polyneuropathy, without long-term current use of insulin (H)    4. Type 2 diabetes mellitus with other circulatory complication, without long-term current use of insulin (H)    5. CKD (chronic kidney disease) stage 2, GFR 60-89 ml/min- needs lab follow up today     6. Type 2 diabetes mellitus with diabetic polyneuropathy, without long-term current use of insulin (H)- elevated fasting sugars currently     7. Hyperlipidemia LDL goal <100- fish oil = worse IBS with diarrhea     8. Irritable bowel syndrome with diarrhea- doing well with 1x/day cholestyramine - got really gassy and bloated with 2x/day     9. Atrial fibrillation, unspecified type (H)    10. Morbid obesity due to excess calories (H)    11. Bilateral leg edema- has been to lymphedema clinic in past    12. Personal history of TIA (transient ischemic attack)    13. Vitamin D deficiency    14. Screen for colon cancer    15. Long term current use of anticoagulant therapy    16. Needs flu shot    17. Visit for screening mammogram    18. Anemia, unspecified type          Patient has been advised of split billing requirements and indicates understanding: Yes   Are you in the first 12 months of your Medicare coverage?  No    Healthy Habits:     In general, how would you rate your overall health?  Good    Frequency of exercise:  1 day/week    Duration of exercise:  Less than 15 minutes    Do you usually eat at least 4 servings of fruit and vegetables a day, include whole grains    & fiber and avoid regularly eating high fat or \"junk\" foods?  No    Taking medications regularly:  Yes    Medication side effects:  None    " Ability to successfully perform activities of daily living:  No assistance needed    Home Safety:  Lack of grab bars in the bathroom    Hearing Impairment:  No hearing concerns    In the past 6 months, have you been bothered by leaking of urine? Yes    In general, how would you rate your overall mental or emotional health?  Good      PHQ-2 Total Score: 1    Additional concerns today:  Yes     from covid-19 2020 prior to vaccines becoming available.     Immunization History   Administered Date(s) Administered     COVID-19,PF,Pfizer (12+ Yrs) 2021, 2021, 2021     Ig, Unspecified Formulation 1997     Influenza (H1N1) 2010     Influenza (High Dose) 3 valent vaccine 2011, 2013, 10/20/2014, 12/10/2015, 2016, 2017, 2019, 2019, 2020     Influenza (IIV3) PF 11/15/2005, 2006, 2007, 10/16/2008, 2010, 2012     Pneumo Conj 13-V (2010&after) 2016     Pneumococcal 23 valent 2009, 2014     TD (ADULT, 7+) 1994, 2005     TDAP Vaccine (Adacel) 2013     Twinrix A/B 2006, 2007, 2007     Zoster vaccine recombinant adjuvanted (SHINGRIX) 2018, 10/30/2018     Zoster vaccine, live 2010    needs flu shot today.         Do you feel safe in your environment? Yes    Have you ever done Advance Care Planning? (For example, a Health Directive, POLST, or a discussion with a medical provider or your loved ones about your wishes): Yes, advance care planning is on file.       Fall risk  Fallen 2 or more times in the past year?: No  Any fall with injury in the past year?: No    Cognitive Screening   1) Repeat 3 items (Leader, Season, Table)    2) Clock draw: NORMAL  3) 3 item recall: Recalls 2 objects   Results: NORMAL clock, 1-2 items recalled: COGNITIVE IMPAIRMENT LESS LIKELY    Mini-CogTM Copyright S Vidya. Licensed by the author for use in [facility];  reprinted with permission ([email or web address]). All rights reserved.      Do you have sleep apnea, excessive snoring or daytime drowsiness?: no    Reviewed and updated as needed this visit by clinical staff                 Reviewed and updated as needed this visit by Provider                Social History     Tobacco Use     Smoking status: Never Smoker     Smokeless tobacco: Never Used   Substance Use Topics     Alcohol use: Yes     Comment: 0-2 per month     If you drink alcohol do you typically have >3 drinks per day or >7 drinks per week? No    Alcohol Use 11/5/2021   Prescreen: >3 drinks/day or >7 drinks/week? No   Prescreen: >3 drinks/day or >7 drinks/week? -         Diabetes Follow-up -       How often are you checking your blood sugar? Occasionally - but they are running in the 120s    What concerns do you have today about your diabetes? None     Do you have any of these symptoms? (Select all that apply)  No numbness or tingling in feet.  No redness, sores or blisters on feet.  No complaints of excessive thirst.  No reports of blurry vision.  No significant changes to weight.      BP Readings from Last 2 Encounters:   08/13/21 104/82   06/21/21 134/82     Hemoglobin A1C (%)   Date Value   05/03/2021 6.2 (H)   11/04/2020 5.7 (H)     LDL Cholesterol Calculated (mg/dL)   Date Value   05/03/2021 61   11/04/2020 46               Hyperlipidemia Follow-Up      Are you regularly taking any medication or supplement to lower your cholesterol?   Yes- Simvastatin    Are you having muscle aches or other side effects that you think could be caused by your cholesterol lowering medication?  No     Recent Labs   Lab Test 05/03/21  1031 11/04/20  0948 12/10/15  1203 04/22/15  1002 11/13/14  0957 11/13/14  0957   CHOL 119 105   < > 111   < > 128   HDL 43* 42*   < > 33*   < > 37*   LDL 61 46   < > 55   < > 66   TRIG 74 87   < > 114   < > 125   CHOLHDLRATIO  --   --   --  3.4  --  3.5    < > = values in this interval not displayed.       .    Hypertension Follow-up - Lisinopril      Do you check your blood pressure regularly outside of the clinic? No     Are you following a low salt diet? No    Are your blood pressures ever more than 140 on the top number (systolic) OR more   than 90 on the bottom number (diastolic), for example 140/90? No     BP Readings from Last 3 Encounters:   11/05/21 124/78   08/13/21 104/82   06/21/21 134/82     Creatinine   Date Value Ref Range Status   05/03/2021 0.63 0.52 - 1.04 mg/dL Final      GFR Estimate   Date Value Ref Range Status   05/03/2021 87 >60 mL/min/[1.73_m2] Final     Comment:   11/04/2020 >90 >60 mL/min/[1.73_m2] Final     Comment:   06/08/2020 89 >60 mL/min/[1.73_m2] Final     Comment:           Irritable Bowel Syndrome - Diarrhea dominant - didn't notice any difference with cholestyramine - would like to stop that, especially since her insurance doesn't cover it.       Current providers sharing in care for this patient include:   Patient Care Team:  Madina Mercado MD as PCP - General (Family Practice)  Madina Mercado MD as Assigned PCP  Handy Bangura PA-C as Assigned Musculoskeletal Provider    The following health maintenance items are reviewed in Epic and correct as of today:  Health Maintenance Due   Topic Date Due     MAMMO SCREENING  07/09/2020     INFLUENZA VACCINE (1) 09/01/2021     A1C  11/03/2021     MEDICARE ANNUAL WELLNESS VISIT  11/04/2021     FALL RISK ASSESSMENT  11/04/2021     Lab work is in process  Labs reviewed in EPIC  BP Readings from Last 3 Encounters:   11/05/21 124/78   08/13/21 104/82   06/21/21 134/82    Wt Readings from Last 3 Encounters:   11/05/21 97.1 kg (214 lb)   08/13/21 98.4 kg (217 lb)   06/21/21 98.4 kg (217 lb)                  Patient Active Problem List   Diagnosis     Morbid obesity due to excess calories (H)     ARMAND (obstructive sleep apnea)     Atrial fibrillation, unspecified type (H)     Hyperlipidemia LDL goal <100- fish oil = worse  IBS with diarrhea      Status post laparoscopic cholecystectomy     CKD (chronic kidney disease) stage 2, GFR 60-89 ml/min     Venous stasis of lower extremity     Advanced directives, counseling/discussion     Sensorineural hearing loss of both ears - using hearing aids     Bilateral leg edema- has been to lymphedema clinic in past     Essential hypertension with goal blood pressure less than 140/90     Type 2 diabetes mellitus with other circulatory complication, without long-term current use of insulin (H)     Long term current use of anticoagulant therapy     Onychomycosis     Type 2 diabetes mellitus with diabetic polyneuropathy, without long-term current use of insulin (H)     Irritable bowel syndrome with diarrhea- doing well with 1x/day cholestyramine - got really gassy and bloated with 2x/day      Osteoarthritis of both knees, unspecified osteoarthritis type     Meningioma (H)--left frontal - MRI 4/25/2019 = stable from 10/2018 and 11/2/2019- no further follow up needed per Neurology per patient - noted 11/4/2020      Persistent atrial fibrillation (H)     Carpal tunnel syndrome of right wrist- increasing over time - now awakening at night with tingling and pain despite wearing a brace at night - desires referral      Screen for colon cancer- pt declines referral for colonoscopy again this year- consents to do one step FIT test      Grief reaction - 's death from COVID-19 complications 12/2/2020      Past Surgical History:   Procedure Laterality Date     C CARDIOVERSION, ELECTIVE;INTERN  02/2007    Successful cardioversion from atrial fibrillation      C DEXA INTERPRETATION, AXIAL  08/2007    T score lumbar 3.7, femoral neck 2.8/2.0(all stable)     C DEXA INTERPRETATION, AXIAL  06/2010    T score lumbar 3.4 (marked degen changes), femoral neck 2.1/2.1 (sig dec lt femur)     C ECHO HEART XTHORACIC,COMPLETE, W/O DOPPLER  11/2006    normal LV/RV size and function, mild pulm ht(30-40mm Hg), mild TR     C  ECHO HEART XTHORACIC,COMPLETE, W/O DOPPLER  10/2008    normal LV systolic function with EF 55-60%, impaired LV relaxation, mod to severe LAE     C LIGATE FALLOPIAN TUBE  1973    Tubal ligation     C ORAL SURGERY SINGLE TOOTH  2019     had to have left upper rear tooth removed secondary to crown breaking /root damage      CARDIAC NUC ESHA STRESS TEST NL  2006    exercised 4 min, no inducible ischemia on ECG or perfusion images     CARPAL TUNNEL RELEASE RT/LT Right 2021    Right carpal tunnel release under local anesthetic, Dr. Orlando Walsh, [facility]     COLONOSCOPY  2006    diverticulosis, repeat 10 years     FLEXIBLE SIGMOIDOSCOPY  10/2000     HC LAPAROSCOPY, SURGICAL; CHOLECYSTECTOMY      Cholecystectomy, Laparoscopic     LIGATN/STRIP LONG & SHORT SAPHEN      L varicose vein excision     REPAIR PTOSIS BILATERAL  2011     Bilateral upper eyelid blepharoplasty and internal browpexy brow ptosis repair, bilateral lower eyelid ectropion repair with snip punctoplasty     TRANSFUSION, DIRECT, BLOOD      pregnancy related       Social History     Tobacco Use     Smoking status: Never Smoker     Smokeless tobacco: Never Used   Substance Use Topics     Alcohol use: Yes     Comment: 0-2 per month     Family History   Problem Relation Age of Onset     Diabetes Mother         type 2     Hypertension Mother      Cardiovascular Mother         pulmonary embolus     Lipids Mother      Heart Disease Mother          atrial fibrillation     Diabetes Father         type 2     Cardiovascular Father         atrial fibrillation,  during an EP procedure     Cancer - colorectal Maternal Grandmother         onset age 88     Heart Disease Maternal Grandmother          age 96     Diabetes Maternal Grandfather         type 2     Diabetes Paternal Grandfather         type 2     Hypertension Sister      Lipids Sister      Lipids Brother      Hypertension Brother      Hypertension Son      Other - See  Comments Cousin 68        Myotonic Dystrophy         Current Outpatient Medications   Medication Sig Dispense Refill     alcohol swab prep pads Use to swab area of injection/dilma as directed. 100 each 3     amLODIPine (NORVASC) 5 MG tablet Take 1 tablet (5 mg) by mouth daily 90 tablet 1     apixaban ANTICOAGULANT (ELIQUIS) 5 MG tablet Take 1 tablet (5 mg) by mouth 2 times daily While holding warfarin for procedure, and then after procedure until INR back to therapeutic, per instructions from Anticoagulation Clinic 60 tablet 0     ASPIRIN 81 MG OR TABS 1 tab po QD (Once per day) 0 0     atenolol (TENORMIN) 100 MG tablet Take 1 tablet (100 mg) by mouth daily 90 tablet 1     BIOTIN 10 MG OR TABS 1 TABLET DAILY       blood glucose (JAYDE CONTOUR NEXT) test strip Use to test blood sugar 1 times daily or as directed. 100 strip 3     blood glucose (NO BRAND SPECIFIED) test strip Use to test blood sugar 1 times daily or as directed. To accompany: Blood Glucose Monitor Brands: per easiest for pt with her insurance. 100 strip 6     blood glucose calibration (NO BRAND SPECIFIED) solution To accompany: Blood Glucose Monitor Brands: per insurance. 1 each 11     blood glucose monitoring (JAYDE MICROLET) lancets Use to test blood sugar 1 times daily or as directed. 100 each 3     blood glucose monitoring (NO BRAND SPECIFIED) meter device kit Use to test blood sugar 1 times daily or as directed. Preferred blood glucose meter OR supplies to accompany: Blood Glucose Monitor Brands: easiest for patient per insurance. 1 kit 0     CALTRATE 600 + D 600-200 MG-IU OR TABS 1 tablet twice daily 60 11     CENTRUM SILVER OR TABS ONE DAILY 3 MONTHS 1 YEAR     cholestyramine (QUESTRAN) 4 g packet Take 1 packet (4 g) by mouth daily with food 120 each 4     hydrochlorothiazide (HYDRODIURIL) 25 MG tablet Take 1 tablet (25 mg) by mouth daily 90 tablet 1     lisinopril (ZESTRIL) 40 MG tablet Take 1 tablet (40 mg) by mouth daily 90 tablet 1      metFORMIN (GLUCOPHAGE) 1000 MG tablet TAKE 1 AND 1/2 TABLETS BY MOUTH WITH BREAKFAST AND TAKE 1 TABLET BY MOUTH WITH SUPPER 225 tablet 1     simvastatin (ZOCOR) 20 MG tablet TAKE ONE TABLET BY MOUTH AT BEDTIME 90 tablet 1     VITAMIN D 1000 UNIT OR CAPS 1 CAPSULE DAILY 3 MONTHS 1 YEAR     warfarin ANTICOAGULANT (COUMADIN) 5 MG tablet TAKE 1-2 TABLETS (5-10 MG) BY MOUTH DAILY AS INSTRUCTED 180 tablet 3     Allergies   Allergen Reactions     Tetracycline      lightheaded     Recent Labs   Lab Test 05/03/21  1031 11/04/20  0948 06/08/20  1104 06/08/20  1104   A1C 6.2* 5.7*  --  6.1*   LDL 61 46  --  48   HDL 43* 42*  --  35*   TRIG 74 87  --  82   ALT 27 23  --  18   CR 0.63 0.55   < > 0.60   GFRESTIMATED 87 >90   < > 89   GFRESTBLACK >90 >90   < > >90   POTASSIUM 4.1 4.0   < > 3.9   TSH 0.52 0.70   < > 0.67    < > = values in this interval not displayed.      Pneumonia Vaccine:Adults age 65+ who received Pneumovax (PPSV23) at 65 years or older: Should be given PCV13 > 1 year after their most recent PPSV23  Mammogram Screening: Mammogram Screening - Patient over age 75, has elected to continue with screening.  History of abnormal Pap smear: NO - age 65 - see link Cervical Cytology Screening Guidelines      Pertinent mammograms are reviewed under the imaging tab.    Review of Systems   Constitutional: Negative for chills and fever.   HENT: Positive for hearing loss. Negative for congestion, ear pain and sore throat.    Eyes: Negative for pain and visual disturbance.   Respiratory: Negative for cough and shortness of breath.    Cardiovascular: Positive for peripheral edema. Negative for chest pain and palpitations.   Gastrointestinal: Positive for diarrhea. Negative for abdominal pain, constipation, heartburn, hematochezia and nausea.   Breasts:  Negative for tenderness, breast mass and discharge.   Genitourinary: Negative for dysuria, frequency, genital sores, hematuria, pelvic pain, urgency, vaginal bleeding and  "vaginal discharge.   Musculoskeletal: Positive for arthralgias and myalgias.   Skin: Negative for rash.   Neurological: Negative for dizziness, weakness, headaches and paresthesias.   Psychiatric/Behavioral: Positive for mood changes. The patient is nervous/anxious.         OBJECTIVE:   /78 (BP Location: Left arm, Cuff Size: Adult Large)   Pulse 78   Temp 97.5  F (36.4  C) (Tympanic)   Ht 1.607 m (5' 3.25\")   Wt 97.1 kg (214 lb)   SpO2 98%   BMI 37.61 kg/m   Estimated body mass index is 38.14 kg/m  as calculated from the following:    Height as of 8/13/21: 1.607 m (5' 3.25\").    Weight as of 8/13/21: 98.4 kg (217 lb).  Physical Exam  GENERAL APPEARANCE: healthy, alert and no distress  EYES: Eyes grossly normal to inspection, PERRL and conjunctivae and sclerae normal  HENT: ear canals and TM's normal, nose and mouth without ulcers or lesions, oropharynx clear and oral mucous membranes moist  NECK: no adenopathy, no asymmetry, masses, or scars and thyroid normal to palpation  RESP: lungs clear to auscultation - no rales, rhonchi or wheezes  BREAST: normal without masses, tenderness or nipple discharge and no palpable axillary masses or adenopathy  CV: regular rate and rhythm, normal S1 S2, no S3 or S4, no murmur, click or rub, no peripheral edema and peripheral pulses strong  ABDOMEN: soft, nontender, no hepatosplenomegaly, no masses and bowel sounds normal  MS: no musculoskeletal defects are noted and gait is age appropriate without ataxia, bilateral mild lymphedema is noted today - stable from previous.   SKIN: no suspicious lesions or rashes  NEURO: Normal strength and tone, sensory exam grossly normal, mentation intact and speech normal  PSYCH: mentation appears normal and affect normal/bright  FEET: both sides normal; no swelling, tenderness or skin or vascular lesions.  Sensation is intact. Peripheral pulses are palpable. Toenails are normal.     Diagnostic Test Results:  Labs reviewed in " Epic    ASSESSMENT / PLAN:       ICD-10-CM    1. Encounter for Medicare annual wellness exam  Z00.00    2. Essential hypertension with goal blood pressure less than 140/90- well controlled today - needs labs and medication refills   I10 amLODIPine (NORVASC) 5 MG tablet     atenolol (TENORMIN) 100 MG tablet     hydrochlorothiazide (HYDRODIURIL) 25 MG tablet     lisinopril (ZESTRIL) 40 MG tablet     Albumin Random Urine Quantitative with Creat Ratio     CBC with Platelets & Differential     Comprehensive metabolic panel     TSH with free T4 reflex     OFFICE/OUTPT VISIT,EST,LEVL IV     Albumin Random Urine Quantitative with Creat Ratio     CBC with Platelets & Differential     Comprehensive metabolic panel     TSH with free T4 reflex   3. Type 2 diabetes mellitus with diabetic polyneuropathy, without long-term current use of insulin (H)  E11.42 blood glucose (NO BRAND SPECIFIED) test strip     metFORMIN (GLUCOPHAGE) 1000 MG tablet     FOOT EXAM     Albumin Random Urine Quantitative with Creat Ratio     Comprehensive metabolic panel     Hemoglobin A1c     OFFICE/OUTPT VISIT,EST,LEVL IV     Albumin Random Urine Quantitative with Creat Ratio     Comprehensive metabolic panel     CANCELED: Hemoglobin A1c   4. Type 2 diabetes mellitus with other circulatory complication, without long-term current use of insulin (H)  E11.59 blood glucose (NO BRAND SPECIFIED) test strip     FOOT EXAM     Albumin Random Urine Quantitative with Creat Ratio     Comprehensive metabolic panel     Hemoglobin A1c     OFFICE/OUTPT VISIT,EST,LEVL IV     Albumin Random Urine Quantitative with Creat Ratio     Comprehensive metabolic panel     CANCELED: Hemoglobin A1c   5. CKD (chronic kidney disease) stage 2, GFR 60-89 ml/min- needs lab follow up today   N18.2 hydrochlorothiazide (HYDRODIURIL) 25 MG tablet     Albumin Random Urine Quantitative with Creat Ratio     CBC with Platelets & Differential     Comprehensive metabolic panel     OFFICE/OUTPT  VISIT,EST,LEVL IV     Albumin Random Urine Quantitative with Creat Ratio     CBC with Platelets & Differential     Comprehensive metabolic panel   6. Type 2 diabetes mellitus with diabetic polyneuropathy, without long-term current use of insulin (H)- elevated fasting sugars currently   E11.42 metFORMIN (GLUCOPHAGE) 1000 MG tablet     HEMOGLOBIN A1C   7. Hyperlipidemia LDL goal <100- fish oil = worse IBS with diarrhea   E78.5 simvastatin (ZOCOR) 20 MG tablet     Albumin Random Urine Quantitative with Creat Ratio     Comprehensive metabolic panel     Lipid panel reflex to direct LDL Fasting     OFFICE/OUTPT VISIT,EST,LEVL IV     Albumin Random Urine Quantitative with Creat Ratio     Comprehensive metabolic panel     Lipid panel reflex to direct LDL Fasting   8. Irritable bowel syndrome with diarrhea- doing well with 1x/day cholestyramine - got really gassy and bloated with 2x/day   K58.0 OFFICE/OUTPT VISIT,EST,LEVL IV   9. Atrial fibrillation, unspecified type (H)  I48.91 INR point of care     OFFICE/OUTPT VISIT,EST,LEVL IV   10. Morbid obesity due to excess calories (H)  E66.01    11. Bilateral leg edema- has been to lymphedema clinic in past  R60.0    12. Personal history of TIA (transient ischemic attack)  Z86.73    13. Vitamin D deficiency  E55.9 25 Hydroxyvitamin D2 and D3     OFFICE/OUTPT VISIT,EST,LEVL IV     25 Hydroxyvitamin D2 and D3   14. Screen for colon cancer  Z12.11 Fecal colorectal cancer screen (FIT)     Fecal colorectal cancer screen (FIT)   15. Long term current use of anticoagulant therapy  Z79.01 INR point of care   16. Needs flu shot  Z23 INFLUENZA, QUAD, HIGH DOSE, PF, 65YR + (FLUZONE HD)     VACCINE ADMINISTRATION, INITIAL   17. Visit for screening mammogram  Z12.31 MA Screen Bilateral w/Martin   18. Anemia, unspecified type  D64.9        Patient has been advised of split billing requirements and indicates understanding: Yes  COUNSELING:  Reviewed preventive health counseling, as reflected in  "patient instructions    Estimated body mass index is 38.14 kg/m  as calculated from the following:    Height as of 8/13/21: 1.607 m (5' 3.25\").    Weight as of 8/13/21: 98.4 kg (217 lb).    Weight management plan: Discussed healthy diet and exercise guidelines    She reports that she has never smoked. She has never used smokeless tobacco.      Appropriate preventive services were discussed with this patient, including applicable screening as appropriate for cardiovascular disease, diabetes, osteopenia/osteoporosis, and glaucoma.  As appropriate for age/gender, discussed screening for colorectal cancer, prostate cancer, breast cancer, and cervical cancer. Checklist reviewing preventive services available has been given to the patient.    Reviewed patients plan of care and provided an AVS. The Complex Care Plan (for patients with higher acuity and needing more deliberate coordination of services) for Zulema meets the Care Plan requirement. This Care Plan has been established and reviewed with the Patient.    Counseling Resources:  ATP IV Guidelines  Pooled Cohorts Equation Calculator  Breast Cancer Risk Calculator  Breast Cancer: Medication to Reduce Risk  FRAX Risk Assessment  ICSI Preventive Guidelines  Dietary Guidelines for Americans, 2010  USDA's MyPlate  ASA Prophylaxis  Lung CA Screening          Madina Mercado MD  [facility]    Identified Health Risks:  "

## 2021-11-04 NOTE — PATIENT INSTRUCTIONS
[facility]  [address] [place] [address]  Office: [phone]   Fax:    [phone]       Return in about 6 months (around 5/5/2022) for  blood pressure, diabetes, cholesterol/lipids  - fasting , with Dr. Mercado, in person.     Patient Education   Personalized Prevention Plan  You are due for the preventive services outlined below.  Your care team is available to assist you in scheduling these services.  If you have already completed any of these items, please share that information with your care team to update in your medical record.  Health Maintenance Due   Topic Date Due     Mammogram  07/09/2020     Flu Vaccine (1) 09/01/2021     A1C Lab  11/03/2021     FALL RISK ASSESSMENT  11/04/2021         Thank you so much or choosing [facility]  for your Health Care. It was a pleasure seeing you at your visit today! Please contact us with any questions or concerns you may have.                   Madina Mercado MD                              To reach your [facility] care team after hours call:   [phone]    PLEASE NOTE OUR HOURS HAVE CHANGED secondary to COVID-19 coronavirus pandemic, as we are trying to minimize patient exposure to the virus,  which is now widespread in the [facility].  These hours may change with very little notice.  We apologize for any inconvenience.       Our current clinic hours are:          Monday- Thursday   7:00am - 6:00pm  in person.      Friday  7:00am- 5:00pm                       Saturday and Sunday : Closed to in person and virtual visits        We have telephone and virtual visit times available between    7:00am - 6pm on Monday-Friday as well.                                                Phone:  [phone]      Our pharmacy hours: Monday through Friday 9:00am to 5:00pm                        Saturday - 9:00 am to 12 noon       Sunday : Closed.              Phone:  [phone]              ###  Please note: at this time we are not accepting any walk-in visits. ###      There is also information available at our web site:  [email or web address]    If your provider ordered any lab tests and you do not receive the results within 10 business days, please call the clinic.    If you need a medication refill please contact your pharmacy.  Please allow 2 business days for your refill to be completed.    Our clinic offers telephone visits and e visits.  Please ask one of your team members to explain more.      Use [employer] (secure email communication and access to your chart) to send your primary care provider a message or make an appointment. Ask someone on your Team how to sign up for [employer].

## 2021-11-05 ENCOUNTER — LAB (OUTPATIENT)
Dept: LAB | Facility: CLINIC | Age: 76
End: 2021-11-05
Payer: MEDICARE

## 2021-11-05 ENCOUNTER — OFFICE VISIT (OUTPATIENT)
Dept: FAMILY MEDICINE | Facility: CLINIC | Age: 76
End: 2021-11-05
Payer: MEDICARE

## 2021-11-05 ENCOUNTER — ANTICOAGULATION THERAPY VISIT (OUTPATIENT)
Dept: ANTICOAGULATION | Facility: CLINIC | Age: 76
End: 2021-11-05

## 2021-11-05 VITALS
DIASTOLIC BLOOD PRESSURE: 78 MMHG | TEMPERATURE: 97.5 F | HEIGHT: 63 IN | HEART RATE: 78 BPM | SYSTOLIC BLOOD PRESSURE: 124 MMHG | BODY MASS INDEX: 37.92 KG/M2 | OXYGEN SATURATION: 98 % | WEIGHT: 214 LBS

## 2021-11-05 DIAGNOSIS — E78.5 HYPERLIPIDEMIA LDL GOAL <100: ICD-10-CM

## 2021-11-05 DIAGNOSIS — I48.91 ATRIAL FIBRILLATION, UNSPECIFIED TYPE (H): ICD-10-CM

## 2021-11-05 DIAGNOSIS — Z79.01 LONG TERM CURRENT USE OF ANTICOAGULANT THERAPY: Primary | ICD-10-CM

## 2021-11-05 DIAGNOSIS — E66.01 MORBID OBESITY DUE TO EXCESS CALORIES (H): ICD-10-CM

## 2021-11-05 DIAGNOSIS — Z79.01 LONG TERM CURRENT USE OF ANTICOAGULANT THERAPY: ICD-10-CM

## 2021-11-05 DIAGNOSIS — I48.19 PERSISTENT ATRIAL FIBRILLATION (H): ICD-10-CM

## 2021-11-05 DIAGNOSIS — E55.9 VITAMIN D DEFICIENCY: ICD-10-CM

## 2021-11-05 DIAGNOSIS — E11.42 TYPE 2 DIABETES MELLITUS WITH DIABETIC POLYNEUROPATHY, WITHOUT LONG-TERM CURRENT USE OF INSULIN (H): ICD-10-CM

## 2021-11-05 DIAGNOSIS — Z23 NEEDS FLU SHOT: ICD-10-CM

## 2021-11-05 DIAGNOSIS — Z12.31 VISIT FOR SCREENING MAMMOGRAM: ICD-10-CM

## 2021-11-05 DIAGNOSIS — D64.9 ANEMIA, UNSPECIFIED TYPE: ICD-10-CM

## 2021-11-05 DIAGNOSIS — E11.42 TYPE 2 DIABETES MELLITUS WITH DIABETIC POLYNEUROPATHY, WITHOUT LONG-TERM CURRENT USE OF INSULIN (H): Primary | ICD-10-CM

## 2021-11-05 DIAGNOSIS — Z00.00 ENCOUNTER FOR MEDICARE ANNUAL WELLNESS EXAM: Primary | ICD-10-CM

## 2021-11-05 DIAGNOSIS — K58.0 IRRITABLE BOWEL SYNDROME WITH DIARRHEA: ICD-10-CM

## 2021-11-05 DIAGNOSIS — R60.0 BILATERAL LEG EDEMA: ICD-10-CM

## 2021-11-05 DIAGNOSIS — E11.59 TYPE 2 DIABETES MELLITUS WITH OTHER CIRCULATORY COMPLICATION, WITHOUT LONG-TERM CURRENT USE OF INSULIN (H): ICD-10-CM

## 2021-11-05 DIAGNOSIS — I10 ESSENTIAL HYPERTENSION WITH GOAL BLOOD PRESSURE LESS THAN 140/90: ICD-10-CM

## 2021-11-05 DIAGNOSIS — Z86.73 PERSONAL HISTORY OF TIA (TRANSIENT ISCHEMIC ATTACK): ICD-10-CM

## 2021-11-05 DIAGNOSIS — N18.2 CKD (CHRONIC KIDNEY DISEASE) STAGE 2, GFR 60-89 ML/MIN: ICD-10-CM

## 2021-11-05 DIAGNOSIS — Z12.11 SCREEN FOR COLON CANCER: ICD-10-CM

## 2021-11-05 LAB
BASOPHILS # BLD AUTO: 0.1 10E3/UL (ref 0–0.2)
BASOPHILS NFR BLD AUTO: 1 %
EOSINOPHIL # BLD AUTO: 0.1 10E3/UL (ref 0–0.7)
EOSINOPHIL NFR BLD AUTO: 2 %
ERYTHROCYTE [DISTWIDTH] IN BLOOD BY AUTOMATED COUNT: 14.4 % (ref 10–15)
HBA1C MFR BLD: 6.2 % (ref 0–5.6)
HCT VFR BLD AUTO: 35.5 % (ref 35–47)
HGB BLD-MCNC: 11.5 G/DL (ref 11.7–15.7)
IMM GRANULOCYTES # BLD: 0 10E3/UL
IMM GRANULOCYTES NFR BLD: 0 %
INR BLD: 1.9 (ref 0.9–1.1)
LYMPHOCYTES # BLD AUTO: 2.1 10E3/UL (ref 0.8–5.3)
LYMPHOCYTES NFR BLD AUTO: 30 %
MCH RBC QN AUTO: 25.6 PG (ref 26.5–33)
MCHC RBC AUTO-ENTMCNC: 32.4 G/DL (ref 31.5–36.5)
MCV RBC AUTO: 79 FL (ref 78–100)
MONOCYTES # BLD AUTO: 0.5 10E3/UL (ref 0–1.3)
MONOCYTES NFR BLD AUTO: 7 %
NEUTROPHILS # BLD AUTO: 4.2 10E3/UL (ref 1.6–8.3)
NEUTROPHILS NFR BLD AUTO: 61 %
PLATELET # BLD AUTO: 264 10E3/UL (ref 150–450)
RBC # BLD AUTO: 4.49 10E6/UL (ref 3.8–5.2)
WBC # BLD AUTO: 7 10E3/UL (ref 4–11)

## 2021-11-05 PROCEDURE — 99207 PR FOOT EXAM NO CHARGE: CPT | Mod: 25 | Performed by: FAMILY MEDICINE

## 2021-11-05 PROCEDURE — 85025 COMPLETE CBC W/AUTO DIFF WBC: CPT | Performed by: FAMILY MEDICINE

## 2021-11-05 PROCEDURE — G0008 ADMIN INFLUENZA VIRUS VAC: HCPCS | Performed by: FAMILY MEDICINE

## 2021-11-05 PROCEDURE — 82306 VITAMIN D 25 HYDROXY: CPT | Performed by: FAMILY MEDICINE

## 2021-11-05 PROCEDURE — 80061 LIPID PANEL: CPT | Performed by: FAMILY MEDICINE

## 2021-11-05 PROCEDURE — 99214 OFFICE O/P EST MOD 30 MIN: CPT | Mod: 25 | Performed by: FAMILY MEDICINE

## 2021-11-05 PROCEDURE — G0439 PPPS, SUBSEQ VISIT: HCPCS | Performed by: FAMILY MEDICINE

## 2021-11-05 PROCEDURE — 80053 COMPREHEN METABOLIC PANEL: CPT | Performed by: FAMILY MEDICINE

## 2021-11-05 PROCEDURE — 84443 ASSAY THYROID STIM HORMONE: CPT | Performed by: FAMILY MEDICINE

## 2021-11-05 PROCEDURE — 90662 IIV NO PRSV INCREASED AG IM: CPT | Performed by: FAMILY MEDICINE

## 2021-11-05 PROCEDURE — 83036 HEMOGLOBIN GLYCOSYLATED A1C: CPT | Performed by: FAMILY MEDICINE

## 2021-11-05 PROCEDURE — 85610 PROTHROMBIN TIME: CPT | Performed by: FAMILY MEDICINE

## 2021-11-05 PROCEDURE — 82043 UR ALBUMIN QUANTITATIVE: CPT | Performed by: FAMILY MEDICINE

## 2021-11-05 PROCEDURE — 36415 COLL VENOUS BLD VENIPUNCTURE: CPT | Performed by: FAMILY MEDICINE

## 2021-11-05 RX ORDER — LISINOPRIL 40 MG/1
40 TABLET ORAL DAILY
Qty: 90 TABLET | Refills: 1 | Status: SHIPPED | OUTPATIENT
Start: 2021-11-05 | End: 2022-05-06

## 2021-11-05 RX ORDER — ATENOLOL 100 MG/1
100 TABLET ORAL DAILY
Qty: 90 TABLET | Refills: 1 | Status: SHIPPED | OUTPATIENT
Start: 2021-11-05 | End: 2022-05-06

## 2021-11-05 RX ORDER — SIMVASTATIN 20 MG
TABLET ORAL
Qty: 90 TABLET | Refills: 1 | Status: SHIPPED | OUTPATIENT
Start: 2021-11-05 | End: 2022-05-06

## 2021-11-05 RX ORDER — AMLODIPINE BESYLATE 5 MG/1
5 TABLET ORAL DAILY
Qty: 90 TABLET | Refills: 1 | Status: SHIPPED | OUTPATIENT
Start: 2021-11-05 | End: 2022-05-06

## 2021-11-05 RX ORDER — HYDROCHLOROTHIAZIDE 25 MG/1
25 TABLET ORAL DAILY
Qty: 90 TABLET | Refills: 1 | Status: SHIPPED | OUTPATIENT
Start: 2021-11-05 | End: 2022-05-06

## 2021-11-05 ASSESSMENT — ENCOUNTER SYMPTOMS
SORE THROAT: 0
ABDOMINAL PAIN: 0
COUGH: 0
SHORTNESS OF BREATH: 0
EYE PAIN: 0
CONSTIPATION: 0
HEARTBURN: 0
WEAKNESS: 0
DIZZINESS: 0
HEADACHES: 0
NERVOUS/ANXIOUS: 1
HEMATOCHEZIA: 0
DIARRHEA: 1
HEMATURIA: 0
MYALGIAS: 1
FREQUENCY: 0
NAUSEA: 0
FEVER: 0
ARTHRALGIAS: 1
DYSURIA: 0
CHILLS: 0
PALPITATIONS: 0
BREAST MASS: 0
PARESTHESIAS: 0

## 2021-11-05 ASSESSMENT — MIFFLIN-ST. JEOR: SCORE: 1428.79

## 2021-11-05 ASSESSMENT — ACTIVITIES OF DAILY LIVING (ADL): CURRENT_FUNCTION: NO ASSISTANCE NEEDED

## 2021-11-05 NOTE — PROGRESS NOTES
ANTICOAGULATION MANAGEMENT     Zulema Nixon 76 year old female is on warfarin with subtherapeutic INR result. (Goal INR 2.0-3.0)    Recent labs: (last 7 days)     11/05/21  1049   INR 1.9*       ASSESSMENT     Source(s): Chart Review and Patient/Caregiver Call       Warfarin doses taken: Warfarin taken as instructed    Diet: Decreased greens/vitamin K in diet; plans to resume previous intake    New illness, injury, or hospitalization: Chronic arthritis - worse at hands - patient had a bad, painful night this week    Medication/supplement changes: Cholestyramine stopped 2 weeks ago, which has potential for interaction; decreasing INR (with discontinuation, ACRN would expect a rise in level). Tylenol prn for arthritis sx.    Signs or symptoms of bleeding or clotting: No    Previous INR: Therapeutic last 2 visits    Additional findings: None     PLAN     Recommended plan for temporary change(s) and ongoing change(s) affecting INR     Dosing Instructions:  Increase your warfarin dose (5% change) with next INR in 2 weeks       Summary  As of 11/5/2021    Full warfarin instructions:  7.5 mg every day   Next INR check:  11/18/2021             Telephone call with Zulema who verbalizes understanding and agrees to plan    Lab visit scheduled    Education provided: Please call back if any changes to your diet, medications or how you've been taking warfarin, Monitoring for bleeding signs and symptoms, Monitoring for clotting signs and symptoms and Importance of notifying clinic for changes in medications; a sooner lab recheck maybe needed.    Plan made per [facility] anticoagulation protocol    Rosa Maria Judge RN  Anticoagulation Clinic  11/5/2021    _______________________________________________________________________     Anticoagulation Episode Summary     Current INR goal:  2.0-3.0   TTR:  34.7 % (1 y)   Target end date:  Indefinite   Send INR reminders to:  [facility]    Indications    Long term current use of  anticoagulant therapy [Z79.01]  Atrial fibrillation  unspecified type (H) [I48.91]  Persistent atrial fibrillation (H) [I48.19]           Comments:           Anticoagulation Care Providers     Provider Role Specialty Phone number    Madina Mercado MD Referring Family Medicine [phone]

## 2021-11-06 LAB
ALBUMIN SERPL-MCNC: 3.7 G/DL (ref 3.4–5)
ALP SERPL-CCNC: 51 U/L (ref 40–150)
ALT SERPL W P-5'-P-CCNC: 23 U/L (ref 0–50)
ANION GAP SERPL CALCULATED.3IONS-SCNC: 8 MMOL/L (ref 3–14)
AST SERPL W P-5'-P-CCNC: 14 U/L (ref 0–45)
BILIRUB SERPL-MCNC: 0.6 MG/DL (ref 0.2–1.3)
BUN SERPL-MCNC: 15 MG/DL (ref 7–30)
CALCIUM SERPL-MCNC: 9.3 MG/DL (ref 8.5–10.1)
CHLORIDE BLD-SCNC: 99 MMOL/L (ref 94–109)
CHOLEST SERPL-MCNC: 120 MG/DL
CO2 SERPL-SCNC: 26 MMOL/L (ref 20–32)
CREAT SERPL-MCNC: 0.63 MG/DL (ref 0.52–1.04)
CREAT UR-MCNC: 67 MG/DL
FASTING STATUS PATIENT QL REPORTED: YES
GFR SERPL CREATININE-BSD FRML MDRD: 87 ML/MIN/1.73M2
GLUCOSE BLD-MCNC: 117 MG/DL (ref 70–99)
HDLC SERPL-MCNC: 39 MG/DL
LDLC SERPL CALC-MCNC: 67 MG/DL
MICROALBUMIN UR-MCNC: 8 MG/L
MICROALBUMIN/CREAT UR: 11.94 MG/G CR (ref 0–25)
NONHDLC SERPL-MCNC: 81 MG/DL
POTASSIUM BLD-SCNC: 4 MMOL/L (ref 3.4–5.3)
PROT SERPL-MCNC: 7.5 G/DL (ref 6.8–8.8)
SODIUM SERPL-SCNC: 133 MMOL/L (ref 133–144)
TRIGL SERPL-MCNC: 69 MG/DL
TSH SERPL DL<=0.005 MIU/L-ACNC: 0.64 MU/L (ref 0.4–4)

## 2021-11-09 LAB
DEPRECATED CALCIDIOL+CALCIFEROL SERPL-MC: <61 UG/L (ref 20–75)
VITAMIN D2 SERPL-MCNC: <5 UG/L
VITAMIN D3 SERPL-MCNC: 56 UG/L

## 2021-11-09 PROCEDURE — 82274 ASSAY TEST FOR BLOOD FECAL: CPT | Performed by: FAMILY MEDICINE

## 2021-11-11 LAB — HEMOCCULT STL QL IA: NEGATIVE

## 2021-11-14 PROBLEM — D64.9 ANEMIA, UNSPECIFIED TYPE: Status: ACTIVE | Noted: 2021-11-14

## 2021-11-14 PROBLEM — Z86.73 PERSONAL HISTORY OF TIA (TRANSIENT ISCHEMIC ATTACK): Status: ACTIVE | Noted: 2021-11-14

## 2021-11-14 PROBLEM — E55.9 VITAMIN D DEFICIENCY: Status: ACTIVE | Noted: 2021-11-14

## 2021-11-22 ENCOUNTER — LAB (OUTPATIENT)
Dept: LAB | Facility: CLINIC | Age: 76
End: 2021-11-22
Payer: MEDICARE

## 2021-11-22 ENCOUNTER — ANTICOAGULATION THERAPY VISIT (OUTPATIENT)
Dept: ANTICOAGULATION | Facility: CLINIC | Age: 76
End: 2021-11-22

## 2021-11-22 DIAGNOSIS — I48.91 ATRIAL FIBRILLATION, UNSPECIFIED TYPE (H): ICD-10-CM

## 2021-11-22 DIAGNOSIS — I48.19 PERSISTENT ATRIAL FIBRILLATION (H): ICD-10-CM

## 2021-11-22 DIAGNOSIS — Z79.01 LONG TERM CURRENT USE OF ANTICOAGULANT THERAPY: Primary | ICD-10-CM

## 2021-11-22 DIAGNOSIS — Z79.01 LONG TERM CURRENT USE OF ANTICOAGULANT THERAPY: ICD-10-CM

## 2021-11-22 LAB — INR BLD: 2.8 (ref 0.9–1.1)

## 2021-11-22 PROCEDURE — 85610 PROTHROMBIN TIME: CPT

## 2021-11-22 PROCEDURE — 36416 COLLJ CAPILLARY BLOOD SPEC: CPT

## 2021-11-22 NOTE — PROGRESS NOTES
ANTICOAGULATION MANAGEMENT     Zulema Nixon 76 year old female is on warfarin with therapeutic INR result. (Goal INR 2.0-3.0)    Recent labs: (last 7 days)     11/22/21  1059   INR 2.8*       ASSESSMENT     Source(s): Chart Review and Patient/Caregiver Call       Warfarin doses taken: Warfarin taken as instructed    Diet: No new diet changes identified    New illness, injury, or hospitalization: No    Medication/supplement changes: None noted    Signs or symptoms of bleeding or clotting: No    Previous INR: Subtherapeutic    Additional findings: None     PLAN     Recommended plan for no diet, medication or health factor changes affecting INR     Dosing Instructions: Continue your current warfarin dose with next INR in 3 weeks       Summary  As of 11/22/2021    Full warfarin instructions:  7.5 mg every day   Next INR check:  12/13/2021             Telephone call with Zulema who verbalizes understanding and agrees to plan    Lab visit scheduled    Education provided: Please call back if any changes to your diet, medications or how you've been taking warfarin    Plan made per [facility] anticoagulation protocol    Albert Christie RN  Anticoagulation Clinic  11/22/2021    _______________________________________________________________________     Anticoagulation Episode Summary     Current INR goal:  2.0-3.0   TTR:  37.0 % (1 y)   Target end date:  Indefinite   Send INR reminders to:  [facility]    Indications    Long term current use of anticoagulant therapy [Z79.01]  Atrial fibrillation  unspecified type (H) [I48.91]  Persistent atrial fibrillation (H) [I48.19]           Comments:           Anticoagulation Care Providers     Provider Role Specialty Phone number    Madina Mercado MD Referring Family Medicine [phone]

## 2021-12-10 ENCOUNTER — HOSPITAL ENCOUNTER (OUTPATIENT)
Dept: MAMMOGRAPHY | Facility: CLINIC | Age: 76
Discharge: HOME | End: 2021-12-10
Attending: FAMILY MEDICINE | Admitting: FAMILY MEDICINE
Payer: MEDICARE

## 2021-12-10 DIAGNOSIS — Z12.31 VISIT FOR SCREENING MAMMOGRAM: ICD-10-CM

## 2021-12-10 PROCEDURE — 77063 BREAST TOMOSYNTHESIS BI: CPT

## 2021-12-14 ENCOUNTER — ANTICOAGULATION THERAPY VISIT (OUTPATIENT)
Dept: ANTICOAGULATION | Facility: CLINIC | Age: 76
End: 2021-12-14

## 2021-12-14 ENCOUNTER — LAB (OUTPATIENT)
Dept: LAB | Facility: CLINIC | Age: 76
End: 2021-12-14
Payer: MEDICARE

## 2021-12-14 DIAGNOSIS — Z79.01 LONG TERM CURRENT USE OF ANTICOAGULANT THERAPY: ICD-10-CM

## 2021-12-14 DIAGNOSIS — Z79.01 LONG TERM CURRENT USE OF ANTICOAGULANT THERAPY: Primary | ICD-10-CM

## 2021-12-14 DIAGNOSIS — I48.91 ATRIAL FIBRILLATION, UNSPECIFIED TYPE (H): ICD-10-CM

## 2021-12-14 DIAGNOSIS — I48.19 PERSISTENT ATRIAL FIBRILLATION (H): ICD-10-CM

## 2021-12-14 LAB — INR BLD: 3.1 (ref 0.9–1.1)

## 2021-12-14 PROCEDURE — 36416 COLLJ CAPILLARY BLOOD SPEC: CPT

## 2021-12-14 PROCEDURE — 85610 PROTHROMBIN TIME: CPT

## 2021-12-14 NOTE — PROGRESS NOTES
ANTICOAGULATION MANAGEMENT     Zulema Nixon 76 year old female is on warfarin with supratherapeutic INR result. (Goal INR 2.0-3.0)    Recent labs: (last 7 days)     12/14/21  1009   INR 3.1*       ASSESSMENT     Source(s): Chart Review and Patient/Caregiver Call       Warfarin doses taken: Warfarin taken as instructed    Diet: Decreased greens/vitamin K in diet; plans to resume previous intake    New illness, injury, or hospitalization: No    Medication/supplement changes: None noted    Signs or symptoms of bleeding or clotting: No    Previous INR: Therapeutic last 2(+) visits    Additional findings: None     PLAN     Recommended plan for temporary change(s) affecting INR     Dosing Instructions: Continue your current warfarin dose with next INR in 2 weeks       Summary  As of 12/14/2021    Full warfarin instructions:  7.5 mg every day   Next INR check:  12/28/2021             Telephone call with Zulema who verbalizes understanding and agrees to plan    Lab visit scheduled    Education provided: Please call back if any changes to your diet, medications or how you've been taking warfarin    Plan made per [facility] anticoagulation protocol    Albert Christie RN  Anticoagulation Clinic  12/14/2021    _______________________________________________________________________     Anticoagulation Episode Summary     Current INR goal:  2.0-3.0   TTR:  41.0 % (1 y)   Target end date:  Indefinite   Send INR reminders to:  [facility]    Indications    Long term current use of anticoagulant therapy [Z79.01]  Atrial fibrillation  unspecified type (H) [I48.91]  Persistent atrial fibrillation (H) [I48.19]           Comments:           Anticoagulation Care Providers     Provider Role Specialty Phone number    Madina Mercado MD Referring Family Medicine [phone]

## 2021-12-31 ENCOUNTER — TELEPHONE (OUTPATIENT)
Dept: FAMILY MEDICINE | Facility: CLINIC | Age: 76
End: 2021-12-31

## 2021-12-31 ENCOUNTER — ANTICOAGULATION THERAPY VISIT (OUTPATIENT)
Dept: ANTICOAGULATION | Facility: CLINIC | Age: 76
End: 2021-12-31

## 2021-12-31 ENCOUNTER — LAB (OUTPATIENT)
Dept: LAB | Facility: CLINIC | Age: 76
End: 2021-12-31
Payer: MEDICARE

## 2021-12-31 DIAGNOSIS — Z79.01 LONG TERM CURRENT USE OF ANTICOAGULANT THERAPY: ICD-10-CM

## 2021-12-31 DIAGNOSIS — Z79.01 LONG TERM CURRENT USE OF ANTICOAGULANT THERAPY: Primary | ICD-10-CM

## 2021-12-31 DIAGNOSIS — I48.19 PERSISTENT ATRIAL FIBRILLATION (H): ICD-10-CM

## 2021-12-31 DIAGNOSIS — I48.91 ATRIAL FIBRILLATION, UNSPECIFIED TYPE (H): ICD-10-CM

## 2021-12-31 LAB — INR BLD: 2.7 (ref 0.9–1.1)

## 2021-12-31 PROCEDURE — 36416 COLLJ CAPILLARY BLOOD SPEC: CPT

## 2021-12-31 PROCEDURE — 85610 PROTHROMBIN TIME: CPT

## 2021-12-31 NOTE — PROGRESS NOTES
ANTICOAGULATION MANAGEMENT     Zulema Nixon 76 year old female is on warfarin with therapeutic INR result. (Goal INR 2.0-3.0)    Recent labs: (last 7 days)     12/31/21  0944   INR 2.7*       ASSESSMENT     Source(s): Chart Review and Patient/Caregiver Call       Warfarin doses taken: Warfarin taken as instructed    Diet: No new diet changes identified    New illness, injury, or hospitalization: No    Medication/supplement changes: None noted    Signs or symptoms of bleeding or clotting: No    Previous INR: Supratherapeutic    Additional findings: None     PLAN     Recommended plan for no diet, medication or health factor changes affecting INR     Dosing Instructions: Continue your current warfarin dose with next INR in 2 weeks  - was going to go 3 weeks but patient is leaving for [place] on 1/12 until first week in Feb so will recheck before she leaves.     Summary  As of 12/31/2021    Full warfarin instructions:  7.5 mg every day   Next INR check:  1/12/2022             Telephone call with Zulema who verbalizes understanding and agrees to plan    Lab visit scheduled    Education provided: Please call back if any changes to your diet, medications or how you've been taking warfarin    Plan made per [facility] anticoagulation protocol    Albert Christie RN  Anticoagulation Clinic  12/31/2021    _______________________________________________________________________     Anticoagulation Episode Summary     Current INR goal:  2.0-3.0   TTR:  42.9 % (1 y)   Target end date:  Indefinite   Send INR reminders to:  [facility]    Indications    Long term current use of anticoagulant therapy [Z79.01]  Atrial fibrillation  unspecified type (H) [I48.91]  Persistent atrial fibrillation (H) [I48.19]           Comments:           Anticoagulation Care Providers     Provider Role Specialty Phone number    Madina Mercado MD Referring Family Medicine [phone]

## 2021-12-31 NOTE — TELEPHONE ENCOUNTER
See [facility] encounter  Tammie Christie RN   [facility]  Mary Long, [place], [place], [facility]

## 2021-12-31 NOTE — PROGRESS NOTES
Anticoagulation Management    Unable to reach Zulema today.    Today's INR result of 2.7 is therapeutic (goal INR of 2.0-3.0).  Result received from: Clinic Lab    Follow up required to assess for changes     LM to call [facility]      Anticoagulation clinic to follow up    Albert Christie RN

## 2022-01-20 ENCOUNTER — LAB (OUTPATIENT)
Dept: LAB | Facility: CLINIC | Age: 77
End: 2022-01-20
Payer: MEDICARE

## 2022-01-20 ENCOUNTER — ANTICOAGULATION THERAPY VISIT (OUTPATIENT)
Dept: ANTICOAGULATION | Facility: CLINIC | Age: 77
End: 2022-01-20

## 2022-01-20 DIAGNOSIS — Z79.01 LONG TERM CURRENT USE OF ANTICOAGULANT THERAPY: ICD-10-CM

## 2022-01-20 DIAGNOSIS — Z79.01 LONG TERM CURRENT USE OF ANTICOAGULANT THERAPY: Primary | ICD-10-CM

## 2022-01-20 DIAGNOSIS — I48.91 ATRIAL FIBRILLATION, UNSPECIFIED TYPE (H): ICD-10-CM

## 2022-01-20 DIAGNOSIS — I48.19 PERSISTENT ATRIAL FIBRILLATION (H): ICD-10-CM

## 2022-01-20 LAB — INR BLD: 3 (ref 0.9–1.1)

## 2022-01-20 PROCEDURE — 85610 PROTHROMBIN TIME: CPT

## 2022-01-20 PROCEDURE — 36416 COLLJ CAPILLARY BLOOD SPEC: CPT

## 2022-01-20 NOTE — PROGRESS NOTES
ANTICOAGULATION MANAGEMENT     Zulema Nixon 76 year old female is on warfarin with therapeutic INR result. (Goal INR 2.0-3.0)    Recent labs: (last 7 days)     01/20/22  0927   INR 3.0*       ASSESSMENT     Source(s): Chart Review and Patient/Caregiver Call       Warfarin doses taken: Warfarin taken as instructed    Diet: Decreased greens/vitamin K in diet; plans to resume previous intake once IBS flare is over    New illness, injury, or hospitalization: Yes: patient reports flare of IBS    Medication/supplement changes: None noted    Signs or symptoms of bleeding or clotting: No    Previous INR: Therapeutic last visit; previously outside of goal range    Additional findings: Patient is now leaving for [place] 2/17/22, trip was postponed d/t niece having COVID     PLAN     Recommended plan for no diet, medication or health factor changes affecting INR     Dosing Instructions: Continue your current warfarin dose with next INR in 2 weeks       Summary  As of 1/20/2022    Full warfarin instructions:  7.5 mg every day   Next INR check:  2/3/2022             Telephone call with Zulema who verbalizes understanding and agrees to plan    Lab visit scheduled    Education provided: Please call back if any changes to your diet, medications or how you've been taking warfarin and Contact [phone]  with any changes, questions or concerns.     Plan made per [facility] anticoagulation protocol    Jenni Nava RN  Anticoagulation Clinic  1/20/2022    _______________________________________________________________________     Anticoagulation Episode Summary     Current INR goal:  2.0-3.0   TTR:  48.4 % (1 y)   Target end date:  Indefinite   Send INR reminders to:  [facility]    Indications    Long term current use of anticoagulant therapy [Z79.01]  Atrial fibrillation  unspecified type (H) [I48.91]  Persistent atrial fibrillation (H) [I48.19]           Comments:           Anticoagulation Care Providers     Provider Role Specialty  Phone number    Madina Mercado MD Referring Family Medicine [phone]

## 2022-02-03 ENCOUNTER — ANTICOAGULATION THERAPY VISIT (OUTPATIENT)
Dept: ANTICOAGULATION | Facility: CLINIC | Age: 77
End: 2022-02-03

## 2022-02-03 ENCOUNTER — LAB (OUTPATIENT)
Dept: LAB | Facility: CLINIC | Age: 77
End: 2022-02-03
Payer: MEDICARE

## 2022-02-03 DIAGNOSIS — Z79.01 LONG TERM CURRENT USE OF ANTICOAGULANT THERAPY: Primary | ICD-10-CM

## 2022-02-03 DIAGNOSIS — I48.19 PERSISTENT ATRIAL FIBRILLATION (H): ICD-10-CM

## 2022-02-03 DIAGNOSIS — I48.91 ATRIAL FIBRILLATION, UNSPECIFIED TYPE (H): ICD-10-CM

## 2022-02-03 DIAGNOSIS — Z79.01 LONG TERM CURRENT USE OF ANTICOAGULANT THERAPY: ICD-10-CM

## 2022-02-03 LAB — INR BLD: 5.6 (ref 0.9–1.1)

## 2022-02-03 PROCEDURE — 85610 PROTHROMBIN TIME: CPT

## 2022-02-03 NOTE — PROGRESS NOTES
"ANTICOAGULATION MANAGEMENT     Zulema Nixon 76 year old female is on warfarin with supratherapeutic INR result. (Goal INR 2.0-3.0)    Recent labs: (last 7 days)     02/03/22  1028   INR 5.6*       ASSESSMENT     Source(s): Chart Review and Patient/Caregiver Call       Warfarin doses taken: Warfarin taken as instructed    Diet: Decreased greens/vitamin K in diet; plans to resume previous intake; however, patient has IBS-D so this may not be possible.  Patient is going to eat a salad today, if that goes well, she plans to add greens back in, but not many.    Patient states she does not really eat dairy, only occasional cheese.    New illness, injury, or hospitalization: Yes: IBS with several bouts of diarrhea.      Medication/supplement changes: No but pt did buy Metamucil, I encouraged her to start this today as will help stabilize bowel movements.    Signs or symptoms of bleeding or clotting: Yes: a recent nosebleed but only lasted \"moments\".  Patient states she has had nosebleeds \"all her life\".    Previous INR: Therapeutic last 2(+) visits    Additional findings: Pt leaving for [place] on 2/17/22     PLAN     Recommended plan for temporary change(s) affecting INR     Dosing Instructions: Hold 1 full dose and partial hold on 2/4/22 doses then continue your current warfarin dose with next INR in 4 days (ot declined coming back on 2/4/22).  If patient does not tolerate greens, then we most likely will need to reduce warfarin MD.     Summary  As of 2/3/2022    Full warfarin instructions:  2/3: Hold; 2/4: 2.5 mg; Otherwise 7.5 mg every day   Next INR check:  2/7/2022             Telephone call with Zulema who agrees to plan and repeated back plan correctly    Lab visit scheduled    Education provided: Importance of consistent vitamin K intake, Monitoring for bleeding signs and symptoms and Contact [phone]  with any changes, questions or concerns.     Plan made per [facility] anticoagulation protocol    Mary Vergara, " RN  Anticoagulation Clinic  2/3/2022    _______________________________________________________________________     Anticoagulation Episode Summary     Current INR goal:  2.0-3.0   TTR:  48.4 % (1 y)   Target end date:  Indefinite   Send INR reminders to:  [facility]    Indications    Long term current use of anticoagulant therapy [Z79.01]  Atrial fibrillation  unspecified type (H) [I48.91]  Persistent atrial fibrillation (H) [I48.19]           Comments:           Anticoagulation Care Providers     Provider Role Specialty Phone number    Madina Mercado MD Referring Family Medicine [phone]

## 2022-02-07 ENCOUNTER — LAB (OUTPATIENT)
Dept: LAB | Facility: CLINIC | Age: 77
End: 2022-02-07
Payer: MEDICARE

## 2022-02-07 ENCOUNTER — ANTICOAGULATION THERAPY VISIT (OUTPATIENT)
Dept: ANTICOAGULATION | Facility: CLINIC | Age: 77
End: 2022-02-07

## 2022-02-07 DIAGNOSIS — Z79.01 LONG TERM CURRENT USE OF ANTICOAGULANT THERAPY: Primary | ICD-10-CM

## 2022-02-07 DIAGNOSIS — I48.91 ATRIAL FIBRILLATION, UNSPECIFIED TYPE (H): ICD-10-CM

## 2022-02-07 DIAGNOSIS — I48.19 PERSISTENT ATRIAL FIBRILLATION (H): ICD-10-CM

## 2022-02-07 DIAGNOSIS — Z79.01 LONG TERM CURRENT USE OF ANTICOAGULANT THERAPY: ICD-10-CM

## 2022-02-07 LAB — INR BLD: 2.9 (ref 0.9–1.1)

## 2022-02-07 PROCEDURE — 85610 PROTHROMBIN TIME: CPT

## 2022-02-07 NOTE — PROGRESS NOTES
Anticoagulation Management    Unable to reach Zulema today.    Today's INR result of 2.9 is therapeutic (goal INR of 2.0-3.0).  Result received from: Clinic Lab    Follow up required to confirm warfarin dose taken and assess for changes    Left VM to take 5mg tonight and to  call [phone] with transfer to Mary Hernandez:[phone] [place] [facility]    New lower maintenance dose approved by [facility]      Anticoagulation clinic to follow up    Mary Vergara RN

## 2022-02-07 NOTE — PROGRESS NOTES
ANTICOAGULATION MANAGEMENT     Zulema Nixon 76 year old female is on warfarin with therapeutic INR result. (Goal INR 2.0-3.0)    Recent labs: (last 7 days)     02/07/22  1148   INR 2.9*       ASSESSMENT     Source(s): Chart Review and Patient/Caregiver Call       Warfarin doses taken: Warfarin taken as instructed with intentional hold on 2/3 and partial hold on 2/4/22    Diet: Decreased greens/vitamin K in diet; ongoing change due to IBS-D    New illness, injury, or hospitalization: No    Medication/supplement changes: None noted    Signs or symptoms of bleeding or clotting: No    Previous INR: Supratherapeutic    Additional findings: diarrhea improving     PLAN     Recommended plan for ongoing change(s) affecting INR     Dosing Instructions:  Decrease your warfarin dose (23% change) with next INR in 8 days prior to her vacation.       Summary  As of 2/7/2022    Full warfarin instructions:  7.5 mg every Wed, Sat; 5 mg all other days   Next INR check:  2/15/2022             Telephone call with Zulema who agrees to plan and repeated back plan correctly    Lab visit scheduled    Education provided: Contact [phone]  with any changes, questions or concerns.     Plan made with [facility] Pharmacist [place] Vergara, RN  Anticoagulation Clinic  2/7/2022    _______________________________________________________________________     Anticoagulation Episode Summary     Current INR goal:  2.0-3.0   TTR:  47.5 % (1 y)   Target end date:  Indefinite   Send INR reminders to:  [facility]    Indications    Long term current use of anticoagulant therapy [Z79.01]  Atrial fibrillation  unspecified type (H) [I48.91]  Persistent atrial fibrillation (H) [I48.19]           Comments:           Anticoagulation Care Providers     Provider Role Specialty Phone number    Madina Mercado MD Referring Family Medicine [phone]

## 2022-02-15 ENCOUNTER — ANTICOAGULATION THERAPY VISIT (OUTPATIENT)
Dept: ANTICOAGULATION | Facility: CLINIC | Age: 77
End: 2022-02-15

## 2022-02-15 ENCOUNTER — LAB (OUTPATIENT)
Dept: LAB | Facility: CLINIC | Age: 77
End: 2022-02-15
Payer: MEDICARE

## 2022-02-15 DIAGNOSIS — I48.91 ATRIAL FIBRILLATION, UNSPECIFIED TYPE (H): ICD-10-CM

## 2022-02-15 DIAGNOSIS — Z79.01 LONG TERM CURRENT USE OF ANTICOAGULANT THERAPY: ICD-10-CM

## 2022-02-15 DIAGNOSIS — I48.19 PERSISTENT ATRIAL FIBRILLATION (H): ICD-10-CM

## 2022-02-15 DIAGNOSIS — Z79.01 LONG TERM CURRENT USE OF ANTICOAGULANT THERAPY: Primary | ICD-10-CM

## 2022-02-15 LAB — INR BLD: 2.7 (ref 0.9–1.1)

## 2022-02-15 PROCEDURE — 85610 PROTHROMBIN TIME: CPT

## 2022-02-15 NOTE — PROGRESS NOTES
ANTICOAGULATION MANAGEMENT     Zulema Nixon 76 year old female is on warfarin with therapeutic INR result. (Goal INR 2.0-3.0)    Recent labs: (last 7 days)     02/15/22  1059   INR 2.7*       ASSESSMENT     Source(s): Chart Review and Patient/Caregiver Call       Warfarin doses taken: Warfarin taken as instructed    Diet: No new diet changes identified, Patient reports she has been able to eat a few greens this week    New illness, injury, or hospitalization: No IBS -D calmer, has not had diarrhea in nearly a week    Medication/supplement changes: None noted    Signs or symptoms of bleeding or clotting: No    Previous INR: Therapeutic last visit; previously outside of goal range    Additional findings: Will have cataract surgery, this has not been scheduled yet. Patient leaving for [place] 2/16 and will return 3/3/22     PLAN     Recommended plan for no diet, medication or health factor changes affecting INR     Dosing Instructions: Continue your current warfarin dose with next INR in 3 weeks       Summary  As of 2/15/2022    Full warfarin instructions:  7.5 mg every Wed, Sat; 5 mg all other days   Next INR check:  3/7/2022             Telephone call with Zulema who verbalizes understanding and agrees to plan    Lab visit scheduled    Education provided: Please call back if any changes to your diet, medications or how you've been taking warfarin and Contact [phone]  with any changes, questions or concerns.     Plan made per [facility] anticoagulation protocol    Jenni Nava RN  Anticoagulation Clinic  2/15/2022    _______________________________________________________________________     Anticoagulation Episode Summary     Current INR goal:  2.0-3.0   TTR:  47.5 % (1 y)   Target end date:  Indefinite   Send INR reminders to:  [facility]    Indications    Long term current use of anticoagulant therapy [Z79.01]  Atrial fibrillation  unspecified type (H) [I48.91]  Persistent atrial fibrillation (H) [I48.19]            Comments:           Anticoagulation Care Providers     Provider Role Specialty Phone number    Madina Mercado MD Referring Family Medicine [phone]

## 2022-03-07 ENCOUNTER — LAB (OUTPATIENT)
Dept: LAB | Facility: CLINIC | Age: 77
End: 2022-03-07
Payer: MEDICARE

## 2022-03-07 ENCOUNTER — ANTICOAGULATION THERAPY VISIT (OUTPATIENT)
Dept: ANTICOAGULATION | Facility: CLINIC | Age: 77
End: 2022-03-07

## 2022-03-07 DIAGNOSIS — Z79.01 LONG TERM CURRENT USE OF ANTICOAGULANT THERAPY: ICD-10-CM

## 2022-03-07 DIAGNOSIS — Z79.01 LONG TERM CURRENT USE OF ANTICOAGULANT THERAPY: Primary | ICD-10-CM

## 2022-03-07 DIAGNOSIS — I48.91 ATRIAL FIBRILLATION, UNSPECIFIED TYPE (H): ICD-10-CM

## 2022-03-07 DIAGNOSIS — I48.19 PERSISTENT ATRIAL FIBRILLATION (H): ICD-10-CM

## 2022-03-07 LAB — INR BLD: 2.9 (ref 0.9–1.1)

## 2022-03-07 PROCEDURE — 36416 COLLJ CAPILLARY BLOOD SPEC: CPT

## 2022-03-07 PROCEDURE — 85610 PROTHROMBIN TIME: CPT

## 2022-03-07 NOTE — PROGRESS NOTES
Anticoagulation Management    Unable to reach Zulema today.    Today's INR result of 2.9 is therapeutic (goal INR of 2.0-3.0).  Result received from: Clinic Lab    Follow up required to confirm warfarin dose taken and assess for changes    Left VM to continue SAME DOSE and to call [phone] with transfer to Mary Hernandez:[phone] [place] [facility] [place]      Anticoagulation clinic to follow up    Mary Vergara RN

## 2022-03-07 NOTE — PROGRESS NOTES
Patient also reported that she has not scheduled her cataract surgery, yet.    Mary Vergara RN  Anticoagulation Clinic

## 2022-03-07 NOTE — PROGRESS NOTES
ANTICOAGULATION MANAGEMENT     Zuleam Nixon 76 year old female is on warfarin with therapeutic INR result. (Goal INR 2.0-3.0)    Recent labs: (last 7 days)     03/07/22  1052   INR 2.9*       ASSESSMENT       Source(s): Chart Review and Patient/Caregiver Call       Warfarin doses taken: Warfarin taken as instructed    Diet: No new diet changes identified    New illness, injury, or hospitalization: No    Medication/supplement changes: None noted    Signs or symptoms of bleeding or clotting: No    Previous INR: Therapeutic last 2(+) visits    Additional findings: Pt states she only had 1 flare up of IBS-D while she was in [place]       PLAN     Recommended plan for no diet, medication or health factor changes affecting INR     Dosing Instructions: Continue your current warfarin dose with next INR in 4 weeks       Summary  As of 3/7/2022    Full warfarin instructions:  7.5 mg every Wed, Sat; 5 mg all other days   Next INR check:  4/4/2022             Telephone call with Zulema who agrees to plan and repeated back plan correctly    Lab visit scheduled    Education provided: Contact [phone]  with any changes, questions or concerns.     Plan made per [facility] anticoagulation protocol    Mary Vergara, RN  Anticoagulation Clinic  3/7/2022    _______________________________________________________________________     Anticoagulation Episode Summary     Current INR goal:  2.0-3.0   TTR:  48.8 % (1 y)   Target end date:  Indefinite   Send INR reminders to:  [facility]    Indications    Long term current use of anticoagulant therapy [Z79.01]  Atrial fibrillation  unspecified type (H) [I48.91]  Persistent atrial fibrillation (H) [I48.19]           Comments:           Anticoagulation Care Providers     Provider Role Specialty Phone number    Madina Mercado MD Referring Family Medicine [phone]            '

## 2022-04-04 ENCOUNTER — LAB (OUTPATIENT)
Dept: LAB | Facility: CLINIC | Age: 77
End: 2022-04-04
Payer: MEDICARE

## 2022-04-04 ENCOUNTER — ANTICOAGULATION THERAPY VISIT (OUTPATIENT)
Dept: ANTICOAGULATION | Facility: CLINIC | Age: 77
End: 2022-04-04

## 2022-04-04 DIAGNOSIS — I48.19 PERSISTENT ATRIAL FIBRILLATION (H): ICD-10-CM

## 2022-04-04 DIAGNOSIS — I48.91 ATRIAL FIBRILLATION, UNSPECIFIED TYPE (H): ICD-10-CM

## 2022-04-04 DIAGNOSIS — Z79.01 LONG TERM CURRENT USE OF ANTICOAGULANT THERAPY: Primary | ICD-10-CM

## 2022-04-04 DIAGNOSIS — Z79.01 LONG TERM CURRENT USE OF ANTICOAGULANT THERAPY: ICD-10-CM

## 2022-04-04 LAB — INR BLD: 1.8 (ref 0.9–1.1)

## 2022-04-04 PROCEDURE — 85610 PROTHROMBIN TIME: CPT

## 2022-04-04 PROCEDURE — 36416 COLLJ CAPILLARY BLOOD SPEC: CPT

## 2022-04-04 NOTE — PROGRESS NOTES
ANTICOAGULATION MANAGEMENT     Zulema Nixon 76 year old female is on warfarin with subtherapeutic INR result. (Goal INR 2.0-3.0)    Recent labs: (last 7 days)     04/04/22  1005   INR 1.8*       ASSESSMENT       Source(s): Chart Review and Patient/Caregiver Call       Warfarin doses taken: Warfarin taken as instructed    Diet: Increased greens/vitamin K in diet; ongoing change--patient states her IBS has not been bothering her so she would like to continue with 3 servings of greens per week.    New illness, injury, or hospitalization: No    Medication/supplement changes: None noted    Signs or symptoms of bleeding or clotting: No    Previous INR: Therapeutic last 2(+) visits    Additional findings: None       PLAN     Recommended plan for ongoing change(s) affecting INR     Dosing Instructions: Increase your warfarin dose (6% change) with next INR in 2 weeks       Summary  As of 4/4/2022    Full warfarin instructions:  7.5 mg every Mon, Wed, Sat; 5 mg all other days   Next INR check:  4/19/2022             Telephone call with Zulema who agrees to plan and repeated back plan correctly    Lab visit scheduled    Education provided: Importance of consistent vitamin K intake and Contact [phone]  with any changes, questions or concerns.     Plan made per [facility] anticoagulation protocol    Mary Vergara, RN  Anticoagulation Clinic  4/4/2022    _______________________________________________________________________     Anticoagulation Episode Summary     Current INR goal:  2.0-3.0   TTR:  55.0 % (1 y)   Target end date:  Indefinite   Send INR reminders to:  [facility]    Indications    Long term current use of anticoagulant therapy [Z79.01]  Atrial fibrillation  unspecified type (H) [I48.91]  Persistent atrial fibrillation (H) [I48.19]           Comments:           Anticoagulation Care Providers     Provider Role Specialty Phone number    Madina Mercado MD Referring Family Medicine [phone]

## 2022-04-04 NOTE — PROGRESS NOTES
ANTICOAGULATION MANAGEMENT     Zulema Nixon 76 year old female is on warfarin with subtherapeutic INR result. (Goal INR 2.0-3.0)    Recent labs: (last 7 days)     04/04/22  1005   INR 1.8*       ASSESSMENT       Source(s): Chart Review    Previous INR was Therapeutic last 2(+) visits    Medication, diet, health changes since last INR chart reviewed; none identified           PLAN     Unable to reach Zulema today.    Left message to take a booster dose of warfarin,  7.5 mg tonight. Request call back for assessment.    Follow up required to confirm warfarin dose taken and assess for changes    Mary Vergara, RN  Anticoagulation Clinic  4/4/2022

## 2022-04-19 ENCOUNTER — LAB (OUTPATIENT)
Dept: LAB | Facility: CLINIC | Age: 77
End: 2022-04-19
Payer: MEDICARE

## 2022-04-19 ENCOUNTER — DOCUMENTATION ONLY (OUTPATIENT)
Dept: ANTICOAGULATION | Facility: CLINIC | Age: 77
End: 2022-04-19

## 2022-04-19 ENCOUNTER — ANTICOAGULATION THERAPY VISIT (OUTPATIENT)
Dept: ANTICOAGULATION | Facility: CLINIC | Age: 77
End: 2022-04-19

## 2022-04-19 DIAGNOSIS — I48.19 PERSISTENT ATRIAL FIBRILLATION (H): ICD-10-CM

## 2022-04-19 DIAGNOSIS — I48.19 PERSISTENT ATRIAL FIBRILLATION (H): Primary | ICD-10-CM

## 2022-04-19 DIAGNOSIS — I48.91 ATRIAL FIBRILLATION, UNSPECIFIED TYPE (H): ICD-10-CM

## 2022-04-19 DIAGNOSIS — Z79.01 LONG TERM CURRENT USE OF ANTICOAGULANT THERAPY: Primary | ICD-10-CM

## 2022-04-19 DIAGNOSIS — Z79.01 LONG TERM CURRENT USE OF ANTICOAGULANT THERAPY: ICD-10-CM

## 2022-04-19 LAB — INR BLD: 2.8 (ref 0.9–1.1)

## 2022-04-19 PROCEDURE — 85610 PROTHROMBIN TIME: CPT

## 2022-04-19 PROCEDURE — 36416 COLLJ CAPILLARY BLOOD SPEC: CPT

## 2022-04-19 NOTE — PROGRESS NOTES
"ANTICOAGULATION MANAGEMENT     Zulema Nixon 76 year old female is on warfarin with therapeutic INR result. (Goal INR 2.0-3.0)    Recent labs: (last 7 days)     04/19/22  1033   INR 2.8*       ASSESSMENT       Source(s): Chart Review and Patient/Caregiver Call       Warfarin doses taken: Warfarin taken as instructed    Diet: No new diet changes identified--eating \"some\" greens and she will try to stay consistent with vitamin K as long as her IBS-D is not bothersome.    New illness, injury, or hospitalization: No    Medication/supplement changes: None noted    Signs or symptoms of bleeding or clotting: No    Previous INR: Subtherapeutic    Additional findings: Pt declined a refill today, she prefers to order all of her medications at the same time and she has 1 refill left, per her bottle.  Pt has annual exam with PCP on 5/6/22.       PLAN     Recommended plan for no diet, medication or health factor changes affecting INR     Dosing Instructions: continue your current warfarin dose with next INR in 3 weeks       Summary  As of 4/19/2022    Full warfarin instructions:  7.5 mg every Mon, Wed, Sat; 5 mg all other days   Next INR check:  5/6/2022             Telephone call with Zulema who verbalizes understanding and agrees to plan    Check at provider office visit    Education provided: Importance of consistent vitamin K intake and Contact [phone]  with any changes, questions or concerns.     Plan made per [facility] anticoagulation protocol    Mary Vergara RN  Anticoagulation Clinic  4/19/2022    _______________________________________________________________________     Anticoagulation Episode Summary     Current INR goal:  2.0-3.0   TTR:  58.3 % (1 y)   Target end date:  Indefinite   Send INR reminders to:  [facility]    Indications    Long term current use of anticoagulant therapy [Z79.01]  Atrial fibrillation  unspecified type (H) [I48.91]  Persistent atrial fibrillation (H) [I48.19]           Comments:         "   Anticoagulation Care Providers     Provider Role Specialty Phone number    Madina Mercado MD Referring Family Medicine [phone]

## 2022-04-19 NOTE — PROGRESS NOTES
ANTICOAGULATION MANAGEMENT      Zulema Nixon due for annual renewal of referral to anticoagulation monitoring. Order pended for your review and signature.      ANTICOAGULATION SUMMARY      Warfarin indication(s)     Atrial fibrillation    Heart valve present?  NO       Current goal range   INR: 2.0-3.0     Goal appropriate for indication? Yes, INR 2-3 appropriate for hx of DVT, PE, hypercoagulable state, Afib, LVAD, or bileaflet AVR without risk factors     Current duration of therapy Indefinite/long term therapy   Time in Therapeutic Range (TTR)  (Goal > 60%) 58%       Office visit with referring provider's group within last year yes on 11/05/2021       Mary Vergara RN

## 2022-05-06 ENCOUNTER — ANTICOAGULATION THERAPY VISIT (OUTPATIENT)
Dept: ANTICOAGULATION | Facility: CLINIC | Age: 77
End: 2022-05-06

## 2022-05-06 ENCOUNTER — OFFICE VISIT (OUTPATIENT)
Dept: FAMILY MEDICINE | Facility: CLINIC | Age: 77
End: 2022-05-06
Payer: MEDICARE

## 2022-05-06 VITALS
WEIGHT: 201 LBS | BODY MASS INDEX: 35.61 KG/M2 | TEMPERATURE: 97.1 F | DIASTOLIC BLOOD PRESSURE: 60 MMHG | HEART RATE: 72 BPM | OXYGEN SATURATION: 100 % | SYSTOLIC BLOOD PRESSURE: 130 MMHG | HEIGHT: 63 IN | RESPIRATION RATE: 18 BRPM

## 2022-05-06 DIAGNOSIS — E66.01 MORBID OBESITY DUE TO EXCESS CALORIES (H): ICD-10-CM

## 2022-05-06 DIAGNOSIS — I48.19 PERSISTENT ATRIAL FIBRILLATION (H): ICD-10-CM

## 2022-05-06 DIAGNOSIS — D32.9 MENINGIOMA (H): ICD-10-CM

## 2022-05-06 DIAGNOSIS — D64.9 ANEMIA, UNSPECIFIED TYPE: ICD-10-CM

## 2022-05-06 DIAGNOSIS — E78.5 HYPERLIPIDEMIA LDL GOAL <100: ICD-10-CM

## 2022-05-06 DIAGNOSIS — I10 ESSENTIAL HYPERTENSION WITH GOAL BLOOD PRESSURE LESS THAN 140/90: ICD-10-CM

## 2022-05-06 DIAGNOSIS — I48.91 ATRIAL FIBRILLATION, UNSPECIFIED TYPE (H): ICD-10-CM

## 2022-05-06 DIAGNOSIS — E11.42 TYPE 2 DIABETES MELLITUS WITH DIABETIC POLYNEUROPATHY, WITHOUT LONG-TERM CURRENT USE OF INSULIN (H): Primary | ICD-10-CM

## 2022-05-06 DIAGNOSIS — E11.59 TYPE 2 DIABETES MELLITUS WITH OTHER CIRCULATORY COMPLICATION, WITHOUT LONG-TERM CURRENT USE OF INSULIN (H): ICD-10-CM

## 2022-05-06 DIAGNOSIS — K58.0 IRRITABLE BOWEL SYNDROME WITH DIARRHEA: ICD-10-CM

## 2022-05-06 DIAGNOSIS — N18.2 CKD (CHRONIC KIDNEY DISEASE) STAGE 2, GFR 60-89 ML/MIN: ICD-10-CM

## 2022-05-06 DIAGNOSIS — E55.9 VITAMIN D DEFICIENCY: ICD-10-CM

## 2022-05-06 DIAGNOSIS — Z23 HIGH PRIORITY FOR COVID-19 VACCINATION: ICD-10-CM

## 2022-05-06 DIAGNOSIS — Z79.01 LONG TERM CURRENT USE OF ANTICOAGULANT THERAPY: Primary | ICD-10-CM

## 2022-05-06 LAB
BASOPHILS # BLD AUTO: 0.1 10E3/UL (ref 0–0.2)
BASOPHILS NFR BLD AUTO: 1 %
EOSINOPHIL # BLD AUTO: 0.1 10E3/UL (ref 0–0.7)
EOSINOPHIL NFR BLD AUTO: 1 %
ERYTHROCYTE [DISTWIDTH] IN BLOOD BY AUTOMATED COUNT: 14.6 % (ref 10–15)
FOLATE SERPL-MCNC: 36.6 NG/ML
HBA1C MFR BLD: 5.9 % (ref 0–5.6)
HCT VFR BLD AUTO: 36.7 % (ref 35–47)
HGB BLD-MCNC: 12.1 G/DL (ref 11.7–15.7)
IMM GRANULOCYTES # BLD: 0 10E3/UL
IMM GRANULOCYTES NFR BLD: 0 %
INR BLD: 2.7 (ref 0.9–1.1)
LYMPHOCYTES # BLD AUTO: 1.5 10E3/UL (ref 0.8–5.3)
LYMPHOCYTES NFR BLD AUTO: 25 %
MCH RBC QN AUTO: 26 PG (ref 26.5–33)
MCHC RBC AUTO-ENTMCNC: 33 G/DL (ref 31.5–36.5)
MCV RBC AUTO: 79 FL (ref 78–100)
MONOCYTES # BLD AUTO: 0.4 10E3/UL (ref 0–1.3)
MONOCYTES NFR BLD AUTO: 7 %
NEUTROPHILS # BLD AUTO: 3.8 10E3/UL (ref 1.6–8.3)
NEUTROPHILS NFR BLD AUTO: 66 %
PLATELET # BLD AUTO: 258 10E3/UL (ref 150–450)
RBC # BLD AUTO: 4.66 10E6/UL (ref 3.8–5.2)
RETICS # AUTO: 0.08 10E6/UL (ref 0.03–0.1)
RETICS/RBC NFR AUTO: 1.6 % (ref 0.5–2)
TRANSFERRIN SERPL-MCNC: 287 MG/DL (ref 210–360)
VIT B12 SERPL-MCNC: 165 PG/ML (ref 193–986)
WBC # BLD AUTO: 5.8 10E3/UL (ref 4–11)

## 2022-05-06 PROCEDURE — 82746 ASSAY OF FOLIC ACID SERUM: CPT | Performed by: FAMILY MEDICINE

## 2022-05-06 PROCEDURE — 80050 GENERAL HEALTH PANEL: CPT | Performed by: FAMILY MEDICINE

## 2022-05-06 PROCEDURE — 85610 PROTHROMBIN TIME: CPT | Performed by: FAMILY MEDICINE

## 2022-05-06 PROCEDURE — 85045 AUTOMATED RETICULOCYTE COUNT: CPT | Performed by: FAMILY MEDICINE

## 2022-05-06 PROCEDURE — 99207 PR FOOT EXAM NO CHARGE: CPT | Performed by: FAMILY MEDICINE

## 2022-05-06 PROCEDURE — 82306 VITAMIN D 25 HYDROXY: CPT | Performed by: FAMILY MEDICINE

## 2022-05-06 PROCEDURE — 83036 HEMOGLOBIN GLYCOSYLATED A1C: CPT | Performed by: FAMILY MEDICINE

## 2022-05-06 PROCEDURE — 82607 VITAMIN B-12: CPT | Performed by: FAMILY MEDICINE

## 2022-05-06 PROCEDURE — 36415 COLL VENOUS BLD VENIPUNCTURE: CPT | Performed by: FAMILY MEDICINE

## 2022-05-06 PROCEDURE — 82728 ASSAY OF FERRITIN: CPT | Performed by: FAMILY MEDICINE

## 2022-05-06 PROCEDURE — 0054A COVID-19,PF,PFIZER (12+ YRS): CPT | Performed by: FAMILY MEDICINE

## 2022-05-06 PROCEDURE — 91305 COVID-19,PF,PFIZER (12+ YRS): CPT | Performed by: FAMILY MEDICINE

## 2022-05-06 PROCEDURE — 84466 ASSAY OF TRANSFERRIN: CPT | Performed by: FAMILY MEDICINE

## 2022-05-06 PROCEDURE — 99215 OFFICE O/P EST HI 40 MIN: CPT | Mod: 25 | Performed by: FAMILY MEDICINE

## 2022-05-06 PROCEDURE — 80061 LIPID PANEL: CPT | Performed by: FAMILY MEDICINE

## 2022-05-06 RX ORDER — SIMVASTATIN 20 MG
TABLET ORAL
Qty: 90 TABLET | Refills: 1 | Status: SHIPPED | OUTPATIENT
Start: 2022-05-06 | End: 2022-11-03

## 2022-05-06 RX ORDER — AMLODIPINE BESYLATE 5 MG/1
5 TABLET ORAL DAILY
Qty: 90 TABLET | Refills: 1 | Status: SHIPPED | OUTPATIENT
Start: 2022-05-06 | End: 2022-11-03

## 2022-05-06 RX ORDER — WARFARIN SODIUM 5 MG/1
TABLET ORAL
Qty: 180 TABLET | Refills: 3 | Status: SHIPPED | OUTPATIENT
Start: 2022-05-06 | End: 2022-11-03

## 2022-05-06 RX ORDER — HYDROCHLOROTHIAZIDE 25 MG/1
25 TABLET ORAL DAILY
Qty: 90 TABLET | Refills: 1 | Status: SHIPPED | OUTPATIENT
Start: 2022-05-06 | End: 2022-11-03

## 2022-05-06 RX ORDER — ATENOLOL 100 MG/1
100 TABLET ORAL DAILY
Qty: 90 TABLET | Refills: 1 | Status: SHIPPED | OUTPATIENT
Start: 2022-05-06 | End: 2022-11-03

## 2022-05-06 RX ORDER — LISINOPRIL 40 MG/1
40 TABLET ORAL DAILY
Qty: 90 TABLET | Refills: 1 | Status: SHIPPED | OUTPATIENT
Start: 2022-05-06 | End: 2022-11-03

## 2022-05-06 NOTE — PATIENT INSTRUCTIONS
" [facility]  [address] [place] [address]  Office: [phone]   Fax:    [phone]     Try an oral probiotic daily over the counter such as Florastor or UltraFlora  Intensive Care  to help restore your natural gut bacteria and improve digestion.  Those are usually available from [employer] or [employer].  May also use Beano , an over the counter supplement, which contains digestive enzymes as well to improve digestion and possible decrease gassiness and bloating related to eating, especially eating higher fiber foods.     .Return in about 6 months (around 11/6/2022) for Physical/Preventative Visit, cholesterol/lipids, diabetes, w/ DrCheyenne V for 40 minute appointment.         Look up a low FODMAPS diet.   Characteristics and sources of common FODMAPS: : foods that can ferment in your GI system and cause some irritability of the bowels:     F  - Fermentable    O - Oligosaccharides    Fructans, galacto/oligosaccharides  Wheat, barley, rye, onion,jeannette, white part of spring onion (scallion),             Garlic, shallots, artichokes, beetroot, fennel, peas, chicory, pistachio,             Cashews, legumes, lentils, and chickpeas    D - Disaccharides  Lactose      Milk, custard, ice cream, and yogurt    M - Monosaccharides \"Free Fructose\" (fructose > glucose)  Apples, pears, mangoes, cherries, watermelon, asparagus, sugar snap peas,             Honey, high-fructose corn syrup  A - And    P - Polyols    Sorbitol, mannitol, maltitol, & xylitol  Apples, pears, apricots, cherries, nectarines, peaches, plums, watermelon,              Mushrooms, cauliflower, artificial sweeteners in gum, candy and drinks    FODMAPS: Fermentable Oligosaccharides, Disaccharieds, Monosaccharides, And Polyols    Adapted from [employer]: American Journal of Gastroenterology.  Annabelle SH, Matilda MC, Eric AZ. Short-chain carbohydrates and functional gastrointestinal disorders.  Am J Gastroenterol " 2013; 108: 707.  Copyright 2013.  Www.nature.com/ajg     For possible COVID-19 novel coronavirus vaccine or other vaccine mild reactions not related to allergies:    No prescription steroids within 2 weeks of the shots as they can suppress your immune system,   Ok to take tylenol 2-325mg or 2-500mg tabs prior to vaccine.  Ok to also take claritin (a non-sedating anti-histamine) 10 mg daily for 2 days    You can do the above regimen for 2-3 after your first,  Second, or third  shots to decrease the possibility of achiness, fever, chills after your COVID-19 novel coronavirus vaccines.     Thank you so much or choosing [facility]  for your Health Care. It was a pleasure seeing you at your visit today! Please contact us with any questions or concerns you may have.                   Madina Mercado MD                              To reach your [facility] care team after hours call:   [phone]    PLEASE NOTE OUR HOURS HAVE CHANGED secondary to COVID-19 coronavirus pandemic, as we are trying to minimize patient exposure to the virus,  which is now widespread in the [facility].  These hours may change with very little notice.  We apologize for any inconvenience.       Our current clinic hours are:          Monday- Thursday   7:00am - 6:00pm  in person.      Friday  7:00am- 5:00pm                       Saturday and Sunday : Closed to in person and virtual visits        We have telephone and virtual visit times available between    7:00am - 6pm on Monday-Friday as well.                                                Phone:  [phone]      Our pharmacy hours: Monday through Friday 8:00am to 5:00pm                        Saturday - 9:00 am to 12 noon       Sunday : Closed.              Phone:  [phone]              ###  Please note: at this time we are not accepting any walk-in visits. ###      There is also information available at our web site:   [email or web address]    If your provider ordered any lab tests and you do not receive the results within 10 business days, please call the clinic.    If you need a medication refill please contact your pharmacy.  Please allow 3 business days for your refill to be completed.    Our clinic offers telephone visits and e visits.  Please ask one of your team members to explain more.      Use [facility] (secure email communication and access to your chart) to send your primary care provider a message or make an appointment. Ask someone on your Team how to sign up for [facility].

## 2022-05-06 NOTE — PROGRESS NOTES
ANTICOAGULATION MANAGEMENT     Zulema Nixon 76 year old female is on warfarin with therapeutic INR result. (Goal INR 2.0-3.0)    Recent labs: (last 7 days)     05/06/22  0937   INR 2.7*       ASSESSMENT       Source(s): Chart Review and Patient/Caregiver Call       Warfarin doses taken: Warfarin taken as instructed    Diet: No new diet changes identified -has been consistent with greens (low amount) d/t IBS    New illness, injury, or hospitalization: Yes: IBS-D acting up. Saw PCP today.    Medication/supplement changes: Pt is using metamucil and plans to add colestipol to help with IBS, no interaction expected for either.    Signs or symptoms of bleeding or clotting: No    Previous INR: Therapeutic last visit; previously outside of goal range    Additional findings: None       PLAN     Recommended plan for temporary change(s) affecting INR     Dosing Instructions: continue your current warfarin dose with next INR in 2-3 weeks       Summary  As of 5/6/2022    Full warfarin instructions:  7.5 mg every Mon, Wed, Sat; 5 mg all other days   Next INR check:  5/27/2022             Telephone call with Zulema who verbalizes understanding and agrees to plan    Lab visit scheduled    Education provided: Goal range and significance of current result and Contact [phone]  with any changes, questions or concerns.     Plan made per [facility] anticoagulation protocol    Ester Duong RN  Anticoagulation Clinic  5/6/2022    _______________________________________________________________________     Anticoagulation Episode Summary     Current INR goal:  2.0-3.0   TTR:  59.8 % (1 y)   Target end date:  Indefinite   Send INR reminders to:  [facility]    Indications    Long term current use of anticoagulant therapy [Z79.01]  Atrial fibrillation  unspecified type (H) [I48.91]  Persistent atrial fibrillation (H) [I48.19]           Comments:           Anticoagulation Care Providers     Provider Role Specialty Phone number     Madina Mercado MD [facility] [phone]

## 2022-05-06 NOTE — PROGRESS NOTES
Assessment & Plan       ICD-10-CM    1. Type 2 diabetes mellitus with diabetic polyneuropathy, without long-term current use of insulin (H)- needs labs and refills   E11.42 HEMOGLOBIN A1C     CBC with platelets     Comprehensive metabolic panel     TSH with free T4 reflex     Adult Eye Referral     FOOT EXAM     metFORMIN (GLUCOPHAGE) 1000 MG tablet     HEMOGLOBIN A1C     Comprehensive metabolic panel     TSH with free T4 reflex     CANCELED: CBC with platelets   2. Type 2 diabetes mellitus with other circulatory complication, without long-term current use of insulin (H)- needs labs and refills   E11.59 HEMOGLOBIN A1C     CBC with platelets     Comprehensive metabolic panel     TSH with free T4 reflex     Adult Eye Referral     FOOT EXAM     HEMOGLOBIN A1C     Comprehensive metabolic panel     TSH with free T4 reflex     CANCELED: CBC with platelets   3. Irritable bowel syndrome with diarrhea - better with daily metamucil - still a problem with unexpected incontinence at times   K58.0    4. Essential hypertension with goal blood pressure less than 140/90 - well controlled today - needs labs and refills   I10 CBC with platelets     Comprehensive metabolic panel     TSH with free T4 reflex     Comprehensive metabolic panel     TSH with free T4 reflex     CANCELED: CBC with platelets   5. Hyperlipidemia LDL goal <100- fish oil = worse IBS with diarrhea   E78.5 Comprehensive metabolic panel     Lipid panel reflex to direct LDL Fasting     simvastatin (ZOCOR) 20 MG tablet     Comprehensive metabolic panel     Lipid panel reflex to direct LDL Fasting   6. Atrial fibrillation, unspecified type (H)- well controlled - never feels it   I48.91 CBC with platelets     Anticoagulation Clinic Referral     CANCELED: CBC with platelets   7. Morbid obesity due to excess calories (H)- improving with limited diet   E66.01    8. Meningioma (H)- no headaches or symptoms at all   D32.9    9. Vitamin D deficiency - needs labs today    "E55.9 25 Hydroxyvitamin D2 and D3     25 Hydroxyvitamin D2 and D3   10. High priority for COVID-19 vaccination  Z23 COVID-19,PF,PFIZER (12+ Yrs GRAY LABEL)   11. Essential hypertension with goal blood pressure less than 140/90- well controlled today - needs labs and medication refills   I10 lisinopril (ZESTRIL) 40 MG tablet     hydrochlorothiazide (HYDRODIURIL) 25 MG tablet     atenolol (TENORMIN) 100 MG tablet     amLODIPine (NORVASC) 5 MG tablet   12. CKD (chronic kidney disease) stage 2, GFR 60-89 ml/min- needs lab follow up today   N18.2 hydrochlorothiazide (HYDRODIURIL) 25 MG tablet   13. Persistent atrial fibrillation- used to see Cardiology - they said   I48.19 warfarin ANTICOAGULANT (COUMADIN) 5 MG tablet   14. Anemia, unspecified type  D64.9 CBC with Platelets & Differential     Ferritin     Iron & Iron Binding Capacity     Reticulocyte count     Transferrin     Vitamin B12     Folate   15. Persistent atrial fibrillation (H)  I48.19 INR point of care        Ordering of each unique test  Prescription drug management  40 minutes spent on the date of the encounter doing chart review, history and exam, documentation and further activities per the note         BMI:   Estimated body mass index is 35.32 kg/m  as calculated from the following:    Height as of this encounter: 1.607 m (5' 3.25\").    Weight as of this encounter: 91.2 kg (201 lb).   Weight management plan: Discussed healthy diet and exercise guidelines    MEDICATIONS:   Orders Placed This Encounter   Medications     metFORMIN (GLUCOPHAGE) 1000 MG tablet     Sig: TAKE 1 AND 1/2 TABLETS BY MOUTH WITH BREAKFAST AND TAKE 1 TABLET BY MOUTH WITH SUPPER     Dispense:  225 tablet     Refill:  1     hold rx until pt calls to fill, please.     lisinopril (ZESTRIL) 40 MG tablet     Sig: Take 1 tablet (40 mg) by mouth daily     Dispense:  90 tablet     Refill:  1     hold rx until pt calls to fill, please.     hydrochlorothiazide (HYDRODIURIL) 25 MG tablet     " Sig: Take 1 tablet (25 mg) by mouth daily     Dispense:  90 tablet     Refill:  1     hold rx until pt calls to fill, please.     atenolol (TENORMIN) 100 MG tablet     Sig: Take 1 tablet (100 mg) by mouth daily     Dispense:  90 tablet     Refill:  1     hold rx until pt calls to fill, please.     amLODIPine (NORVASC) 5 MG tablet     Sig: Take 1 tablet (5 mg) by mouth daily     Dispense:  90 tablet     Refill:  1     hold rx until pt calls to fill, please.     simvastatin (ZOCOR) 20 MG tablet     Sig: TAKE ONE TABLET BY MOUTH AT BEDTIME     Dispense:  90 tablet     Refill:  1     hold rx until pt calls to fill, please.     warfarin ANTICOAGULANT (COUMADIN) 5 MG tablet     Sig: TAKE 1-2 TABLETS (5-10 MG) BY MOUTH DAILY AS INSTRUCTED     Dispense:  180 tablet     Refill:  3     hold rx until pt calls to fill, please.          - Continue other medications without change  Work on weight loss  Regular exercise  See Patient Instructions    Return in about 6 months (around 11/6/2022) for Physical/Preventative Visit, cholesterol/lipids, diabetes, w/ Dr. BAEZ for 40 minute appointment.           Madina Mercado MD  [facility] PRIOR DAMION Poole is a 76 year old who presents for the following health issues     HPI     Diabetes Follow-up    How often are you checking your blood sugar? A few times a week  What time of day are you checking your blood sugars (select all that apply)?  Before meals - running about 118- 121   Have you had any blood sugars above 200?  No  Have you had any blood sugars below 70?  No    What symptoms do you notice when your blood sugar is low?  Lethargy    What concerns do you have today about your diabetes? None     Do you have any of these symptoms? (Select all that apply)  Weight loss    Have you had a diabetic eye exam in the last 12 months? Yes-  Location: [facility] - has upcoming appt with them in 6/2022.     May be time to do her cataracts coming up.      Atrial fibrillation, unspecified type (H)- well controlled - never feels it      Sleep Apnea-  CPAP machine is currently in for repair. - awakening in the am feeling rested.  ? Weight loss helping her breathing at night. Was close to 300lbs when she was initially diagnosed.     Meningioma (H)- no headaches or symptoms at all.     [place] thinking [place] 4 [place] . She's not quite ready yet.          IBS - D - has been acting up lately.  Has a friend who has recently been prescribed colestipol.  Feels like her social life is centered around whether she's having a good bowel day or a bad bowel day. She's eliminated many things from her diet including a lot of vegetables.  Eats a lot of celery and that doesn't make her IBS worse at all.  Has lost a lot of weight about 18-20 lbs in the last year with limiting her diet.   She started taking metamucil daily as a daily fiber therapy and that has been helping as well.  Irritable bowel syndrome with diarrhea was initially better last year with cholestyramine, then didn't work as well (2g/day = too much gassiness and bloating) - metamucil daily has been better. She'd like to try the colestipol tablet in addition to the metamucil powder.      Hasn't been taking a probiotic at all - was taking Align in the past.  See AVS for other probiotics and low-FODMAPS diet.     Wt Readings from Last 5 Encounters:   05/06/22 91.2 kg (201 lb)   11/05/21 97.1 kg (214 lb)   08/13/21 98.4 kg (217 lb)   06/21/21 98.4 kg (217 lb)   05/03/21 99.3 kg (219 lb)         Hyperlipidemia Follow-Up      Are you regularly taking any medication or supplement to lower your cholesterol?   Yes- Simvastatian    Are you having muscle aches or other side effects that you think could be caused by your cholesterol lowering medication?  No     Recent Labs   Lab Test 11/05/21  1049 05/03/21  1031 12/10/15  1203 04/22/15  1002 11/13/14  0957   CHOL 120 119   " < > 111 128   HDL 39* 43*   < > 33* 37*   LDL 67 61   < > 55 66   TRIG 69 74   < > 114 125   CHOLHDLRATIO  --   --   --  3.4 3.5    < > = values in this interval not displayed.        Hypertension Follow-up      Do you check your blood pressure regularly outside of the clinic? No     Are you following a low salt diet? Yes    Are your blood pressures ever more than 140 on the top number (systolic) OR more   than 90 on the bottom number (diastolic), for example 140/90? No    BP Readings from Last 2 Encounters:   05/06/22 130/60   11/05/21 124/78     Hemoglobin A1C POCT (%)   Date Value   05/03/2021 6.2 (H)   11/04/2020 5.7 (H)     Hemoglobin A1C (%)   Date Value   11/05/2021 6.2 (H)     LDL Cholesterol Calculated (mg/dL)   Date Value   11/05/2021 67   05/03/2021 61   11/04/2020 46         How many servings of fruits and vegetables do you eat daily?  0-1    On average, how many sweetened beverages do you drink each day (Examples: soda, juice, sweet tea, etc.  Do NOT count diet or artificially sweetened beverages)?   0    How many days per week do you exercise enough to make your heart beat faster? 3 or less    How many minutes a day do you exercise enough to make your heart beat faster? 9 or less    How many days per week do you miss taking your medication? 0        Review of Systems   Constitutional, HEENT, cardiovascular, pulmonary, GI, , musculoskeletal, neuro, skin, endocrine and psych systems are negative, except as otherwise noted.      Objective    /60   Pulse 72   Temp 97.1  F (36.2  C) (Tympanic)   Resp 18   Ht 1.607 m (5' 3.25\")   Wt 91.2 kg (201 lb)   SpO2 100%   BMI 35.32 kg/m    Body mass index is 35.32 kg/m .  Physical Exam   GENERAL: healthy, alert and no distress  EYES: Eyes grossly normal to inspection, PERRL and conjunctivae and sclerae normal  HENT: ear canals and TM's normal, nose and mouth without ulcers or lesions  NECK: no adenopathy, no asymmetry, masses, or scars and thyroid " normal to palpation  RESP: lungs clear to auscultation - no rales, rhonchi or wheezes  CV: regular rate and rhythm, normal S1 S2, no S3 or S4, no murmur, click or rub, no peripheral edema and peripheral pulses strong  ABDOMEN: soft, nontender, no hepatosplenomegaly, no masses and bowel sounds normal  MS: no gross musculoskeletal defects noted, no edema  SKIN: no suspicious lesions or rashes  NEURO: Normal strength and tone, mentation intact and speech normal  PSYCH: mentation appears normal, affect normal/bright  Diabetic foot exam:bilateral feet =  normal DP and PT pulses, no trophic changes or ulcerative lesions and normal sensory exam    Lab on 04/19/2022   Component Date Value Ref Range Status     INR 04/19/2022 2.8 (A) 0.9 - 1.1 Final

## 2022-05-07 LAB
ALBUMIN SERPL-MCNC: 3.8 G/DL (ref 3.4–5)
ALP SERPL-CCNC: 47 U/L (ref 40–150)
ALT SERPL W P-5'-P-CCNC: 24 U/L (ref 0–50)
ANION GAP SERPL CALCULATED.3IONS-SCNC: 7 MMOL/L (ref 3–14)
AST SERPL W P-5'-P-CCNC: 16 U/L (ref 0–45)
BILIRUB SERPL-MCNC: 0.6 MG/DL (ref 0.2–1.3)
BUN SERPL-MCNC: 9 MG/DL (ref 7–30)
CALCIUM SERPL-MCNC: 9.4 MG/DL (ref 8.5–10.1)
CHLORIDE BLD-SCNC: 96 MMOL/L (ref 94–109)
CHOLEST SERPL-MCNC: 103 MG/DL
CO2 SERPL-SCNC: 30 MMOL/L (ref 20–32)
CREAT SERPL-MCNC: 0.49 MG/DL (ref 0.52–1.04)
FASTING STATUS PATIENT QL REPORTED: YES
FERRITIN SERPL-MCNC: 9 NG/ML (ref 8–252)
GFR SERPL CREATININE-BSD FRML MDRD: >90 ML/MIN/1.73M2
GLUCOSE BLD-MCNC: 127 MG/DL (ref 70–99)
HDLC SERPL-MCNC: 39 MG/DL
IRON SATN MFR SERPL: 12 % (ref 15–46)
IRON SERPL-MCNC: 44 UG/DL (ref 35–180)
LDLC SERPL CALC-MCNC: 50 MG/DL
NONHDLC SERPL-MCNC: 64 MG/DL
POTASSIUM BLD-SCNC: 3.8 MMOL/L (ref 3.4–5.3)
PROT SERPL-MCNC: 7.1 G/DL (ref 6.8–8.8)
SODIUM SERPL-SCNC: 133 MMOL/L (ref 133–144)
TIBC SERPL-MCNC: 378 UG/DL (ref 240–430)
TRIGL SERPL-MCNC: 70 MG/DL
TSH SERPL DL<=0.005 MIU/L-ACNC: 0.67 MU/L (ref 0.4–4)

## 2022-05-10 ENCOUNTER — TELEPHONE (OUTPATIENT)
Dept: FAMILY MEDICINE | Facility: CLINIC | Age: 77
End: 2022-05-10
Payer: MEDICARE

## 2022-05-10 DIAGNOSIS — K58.0 IRRITABLE BOWEL SYNDROME WITH DIARRHEA: Primary | ICD-10-CM

## 2022-05-10 NOTE — TELEPHONE ENCOUNTER
Patient is calling because she thought Dr Mercado was going to prescribe her a medication for IBS.

## 2022-05-10 NOTE — TELEPHONE ENCOUNTER
LOV: 5/6/2022     IBS-D - patient requesting colestipol tablet    Routing to provider to review and advise.     Lana Cain RN  [place] [facility]

## 2022-05-11 RX ORDER — MONTELUKAST SODIUM 4 MG/1
2 TABLET, CHEWABLE ORAL 2 TIMES DAILY
Qty: 120 TABLET | Refills: 5 | Status: SHIPPED | OUTPATIENT
Start: 2022-05-11 | End: 2022-06-21

## 2022-05-12 LAB
DEPRECATED CALCIDIOL+CALCIFEROL SERPL-MC: <60 UG/L (ref 20–75)
VITAMIN D2 SERPL-MCNC: <5 UG/L
VITAMIN D3 SERPL-MCNC: 55 UG/L

## 2022-05-24 ENCOUNTER — LAB (OUTPATIENT)
Dept: LAB | Facility: CLINIC | Age: 77
End: 2022-05-24
Payer: MEDICARE

## 2022-05-24 ENCOUNTER — ANTICOAGULATION THERAPY VISIT (OUTPATIENT)
Dept: ANTICOAGULATION | Facility: CLINIC | Age: 77
End: 2022-05-24

## 2022-05-24 DIAGNOSIS — I48.19 PERSISTENT ATRIAL FIBRILLATION (H): ICD-10-CM

## 2022-05-24 DIAGNOSIS — Z79.01 LONG TERM CURRENT USE OF ANTICOAGULANT THERAPY: Primary | ICD-10-CM

## 2022-05-24 DIAGNOSIS — I48.91 ATRIAL FIBRILLATION, UNSPECIFIED TYPE (H): ICD-10-CM

## 2022-05-24 LAB — INR BLD: 2.6 (ref 0.9–1.1)

## 2022-05-24 PROCEDURE — 36415 COLL VENOUS BLD VENIPUNCTURE: CPT

## 2022-05-24 PROCEDURE — 85610 PROTHROMBIN TIME: CPT

## 2022-05-24 NOTE — PROGRESS NOTES
ANTICOAGULATION MANAGEMENT     Zulema Nixon 76 year old female is on warfarin with therapeutic INR result. (Goal INR 2.0-3.0)    Recent labs: (last 7 days)     05/24/22  1019   INR 2.6*       ASSESSMENT       Source(s): Chart Review and Patient/Caregiver Call       Warfarin doses taken: Warfarin taken as instructed    Diet: No new diet changes identified. Has been slowly adding in green vegetables because IBS is more under control    New illness, injury, or hospitalization: No    Medication/supplement changes: She started taking colestipol for IBS about 1 week ago. Per [facility]: No interactions with warfarin found.    Signs or symptoms of bleeding or clotting: No    Previous INR: Therapeutic last 2(+) visits    Additional findings: None       PLAN     Recommended plan for no diet, medication or health factor changes affecting INR     Dosing Instructions: continue your current warfarin dose with next INR in 4 weeks       Summary  As of 5/24/2022    Full warfarin instructions:  7.5 mg every Mon, Wed, Sat; 5 mg all other days   Next INR check:  6/21/2022             Telephone call with Zulema who agrees to plan and repeated back plan correctly    Lab visit scheduled    Education provided: Contact [phone]  with any changes, questions or concerns.     Plan made per [facility] anticoagulation protocol    Janeth Bustamante RN  Anticoagulation Clinic  5/24/2022    _______________________________________________________________________     Anticoagulation Episode Summary     Current INR goal:  2.0-3.0   TTR:  62.4 % (1 y)   Target end date:  Indefinite   Send INR reminders to:  [facility]    Indications    Long term current use of anticoagulant therapy [Z79.01]  Atrial fibrillation  unspecified type (H) [I48.91]  Persistent atrial fibrillation (H) [I48.19]           Comments:           Anticoagulation Care Providers     Provider Role Specialty Phone number    Madina Mercado MD Referring Family Medicine  [phone]

## 2022-06-09 ENCOUNTER — TRANSFERRED RECORDS (OUTPATIENT)
Dept: HEALTH INFORMATION MANAGEMENT | Facility: CLINIC | Age: 77
End: 2022-06-09
Payer: MEDICARE

## 2022-06-09 LAB — RETINOPATHY: NEGATIVE

## 2022-06-21 ENCOUNTER — ANTICOAGULATION THERAPY VISIT (OUTPATIENT)
Dept: ANTICOAGULATION | Facility: CLINIC | Age: 77
End: 2022-06-21

## 2022-06-21 ENCOUNTER — ALLIED HEALTH/NURSE VISIT (OUTPATIENT)
Dept: NURSING | Facility: CLINIC | Age: 77
End: 2022-06-21
Payer: MEDICARE

## 2022-06-21 ENCOUNTER — LAB (OUTPATIENT)
Dept: LAB | Facility: CLINIC | Age: 77
End: 2022-06-21

## 2022-06-21 ENCOUNTER — OFFICE VISIT (OUTPATIENT)
Dept: FAMILY MEDICINE | Facility: CLINIC | Age: 77
End: 2022-06-21

## 2022-06-21 ENCOUNTER — OFFICE VISIT (OUTPATIENT)
Dept: PEDIATRICS | Facility: CLINIC | Age: 77
End: 2022-06-21
Attending: PHYSICIAN ASSISTANT
Payer: MEDICARE

## 2022-06-21 ENCOUNTER — HOSPITAL ENCOUNTER (OUTPATIENT)
Dept: CT IMAGING | Facility: CLINIC | Age: 77
Discharge: HOME | End: 2022-06-21
Attending: FAMILY MEDICINE | Admitting: FAMILY MEDICINE
Payer: MEDICARE

## 2022-06-21 VITALS
RESPIRATION RATE: 18 BRPM | HEART RATE: 77 BPM | DIASTOLIC BLOOD PRESSURE: 88 MMHG | BODY MASS INDEX: 34.99 KG/M2 | WEIGHT: 199.1 LBS | OXYGEN SATURATION: 97 % | SYSTOLIC BLOOD PRESSURE: 152 MMHG

## 2022-06-21 VITALS
DIASTOLIC BLOOD PRESSURE: 88 MMHG | BODY MASS INDEX: 34.99 KG/M2 | WEIGHT: 199.1 LBS | HEART RATE: 77 BPM | SYSTOLIC BLOOD PRESSURE: 152 MMHG | TEMPERATURE: 97.3 F | OXYGEN SATURATION: 97 % | RESPIRATION RATE: 18 BRPM

## 2022-06-21 VITALS
HEART RATE: 86 BPM | WEIGHT: 199 LBS | OXYGEN SATURATION: 98 % | DIASTOLIC BLOOD PRESSURE: 87 MMHG | TEMPERATURE: 97.5 F | SYSTOLIC BLOOD PRESSURE: 120 MMHG | BODY MASS INDEX: 34.97 KG/M2

## 2022-06-21 DIAGNOSIS — R79.1 SUPRATHERAPEUTIC INR: ICD-10-CM

## 2022-06-21 DIAGNOSIS — I48.19 PERSISTENT ATRIAL FIBRILLATION (H): ICD-10-CM

## 2022-06-21 DIAGNOSIS — I10 ESSENTIAL HYPERTENSION WITH GOAL BLOOD PRESSURE LESS THAN 140/90: ICD-10-CM

## 2022-06-21 DIAGNOSIS — S09.90XA INJURY OF HEAD, INITIAL ENCOUNTER: Primary | ICD-10-CM

## 2022-06-21 DIAGNOSIS — Q43.8 TORTUOUS COLON: ICD-10-CM
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
## 2022-06-21 DIAGNOSIS — Z79.01 ANTICOAGULATED ON COUMADIN: ICD-10-CM

## 2022-06-21 DIAGNOSIS — S09.90XA INJURY OF HEAD, INITIAL ENCOUNTER: ICD-10-CM

## 2022-06-21 DIAGNOSIS — I48.91 ATRIAL FIBRILLATION, UNSPECIFIED TYPE (H): ICD-10-CM

## 2022-06-21 DIAGNOSIS — Z79.01 LONG TERM CURRENT USE OF ANTICOAGULANT THERAPY: Primary | ICD-10-CM

## 2022-06-21 DIAGNOSIS — D32.9 MENINGIOMA (H): ICD-10-CM

## 2022-06-21 DIAGNOSIS — Z12.11 SCREEN FOR COLON CANCER: ICD-10-CM

## 2022-06-21 DIAGNOSIS — K58.0 IRRITABLE BOWEL SYNDROME WITH DIARRHEA: ICD-10-CM

## 2022-06-21 LAB — INR BLD: 3.2 (ref 0.9–1.1)

## 2022-06-21 PROCEDURE — 99214 OFFICE O/P EST MOD 30 MIN: CPT | Performed by: FAMILY MEDICINE

## 2022-06-21 PROCEDURE — 85610 PROTHROMBIN TIME: CPT

## 2022-06-21 PROCEDURE — 99207 PR NO CHARGE NURSE ONLY: CPT

## 2022-06-21 PROCEDURE — 70450 CT HEAD/BRAIN W/O DYE: CPT

## 2022-06-21 PROCEDURE — 36416 COLLJ CAPILLARY BLOOD SPEC: CPT

## 2022-06-21 PROCEDURE — 99207 REFERRAL TO ACUTE AND DIAGNOSTIC SERVICES: CPT | Performed by: PHYSICIAN ASSISTANT

## 2022-06-21 ASSESSMENT — PAIN SCALES - GENERAL: PAINLEVEL: NO PAIN (0)

## 2022-06-21 NOTE — PATIENT INSTRUCTIONS
As we discussed head bleeds in older patients can sometimes be slow developing it is important to look out for symptoms which require immediate attention in the emergency room this includes: vomiting, confusion, slurred speech, lethargy, loss of movement of body, balance issues, visual difficulties    The bruising her face may take a couple weeks to resolve    To help with the swelling apply ice for 10 minutes every half an hour or so for the first 2 days

## 2022-06-21 NOTE — PROGRESS NOTES
ANTICOAGULATION MANAGEMENT     Zulema Nixon 76 year old female is on warfarin with supratherapeutic INR result. (Goal INR 2.0-3.0)    Recent labs: (last 7 days)     06/21/22  1037   INR 3.2*       ASSESSMENT       Source(s): Chart Review and Patient/Caregiver Call       Warfarin doses taken: Warfarin taken as instructed    Diet: Decreased greens/vitamin K in diet; ongoing change    New illness, injury, or hospitalization: Yes: loose stools from IBS.  Has been referred to GI.    Fall today.  Small hematomas and small cuts on her face.  Saw provider today.  CT was negative.    Medication/supplement changes: None noted    Signs or symptoms of bleeding or clotting: Yes: from fall.    Previous INR: Therapeutic last 2(+) visits    Additional findings: None       PLAN     Recommended plan for temporary change(s) and ongoing change(s) affecting INR     Dosing Instructions: partial hold then decrease your warfarin dose (6% change) with next INR in 2 weeks       Summary  As of 6/21/2022    Full warfarin instructions:  6/21: 2.5 mg; Otherwise 7.5 mg every Wed, Sat; 5 mg all other days   Next INR check:  7/7/2022             Telephone call with Zulema who agrees to plan and repeated back plan correctly    Lab visit scheduled    Education provided: Please call back if any changes to your diet, medications or how you've been taking warfarin    Plan made per [facility] anticoagulation protocol    Sydney Guerrero RN  Anticoagulation Clinic  6/21/2022    _______________________________________________________________________     Anticoagulation Episode Summary     Current INR goal:  2.0-3.0   TTR:  60.3 % (1 y)   Target end date:  Indefinite   Send INR reminders to:  [facility]    Indications    Long term current use of anticoagulant therapy [Z79.01]  Atrial fibrillation  unspecified type (H) [I48.91]  Persistent atrial fibrillation (H) [I48.19]           Comments:           Anticoagulation Care Providers     Provider Role Specialty  Phone number    Madina Mercado MD Referring Family Medicine [phone]

## 2022-06-21 NOTE — PROGRESS NOTES
S-(situation): Pt noted fell forward today.     B-(background): Pt reports was leaning over to [employer]. Pt noted reached out, grabbed weed and noted to fall forward. Pt noted landed face forward.     A-(assessment): Pt noted to have a cut on nose, above right eye, and small 2 cm bruise on lower lip. Pt denied LOC, lightheaded or dizziness. Pt denies pain.     NEURO:  equal  strength, neg Romberg, DTR II/IV bilaterally (UE and LE), finger to nose normal, CN intact, ambulates without difficulty.  no focal deficits noted.      R-(recommendations): Pt to be seen by Lizy Mendoza, referral to ADS    HAMMAD Ring [facility] [place] Triage      Fall with head strike    Onset: 1 hour ago     Description:   Do you feel like you are going to faint: no  Or just unsteady/off balance: no  Does it feel like the surroundings (bed, room) are moving: no  Have you passed out or fallen: YES  History of head trauma: no  Do certain movements/positions of the head make it worse: NONE  Does staying in a fixed position give relief: not applicable  Is it worse with activity or movement: not applicable    Accompanying Signs & Symptoms:       Difficulty speaking (aphasia): no  Off balance (ataxia): no  Speech problems(Dysarthria): no  Ringing in ears (Tinnitus): no  Ear pain: no  Hearing Loss: no  Rapid eye movement (Nystagmus): no  Double vision (Diplopia): no  Fatigue: no Rapid/irregular (Palpitations): YES- Pt was noted to be in A-fib, reports this is normal for her  Nausea, vomiting: no  Weakness in arms or legs: no  Staggering gait: no  Falls: YES- this morning with abrasions to face  Black tarry stool (Melena): no  Rectal bleeding: no  Heavy menstrual bleeding: no       Precipitating and/or Alleviating factors:    Any increase in anxiety: no  Any new BP medications: no  Alcohol/drugabuse/withdrawl: no    Progression of Symptoms:  same    Therapies tried and outcome: None    HAMMAD Ring [facility]  [facility]

## 2022-06-21 NOTE — PROGRESS NOTES
"  Assessment & Plan     Injury of head, initial encounter  Anticoagulated on Coumadin  Supratherapeutic INR  Meningioma (H)--left frontal - MRI 4/25/2019 = stable from 10/2018 and 11/2/2019- no further follow up needed per Neurology per patient - noted 11/4/2020   Superficial abrasions of the chin and right side of face from fall today.  Due to anticoagulation would recommend CT scan to rule out intracranial bleed.  Patient will proceed to the acute diagnostic service Center immediately from here.  - Referral to Acute and Diagnostic Services (Day of diagnostic / First order acute)    Irritable bowel syndrome with diarrhea - better with metamucil - was initially better with cholestyramine, then didn't work as well (2g/day = too much gassiness and bloating)   Screen for colon cancer  Tortuous colon  Patient states that she is struggled with overactive bladder for years.  Asked Dr. Mercado to \"try something.  She sent over a prescription for colestipol to take 2 tablets 4 times a day but has not progressed up to the 4 tablets daily as she has noticed increased gassiness and bloating.  She is wondering if she  should continue to work her way up and dosing.  She has not seen gastroenterology.  She has not had a colonoscopy since 2006 as it was a traumatic experience because she had a \"tortuous colon.  Recommend that she discontinue the colestipol medication at this time.  - Adult Gastro Ref - Consult Only  - Adult Gastro Ref - Procedure Only    Essential hypertension with goal blood pressure less than 140/90  Patient did not take blood pressure medication today.      Referral to Acute and Diagnostic Services    The [facility] Acute and Diagnostics Services Clinic has been contacted at [phone] ([place]) to confirm patient acceptance. The transition to Acute & Diagnostic Services Clinic has been discussed with patient, and she agrees with next level of care.  Patient understands that evaluation/treatment " at [facility] typically takes significantly longer than in clinic/urgent care (>2 hours).      Special issues:  None      Referral placed: Yes  Patient has transportation arranged and will travel to the [facility] without delay: Yes  Patient aware not to eat or drink. n/a    The following provider has assessed this patient for intervention at [facility], and directed the patient for referral: Lizy Mendoza PA-C    Return today (on 6/21/2022) for ADS.    Lizy Mendoza PA-C  [facility]    Celestine Poole is a 76 year old accompanied by her daughter., presenting for the following health issues:  Fall, Abrasion, and Head Injury    S-(situation): Pt noted fell forward today.     B-(background): Pt reports was leaning over to [employer]. Pt noted reached out, grabbed weed and noted to fall forward. Pt noted landed face forward.     A-(assessment): Pt noted to have a cut on nose, above right eye, and small 2 cm bruise on lower lip. Pt denied LOC, lightheaded or dizziness. Pt denies pain.     NEURO:  equal  strength, neg Romberg, DTR II/IV bilaterally (UE and LE), finger to nose normal, CN intact, ambulates without difficulty.  no focal deficits noted.      R-(recommendations): Pt to be seen by Lizy Mendoza, referral to [facility]    Raleigh CARTER RN   [facility] [place] Triage        HPI         Review of Systems   Constitutional, HEENT, cardiovascular, pulmonary, gi and gu systems are negative, except as otherwise noted.      Objective    BP (!) 152/88 (BP Location: Left arm, Patient Position: Chair, Cuff Size: Adult Regular)   Pulse 77   Temp 97.3  F (36.3  C) (Tympanic)   Resp 18   Wt 90.3 kg (199 lb 1.6 oz)   SpO2 97%   BMI 34.99 kg/m    Body mass index is 34.99 kg/m .  Physical Exam   GENERAL: healthy, alert and no distress  EYES: Eyes grossly normal to inspection, PERRL and conjunctivae and sclerae normal  NECK: no adenopathy, no asymmetry, masses, or scars and thyroid normal to  palpation  RESP: lungs clear to auscultation - no rales, rhonchi or wheezes  CV: regular rate and rhythm, normal S1 S2, no S3 or S4, no murmur, click or rub, no peripheral edema and peripheral pulses strong  ABDOMEN: soft, nontender, no hepatosplenomegaly, no masses and bowel sounds normal  MS: no gross musculoskeletal defects noted, no edema  SKIN: Superficial abrasions of the right temple, very small laceration to the bridge of the nose, small hematoma on the right chin and early ecchymosis development of the right brow  NEURO: cranial nerves II-XII grossly intact  PSYCH: mentation appears normal, affect normal/bright    Results for orders placed or performed in visit on 06/21/22 (from the past 24 hour(s))   INR point of care   Result Value Ref Range    INR 3.2 (H) 0.9 - 1.1    Narrative    This test is intended for monitoring Coumadin therapy. Results are not accurate in patients with prolonged INR due to factor deficiency.     MRI Brain 4/25/19  IMPRESSION:  1. No change in the extra-axial mass in the left frontal region. This  is consistent with a meningioma.  2. There is diffuse parenchymal volume loss. White matter changes are  present in the cerebral hemispheres that are consistent with small  vessel ischemic disease in this age patient.              .  ..

## 2022-06-21 NOTE — PROGRESS NOTES
"  HPI     Concern - Fall this AM (9:30ish) outside while gardening  Onset: No pain, small lac's and bruises to face (near right eye, nose and on chin)  Description: Fell face first onto garden area/\"Field rock.\" Small amount of blood from nose lac. Denies LOC, pain, dizziness or lightheadedness. Has not yet taken normal meds \"So my blood pressure might be high\"  Intensity: moderate  Progression of Symptoms:  worsening and same  Previous history of similar problem: N/A  Precipitating factors:        Worsened by: N/A  Alleviating factors:        Improved by: N/A  Therapies tried and outcome: None            .  ..  "

## 2022-06-21 NOTE — PROGRESS NOTES
Assessment & Plan     Injury of head, initial encounter  - Referral to Acute and Diagnostic Services (Day of diagnostic / First order acute)  - CT Head w/o Contrast    Anticoagulated on Coumadin  - Referral to Acute and Diagnostic Services (Day of diagnostic / First order acute)    No acute head bleed at this time.  Suspicion for facial fracture is low based on exam history and then also imaging which confirms this.  Patient appears well at this time with no neuro decline.  We discussed over the next couple weeks it is imperative that the family members continue to monitor Zulema for any signs of neuro decline her daughter was present for this counseling I did provide her with a handout to as a reminder.  Ice PRN for hematomas. No indication for laceration repair at this time.     Patient absent of neck pain or restricted motion will defer CT imaging at this time based on NEXUS screening criteria.     Gilles Fletcher MD   [place] [facility]    Celestine Poole is a 76 year old female who presents to clinic today for the following health issues:  Chief Complaint   Patient presents with     Fall     HPI    Patient was working on her garden today waiting for her daughter to pick her up when she stumbled when attempting to pull some weeds she fell forward and hit the ground.  Did not pass out or lose consciousness.  She has no visual difficulties.  No previous history of head bleeds.  She is currently on Coumadin        Patient Active Problem List    Diagnosis Date Noted     Anemia, unspecified type 11/14/2021     Priority: Medium     Personal history of TIA (transient ischemic attack) 11/14/2021     Priority: Medium     Vitamin D deficiency 11/14/2021     Priority: Medium     Grief reaction - 's death from COVID-19 complications 12/2/2020 05/03/2021     Priority: Medium     Carpal tunnel syndrome of right wrist- increasing over time - now awakening at night with tingling and pain despite wearing a brace  at night - desires referral  11/04/2020     Priority: Medium     Screen for colon cancer- pt declines referral for colonoscopy again this year- consents to do one step FIT test  11/04/2020     Priority: Medium     Persistent atrial fibrillation (H) 06/03/2020     Priority: Medium     Meningioma (H)--left frontal - MRI 4/25/2019 = stable from 10/2018 and 11/2/2019- no further follow up needed per Neurology per patient - noted 11/4/2020 09/20/2018     Priority: Medium     Irritable bowel syndrome with diarrhea - better with metamucil - was initially better with cholestyramine, then didn't work as well (2g/day = too much gassiness and bloating)  01/10/2018     Priority: Medium     Osteoarthritis of both knees, unspecified osteoarthritis type 01/10/2018     Priority: Medium     Type 2 diabetes mellitus with diabetic polyneuropathy, without long-term current use of insulin (H) 12/12/2016     Priority: Medium     Onychomycosis 05/23/2016     Priority: Medium     Long term current use of anticoagulant therapy 03/01/2016     Priority: Medium     Type 2 diabetes mellitus with other circulatory complication, without long-term current use of insulin (H) 12/10/2015     Priority: Medium     Bilateral leg edema- has been to lymphedema clinic in past 11/13/2014     Priority: Medium     Essential hypertension with goal blood pressure less than 140/90 11/13/2014     Priority: Medium     Sensorineural hearing loss of both ears - using hearing aids      Priority: Medium     Advanced directives, counseling/discussion 09/28/2011     Priority: Medium     Advance Care Planning 5/23/2016: ACP Review of Chart / Resources Provided:  Reviewed chart for advance care plan.  Zulema Nixon has been provided information and resources to begin or update their advance care plan.  Added by Torie Hunter             Venous stasis of lower extremity      Priority: Medium     excision varicose vein left lower extremity       CKD (chronic kidney  disease) stage 2, GFR 60-89 ml/min      Priority: Medium     with microalbuminuria       Status post laparoscopic cholecystectomy      Priority: Medium     cholecystectomy for cholelithiasis       Hyperlipidemia LDL goal <100- fish oil = worse IBS with diarrhea  10/31/2010     Priority: Medium     Morbid obesity due to excess calories (H)      Priority: Medium     ARMAND (obstructive sleep apnea)      Priority: Medium     C PAP       Atrial fibrillation, unspecified type (H) 11/01/2006     Priority: Medium     cardioverted 2/2007, on chronic anticoagulation         Current Outpatient Medications   Medication     alcohol swab prep pads     amLODIPine (NORVASC) 5 MG tablet     ASPIRIN 81 MG OR TABS     atenolol (TENORMIN) 100 MG tablet     BIOTIN 10 MG OR TABS     blood glucose (JAYDE CONTOUR NEXT) test strip     blood glucose calibration (NO BRAND SPECIFIED) solution     blood glucose monitoring (JAYDE MICROLET) lancets     blood glucose monitoring (NO BRAND SPECIFIED) meter device kit     CALTRATE 600 + D 600-200 MG-IU OR TABS     CENTRUM SILVER OR TABS     hydrochlorothiazide (HYDRODIURIL) 25 MG tablet     lisinopril (ZESTRIL) 40 MG tablet     metFORMIN (GLUCOPHAGE) 1000 MG tablet     simvastatin (ZOCOR) 20 MG tablet     VITAMIN D 1000 UNIT OR CAPS     warfarin ANTICOAGULANT (COUMADIN) 5 MG tablet     No current facility-administered medications for this visit.           Objective    /87 (Patient Position: Sitting)   Pulse 86   Temp 97.5  F (36.4  C) (Oral)   Wt 90.3 kg (199 lb)   SpO2 98%   BMI 34.97 kg/m    Physical Exam     Face: no step off, hematoma lateral to the right eye orbit , hematoma of R lower chin, no open lacerations, superficial abrasion right of bridge of nose  Mouth: no missing dentition  Neck: no c-spine tenderness, preserved ROM active         Results for orders placed or performed during the hospital encounter of 06/21/22   CT Head w/o Contrast     Status: None (Preliminary result)     Narrative    CT SCAN OF THE HEAD WITHOUT CONTRAST   6/21/2022 12:43 PM     HISTORY: Injury of head, initial encounter.    TECHNIQUE:  Axial images of the head and coronal reformations without  IV contrast material. Radiation dose for this scan was reduced using  automated exposure control, adjustment of the mA and/or kV according  to patient size, or iterative reconstruction technique.    COMPARISON: MRI of the brain 4/25/2019 and 10/24/2018.    FINDINGS: The ventricles are normal in size and configuration. Mild  generalized brain parenchymal volume loss. Mild patchy nonspecific  hypodensity in the cerebral white matter, likely due to chronic small  vessel ischemic changes. Redemonstration of an extra-axial calcified  mass overlying the left frontal pole with mild mass effect on the  underlying brain parenchyma. The mass does not appear significantly  changed, accounting for differences in technique, compared to the most  recent MRI exam. As before, there is mild nonspecific hypodensity in  the subcortical white matter of the anterior inferior left frontal  lobe that corresponds to T2/FLAIR signal abnormality on the previous  study. This could represent mild vasogenic edema or possibly gliosis.  It does not appear significantly changed in extent. There is no  midline shift/herniation. No acute intracranial hemorrhage or  extra-axial fluid collection is identified. Scattered intracranial  atherosclerotic calcifications are present.    The paranasal sinuses and mastoids appear clear where visualized. The  calvarium appears intact.      Impression    IMPRESSION:  1. No CT findings of acute intracranial process.  2. Unchanged calcified extra-axial mass overlying the left frontal  pole with mild mass effect on the anterior left frontal lobe and mild  nonspecific hypodensity (possibly vasogenic edema or gliosis) in the  adjacent anterior-inferior left frontal white matter.  3. Brain atrophy and presumed chronic small  vessel ischemic change, as  described.   Results for orders placed or performed in visit on 06/21/22   INR point of care     Status: Abnormal   Result Value Ref Range    INR 3.2 (H) 0.9 - 1.1    Narrative    This test is intended for monitoring Coumadin therapy. Results are not accurate in patients with prolonged INR due to factor deficiency.                     The use of Dragon/[employer] dictation services may have been used to construct the content in this note; any grammatical or spelling errors are non-intentional. Please contact the author of this note directly if you are in need of any clarification.

## 2022-07-07 ENCOUNTER — LAB (OUTPATIENT)
Dept: LAB | Facility: CLINIC | Age: 77
End: 2022-07-07
Payer: MEDICARE

## 2022-07-07 ENCOUNTER — ANTICOAGULATION THERAPY VISIT (OUTPATIENT)
Dept: ANTICOAGULATION | Facility: CLINIC | Age: 77
End: 2022-07-07

## 2022-07-07 DIAGNOSIS — I48.19 PERSISTENT ATRIAL FIBRILLATION (H): ICD-10-CM

## 2022-07-07 DIAGNOSIS — I48.91 ATRIAL FIBRILLATION, UNSPECIFIED TYPE (H): ICD-10-CM

## 2022-07-07 DIAGNOSIS — Z79.01 LONG TERM CURRENT USE OF ANTICOAGULANT THERAPY: Primary | ICD-10-CM

## 2022-07-07 LAB — INR BLD: 2.3 (ref 0.9–1.1)

## 2022-07-07 PROCEDURE — 36416 COLLJ CAPILLARY BLOOD SPEC: CPT

## 2022-07-07 PROCEDURE — 85610 PROTHROMBIN TIME: CPT

## 2022-07-07 NOTE — PROGRESS NOTES
ANTICOAGULATION MANAGEMENT     Zulema Nixon 76 year old female is on warfarin with therapeutic INR result. (Goal INR 2.0-3.0)    Recent labs: (last 7 days)     07/07/22  1023   INR 2.3*       ASSESSMENT       Source(s): Chart Review    Previous INR was Supratherapeutic    Medication, diet, health changes since last INR    Warfarin dose decreased 6/21/22           PLAN     Unable to reach Zulema today.    Left message to return call to [phone] Left message to call [phone].     Follow up required to confirm warfarin dose taken and assess for changes    Jenni Nava RN  Anticoagulation Clinic  7/7/2022

## 2022-07-07 NOTE — PROGRESS NOTES
ANTICOAGULATION MANAGEMENT     Zulema Nixon 76 year old female is on warfarin with therapeutic INR result. (Goal INR 2.0-3.0)    Recent labs: (last 7 days)     07/07/22  1023   INR 2.3*       ASSESSMENT       Source(s): Chart Review and Patient/Caregiver Call       Warfarin doses taken: Warfarin taken as instructed    Diet: No new diet changes identified    New illness, injury, or hospitalization: No    Medication/supplement changes: colestipol stopped on 6/21/22 No interaction anticipated    Signs or symptoms of bleeding or clotting: No, patient reports bruising from fall on 6/21/22 are resolving    Previous INR: Supratherapeutic    Additional findings: Upcoming surgery/procedure cataract surgery, not scheduled yet       PLAN     Recommended plan for no diet, medication or health factor changes affecting INR     Dosing Instructions: continue your current warfarin dose with next INR in 2 weeks       Summary  As of 7/7/2022    Full warfarin instructions:  7.5 mg every Wed, Sat; 5 mg all other days   Next INR check:  7/20/2022             Telephone call with Zulema who verbalizes understanding and agrees to plan    Lab visit scheduled    Education provided: Please call back if any changes to your diet, medications or how you've been taking warfarin and Contact [phone]  with any changes, questions or concerns.     Plan made per [facility] anticoagulation protocol    Jenni Nava RN  Anticoagulation Clinic  7/7/2022    _______________________________________________________________________     Anticoagulation Episode Summary     Current INR goal:  2.0-3.0   TTR:  63.7 % (1 y)   Target end date:  Indefinite   Send INR reminders to:  [facility]    Indications    Long term current use of anticoagulant therapy [Z79.01]  Atrial fibrillation  unspecified type (H) [I48.91]  Persistent atrial fibrillation (H) [I48.19]           Comments:           Anticoagulation Care Providers     Provider Role Specialty Phone number     Madina Mercado MD [facility] [phone]

## 2022-07-20 ENCOUNTER — ANTICOAGULATION THERAPY VISIT (OUTPATIENT)
Dept: ANTICOAGULATION | Facility: CLINIC | Age: 77
End: 2022-07-20

## 2022-07-20 ENCOUNTER — LAB (OUTPATIENT)
Dept: LAB | Facility: CLINIC | Age: 77
End: 2022-07-20
Payer: MEDICARE

## 2022-07-20 DIAGNOSIS — I48.19 PERSISTENT ATRIAL FIBRILLATION (H): ICD-10-CM

## 2022-07-20 DIAGNOSIS — Z79.01 LONG TERM CURRENT USE OF ANTICOAGULANT THERAPY: Primary | ICD-10-CM

## 2022-07-20 DIAGNOSIS — I48.91 ATRIAL FIBRILLATION, UNSPECIFIED TYPE (H): ICD-10-CM

## 2022-07-20 LAB — INR BLD: 2.1 (ref 0.9–1.1)

## 2022-07-20 PROCEDURE — 36415 COLL VENOUS BLD VENIPUNCTURE: CPT

## 2022-07-20 PROCEDURE — 85610 PROTHROMBIN TIME: CPT

## 2022-07-20 NOTE — PROGRESS NOTES
ANTICOAGULATION MANAGEMENT     Zulema Nixon 77 year old female is on warfarin with therapeutic INR result. (Goal INR 2.0-3.0)    Recent labs: (last 7 days)     07/20/22  1033   INR 2.1*       ASSESSMENT       Source(s): Chart Review and Patient/Caregiver Call       Warfarin doses taken: Warfarin taken as instructed    Diet: No new diet changes identified    New illness, injury, or hospitalization: No    Medication/supplement changes: None noted    Signs or symptoms of bleeding or clotting: No    Previous INR: Therapeutic last visit; previously outside of goal range    Additional findings: Upcoming surgery/procedure cataract surgery scheduled on 9/26/22       PLAN     Recommended plan for no diet, medication or health factor changes affecting INR     Dosing Instructions: continue your current warfarin dose with next INR in 4 weeks       Summary  As of 7/20/2022    Full warfarin instructions:  7.5 mg every Wed, Sat; 5 mg all other days   Next INR check:  8/17/2022             Telephone call with Zulema who agrees to plan and repeated back plan correctly    Lab visit scheduled    Education provided: Please call back if any changes to your diet, medications or how you've been taking warfarin    Plan made per [facility] anticoagulation protocol    Sydney Guerrero RN  Anticoagulation Clinic  7/20/2022    _______________________________________________________________________     Anticoagulation Episode Summary     Current INR goal:  2.0-3.0   TTR:  67.2 % (1 y)   Target end date:  Indefinite   Send INR reminders to:  [facility]    Indications    Long term current use of anticoagulant therapy [Z79.01]  Atrial fibrillation  unspecified type (H) [I48.91]  Persistent atrial fibrillation (H) [I48.19]           Comments:           Anticoagulation Care Providers     Provider Role Specialty Phone number    Madina Mercado MD Referring Family Medicine [phone]

## 2022-08-17 ENCOUNTER — ANTICOAGULATION THERAPY VISIT (OUTPATIENT)
Dept: ANTICOAGULATION | Facility: CLINIC | Age: 77
End: 2022-08-17

## 2022-08-17 ENCOUNTER — LAB (OUTPATIENT)
Dept: LAB | Facility: CLINIC | Age: 77
End: 2022-08-17
Payer: MEDICARE

## 2022-08-17 DIAGNOSIS — I48.19 PERSISTENT ATRIAL FIBRILLATION (H): ICD-10-CM

## 2022-08-17 DIAGNOSIS — Z79.01 LONG TERM CURRENT USE OF ANTICOAGULANT THERAPY: Primary | ICD-10-CM

## 2022-08-17 DIAGNOSIS — I48.91 ATRIAL FIBRILLATION, UNSPECIFIED TYPE (H): ICD-10-CM

## 2022-08-17 LAB — INR BLD: 3 (ref 0.9–1.1)

## 2022-08-17 PROCEDURE — 85610 PROTHROMBIN TIME: CPT

## 2022-08-17 PROCEDURE — 36416 COLLJ CAPILLARY BLOOD SPEC: CPT

## 2022-08-17 NOTE — PROGRESS NOTES
ANTICOAGULATION MANAGEMENT     Zulema Nixon 77 year old female is on warfarin with therapeutic INR result. (Goal INR 2.0-3.0)    Recent labs: (last 7 days)     08/17/22  1030   INR 3.0*       ASSESSMENT       Source(s): Chart Review and Patient/Caregiver Call       Warfarin doses taken: Warfarin taken as instructed    Diet: No new diet changes identified    New illness, injury, or hospitalization: No    Medication/supplement changes: None noted    Signs or symptoms of bleeding or clotting: No    Previous INR: Therapeutic last 2(+) visits    Additional findings: None       PLAN     Recommended plan for no diet, medication or health factor changes affecting INR     Dosing Instructions: Continue your current warfarin dose with next INR in 4 weeks       Summary  As of 8/17/2022    Full warfarin instructions:  7.5 mg every Wed, Sat; 5 mg all other days   Next INR check:  9/14/2022             Telephone call with Zulema who verbalizes understanding and agrees to plan    Lab visit scheduled    Education provided: None required    Plan made per [facility] anticoagulation protocol    Hank Machado RN  Anticoagulation Clinic  8/17/2022    _______________________________________________________________________     Anticoagulation Episode Summary     Current INR goal:  2.0-3.0   TTR:  74.9 % (1 y)   Target end date:  Indefinite   Send INR reminders to:  [facility]    Indications    Long term current use of anticoagulant therapy [Z79.01]  Atrial fibrillation  unspecified type (H) [I48.91]  Persistent atrial fibrillation (H) [I48.19]           Comments:           Anticoagulation Care Providers     Provider Role Specialty Phone number    Madina Mercado MD Referring Family Medicine [phone]

## 2022-09-14 ENCOUNTER — LAB (OUTPATIENT)
Dept: LAB | Facility: CLINIC | Age: 77
End: 2022-09-14
Payer: MEDICARE

## 2022-09-14 ENCOUNTER — ANTICOAGULATION THERAPY VISIT (OUTPATIENT)
Dept: ANTICOAGULATION | Facility: CLINIC | Age: 77
End: 2022-09-14

## 2022-09-14 DIAGNOSIS — I48.19 PERSISTENT ATRIAL FIBRILLATION (H): ICD-10-CM

## 2022-09-14 DIAGNOSIS — Z79.01 LONG TERM CURRENT USE OF ANTICOAGULANT THERAPY: Primary | ICD-10-CM

## 2022-09-14 DIAGNOSIS — I48.91 ATRIAL FIBRILLATION, UNSPECIFIED TYPE (H): ICD-10-CM

## 2022-09-14 LAB — INR BLD: 3.9 (ref 0.9–1.1)

## 2022-09-14 PROCEDURE — 85610 PROTHROMBIN TIME: CPT

## 2022-09-14 PROCEDURE — 36416 COLLJ CAPILLARY BLOOD SPEC: CPT

## 2022-09-14 NOTE — PROGRESS NOTES
ANTICOAGULATION MANAGEMENT     Zulema Nixon 77 year old female is on warfarin with supratherapeutic INR result. (Goal INR 2.0-3.0)    Recent labs: (last 7 days)     09/14/22  0940   INR 3.9*       ASSESSMENT       Source(s): Chart Review and Patient/Caregiver Call       Warfarin doses taken: Warfarin taken as instructed    Diet: Decreased greens/vitamin K in diet; ongoing change    New illness, injury, or hospitalization: flair up of IBS for the last 2 weeks and having loose stools.  She has had flair ups in the past but normally they do not last this long.  Has appointment with PCP next week and also plans to follow up with GI by the end of the month.    Medication/supplement changes: None noted    Signs or symptoms of bleeding or clotting: No    Previous INR: Therapeutic last 2(+) visits    Additional findings: Upcoming surgery/procedure cataract surgery on 9/26/22       PLAN     Recommended plan for ongoing change(s) affecting INR     Dosing Instructions: hold 1 doses then decrease your warfarin dose (12.5% change) with next INR in 10 days       Summary  As of 9/14/2022    Full warfarin instructions:  9/14: Hold; Otherwise 5 mg every day   Next INR check:  9/23/2022             Telephone call with Zulema who agrees to plan and repeated back plan correctly    Check at provider office visit    Education provided: Please call back if any changes to your diet, medications or how you've been taking warfarin, Impact of vitamin K foods on INR, Goal range and significance of current result and Monitoring for bleeding signs and symptoms    Plan made per [facility] anticoagulation protocol    Sydney Guerrero RN  Anticoagulation Clinic  9/14/2022    _______________________________________________________________________     Anticoagulation Episode Summary     Current INR goal:  2.0-3.0   TTR:  74.9 % (1 y)   Target end date:  Indefinite   Send INR reminders to:  [facility]    Indications    Long term current use of  anticoagulant therapy [Z79.01]  Atrial fibrillation  unspecified type (H) [I48.91]  Persistent atrial fibrillation (H) [I48.19]           Comments:           Anticoagulation Care Providers     Provider Role Specialty Phone number    Madina Mercado MD Referring Family Medicine [phone]

## 2022-09-23 ENCOUNTER — TELEPHONE (OUTPATIENT)
Dept: ANTICOAGULATION | Facility: CLINIC | Age: 77
End: 2022-09-23

## 2022-09-23 ENCOUNTER — OFFICE VISIT (OUTPATIENT)
Dept: FAMILY MEDICINE | Facility: CLINIC | Age: 77
End: 2022-09-23
Payer: MEDICARE

## 2022-09-23 ENCOUNTER — LAB (OUTPATIENT)
Dept: LAB | Facility: CLINIC | Age: 77
End: 2022-09-23
Payer: MEDICARE

## 2022-09-23 ENCOUNTER — ANTICOAGULATION THERAPY VISIT (OUTPATIENT)
Dept: ANTICOAGULATION | Facility: CLINIC | Age: 77
End: 2022-09-23

## 2022-09-23 VITALS
SYSTOLIC BLOOD PRESSURE: 122 MMHG | OXYGEN SATURATION: 100 % | HEIGHT: 63 IN | BODY MASS INDEX: 34.2 KG/M2 | DIASTOLIC BLOOD PRESSURE: 84 MMHG | WEIGHT: 193 LBS | RESPIRATION RATE: 14 BRPM | HEART RATE: 65 BPM | TEMPERATURE: 97.6 F

## 2022-09-23 DIAGNOSIS — I48.19 PERSISTENT ATRIAL FIBRILLATION (H): ICD-10-CM

## 2022-09-23 DIAGNOSIS — E78.5 HYPERLIPIDEMIA LDL GOAL <100: ICD-10-CM

## 2022-09-23 DIAGNOSIS — G47.33 OSA (OBSTRUCTIVE SLEEP APNEA): ICD-10-CM

## 2022-09-23 DIAGNOSIS — H25.9 AGE-RELATED CATARACT OF BOTH EYES, UNSPECIFIED AGE-RELATED CATARACT TYPE: ICD-10-CM

## 2022-09-23 DIAGNOSIS — E11.59 TYPE 2 DIABETES MELLITUS WITH OTHER CIRCULATORY COMPLICATION, WITHOUT LONG-TERM CURRENT USE OF INSULIN (H): ICD-10-CM

## 2022-09-23 DIAGNOSIS — N18.2 CKD (CHRONIC KIDNEY DISEASE) STAGE 2, GFR 60-89 ML/MIN: ICD-10-CM

## 2022-09-23 DIAGNOSIS — Z01.818 PREOP EXAMINATION: Primary | ICD-10-CM

## 2022-09-23 DIAGNOSIS — Z79.01 LONG TERM CURRENT USE OF ANTICOAGULANT THERAPY: Primary | ICD-10-CM

## 2022-09-23 DIAGNOSIS — I48.91 ATRIAL FIBRILLATION, UNSPECIFIED TYPE (H): ICD-10-CM

## 2022-09-23 DIAGNOSIS — I10 ESSENTIAL HYPERTENSION WITH GOAL BLOOD PRESSURE LESS THAN 140/90: ICD-10-CM

## 2022-09-23 LAB — INR BLD: 2 (ref 0.9–1.1)

## 2022-09-23 PROCEDURE — 85610 PROTHROMBIN TIME: CPT

## 2022-09-23 PROCEDURE — 99214 OFFICE O/P EST MOD 30 MIN: CPT | Performed by: NURSE PRACTITIONER

## 2022-09-23 PROCEDURE — 36416 COLLJ CAPILLARY BLOOD SPEC: CPT

## 2022-09-23 ASSESSMENT — PAIN SCALES - GENERAL: PAINLEVEL: NO PAIN (0)

## 2022-09-23 NOTE — PROGRESS NOTES
[address] [address]  Phone: [phone]  Primary Provider: Madina Mercado  Pre-op Performing Provider: DORIS MEZA      PREOPERATIVE EVALUATION:  Today's date: 9/23/2022    Zulema Nixon is a 77 year old female who presents for a preoperative evaluation.    Surgical Information:  Surgery/Procedure: Cataract - bilateral eyes   Surgery Location: [facility]   Surgeon: Dr Santacruz   Surgery Date: 9/26/22 and 12/12/2022 for left eye  Time of Surgery: TBD   Where patient plans to recover: At home with family  Fax number for surgical facility: Note does not need to be faxed, will be available electronically in Epic.    Type of Anesthesia Anticipated: to be determined    Assessment & Plan     The proposed surgical procedure is considered LOW risk.    Preop examination    Age-related cataract of both eyes, unspecified age-related cataract type    Type 2 diabetes mellitus with other circulatory complication, without long-term current use of insulin (H)  Stable on metformin. Hold metformin day of surgery.    Essential hypertension with goal blood pressure less than 140/90    Hyperlipidemia LDL goal <100- fish oil = worse IBS with diarrhea     ARMAND (obstructive sleep apnea)    CKD (chronic kidney disease) stage 2, GFR 60-89 ml/min           Risks and Recommendations:  The patient has the following additional risks and recommendations for perioperative complications:   - No identified additional risk factors other than previously addressed    Medication Instructions:   - metformin: HOLD day of surgery.    RECOMMENDATION:  APPROVAL GIVEN to proceed with proposed procedure, without further diagnostic evaluation.    Extension given since needed to delay previously scheduled left cataract surgery. Approval for surgery on left cataract on 12/12/22 without further evaluation.        Doris Meza, MARCIA                    Subjective     HPI  related to upcoming procedure: upcoming cataract surgeries    Preop Questions 9/23/2022   1. Have you ever had a heart attack or stroke? No   2. Have you ever had surgery on your heart or blood vessels, such as a stent placement, a coronary artery bypass, or surgery on an artery in your head, neck, heart, or legs? No   3. Do you have chest pain with activity? No   4. Do you have a history of  heart failure? No   5. Do you currently have a cold, bronchitis or symptoms of other infection? No   6. Do you have a cough, shortness of breath, or wheezing? No   7. Do you or anyone in your family have previous history of blood clots? No   8. Do you or does anyone in your family have a serious bleeding problem such as prolonged bleeding following surgeries or cuts? No   9. Have you ever had problems with anemia or been told to take iron pills? No   10. Have you had any abnormal blood loss such as black, tarry or bloody stools, or abnormal vaginal bleeding? No   11. Have you ever had a blood transfusion? YES - many years ago with pregnancy/birth, no recent issues with anemia   11a. Have you ever had a transfusion reaction? No   12. Are you willing to have a blood transfusion if it is medically needed before, during, or after your surgery? Yes   13. Have you or any of your relatives ever had problems with anesthesia? No   14. Do you have sleep apnea, excessive snoring or daytime drowsiness? YES - uses CPAP   14a. Do you have a CPAP machine? Yes   15. Do you have any artifical heart valves or other implanted medical devices like a pacemaker, defibrillator, or continuous glucose monitor? No   16. Do you have artificial joints? No   17. Are you allergic to latex? No       Health Care Directive:  Patient does not have a Health Care Directive or Living Will: Advance Directive received and scanned. Click on Code in the patient header to view.    Preoperative Review of :   reviewed - no record of controlled substances  prescribed.      Status of Chronic Conditions:  DIABETES - Patient has a longstanding history of DiabetesType Type II . Patient is being treated with oral agents and denies significant side effects. Control has been good. Complicating factors include but are not limited to: hypertension.     HYPERLIPIDEMIA - Patient has a long history of significant Hyperlipidemia requiring medication for treatment with recent good control. Patient reports no problems or side effects with the medication.     HYPERTENSION - Patient has longstanding history of HTN , currently denies any symptoms referable to elevated blood pressure. Specifically denies chest pain, palpitations, dyspnea, orthopnea, PND or peripheral edema. Blood pressure readings have been in normal range. Current medication regimen is as listed below. Patient denies any side effects of medication.       Review of Systems  CONSTITUTIONAL: NEGATIVE for fever, chills, change in weight  INTEGUMENTARY/SKIN: NEGATIVE for worrisome rashes, moles or lesions  EYES: NEGATIVE for vision changes or irritation  ENT/MOUTH: NEGATIVE for ear, mouth and throat problems  RESP: NEGATIVE for significant cough or SOB  CV: NEGATIVE for chest pain, palpitations or peripheral edema  GI: NEGATIVE for nausea, abdominal pain, heartburn, or change in bowel habits  : NEGATIVE for frequency, dysuria, or hematuria  MUSCULOSKELETAL: NEGATIVE for significant arthralgias or myalgia  NEURO: NEGATIVE for weakness, dizziness or paresthesias  ENDOCRINE: NEGATIVE for temperature intolerance, skin/hair changes  HEME: NEGATIVE for bleeding problems  PSYCHIATRIC: NEGATIVE for changes in mood or affect    Patient Active Problem List    Diagnosis Date Noted     Anemia, unspecified type 11/14/2021     Priority: Medium     Personal history of TIA (transient ischemic attack) 11/14/2021     Priority: Medium     Vitamin D deficiency 11/14/2021     Priority: Medium     Grief reaction - 's death from COVID-19  complications 12/2/2020 05/03/2021     Priority: Medium     Carpal tunnel syndrome of right wrist- increasing over time - now awakening at night with tingling and pain despite wearing a brace at night - desires referral  11/04/2020     Priority: Medium     Screen for colon cancer- pt declines referral for colonoscopy again this year- consents to do one step FIT test  11/04/2020     Priority: Medium     Persistent atrial fibrillation (H) 06/03/2020     Priority: Medium     Meningioma (H)--left frontal - MRI 4/25/2019 = stable from 10/2018 and 11/2/2019- no further follow up needed per Neurology per patient - noted 11/4/2020 09/20/2018     Priority: Medium     Irritable bowel syndrome with diarrhea - better with metamucil - was initially better with cholestyramine, then didn't work as well (2g/day = too much gassiness and bloating)  01/10/2018     Priority: Medium     Osteoarthritis of both knees, unspecified osteoarthritis type 01/10/2018     Priority: Medium     Type 2 diabetes mellitus with diabetic polyneuropathy, without long-term current use of insulin (H) 12/12/2016     Priority: Medium     Onychomycosis 05/23/2016     Priority: Medium     Long term current use of anticoagulant therapy 03/01/2016     Priority: Medium     Type 2 diabetes mellitus with other circulatory complication, without long-term current use of insulin (H) 12/10/2015     Priority: Medium     Bilateral leg edema- has been to lymphedema clinic in past 11/13/2014     Priority: Medium     Essential hypertension with goal blood pressure less than 140/90 11/13/2014     Priority: Medium     Sensorineural hearing loss of both ears - using hearing aids      Priority: Medium     Advanced directives, counseling/discussion 09/28/2011     Priority: Medium     Advance Care Planning 5/23/2016: ACP Review of Chart / Resources Provided:  Reviewed chart for advance care plan.  Zulema Nixon has been provided information and resources to begin or update their  advance care plan.  Added by Torie Hunter             Venous stasis of lower extremity      Priority: Medium     excision varicose vein left lower extremity       CKD (chronic kidney disease) stage 2, GFR 60-89 ml/min      Priority: Medium     with microalbuminuria       Status post laparoscopic cholecystectomy      Priority: Medium     cholecystectomy for cholelithiasis       Hyperlipidemia LDL goal <100- fish oil = worse IBS with diarrhea  10/31/2010     Priority: Medium     Morbid obesity due to excess calories (H)      Priority: Medium     ARMAND (obstructive sleep apnea)      Priority: Medium     C PAP       Atrial fibrillation, unspecified type (H) 2006     Priority: Medium     cardioverted 2007, on chronic anticoagulation        Past Medical History:   Diagnosis Date     Atrial fibrillation (H) 2006    cardioverted 2007, on chronic anticoagulation      Cerebral artery occlusion with cerebral infarction (H)      CKD (chronic kidney disease) stage 2, GFR 60-89 ml/min      with microalbuminuria     Essential hypertension, benign      Hyperlipidemia LDL goal <100 10/31/2010    fish oil = IBS with diarrhea      Morbid obesity (H)      ARMAND (obstructive sleep apnea)     C PAP     Sensorineural hearing loss of both ears     using hearing aids     Status post laparoscopic cholecystectomy     cholecystectomy for cholelithiasis     Supervision of other normal pregnancy      - vaginal, one set of twins     Tortuous colon     detected at colonoscopy  - was recommended for next testing to have CT colonography      Tubal ligation status      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)      Varicose veins of lower extremities with inflammation     excision varicose vein left lower extremity     Venous stasis of lower extremity     excision varicose vein left lower extremity      Past Surgical History:   Procedure Laterality Date     C DEXA INTERPRETATION, AXIAL  2007     T score lumbar 3.7, femoral neck 2.8/2.0(all stable)     C DEXA INTERPRETATION, AXIAL  06/2010    T score lumbar 3.4 (marked degen changes), femoral neck 2.1/2.1 (sig dec lt femur)     C ORAL SURGERY SINGLE TOOTH  04/2019     had to have left upper rear tooth removed secondary to crown breaking /root damage      CARDIAC NUC ESHA STRESS TEST NL  11/2006    exercised 4 min, no inducible ischemia on ECG or perfusion images     CARPAL TUNNEL RELEASE RT/LT Right 08/04/2021    Right carpal tunnel release under local anesthetic, Dr. Orlando Walsh, [facility]     COLONOSCOPY  11/2006    diverticulosis, repeat 10 years     FLEXIBLE SIGMOIDOSCOPY  10/2000      LAPAROSCOPY, SURGICAL; CHOLECYSTECTOMY  1991    Cholecystectomy, Laparoscopic     LIGATN/STRIP LONG & SHORT SAPHEN  1995    L varicose vein excision     REPAIR PTOSIS BILATERAL  02/2011     Bilateral upper eyelid blepharoplasty and internal browpexy brow ptosis repair, bilateral lower eyelid ectropion repair with snip punctoplasty     TRANSFUSION, DIRECT, BLOOD      pregnancy related     ZZC CARDIOVERSION, ELECTIVE;INTERN  02/2007    Successful cardioversion from atrial fibrillation      ZZ ECHO HEART XTHORACIC,COMPLETE, W/O DOPPLER  11/2006    normal LV/RV size and function, mild pulm ht(30-40mm Hg), mild TR     ZZ ECHO HEART XTHORACIC,COMPLETE, W/O DOPPLER  10/2008    normal LV systolic function with EF 55-60%, impaired LV relaxation, mod to severe LAE     ZZC LIGATE FALLOPIAN TUBE  1973    Tubal ligation     Current Outpatient Medications   Medication Sig Dispense Refill     alcohol swab prep pads Use to swab area of injection/dilma as directed. 100 each 3     amLODIPine (NORVASC) 5 MG tablet Take 1 tablet (5 mg) by mouth daily 90 tablet 1     ASPIRIN 81 MG OR TABS 1 tab po QD (Once per day) 0 0     atenolol (TENORMIN) 100 MG tablet Take 1 tablet (100 mg) by mouth daily 90 tablet 1     BIOTIN 10 MG OR TABS 1 TABLET DAILY       blood glucose (JAYDE CONTOUR  NEXT) test strip Use to test blood sugar 1 times daily or as directed. 100 strip 3     blood glucose calibration (NO BRAND SPECIFIED) solution To accompany: Blood Glucose Monitor Brands: per insurance. 1 each 11     blood glucose monitoring (JAYDE MICROLET) lancets Use to test blood sugar 1 times daily or as directed. 100 each 3     blood glucose monitoring (NO BRAND SPECIFIED) meter device kit Use to test blood sugar 1 times daily or as directed. Preferred blood glucose meter OR supplies to accompany: Blood Glucose Monitor Brands: easiest for patient per insurance. 1 kit 0     CALTRATE 600 + D 600-200 MG-IU OR TABS 1 tablet twice daily 60 11     CENTRUM SILVER OR TABS ONE DAILY 3 MONTHS 1 YEAR     hydrochlorothiazide (HYDRODIURIL) 25 MG tablet Take 1 tablet (25 mg) by mouth daily 90 tablet 1     lisinopril (ZESTRIL) 40 MG tablet Take 1 tablet (40 mg) by mouth daily 90 tablet 1     metFORMIN (GLUCOPHAGE) 1000 MG tablet TAKE 1 AND 1/2 TABLETS BY MOUTH WITH BREAKFAST AND TAKE 1 TABLET BY MOUTH WITH SUPPER 225 tablet 1     simvastatin (ZOCOR) 20 MG tablet TAKE ONE TABLET BY MOUTH AT BEDTIME 90 tablet 1     VITAMIN D 1000 UNIT OR CAPS 1 CAPSULE DAILY 3 MONTHS 1 YEAR     warfarin ANTICOAGULANT (COUMADIN) 5 MG tablet TAKE 1-2 TABLETS (5-10 MG) BY MOUTH DAILY AS INSTRUCTED 180 tablet 3       Allergies   Allergen Reactions     Tetracycline      lightheaded        Social History     Tobacco Use     Smoking status: Never Smoker     Smokeless tobacco: Never Used   Substance Use Topics     Alcohol use: Yes     Comment: 0-2 per month     Family History   Problem Relation Age of Onset     Diabetes Mother         type 2     Hypertension Mother      Cardiovascular Mother         pulmonary embolus     Lipids Mother      Heart Disease Mother          atrial fibrillation     Diabetes Father         type 2     Cardiovascular Father         atrial fibrillation,  during an EP procedure     Cancer - colorectal Maternal Grandmother          onset age 88     Heart Disease Maternal Grandmother          age 96     Diabetes Maternal Grandfather         type 2     Diabetes Paternal Grandfather         type 2     Hypertension Sister      Lipids Sister      Lipids Brother      Hypertension Brother      Hypertension Son      Other - See Comments Cousin 68        Myotonic Dystrophy     History   Drug Use No         Objective     There were no vitals taken for this visit.    Physical Exam    GENERAL APPEARANCE: healthy, alert and no distress     EYES: EOMI, PERRL     HENT: ear canals and TM's normal and nose and mouth without ulcers or lesions     NECK: no adenopathy, no asymmetry, masses, or scars and thyroid normal to palpation     RESP: lungs clear to auscultation - no rales, rhonchi or wheezes     CV: regular rates and rhythm, normal S1 S2, no S3 or S4 and no murmur, click or rub     ABDOMEN:  soft, nontender, no HSM or masses and bowel sounds normal     MS: extremities normal- no gross deformities noted, no evidence of inflammation in joints, FROM in all extremities.     SKIN: no suspicious lesions or rashes     NEURO: Normal strength and tone, sensory exam grossly normal, mentation intact and speech normal     PSYCH: mentation appears normal. and affect normal/bright     LYMPHATICS: No cervical adenopathy    Recent Labs   Lab Test 22  0940 22  1030 22  1019 22  0937 21  1059 21  1049   HGB  --   --   --  12.1  --  11.5*   PLT  --   --   --  258  --  264   INR 3.9* 3.0*   < > 2.7*   < > 1.9*   NA  --   --   --  133  --  133   POTASSIUM  --   --   --  3.8  --  4.0   CR  --   --   --  0.49*  --  0.63   A1C  --   --   --  5.9*  --  6.2*    < > = values in this interval not displayed.        Diagnostics:  No labs were ordered during this visit.   No EKG required, no history of coronary heart disease, significant arrhythmia, peripheral arterial disease or other structural heart disease.    Revised Cardiac Risk Index  (RCRI):  The patient has the following serious cardiovascular risks for perioperative complications:   - No serious cardiac risks = 0 points     RCRI Interpretation: 0 points: Class I (very low risk - 0.4% complication rate)           Signed Electronically by: Doris Meza CNP  Copy of this evaluation report is provided to requesting physician.

## 2022-09-23 NOTE — PROGRESS NOTES
ANTICOAGULATION MANAGEMENT     Zulema Nixon 77 year old female is on warfarin with therapeutic INR result. (Goal INR 2.0-3.0)    Recent labs: (last 7 days)     09/23/22  1002   INR 2.0*       ASSESSMENT       Source(s): Chart Review and Patient/Caregiver Call       Warfarin doses taken: Warfarin taken as instructed    Diet: Decreased greens/vitamin K in diet; ongoing change due to IBS    New illness, injury, or hospitalization: No    Medication/supplement changes: None noted    Signs or symptoms of bleeding or clotting: No    Previous INR: Supratherapeutic    Additional findings: warfarin maintenance dose reduced 12% on 9/14/22    Patient saw PCP today for pre-op,  Right cataract surgery scheduled for 9/26/22, left cataract scheduled for 10/24/22       PLAN     Recommended plan for ongoing change(s) affecting INR     Dosing Instructions: Continue your current warfarin dose with next INR in 2 weeks       Summary  As of 9/23/2022    Full warfarin instructions:  5 mg every day   Next INR check:  10/7/2022             Telephone call with Zulema who verbalizes understanding and agrees to plan    Lab visit scheduled    Education provided: Impact of vitamin K foods on INR and Contact [phone]  with any changes, questions or concerns.     Plan made per [facility] anticoagulation protocol    Mary Vergara, RN  Anticoagulation Clinic  9/23/2022    _______________________________________________________________________     Anticoagulation Episode Summary     Current INR goal:  2.0-3.0   TTR:  76.2 % (1 y)   Target end date:  Indefinite   Send INR reminders to:  [facility]    Indications    Long term current use of anticoagulant therapy [Z79.01]  Atrial fibrillation  unspecified type (H) [I48.91]  Persistent atrial fibrillation (H) [I48.19]           Comments:           Anticoagulation Care Providers     Provider Role Specialty Phone number    Madina Mercado MD Referring Family Medicine [phone]

## 2022-09-23 NOTE — TELEPHONE ENCOUNTER
Reason for Call:  Other returning call    Detailed comments: Pt returning Mary's call from INR clinic, was unable to get to the phone.    Phone Number Patient can be reached at: Home number on file [phone] (home)    Best Time: Anytime    Can we leave a detailed message on this number? YES    Call taken on 9/23/2022 at 4:42 PM by Susu Quiñones

## 2022-09-23 NOTE — PROGRESS NOTES
ANTICOAGULATION MANAGEMENT     Zulema Nixon 77 year old female is on warfarin with therapeutic INR result. (Goal INR 2.0-3.0)    Recent labs: (last 7 days)     09/23/22  1002   INR 2.0*       ASSESSMENT       Source(s): Chart Review    Previous INR was Supratherapeutic    Medication, diet, health changes since last INR     Warfarin maintenance dose was reduced 12% on 9/14/22    Patient was seen today for pre-op for cataract surgery scheduled for 9/26/22           PLAN     Unable to reach Zulema today.    Left message to continue current dose of warfarin 5 mg tonight. Request call back for assessment.    Follow up required to confirm warfarin dose taken and assess for changes    Mary Vergara RN  Anticoagulation Clinic  9/23/2022

## 2022-09-26 ENCOUNTER — TRANSFERRED RECORDS (OUTPATIENT)
Dept: HEALTH INFORMATION MANAGEMENT | Facility: CLINIC | Age: 77
End: 2022-09-26

## 2022-09-26 LAB — RETINOPATHY: NEGATIVE

## 2022-10-07 ENCOUNTER — ANTICOAGULATION THERAPY VISIT (OUTPATIENT)
Dept: ANTICOAGULATION | Facility: CLINIC | Age: 77
End: 2022-10-07

## 2022-10-07 ENCOUNTER — LAB (OUTPATIENT)
Dept: LAB | Facility: CLINIC | Age: 77
End: 2022-10-07
Payer: MEDICARE

## 2022-10-07 DIAGNOSIS — Z79.01 LONG TERM CURRENT USE OF ANTICOAGULANT THERAPY: Primary | ICD-10-CM

## 2022-10-07 DIAGNOSIS — I48.19 PERSISTENT ATRIAL FIBRILLATION (H): ICD-10-CM

## 2022-10-07 DIAGNOSIS — I48.91 ATRIAL FIBRILLATION, UNSPECIFIED TYPE (H): ICD-10-CM

## 2022-10-07 LAB — INR BLD: 1.3 (ref 0.9–1.1)

## 2022-10-07 PROCEDURE — 36416 COLLJ CAPILLARY BLOOD SPEC: CPT

## 2022-10-07 PROCEDURE — 85610 PROTHROMBIN TIME: CPT

## 2022-10-07 NOTE — PROGRESS NOTES
"ANTICOAGULATION MANAGEMENT       ASSESSMENT       Source(s): Chart Review and Patient/Caregiver Call       Warfarin doses taken: Warfarin taken as instructed    Diet: No new diet changes identified    New illness, injury, or hospitalization: Yes: Had flare of IBS several days ago, no blood in stool, appetite normal, no concerns otherwise. Says this is her \"normal\". Had cataract surgery 9/26 but did not stop warfarin and everything went well.    Medication/supplement changes: None noted    Signs or symptoms of bleeding or clotting: No    Previous INR: Therapeutic last visit; previously outside of goal range    Additional findings: None       PLAN     Recommended plan for no diet, medication or health factor changes affecting INR     Dosing Instructions: booster dose then Increase your warfarin dose (14% change) with next INR in 5-7 days       Summary  As of 10/7/2022    Full warfarin instructions:  10/7: 10 mg; Otherwise 7.5 mg every Tue, Sat; 5 mg all other days   Next INR check:  10/14/2022             Telephone call with Zulema who verbalizes understanding and agrees to plan and who agrees to plan and repeated back plan correctly    Lab visit scheduled    Education provided: Goal range and significance of current result, Importance of therapeutic range, Monitoring for clotting signs and symptoms, When to seek medical attention/emergency care and Contact [phone]  with any changes, questions or concerns.     Plan made per [facility] anticoagulation protocol    Alyssa Pabon RN  Anticoagulation Clinic  10/7/2022    _______________________________________________________________________     Anticoagulation Episode Summary     Current INR goal:  2.0-3.0   TTR:  72.5 % (1 y)   Target end date:  Indefinite   Send INR reminders to:  [facility]    Indications    Long term current use of anticoagulant therapy [Z79.01]  Atrial fibrillation  unspecified type (H) [I48.91]  Persistent atrial fibrillation (H) [I48.19]     "       Comments:           Anticoagulation Care Providers     Provider Role Specialty Phone number    Madina Mercado MD Referring Family Medicine [phone]

## 2022-10-07 NOTE — PROGRESS NOTES
ANTICOAGULATION MANAGEMENT     Zulema Nixon 77 year old female is on warfarin with subtherapeutic INR result. (Goal INR 2.0-3.0)    Recent labs: (last 7 days)     10/07/22  1015   INR 1.3*       ASSESSMENT       Source(s): Chart Review    Previous INR was Therapeutic last visit; previously outside of goal range    Medication, diet, health changes since last INR chart reviewed; none identified other than cataract surgery 9/26/22           PLAN     Unable to reach Zulema today.    Left message to call back to discuss warfarin dosing.    Follow up required to confirm warfarin dose taken and assess for changes, discuss out of range result  and discuss dosing instructions and confirm understanding of instructions    Alyssa Pabon RN  Anticoagulation Clinic  10/7/2022

## 2022-10-13 ENCOUNTER — ANTICOAGULATION THERAPY VISIT (OUTPATIENT)
Dept: ANTICOAGULATION | Facility: CLINIC | Age: 77
End: 2022-10-13

## 2022-10-13 ENCOUNTER — LAB (OUTPATIENT)
Dept: LAB | Facility: CLINIC | Age: 77
End: 2022-10-13
Payer: MEDICARE

## 2022-10-13 DIAGNOSIS — I48.19 PERSISTENT ATRIAL FIBRILLATION (H): ICD-10-CM

## 2022-10-13 DIAGNOSIS — Z79.01 LONG TERM CURRENT USE OF ANTICOAGULANT THERAPY: Primary | ICD-10-CM

## 2022-10-13 DIAGNOSIS — I48.91 ATRIAL FIBRILLATION, UNSPECIFIED TYPE (H): ICD-10-CM

## 2022-10-13 LAB — INR BLD: 1.7 (ref 0.9–1.1)

## 2022-10-13 PROCEDURE — 85610 PROTHROMBIN TIME: CPT

## 2022-10-13 PROCEDURE — 36416 COLLJ CAPILLARY BLOOD SPEC: CPT

## 2022-10-13 NOTE — PROGRESS NOTES
ANTICOAGULATION MANAGEMENT     Zulema Nixon 77 year old female is on warfarin with subtherapeutic INR result. (Goal INR 2.0-3.0)    Recent labs: (last 7 days)     10/13/22  1124   INR 1.7*       ASSESSMENT       Source(s): Chart Review and Patient/Caregiver Call       Warfarin doses taken: Warfarin taken as instructed    Diet: No new diet changes identified    New illness, injury, or hospitalization: No    Medication/supplement changes: None noted    Signs or symptoms of bleeding or clotting: No    Previous INR: Subtherapeutic    Additional findings: Upcoming surgery/procedure Cataract surgery 10/24/22       PLAN     Recommended plan for no diet, medication or health factor changes affecting INR, Warfarin maintenance dose was increased 14% at 10/7/22 INR check (back to previous dosing) Will increase warfarin maintenance dose again today.     Dosing Instructions: Increase your warfarin dose (6.2% change) with next INR in 1-2 weeks, patient requested 6 days       Summary  As of 10/13/2022    Full warfarin instructions:  7.5 mg every Sun, Tue, Thu; 5 mg all other days   Next INR check:  10/19/2022             Telephone call with Zulema who agrees to plan and repeated back plan correctly    Lab visit scheduled    Education provided: Please call back if any changes to your diet, medications or how you've been taking warfarin and Contact [phone]  with any changes, questions or concerns.     Plan made per [facility] anticoagulation protocol    Jenni Nava RN  Anticoagulation Clinic  10/13/2022    _______________________________________________________________________     Anticoagulation Episode Summary     Current INR goal:  2.0-3.0   TTR:  70.8 % (1 y)   Target end date:  Indefinite   Send INR reminders to:  [facility]    Indications    Long term current use of anticoagulant therapy [Z79.01]  Atrial fibrillation  unspecified type (H) [I48.91]  Persistent atrial fibrillation (H) [I48.19]           Comments:            Anticoagulation Care Providers     Provider Role Specialty Phone number    Madina Mercado MD Referring Family Medicine [phone]

## 2022-10-13 NOTE — PROGRESS NOTES
Left messages on home and cell phone to call [phone].  Jenni Nava RN, BSN  Anticoagulation Clinic

## 2022-10-19 ENCOUNTER — ANTICOAGULATION THERAPY VISIT (OUTPATIENT)
Dept: ANTICOAGULATION | Facility: CLINIC | Age: 77
End: 2022-10-19

## 2022-10-19 ENCOUNTER — LAB (OUTPATIENT)
Dept: LAB | Facility: CLINIC | Age: 77
End: 2022-10-19
Payer: MEDICARE

## 2022-10-19 DIAGNOSIS — I48.19 PERSISTENT ATRIAL FIBRILLATION (H): ICD-10-CM

## 2022-10-19 LAB — INR BLD: 1.8 (ref 0.9–1.1)

## 2022-10-19 PROCEDURE — 85610 PROTHROMBIN TIME: CPT

## 2022-10-19 PROCEDURE — 36416 COLLJ CAPILLARY BLOOD SPEC: CPT

## 2022-10-19 NOTE — PROGRESS NOTES
ANTICOAGULATION MANAGEMENT     Zulema Nixon 77 year old female is on warfarin with subtherapeutic INR result. (Goal INR 2.0-3.0)    Recent labs: (last 7 days)     10/19/22  1304   INR 1.8*       ASSESSMENT       Source(s): Chart Review and Patient/Caregiver Call       Warfarin doses taken: Warfarin taken as instructed    Diet: No new diet changes identified    New illness, injury, or hospitalization: No    Medication/supplement changes: None noted    Signs or symptoms of bleeding or clotting: No    Previous INR: Subtherapeutic    Additional findings: Upcoming surgery/procedure cataract surgery on 10/24       PLAN     Recommended plan for no diet, medication or health factor changes affecting INR     Dosing Instructions: Increase your warfarin dose (6% change) with next INR in 1-2 weeks       Summary  As of 10/19/2022    Full warfarin instructions:  5 mg every Mon, Wed, Fri; 7.5 mg all other days   Next INR check:  11/2/2022             Telephone call with Zulema who verbalizes understanding and agrees to plan    Lab visit scheduled    Education provided: Goal range and significance of current result, Importance of therapeutic range, Monitoring for clotting signs and symptoms and When to seek medical attention/emergency care    Plan made per [facility] anticoagulation protocol    Fanny Barbour RN  Anticoagulation Clinic  10/19/2022    _______________________________________________________________________     Anticoagulation Episode Summary     Current INR goal:  2.0-3.0   TTR:  69.2 % (1 y)   Target end date:  Indefinite   Send INR reminders to:  [facility]    Indications    Long term current use of anticoagulant therapy [Z79.01]  Atrial fibrillation  unspecified type (H) [I48.91]  Persistent atrial fibrillation (H) [I48.19]           Comments:           Anticoagulation Care Providers     Provider Role Specialty Phone number    Madina Mercado MD Referring Family Medicine [phone]

## 2022-11-03 ENCOUNTER — OFFICE VISIT (OUTPATIENT)
Dept: FAMILY MEDICINE | Facility: CLINIC | Age: 77
End: 2022-11-03
Payer: MEDICARE

## 2022-11-03 ENCOUNTER — TELEPHONE (OUTPATIENT)
Dept: FAMILY MEDICINE | Facility: CLINIC | Age: 77
End: 2022-11-03

## 2022-11-03 VITALS
TEMPERATURE: 97.8 F | WEIGHT: 199 LBS | RESPIRATION RATE: 18 BRPM | HEIGHT: 63 IN | DIASTOLIC BLOOD PRESSURE: 72 MMHG | HEART RATE: 74 BPM | SYSTOLIC BLOOD PRESSURE: 132 MMHG | OXYGEN SATURATION: 100 % | BODY MASS INDEX: 35.26 KG/M2

## 2022-11-03 DIAGNOSIS — I48.91 ATRIAL FIBRILLATION, UNSPECIFIED TYPE (H): ICD-10-CM

## 2022-11-03 DIAGNOSIS — E11.42 TYPE 2 DIABETES MELLITUS WITH DIABETIC POLYNEUROPATHY, WITHOUT LONG-TERM CURRENT USE OF INSULIN (H): ICD-10-CM

## 2022-11-03 DIAGNOSIS — I10 ESSENTIAL HYPERTENSION WITH GOAL BLOOD PRESSURE LESS THAN 140/90: ICD-10-CM

## 2022-11-03 DIAGNOSIS — N18.2 CKD (CHRONIC KIDNEY DISEASE) STAGE 2, GFR 60-89 ML/MIN: ICD-10-CM

## 2022-11-03 DIAGNOSIS — G45.9 TIA (TRANSIENT ISCHEMIC ATTACK): Primary | ICD-10-CM

## 2022-11-03 DIAGNOSIS — E78.5 HYPERLIPIDEMIA LDL GOAL <100: ICD-10-CM

## 2022-11-03 LAB
ALBUMIN SERPL-MCNC: 3.6 G/DL (ref 3.4–5)
ALP SERPL-CCNC: 53 U/L (ref 40–150)
ALT SERPL W P-5'-P-CCNC: 19 U/L (ref 0–50)
ANION GAP SERPL CALCULATED.3IONS-SCNC: 6 MMOL/L (ref 3–14)
AST SERPL W P-5'-P-CCNC: 18 U/L (ref 0–45)
BILIRUB SERPL-MCNC: 0.7 MG/DL (ref 0.2–1.3)
BUN SERPL-MCNC: 11 MG/DL (ref 7–30)
CALCIUM SERPL-MCNC: 9.3 MG/DL (ref 8.5–10.1)
CHLORIDE BLD-SCNC: 99 MMOL/L (ref 94–109)
CO2 SERPL-SCNC: 30 MMOL/L (ref 20–32)
CREAT SERPL-MCNC: 0.5 MG/DL (ref 0.52–1.04)
CREAT UR-MCNC: 50 MG/DL
ERYTHROCYTE [DISTWIDTH] IN BLOOD BY AUTOMATED COUNT: 14.3 % (ref 10–15)
GFR SERPL CREATININE-BSD FRML MDRD: >90 ML/MIN/1.73M2
GLUCOSE BLD-MCNC: 117 MG/DL (ref 70–99)
HCT VFR BLD AUTO: 34.2 % (ref 35–47)
HGB BLD-MCNC: 11.1 G/DL (ref 11.7–15.7)
MCH RBC QN AUTO: 26.9 PG (ref 26.5–33)
MCHC RBC AUTO-ENTMCNC: 32.5 G/DL (ref 31.5–36.5)
MCV RBC AUTO: 83 FL (ref 78–100)
MICROALBUMIN UR-MCNC: 12 MG/L
MICROALBUMIN/CREAT UR: 24 MG/G CR (ref 0–25)
PLATELET # BLD AUTO: 250 10E3/UL (ref 150–450)
POTASSIUM BLD-SCNC: 3.8 MMOL/L (ref 3.4–5.3)
PROT SERPL-MCNC: 6.8 G/DL (ref 6.8–8.8)
RBC # BLD AUTO: 4.13 10E6/UL (ref 3.8–5.2)
SODIUM SERPL-SCNC: 135 MMOL/L (ref 133–144)
WBC # BLD AUTO: 6.8 10E3/UL (ref 4–11)

## 2022-11-03 PROCEDURE — 36415 COLL VENOUS BLD VENIPUNCTURE: CPT | Performed by: NURSE PRACTITIONER

## 2022-11-03 PROCEDURE — G0008 ADMIN INFLUENZA VIRUS VAC: HCPCS | Performed by: NURSE PRACTITIONER

## 2022-11-03 PROCEDURE — 85027 COMPLETE CBC AUTOMATED: CPT | Performed by: NURSE PRACTITIONER

## 2022-11-03 PROCEDURE — 99214 OFFICE O/P EST MOD 30 MIN: CPT | Mod: 25 | Performed by: NURSE PRACTITIONER

## 2022-11-03 PROCEDURE — 90662 IIV NO PRSV INCREASED AG IM: CPT | Performed by: NURSE PRACTITIONER

## 2022-11-03 PROCEDURE — 82043 UR ALBUMIN QUANTITATIVE: CPT | Performed by: NURSE PRACTITIONER

## 2022-11-03 PROCEDURE — 0124A COVID-19,PF,PFIZER BOOSTER BIVALENT: CPT | Performed by: NURSE PRACTITIONER

## 2022-11-03 PROCEDURE — 91312 COVID-19,PF,PFIZER BOOSTER BIVALENT: CPT | Performed by: NURSE PRACTITIONER

## 2022-11-03 PROCEDURE — 80053 COMPREHEN METABOLIC PANEL: CPT | Performed by: NURSE PRACTITIONER

## 2022-11-03 RX ORDER — HYDROCHLOROTHIAZIDE 25 MG/1
25 TABLET ORAL DAILY
Qty: 90 TABLET | Refills: 1 | Status: SHIPPED | OUTPATIENT
Start: 2022-11-03 | End: 2023-05-09

## 2022-11-03 RX ORDER — POLYETHYLENE GLYCOL 400 2.5 MG/ML
1 SOLUTION/ DROPS OPHTHALMIC
COMMUNITY

## 2022-11-03 RX ORDER — SIMVASTATIN 20 MG
TABLET ORAL
Qty: 90 TABLET | Refills: 1 | Status: SHIPPED | OUTPATIENT
Start: 2022-11-03 | End: 2023-05-09

## 2022-11-03 RX ORDER — LISINOPRIL 40 MG/1
40 TABLET ORAL DAILY
Qty: 90 TABLET | Refills: 1 | Status: SHIPPED | OUTPATIENT
Start: 2022-11-03 | End: 2023-05-09

## 2022-11-03 RX ORDER — AMLODIPINE BESYLATE 5 MG/1
5 TABLET ORAL DAILY
Qty: 90 TABLET | Refills: 1 | Status: SHIPPED | OUTPATIENT
Start: 2022-11-03 | End: 2023-05-09

## 2022-11-03 RX ORDER — ATENOLOL 100 MG/1
100 TABLET ORAL DAILY
Qty: 90 TABLET | Refills: 1 | Status: SHIPPED | OUTPATIENT
Start: 2022-11-03 | End: 2023-05-09

## 2022-11-03 NOTE — PROGRESS NOTES
Assessment & Plan     TIA (transient ischemic attack)  Doing well and back to baseline without findings on previous exam. Refill provided as needed. Recheck labs.   - rivaroxaban ANTICOAGULANT (XARELTO) 20 MG TABS tablet  Dispense: 90 tablet; Refill: 3  - CBC with platelets  - Comprehensive metabolic panel (BMP + Alb, Alk Phos, ALT, AST, Total. Bili, TP)  - CBC with platelets  - Comprehensive metabolic panel (BMP + Alb, Alk Phos, ALT, AST, Total. Bili, TP)    Atrial fibrillation, unspecified type (H)  Follows cardiology.  - rivaroxaban ANTICOAGULANT (XARELTO) 20 MG TABS tablet  Dispense: 90 tablet; Refill: 3  - CBC with platelets  - Comprehensive metabolic panel (BMP + Alb, Alk Phos, ALT, AST, Total. Bili, TP)  - CBC with platelets  - Comprehensive metabolic panel (BMP + Alb, Alk Phos, ALT, AST, Total. Bili, TP)    CKD (chronic kidney disease) stage 2, GFR 60-89 ml/min  Labs.   - Albumin Random Urine Quantitative with Creat Ratio  - CBC with platelets  - Comprehensive metabolic panel (BMP + Alb, Alk Phos, ALT, AST, Total. Bili, TP)  - hydrochlorothiazide (HYDRODIURIL) 25 MG tablet  Dispense: 90 tablet; Refill: 1  - Albumin Random Urine Quantitative with Creat Ratio  - CBC with platelets  - Comprehensive metabolic panel (BMP + Alb, Alk Phos, ALT, AST, Total. Bili, TP)    Essential hypertension with goal blood pressure less than 140/90- well controlled today - needs labs and medication refills   - amLODIPine (NORVASC) 5 MG tablet  Dispense: 90 tablet; Refill: 1  - atenolol (TENORMIN) 100 MG tablet  Dispense: 90 tablet; Refill: 1  - hydrochlorothiazide (HYDRODIURIL) 25 MG tablet  Dispense: 90 tablet; Refill: 1  - lisinopril (ZESTRIL) 40 MG tablet  Dispense: 90 tablet; Refill: 1    CKD (chronic kidney disease) stage 2, GFR 60-89 ml/min- needs lab follow up today   - Albumin Random Urine Quantitative with Creat Ratio  - CBC with platelets  - Comprehensive metabolic panel (BMP + Alb, Alk Phos, ALT, AST, Total. Bili,  "TP)  - hydrochlorothiazide (HYDRODIURIL) 25 MG tablet  Dispense: 90 tablet; Refill: 1  - Albumin Random Urine Quantitative with Creat Ratio  - CBC with platelets  - Comprehensive metabolic panel (BMP + Alb, Alk Phos, ALT, AST, Total. Bili, TP)    Type 2 diabetes mellitus with diabetic polyneuropathy, without long-term current use of insulin (H)- needs labs and refills   Due for refill of medications.   - metFORMIN (GLUCOPHAGE) 1000 MG tablet  Dispense: 225 tablet; Refill: 1    Hyperlipidemia LDL goal <100- fish oil = worse IBS with diarrhea   - simvastatin (ZOCOR) 20 MG tablet  Dispense: 90 tablet; Refill: 1      Review of the result(s) of each unique test - lab  Ordering of each unique test  Prescription drug management  28 minutes spent on the date of the encounter doing chart review, history and exam, documentation and further activities per the note     MED REC REQUIRED  Post Medication Reconciliation Status:  Discharge medications reconciled, continue medications without change    BMI:   Estimated body mass index is 34.97 kg/m  as calculated from the following:    Height as of this encounter: 1.607 m (5' 3.25\").    Weight as of this encounter: 90.3 kg (199 lb).   Weight management plan: Discussed healthy diet and exercise guidelines        Return in about 3 months (around 2/3/2023) for Follow up.    Doris Meza [facility] DAMION Poole is a 77 year old, presenting for the following health issues:  No chief complaint on file.      HPI     ED/UC Followup:    Facility:  [facility]  Date of visit: 10/20/22  Reason for visit: TIA (transient ischemic attack)   Current Status: Has been feeling great since she stopped the Warfarin and switched to Xarelto        Review of Systems   Constitutional, HEENT, cardiovascular, pulmonary, GI, , musculoskeletal, neuro, skin, endocrine and psych systems are negative, except as otherwise noted.      Objective    BP " "132/72   Pulse 74   Temp 97.8  F (36.6  C)   Resp 18   Ht 1.607 m (5' 3.25\")   Wt 90.3 kg (199 lb)   SpO2 100%   BMI 34.97 kg/m    Body mass index is 34.97 kg/m .  Physical Exam   GENERAL: healthy, alert and no distress  NECK: no adenopathy, no asymmetry, masses, or scars and thyroid normal to palpation  RESP: lungs clear to auscultation - no rales, rhonchi or wheezes  CV: regular rate and rhythm, normal S1 S2, no S3 or S4, no murmur, click or rub, no peripheral edema and peripheral pulses strong  ABDOMEN: soft, nontender, no hepatosplenomegaly, no masses and bowel sounds normal  MS: no gross musculoskeletal defects noted, no edema  NEURO: Normal strength and tone, mentation intact and speech normal    Results for orders placed or performed in visit on 11/03/22   Albumin Random Urine Quantitative with Creat Ratio     Status: None   Result Value Ref Range    Creatinine Urine mg/dL 50 mg/dL    Albumin Urine mg/L 12 mg/L    Albumin Urine mg/g Cr 24.00 0.00 - 25.00 mg/g Cr   CBC with platelets     Status: Abnormal   Result Value Ref Range    WBC Count 6.8 4.0 - 11.0 10e3/uL    RBC Count 4.13 3.80 - 5.20 10e6/uL    Hemoglobin 11.1 (L) 11.7 - 15.7 g/dL    Hematocrit 34.2 (L) 35.0 - 47.0 %    MCV 83 78 - 100 fL    MCH 26.9 26.5 - 33.0 pg    MCHC 32.5 31.5 - 36.5 g/dL    RDW 14.3 10.0 - 15.0 %    Platelet Count 250 150 - 450 10e3/uL   Comprehensive metabolic panel (BMP + Alb, Alk Phos, ALT, AST, Total. Bili, TP)     Status: Abnormal   Result Value Ref Range    Sodium 135 133 - 144 mmol/L    Potassium 3.8 3.4 - 5.3 mmol/L    Chloride 99 94 - 109 mmol/L    Carbon Dioxide (CO2) 30 20 - 32 mmol/L    Anion Gap 6 3 - 14 mmol/L    Urea Nitrogen 11 7 - 30 mg/dL    Creatinine 0.50 (L) 0.52 - 1.04 mg/dL    Calcium 9.3 8.5 - 10.1 mg/dL    Glucose 117 (H) 70 - 99 mg/dL    Alkaline Phosphatase 53 40 - 150 U/L    AST 18 0 - 45 U/L    ALT 19 0 - 50 U/L    Protein Total 6.8 6.8 - 8.8 g/dL    Albumin 3.6 3.4 - 5.0 g/dL    Bilirubin " Total 0.7 0.2 - 1.3 mg/dL    GFR Estimate >90 >60 mL/min/1.73m2             ALTON Marcelo     [address] [address]  [email or web address]   Office: [phone]

## 2022-11-03 NOTE — TELEPHONE ENCOUNTER
Please re-fax pre op from 9/2022. Patient had left cataract surgery rescheduled and I have put an extension on the pre op note.   Doris Meza, CNP

## 2022-11-04 ENCOUNTER — TELEPHONE (OUTPATIENT)
Dept: ANTICOAGULATION | Facility: CLINIC | Age: 77
End: 2022-11-04

## 2022-11-04 NOTE — TELEPHONE ENCOUNTER
ANTICOAGULATION  MANAGEMENT    Zulema Nixon is being discharged from the [facility] ([facility]).    Reason for discharge: warfarin replaced by alternate therapy, Xarelto    Anticoagulation episode resolved, [facility] referral closed and Standing order discontinued    If patient needs warfarin management in the future, please send a new referral    Mary Vergara RN

## 2022-11-09 ENCOUNTER — OFFICE VISIT (OUTPATIENT)
Dept: CARDIOLOGY | Facility: CLINIC | Age: 77
End: 2022-11-09
Payer: MEDICARE

## 2022-11-09 VITALS
WEIGHT: 200 LBS | BODY MASS INDEX: 35.44 KG/M2 | OXYGEN SATURATION: 99 % | DIASTOLIC BLOOD PRESSURE: 84 MMHG | SYSTOLIC BLOOD PRESSURE: 132 MMHG | HEIGHT: 63 IN | HEART RATE: 55 BPM

## 2022-11-09 DIAGNOSIS — I48.21 PERMANENT ATRIAL FIBRILLATION (H): Primary | ICD-10-CM

## 2022-11-09 PROCEDURE — 99204 OFFICE O/P NEW MOD 45 MIN: CPT | Performed by: INTERNAL MEDICINE

## 2022-11-09 PROCEDURE — 93000 ELECTROCARDIOGRAM COMPLETE: CPT | Performed by: INTERNAL MEDICINE

## 2022-11-09 NOTE — PATIENT INSTRUCTIONS
You may shop around for Xarelto and Eliquis costs at the pharmacies. Typically [facility] and [facility] have some of the best prices.

## 2022-11-09 NOTE — LETTER
11/9/2022    Madina Mercado MD  [address] [facility] [address]    RE: Zulema Nixon       Dear Colleague,     I had the pleasure of seeing Zulema Nixon in the [facility].  Cardiology Consultation      Zulema Nixon MRN# [id number]   YOB: 1945 Age: 77 year old   Date of Visit 11/09/2022     Reason for consult:  Atrial fibrillation           Assessment and Plan:     1. Chronic atrial fibrillation, asymptomatic.  Recent TIA on warfarin.  Feeling well on Xarelto 10 mg daily    Discussed the importance of taking this with food and if any future TIA symptoms while on the medication, would go to full-strength.    Routine follow-up in 1 year      45 minutes spent today in review of past medical record, discussion with patient and postvisit charting    This note was transcribed using electronic voice recognition software, typographical errors may be present.                Chief Complaint:   New Patient (Reestablish care for Afib, varicose veins)           History of Present Illness:   This patient is a very pleasant 77 year old female that I met during venous ablation and saw for cardiology care last in 2018.  She has chronic atrial fibrillation.  She was maintained on warfarin.  She has IBS.    She recently had TIA symptoms of left-sided weakness and slurred speech, MRI was negative for infarct.  She does have a stable meningioma.    Her warfarin was subtherapeutic and she states that she was having a difficult time getting it therapeutic.    She was put on Xarelto 10 mg daily and actually feels better from a GI and overall standpoint.  She does take it with her evening meal.    She states that the Xarelto was prescribed instead of Eliquis because she had used her Eliquis first prescription previously and it was mainly a cost issue.    [place] and her daughter and son-in-law live quarter-mile down.  She took care of their sons in  and one is in [place]  "[place]isel[place], 1 is at the [employer] and the other is still at home.    She otherwise feels well without any residual neurologic symptoms.    She did lose her  of greater than 50 years and this has been somewhat difficult.  She has a great attitude about it.  She has also lost 50 pounds since I saw her last and she is very proud of this understandably so.  I congratulated her on her efforts.    Her lower extremity edema is stable.  She still wears her compression stockings.         Physical Exam:     Vitals: /84 (BP Location: Right arm, Patient Position: Sitting, Cuff Size: Adult Large)   Pulse 55   Ht 1.607 m (5' 3.25\")   Wt 90.7 kg (200 lb)   SpO2 99%   BMI 35.15 kg/m    Constitutional:  cooperative, alert and oriented, well developed, well nourished, in no acute distress        Skin:  warm and dry to the touch, no apparent skin lesions or masses noted        Head:  normocephalic, no masses or lesions        Eyes:  pupils equal and round, conjunctivae and lids unremarkable, sclera white, no xanthalasma, EOMS intact, no nystagmus        ENT:  no pallor or cyanosis        Neck:  JVP normal        Chest:  normal symmetry        Cardiac:   irregularly irregular rhythm           rate controlled    Abdomen:  abdomen soft, non-tender, BS normoactive, no mass, no HSM, no bruits   benign    Extremities and Back:    erythema      Neurological:  no gross motor deficits;affect appropriate                      Past Medical History:   I have reviewed this patient's past medical history  Past Medical History:   Diagnosis Date     Atrial fibrillation (H) 11/01/2006    cardioverted 2/2007, on chronic anticoagulation      Cerebral artery occlusion with cerebral infarction (H)      CKD (chronic kidney disease) stage 2, GFR 60-89 ml/min 2006     with microalbuminuria     Essential hypertension, benign      Hyperlipidemia LDL goal <100 10/31/2010    fish oil = IBS with diarrhea      Morbid obesity (H)      ARMAND " (obstructive sleep apnea)     C PAP     Sensorineural hearing loss of both ears     using hearing aids     Status post laparoscopic cholecystectomy     cholecystectomy for cholelithiasis     Supervision of other normal pregnancy      - vaginal, one set of twins     Tortuous colon     detected at colonoscopy  - was recommended for next testing to have CT colonography      Tubal ligation status      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)      Varicose veins of lower extremities with inflammation     excision varicose vein left lower extremity     Venous stasis of lower extremity     excision varicose vein left lower extremity              Past Surgical History:   I have reviewed this patient's past surgical history  Past Surgical History:   Procedure Laterality Date     C DEXA INTERPRETATION, AXIAL  2007    T score lumbar 3.7, femoral neck 2.8/2.0(all stable)     C DEXA INTERPRETATION, AXIAL  2010    T score lumbar 3.4 (marked degen changes), femoral neck 2.1/2.1 (sig dec lt femur)     C ORAL SURGERY SINGLE TOOTH  2019     had to have left upper rear tooth removed secondary to crown breaking /root damage      CARDIAC NUC ESHA STRESS TEST NL  2006    exercised 4 min, no inducible ischemia on ECG or perfusion images     CARPAL TUNNEL RELEASE RT/LT Right 2021    Right carpal tunnel release under local anesthetic, Dr. Orlando Walsh, [facility]     COLONOSCOPY  2006    diverticulosis, repeat 10 years     FLEXIBLE SIGMOIDOSCOPY  10/2000     HC LAPAROSCOPY, SURGICAL; CHOLECYSTECTOMY      Cholecystectomy, Laparoscopic     LIGATN/STRIP LONG & SHORT SAPHEN      L varicose vein excision     REPAIR PTOSIS BILATERAL  2011     Bilateral upper eyelid blepharoplasty and internal browpexy brow ptosis repair, bilateral lower eyelid ectropion repair with snip punctoplasty     TRANSFUSION, DIRECT, BLOOD      pregnancy related     ZZC CARDIOVERSION, ELECTIVE;INTERN   2007    Successful cardioversion from atrial fibrillation      [facility] ECHO HEART XTHORACIC,COMPLETE, W/O DOPPLER  2006    normal LV/RV size and function, mild pulm ht(30-40mm Hg), mild TR     Z ECHO HEART XTHORACIC,COMPLETE, W/O DOPPLER  10/2008    normal LV systolic function with EF 55-60%, impaired LV relaxation, mod to severe LAE     Z LIGATE FALLOPIAN TUBE      Tubal ligation               Social History:   I have reviewed this patient's social history  Social History     Tobacco Use     Smoking status: Never     Smokeless tobacco: Never   Substance Use Topics     Alcohol use: Yes     Comment: 0-2 per month             Family History:   I have reviewed this patient's family history  Family History   Problem Relation Age of Onset     Diabetes Mother         type 2     Hypertension Mother      Cardiovascular Mother         pulmonary embolus     Lipids Mother      Heart Disease Mother          atrial fibrillation     Diabetes Father         type 2     Cardiovascular Father         atrial fibrillation,  during an EP procedure     Cancer - colorectal Maternal Grandmother         onset age 88     Heart Disease Maternal Grandmother          age 96     Diabetes Maternal Grandfather         type 2     Diabetes Paternal Grandfather         type 2     Hypertension Sister      Lipids Sister      Lipids Brother      Hypertension Brother      Hypertension Son      Other - See Comments Cousin 68        Myotonic Dystrophy             Allergies:     Allergies   Allergen Reactions     Tetracycline      lightheaded             Medications:   I have reviewed this patient's current medications  Current Outpatient Medications   Medication Sig Dispense Refill     alcohol swab prep pads Use to swab area of injection/dilma as directed. 100 each 3     amLODIPine (NORVASC) 5 MG tablet Take 1 tablet (5 mg) by mouth daily 90 tablet 1     ASPIRIN 81 MG OR TABS 1 tab po QD (Once per day) 0 0     atenolol (TENORMIN) 100 MG  tablet Take 1 tablet (100 mg) by mouth daily 90 tablet 1     BIOTIN 10 MG OR TABS 1 TABLET DAILY       blood glucose (JAYDE CONTOUR NEXT) test strip Use to test blood sugar 1 times daily or as directed. 100 strip 3     blood glucose calibration (NO BRAND SPECIFIED) solution To accompany: Blood Glucose Monitor Brands: per insurance. 1 each 11     blood glucose monitoring (JAYDE MICROLET) lancets Use to test blood sugar 1 times daily or as directed. 100 each 3     blood glucose monitoring (NO BRAND SPECIFIED) meter device kit Use to test blood sugar 1 times daily or as directed. Preferred blood glucose meter OR supplies to accompany: Blood Glucose Monitor Brands: easiest for patient per insurance. 1 kit 0     CALTRATE 600 + D 600-200 MG-IU OR TABS 1 tablet twice daily 60 11     hydrochlorothiazide (HYDRODIURIL) 25 MG tablet Take 1 tablet (25 mg) by mouth daily 90 tablet 1     lisinopril (ZESTRIL) 40 MG tablet Take 1 tablet (40 mg) by mouth daily 90 tablet 1     METAMUCIL FIBER PO        metFORMIN (GLUCOPHAGE) 1000 MG tablet TAKE 1 AND 1/2 TABLETS BY MOUTH WITH BREAKFAST AND TAKE 1 TABLET BY MOUTH WITH SUPPER 225 tablet 1     Multiple Vitamins-Minerals (CENTRUM SILVER ADULT 50+ PO) Take 1 tablet by mouth daily       polyethylene glycol 400 (BLINK TEARS) 0.25 % SOLN ophthalmic solution Apply 1 drop to eye       rivaroxaban ANTICOAGULANT (XARELTO) 20 MG TABS tablet Take 1 tablet (20 mg) by mouth At Bedtime 90 tablet 3     simvastatin (ZOCOR) 20 MG tablet TAKE ONE TABLET BY MOUTH AT BEDTIME 90 tablet 1     vitamin B-12 (CYANOCOBALAMIN) 1000 MCG tablet Take 1,000 mcg by mouth       VITAMIN D 1000 UNIT OR CAPS 1 CAPSULE DAILY 3 MONTHS 1 YEAR               Review of Systems:       Review of Systems:  Skin:  Positive for bruising   Eyes:  Positive for glasses  ENT:  Positive for hearing loss  Respiratory:  Positive for sleep apnea  Cardiovascular:    Positive for;edema  Gastroenterology: Positive for    Genitourinary:   Negative    Musculoskeletal:  Positive for arthritis  Neurologic:  Negative    Psychiatric:  Positive for anxiety  Heme/Lymph/Imm:  Positive for easy bruising  Endocrine:  Positive for diabetes                     Data:   All available laboratory data reviewed  Lab Results   Component Value Date    CHOL 103 05/06/2022    CHOL 119 05/03/2021     Lab Results   Component Value Date    HDL 39 05/06/2022    HDL 43 05/03/2021     Lab Results   Component Value Date    LDL 50 05/06/2022    LDL 61 05/03/2021     Lab Results   Component Value Date    TRIG 70 05/06/2022    TRIG 74 05/03/2021     Lab Results   Component Value Date    CHOLHDLRATIO 3.4 04/22/2015     TSH   Date Value Ref Range Status   05/06/2022 0.67 0.40 - 4.00 mU/L Final   05/03/2021 0.52 0.40 - 4.00 mU/L Final     Last Basic Metabolic Panel:  Lab Results   Component Value Date     11/03/2022     05/03/2021      Lab Results   Component Value Date    POTASSIUM 3.8 11/03/2022    POTASSIUM 4.1 05/03/2021     Lab Results   Component Value Date    CHLORIDE 99 11/03/2022    CHLORIDE 99 05/03/2021     Lab Results   Component Value Date    ALVA 9.3 11/03/2022    ALVA 9.3 05/03/2021     Lab Results   Component Value Date    CO2 30 11/03/2022    CO2 32 05/03/2021     Lab Results   Component Value Date    BUN 11 11/03/2022    BUN 11 05/03/2021     Lab Results   Component Value Date    CR 0.50 11/03/2022    CR 0.63 05/03/2021     Lab Results   Component Value Date     11/03/2022     05/03/2021     Lab Results   Component Value Date    WBC 6.8 11/03/2022    WBC 6.7 05/03/2021     Lab Results   Component Value Date    RBC 4.13 11/03/2022    RBC 4.46 05/03/2021     Lab Results   Component Value Date    HGB 11.1 11/03/2022    HGB 12.0 05/03/2021     Lab Results   Component Value Date    HCT 34.2 11/03/2022    HCT 36.4 05/03/2021     Lab Results   Component Value Date    MCV 83 11/03/2022    MCV 82 05/03/2021     Lab Results   Component Value Date     MCH 26.9 11/03/2022    MCH 26.9 05/03/2021     Lab Results   Component Value Date    MCHC 32.5 11/03/2022    MCHC 33.0 05/03/2021     Lab Results   Component Value Date    RDW 14.3 11/03/2022    RDW 14.3 05/03/2021     Lab Results   Component Value Date     11/03/2022     05/03/2021       Thank you for allowing me to participate in the care of your patient.      Sincerely,     Edis Walsh MD     [facility]  cc:   No referring provider defined for this encounter.

## 2022-11-09 NOTE — PROGRESS NOTES
Cardiology Consultation      Zulema Nixon MRN# [id number]   YOB: 1945 Age: 77 year old   Date of Visit 11/09/2022     Reason for consult:  Atrial fibrillation           Assessment and Plan:     1. Chronic atrial fibrillation, asymptomatic.  Recent TIA on warfarin.  Feeling well on Xarelto 10 mg daily    Discussed the importance of taking this with food and if any future TIA symptoms while on the medication, would go to full-strength.    Routine follow-up in 1 year      45 minutes spent today in review of past medical record, discussion with patient and postvisit charting    This note was transcribed using electronic voice recognition software, typographical errors may be present.                Chief Complaint:   New Patient (Reestablish care for Afib, varicose veins)           History of Present Illness:   This patient is a very pleasant 77 year old female that I met during venous ablation and saw for cardiology care last in 2018.  She has chronic atrial fibrillation.  She was maintained on warfarin.  She has IBS.    She recently had TIA symptoms of left-sided weakness and slurred speech, MRI was negative for infarct.  She does have a stable meningioma.    Her warfarin was subtherapeutic and she states that she was having a difficult time getting it therapeutic.    She was put on Xarelto 10 mg daily and actually feels better from a GI and overall standpoint.  She does take it with her evening meal.    She states that the Xarelto was prescribed instead of Eliquis because she had used her Eliquis first prescription previously and it was mainly a cost issue.    [place] and her daughter and son-in-law live quarter-mile down.  She took care of their sons in  and one is in [place], 1 is at the [place] and the other is still at home.    She otherwise feels well without any residual neurologic symptoms.    She did lose her  of greater than 50 years and this has been  "somewhat difficult.  She has a great attitude about it.  She has also lost 50 pounds since I saw her last and she is very proud of this understandably so.  I congratulated her on her efforts.    Her lower extremity edema is stable.  She still wears her compression stockings.         Physical Exam:     Vitals: /84 (BP Location: Right arm, Patient Position: Sitting, Cuff Size: Adult Large)   Pulse 55   Ht 1.607 m (5' 3.25\")   Wt 90.7 kg (200 lb)   SpO2 99%   BMI 35.15 kg/m    Constitutional:  cooperative, alert and oriented, well developed, well nourished, in no acute distress        Skin:  warm and dry to the touch, no apparent skin lesions or masses noted        Head:  normocephalic, no masses or lesions        Eyes:  pupils equal and round, conjunctivae and lids unremarkable, sclera white, no xanthalasma, EOMS intact, no nystagmus        ENT:  no pallor or cyanosis        Neck:  JVP normal        Chest:  normal symmetry        Cardiac:   irregularly irregular rhythm           rate controlled    Abdomen:  abdomen soft, non-tender, BS normoactive, no mass, no HSM, no bruits   benign    Extremities and Back:    erythema      Neurological:  no gross motor deficits;affect appropriate                      Past Medical History:   I have reviewed this patient's past medical history  Past Medical History:   Diagnosis Date     Atrial fibrillation (H) 11/01/2006    cardioverted 2/2007, on chronic anticoagulation      Cerebral artery occlusion with cerebral infarction (H)      CKD (chronic kidney disease) stage 2, GFR 60-89 ml/min 2006     with microalbuminuria     Essential hypertension, benign      Hyperlipidemia LDL goal <100 10/31/2010    fish oil = IBS with diarrhea      Morbid obesity (H)      ARMAND (obstructive sleep apnea) 1994    C PAP     Sensorineural hearing loss of both ears     using hearing aids     Status post laparoscopic cholecystectomy 1991    cholecystectomy for cholelithiasis     Supervision of " other normal pregnancy      - vaginal, one set of twins     Tortuous colon     detected at colonoscopy  - was recommended for next testing to have CT colonography      Tubal ligation status      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)      Varicose veins of lower extremities with inflammation     excision varicose vein left lower extremity     Venous stasis of lower extremity     excision varicose vein left lower extremity              Past Surgical History:   I have reviewed this patient's past surgical history  Past Surgical History:   Procedure Laterality Date     C DEXA INTERPRETATION, AXIAL  2007    T score lumbar 3.7, femoral neck 2.8/2.0(all stable)     C DEXA INTERPRETATION, AXIAL  2010    T score lumbar 3.4 (marked degen changes), femoral neck 2.1/2.1 (sig dec lt femur)     C ORAL SURGERY SINGLE TOOTH  2019     had to have left upper rear tooth removed secondary to crown breaking /root damage      CARDIAC NUC ESHA STRESS TEST NL  2006    exercised 4 min, no inducible ischemia on ECG or perfusion images     CARPAL TUNNEL RELEASE RT/LT Right 2021    Right carpal tunnel release under local anesthetic, Dr. Orlando Walsh, [facility]     COLONOSCOPY  2006    diverticulosis, repeat 10 years     FLEXIBLE SIGMOIDOSCOPY  10/2000     HC LAPAROSCOPY, SURGICAL; CHOLECYSTECTOMY      Cholecystectomy, Laparoscopic     LIGATN/STRIP LONG & SHORT SAPHEN      L varicose vein excision     REPAIR PTOSIS BILATERAL  2011     Bilateral upper eyelid blepharoplasty and internal browpexy brow ptosis repair, bilateral lower eyelid ectropion repair with snip punctoplasty     TRANSFUSION, DIRECT, BLOOD      pregnancy related     Z CARDIOVERSION, ELECTIVE;INTERN  2007    Successful cardioversion from atrial fibrillation      Z ECHO HEART XTHORACIC,COMPLETE, W/O DOPPLER  2006    normal LV/RV size and function, mild pulm ht(30-40mm Hg), mild TR     ZZ ECHO HEART  XTHORACIC,COMPLETE, W/O DOPPLER  10/2008    normal LV systolic function with EF 55-60%, impaired LV relaxation, mod to severe LAE     ZZC LIGATE FALLOPIAN TUBE      Tubal ligation               Social History:   I have reviewed this patient's social history  Social History     Tobacco Use     Smoking status: Never     Smokeless tobacco: Never   Substance Use Topics     Alcohol use: Yes     Comment: 0-2 per month             Family History:   I have reviewed this patient's family history  Family History   Problem Relation Age of Onset     Diabetes Mother         type 2     Hypertension Mother      Cardiovascular Mother         pulmonary embolus     Lipids Mother      Heart Disease Mother          atrial fibrillation     Diabetes Father         type 2     Cardiovascular Father         atrial fibrillation,  during an EP procedure     Cancer - colorectal Maternal Grandmother         onset age 88     Heart Disease Maternal Grandmother          age 96     Diabetes Maternal Grandfather         type 2     Diabetes Paternal Grandfather         type 2     Hypertension Sister      Lipids Sister      Lipids Brother      Hypertension Brother      Hypertension Son      Other - See Comments Cousin 68        Myotonic Dystrophy             Allergies:     Allergies   Allergen Reactions     Tetracycline      lightheaded             Medications:   I have reviewed this patient's current medications  Current Outpatient Medications   Medication Sig Dispense Refill     alcohol swab prep pads Use to swab area of injection/dilma as directed. 100 each 3     amLODIPine (NORVASC) 5 MG tablet Take 1 tablet (5 mg) by mouth daily 90 tablet 1     ASPIRIN 81 MG OR TABS 1 tab po QD (Once per day) 0 0     atenolol (TENORMIN) 100 MG tablet Take 1 tablet (100 mg) by mouth daily 90 tablet 1     BIOTIN 10 MG OR TABS 1 TABLET DAILY       blood glucose (JAYDE CONTOUR NEXT) test strip Use to test blood sugar 1 times daily or as directed. 100  strip 3     blood glucose calibration (NO BRAND SPECIFIED) solution To accompany: Blood Glucose Monitor Brands: per insurance. 1 each 11     blood glucose monitoring (JAYDE MICROLET) lancets Use to test blood sugar 1 times daily or as directed. 100 each 3     blood glucose monitoring (NO BRAND SPECIFIED) meter device kit Use to test blood sugar 1 times daily or as directed. Preferred blood glucose meter OR supplies to accompany: Blood Glucose Monitor Brands: easiest for patient per insurance. 1 kit 0     CALTRATE 600 + D 600-200 MG-IU OR TABS 1 tablet twice daily 60 11     hydrochlorothiazide (HYDRODIURIL) 25 MG tablet Take 1 tablet (25 mg) by mouth daily 90 tablet 1     lisinopril (ZESTRIL) 40 MG tablet Take 1 tablet (40 mg) by mouth daily 90 tablet 1     METAMUCIL FIBER PO        metFORMIN (GLUCOPHAGE) 1000 MG tablet TAKE 1 AND 1/2 TABLETS BY MOUTH WITH BREAKFAST AND TAKE 1 TABLET BY MOUTH WITH SUPPER 225 tablet 1     Multiple Vitamins-Minerals (CENTRUM SILVER ADULT 50+ PO) Take 1 tablet by mouth daily       polyethylene glycol 400 (BLINK TEARS) 0.25 % SOLN ophthalmic solution Apply 1 drop to eye       rivaroxaban ANTICOAGULANT (XARELTO) 20 MG TABS tablet Take 1 tablet (20 mg) by mouth At Bedtime 90 tablet 3     simvastatin (ZOCOR) 20 MG tablet TAKE ONE TABLET BY MOUTH AT BEDTIME 90 tablet 1     vitamin B-12 (CYANOCOBALAMIN) 1000 MCG tablet Take 1,000 mcg by mouth       VITAMIN D 1000 UNIT OR CAPS 1 CAPSULE DAILY 3 MONTHS 1 YEAR               Review of Systems:       Review of Systems:  Skin:  Positive for bruising   Eyes:  Positive for glasses  ENT:  Positive for hearing loss  Respiratory:  Positive for sleep apnea  Cardiovascular:    Positive for;edema  Gastroenterology: Positive for    Genitourinary:  Negative    Musculoskeletal:  Positive for arthritis  Neurologic:  Negative    Psychiatric:  Positive for anxiety  Heme/Lymph/Imm:  Positive for easy bruising  Endocrine:  Positive for diabetes                      Data:   All available laboratory data reviewed  Lab Results   Component Value Date    CHOL 103 05/06/2022    CHOL 119 05/03/2021     Lab Results   Component Value Date    HDL 39 05/06/2022    HDL 43 05/03/2021     Lab Results   Component Value Date    LDL 50 05/06/2022    LDL 61 05/03/2021     Lab Results   Component Value Date    TRIG 70 05/06/2022    TRIG 74 05/03/2021     Lab Results   Component Value Date    CHOLHDLRATIO 3.4 04/22/2015     TSH   Date Value Ref Range Status   05/06/2022 0.67 0.40 - 4.00 mU/L Final   05/03/2021 0.52 0.40 - 4.00 mU/L Final     Last Basic Metabolic Panel:  Lab Results   Component Value Date     11/03/2022     05/03/2021      Lab Results   Component Value Date    POTASSIUM 3.8 11/03/2022    POTASSIUM 4.1 05/03/2021     Lab Results   Component Value Date    CHLORIDE 99 11/03/2022    CHLORIDE 99 05/03/2021     Lab Results   Component Value Date    ALVA 9.3 11/03/2022    ALVA 9.3 05/03/2021     Lab Results   Component Value Date    CO2 30 11/03/2022    CO2 32 05/03/2021     Lab Results   Component Value Date    BUN 11 11/03/2022    BUN 11 05/03/2021     Lab Results   Component Value Date    CR 0.50 11/03/2022    CR 0.63 05/03/2021     Lab Results   Component Value Date     11/03/2022     05/03/2021     Lab Results   Component Value Date    WBC 6.8 11/03/2022    WBC 6.7 05/03/2021     Lab Results   Component Value Date    RBC 4.13 11/03/2022    RBC 4.46 05/03/2021     Lab Results   Component Value Date    HGB 11.1 11/03/2022    HGB 12.0 05/03/2021     Lab Results   Component Value Date    HCT 34.2 11/03/2022    HCT 36.4 05/03/2021     Lab Results   Component Value Date    MCV 83 11/03/2022    MCV 82 05/03/2021     Lab Results   Component Value Date    MCH 26.9 11/03/2022    MCH 26.9 05/03/2021     Lab Results   Component Value Date    MCHC 32.5 11/03/2022    MCHC 33.0 05/03/2021     Lab Results   Component Value Date    RDW 14.3 11/03/2022    RDW 14.3 05/03/2021      Lab Results   Component Value Date     11/03/2022     05/03/2021

## 2022-12-08 ENCOUNTER — TELEPHONE (OUTPATIENT)
Dept: FAMILY MEDICINE | Facility: CLINIC | Age: 77
End: 2022-12-08

## 2022-12-08 NOTE — TELEPHONE ENCOUNTER
At pt. Request pre opt has been faxed to [facility].     Seamus Pak

## 2022-12-12 ENCOUNTER — TRANSFERRED RECORDS (OUTPATIENT)
Dept: MULTI SPECIALTY CLINIC | Facility: CLINIC | Age: 77
End: 2022-12-12

## 2022-12-12 LAB — RETINOPATHY: NORMAL

## 2022-12-24 ENCOUNTER — HEALTH MAINTENANCE LETTER (OUTPATIENT)
Age: 77
End: 2022-12-24

## 2023-01-17 ENCOUNTER — HOSPITAL ENCOUNTER (OUTPATIENT)
Dept: MAMMOGRAPHY | Facility: CLINIC | Age: 78
Discharge: HOME | End: 2023-01-17
Attending: FAMILY MEDICINE | Admitting: FAMILY MEDICINE
Payer: MEDICARE

## 2023-01-17 DIAGNOSIS — Z12.31 VISIT FOR SCREENING MAMMOGRAM: ICD-10-CM

## 2023-01-17 PROCEDURE — 77067 SCR MAMMO BI INCL CAD: CPT

## 2023-01-23 NOTE — RESULT ENCOUNTER NOTE
Your mammogram was normal.     Thank you so much for choosing [facility].  Please contact us with any questions that you may have.   We appreciate the opportunity to serve you now and look forward to supporting your healthcare needs for a long time to come!    Most Sincerely,     Madina Mercado MD

## 2023-05-09 ENCOUNTER — OFFICE VISIT (OUTPATIENT)
Dept: FAMILY MEDICINE | Facility: CLINIC | Age: 78
End: 2023-05-09
Payer: MEDICARE

## 2023-05-09 VITALS
HEIGHT: 63 IN | HEART RATE: 70 BPM | RESPIRATION RATE: 18 BRPM | BODY MASS INDEX: 33.49 KG/M2 | DIASTOLIC BLOOD PRESSURE: 86 MMHG | SYSTOLIC BLOOD PRESSURE: 136 MMHG | OXYGEN SATURATION: 100 % | WEIGHT: 189 LBS | TEMPERATURE: 96.9 F

## 2023-05-09 DIAGNOSIS — K58.0 IRRITABLE BOWEL SYNDROME WITH DIARRHEA: ICD-10-CM

## 2023-05-09 DIAGNOSIS — D48.5 NEOPLASM OF UNCERTAIN BEHAVIOR OF SKIN: ICD-10-CM

## 2023-05-09 DIAGNOSIS — Z23 NEED FOR DIPHTHERIA-TETANUS-PERTUSSIS (TDAP) VACCINE: ICD-10-CM

## 2023-05-09 DIAGNOSIS — E11.42 TYPE 2 DIABETES MELLITUS WITH DIABETIC POLYNEUROPATHY, WITHOUT LONG-TERM CURRENT USE OF INSULIN (H): ICD-10-CM

## 2023-05-09 DIAGNOSIS — I10 ESSENTIAL HYPERTENSION WITH GOAL BLOOD PRESSURE LESS THAN 140/90: ICD-10-CM

## 2023-05-09 DIAGNOSIS — E11.59 TYPE 2 DIABETES MELLITUS WITH OTHER CIRCULATORY COMPLICATION, WITHOUT LONG-TERM CURRENT USE OF INSULIN (H): ICD-10-CM

## 2023-05-09 DIAGNOSIS — Z79.01 LONG TERM CURRENT USE OF ANTICOAGULANT THERAPY: ICD-10-CM

## 2023-05-09 DIAGNOSIS — I48.91 ATRIAL FIBRILLATION, UNSPECIFIED TYPE (H): ICD-10-CM

## 2023-05-09 DIAGNOSIS — N18.2 CKD (CHRONIC KIDNEY DISEASE) STAGE 2, GFR 60-89 ML/MIN: ICD-10-CM

## 2023-05-09 DIAGNOSIS — G47.33 OSA (OBSTRUCTIVE SLEEP APNEA): ICD-10-CM

## 2023-05-09 DIAGNOSIS — E55.9 VITAMIN D DEFICIENCY: ICD-10-CM

## 2023-05-09 DIAGNOSIS — Z86.73 PERSONAL HISTORY OF TIA (TRANSIENT ISCHEMIC ATTACK): ICD-10-CM

## 2023-05-09 DIAGNOSIS — G45.9 TIA (TRANSIENT ISCHEMIC ATTACK): ICD-10-CM

## 2023-05-09 DIAGNOSIS — Z00.00 ENCOUNTER FOR ANNUAL WELLNESS EXAM IN MEDICARE PATIENT: Primary | ICD-10-CM

## 2023-05-09 DIAGNOSIS — D32.9 MENINGIOMA (H): ICD-10-CM

## 2023-05-09 DIAGNOSIS — E78.5 HYPERLIPIDEMIA LDL GOAL <100: ICD-10-CM

## 2023-05-09 DIAGNOSIS — E66.811 OBESITY (BMI 30.0-34.9): ICD-10-CM

## 2023-05-09 LAB
ALBUMIN SERPL BCG-MCNC: 4.3 G/DL (ref 3.5–5.2)
ALP SERPL-CCNC: 49 U/L (ref 35–104)
ALT SERPL W P-5'-P-CCNC: 14 U/L (ref 10–35)
ANION GAP SERPL CALCULATED.3IONS-SCNC: 13 MMOL/L (ref 7–15)
AST SERPL W P-5'-P-CCNC: 22 U/L (ref 10–35)
BILIRUB SERPL-MCNC: 0.5 MG/DL
BUN SERPL-MCNC: 13.6 MG/DL (ref 8–23)
CALCIUM SERPL-MCNC: 9.9 MG/DL (ref 8.8–10.2)
CHLORIDE SERPL-SCNC: 97 MMOL/L (ref 98–107)
CHOLEST SERPL-MCNC: 100 MG/DL
CREAT SERPL-MCNC: 0.63 MG/DL (ref 0.51–0.95)
DEPRECATED HCO3 PLAS-SCNC: 27 MMOL/L (ref 22–29)
ERYTHROCYTE [DISTWIDTH] IN BLOOD BY AUTOMATED COUNT: 14.7 % (ref 10–15)
GFR SERPL CREATININE-BSD FRML MDRD: 90 ML/MIN/1.73M2
GLUCOSE SERPL-MCNC: 117 MG/DL (ref 70–99)
HBA1C MFR BLD: 5.6 % (ref 0–5.6)
HCT VFR BLD AUTO: 34 % (ref 35–47)
HDLC SERPL-MCNC: 43 MG/DL
HGB BLD-MCNC: 11 G/DL (ref 11.7–15.7)
LDLC SERPL CALC-MCNC: 47 MG/DL
MCH RBC QN AUTO: 25.8 PG (ref 26.5–33)
MCHC RBC AUTO-ENTMCNC: 32.4 G/DL (ref 31.5–36.5)
MCV RBC AUTO: 80 FL (ref 78–100)
NONHDLC SERPL-MCNC: 57 MG/DL
PLATELET # BLD AUTO: 227 10E3/UL (ref 150–450)
POTASSIUM SERPL-SCNC: 4.3 MMOL/L (ref 3.4–5.3)
PROT SERPL-MCNC: 7 G/DL (ref 6.4–8.3)
RBC # BLD AUTO: 4.26 10E6/UL (ref 3.8–5.2)
SODIUM SERPL-SCNC: 137 MMOL/L (ref 136–145)
TRIGL SERPL-MCNC: 51 MG/DL
TSH SERPL DL<=0.005 MIU/L-ACNC: 0.9 UIU/ML (ref 0.3–4.2)
WBC # BLD AUTO: 5.8 10E3/UL (ref 4–11)

## 2023-05-09 PROCEDURE — 82043 UR ALBUMIN QUANTITATIVE: CPT | Performed by: FAMILY MEDICINE

## 2023-05-09 PROCEDURE — 82570 ASSAY OF URINE CREATININE: CPT | Performed by: FAMILY MEDICINE

## 2023-05-09 PROCEDURE — 83036 HEMOGLOBIN GLYCOSYLATED A1C: CPT | Performed by: FAMILY MEDICINE

## 2023-05-09 PROCEDURE — 80053 COMPREHEN METABOLIC PANEL: CPT | Performed by: FAMILY MEDICINE

## 2023-05-09 PROCEDURE — 84443 ASSAY THYROID STIM HORMONE: CPT | Performed by: FAMILY MEDICINE

## 2023-05-09 PROCEDURE — 99215 OFFICE O/P EST HI 40 MIN: CPT | Mod: 25 | Performed by: FAMILY MEDICINE

## 2023-05-09 PROCEDURE — 82306 VITAMIN D 25 HYDROXY: CPT | Performed by: FAMILY MEDICINE

## 2023-05-09 PROCEDURE — G0439 PPPS, SUBSEQ VISIT: HCPCS | Performed by: FAMILY MEDICINE

## 2023-05-09 PROCEDURE — 36415 COLL VENOUS BLD VENIPUNCTURE: CPT | Performed by: FAMILY MEDICINE

## 2023-05-09 PROCEDURE — 85027 COMPLETE CBC AUTOMATED: CPT | Performed by: FAMILY MEDICINE

## 2023-05-09 PROCEDURE — 80061 LIPID PANEL: CPT | Performed by: FAMILY MEDICINE

## 2023-05-09 PROCEDURE — 99207 PR FOOT EXAM NO CHARGE: CPT | Performed by: FAMILY MEDICINE

## 2023-05-09 RX ORDER — LISINOPRIL 40 MG/1
40 TABLET ORAL DAILY
Qty: 90 TABLET | Refills: 1 | Status: SHIPPED | OUTPATIENT
Start: 2023-05-09 | End: 2023-06-15

## 2023-05-09 RX ORDER — ATENOLOL 100 MG/1
100 TABLET ORAL DAILY
Qty: 90 TABLET | Refills: 1 | Status: SHIPPED | OUTPATIENT
Start: 2023-05-09 | End: 2023-06-15

## 2023-05-09 RX ORDER — AMLODIPINE BESYLATE 5 MG/1
5 TABLET ORAL DAILY
Qty: 90 TABLET | Refills: 1 | Status: SHIPPED | OUTPATIENT
Start: 2023-05-09

## 2023-05-09 RX ORDER — SIMVASTATIN 20 MG
TABLET ORAL
Qty: 90 TABLET | Refills: 1 | Status: SHIPPED | OUTPATIENT
Start: 2023-05-09

## 2023-05-09 RX ORDER — HYDROCHLOROTHIAZIDE 25 MG/1
25 TABLET ORAL DAILY
Qty: 90 TABLET | Refills: 1 | Status: SHIPPED | OUTPATIENT
Start: 2023-05-09

## 2023-05-09 NOTE — PROGRESS NOTES
Assessment & Plan :       ICD-10-CM    1. Encounter for annual wellness exam in Medicare patient  Z00.00       2. Hyperlipidemia LDL goal <100- fish oil = worse IBS with diarrhea   E78.5 simvastatin (ZOCOR) 20 MG tablet     Lipid panel reflex to direct LDL Fasting     Comprehensive metabolic panel     Albumin Random Urine Quantitative with Creat Ratio     PRIMARY CARE FOLLOW-UP SCHEDULING      3. TIA (transient ischemic attack)  G45.9 rivaroxaban ANTICOAGULANT (XARELTO) 20 MG TABS tablet     US Carotid Bilateral     PRIMARY CARE FOLLOW-UP SCHEDULING     Adult Neurology  Referral      4. Type 2 diabetes mellitus with diabetic polyneuropathy, without long-term current use of insulin (H)- needs labs and refills   E11.42 metFORMIN (GLUCOPHAGE) 1000 MG tablet     TSH with free T4 reflex     Comprehensive metabolic panel     CBC with platelets     Albumin Random Urine Quantitative with Creat Ratio     PRIMARY CARE FOLLOW-UP SCHEDULING      5. Essential hypertension with goal blood pressure less than 140/90- well controlled today - needs labs and medication refills   I10 lisinopril (ZESTRIL) 40 MG tablet     hydrochlorothiazide (HYDRODIURIL) 25 MG tablet     atenolol (TENORMIN) 100 MG tablet     amLODIPine (NORVASC) 5 MG tablet     TSH with free T4 reflex     Comprehensive metabolic panel     CBC with platelets     Albumin Random Urine Quantitative with Creat Ratio     PRIMARY CARE FOLLOW-UP SCHEDULING      6. CKD (chronic kidney disease) stage 2, GFR 60-89 ml/min- needs lab follow up today   N18.2 hydrochlorothiazide (HYDRODIURIL) 25 MG tablet     Comprehensive metabolic panel     Albumin Random Urine Quantitative with Creat Ratio     PRIMARY CARE FOLLOW-UP SCHEDULING      7. Type 2 diabetes mellitus with diabetic polyneuropathy, without long-term current use of insulin (H)  E11.42 HEMOGLOBIN A1C     Lipid panel reflex to direct LDL Non-fasting     Adult Eye  Referral     FOOT EXAM     Adult  Dermatology Referral      8. Type 2 diabetes mellitus with other circulatory complication, without long-term current use of insulin (H)  E11.59 FOOT EXAM     Adult Dermatology Referral     PRIMARY CARE FOLLOW-UP SCHEDULING      9. Long term current use of anticoagulant therapy- Xarelto - had TIA on Warfarin - difficulty getting therapeutic with that, Elliquis too expensive   Z79.01       10. Obesity (BMI 30.0-34.9)- has lost weight purposefully  - is no longer morbidly obese   E66.9       11. Irritable bowel syndrome with diarrhea - better with metamucil - was initially better with cholestyramine, then didn't work as well (2g/day = too much gassiness and bloating)   K58.0 Adult GI  Referral - Consult Only      12. Meningioma (H)--left frontal - MRI 4/25/2019 = stable from 10/2018 and 11/2/2019- no further follow up needed per Neurology per patient - noted 11/4/2020   D32.9       13. Neoplasm of uncertain behavior of skin - nonhealing scabbed lesion superior right scalp   D48.5 Adult Dermatology Referral      14. Need for diphtheria-tetanus-pertussis (Tdap) vaccine  Z23 Tdap, tetanus-diptheria-acell pertussis, (BOOSTRIX) 5-2.5-18.5 LF-MCG/0.5 TERRELL injection      15. Atrial fibrillation, unspecified type (H)- on Xarelto - seeing Dr. Edis Walsh - [facility] once a year - last visit 11/2022   I48.91 rivaroxaban ANTICOAGULANT (XARELTO) 20 MG TABS tablet      16. Vitamin D deficiency  E55.9 25 Hydroxyvitamin D2 and D3      17. ARMAND (obstructive sleep apnea)  G47.33       18. Personal history of TIA (transient ischemic attack)  Z86.73          45 minutes today spent on nonpreventative care.     Ordering of each unique test  Prescription drug management  60 minutes spent by me on the date of the encounter doing chart review, history and exam, documentation and further activities per the note     Return in about 27 weeks (around 11/14/2023) for blood pressure, cholesterol/lipids, diabetes, w/ Dr. BAEZ for 40 minute  "appointment.     BMI:   Estimated body mass index is 33.22 kg/m  as calculated from the following:    Height as of this encounter: 1.607 m (5' 3.25\").    Weight as of this encounter: 85.7 kg (189 lb).   Weight management plan: Discussed healthy diet and exercise guidelines    MEDICATIONS:  Continue current medications without change  Work on weight loss  Regular exercise  See Patient Instructions         Madina Mercado MD  [facility] PRIOR DAMION Poole is a 77 year old, presenting for the following health issues:  Diabetes, Hypertension, and Lipids   and the following other medical problems:    Also due for a Medicare annual Wellness visit: see below for that as well.   1. Type 2 diabetes mellitus with other circulatory complication, without long-term current use of insulin (H)    2. Hyperlipidemia LDL goal <100- fish oil = worse IBS with diarrhea     3. Atrial fibrillation, unspecified type (H)    4. TIA (transient ischemic attack)    5. Type 2 diabetes mellitus with diabetic polyneuropathy, without long-term current use of insulin (H)- needs labs and refills     6. Essential hypertension with goal blood pressure less than 140/90- well controlled today - needs labs and medication refills     7. CKD (chronic kidney disease) stage 2, GFR 60-89 ml/min- needs lab follow up today     8. Type 2 diabetes mellitus with diabetic polyneuropathy, without long-term current use of insulin (H)    9. Long term current use of anticoagulant therapy    10. Obesity (BMI 30.0-34.9)- has lost weight - is no longer morbidly obese     11. Irritable bowel syndrome with diarrhea - better with metamucil - was initially better with cholestyramine, then didn't work as well (2g/day = too much gassiness and bloating)     12. Meningioma (H)--left frontal - MRI 4/25/2019 = stable from 10/2018 and 11/2/2019- no further follow up needed per Neurology per patient - noted 11/4/2020 5/9/2023     8:05 AM "   Additional Questions   Roomed by misa arizmendi   Accompanied by self         5/9/2023     8:10 AM   Patient Reported Additional Medications   Patient reports taking the following new medications stopped warfarin and started xarelto     History of Present Illness       Diabetes:   She presents for follow up of diabetes.  She is checking home blood glucose a few times a week. She checks blood glucose before meals.  Blood glucose is never over 200 and never under 70. When her blood glucose is low, the patient is asymptomatic for confusion, blurred vision, lethargy and reports not feeling dizzy, shaky, or weak.  She has no concerns regarding her diabetes at this time.  She is having weight loss. The patient has had a diabetic eye exam in the last 12 months. Eye exam performed on december 2022. Location of last eye exam [place]  [facility].        She eats 0-1 servings of fruits and vegetables daily.She consumes 0 sweetened beverage(s) daily.She exercises with enough effort to increase her heart rate 10 to 19 minutes per day.  She exercises with enough effort to increase her heart rate 3 or less days per week.   She is taking medications regularly.     IBS-Diarrhea  -- Worried about going to the [employer] game tonight with her IBS-D-.  Ok to take 1- imodiumAD prior to leaving for the game.  Pay attention to earliest signs of needing to use the restroom and go sooner rather than later. Discussed low FOD-MAPS diet today.  Would like to see Gastroenterology as well.  Feels like her GI issues are affecting her mental health as well as she is limiting her activities as she is afraid of having issues with uncontrolled diarrhea.      obstructive sleep apnea Going for repeat sleep study for her ARMAND - since her weight loss.  Needs CPAP adjusted.     Mood decrease - yearns for the way life used to be when  was alive.       5/3/2021    10:25 AM   Last PHQ-9   1.  Little interest or pleasure in doing things 0   2.  Feeling down,  depressed, or hopeless 1   3.  Trouble falling or staying asleep, or sleeping too much 1   4.  Feeling tired or having little energy 1   5.  Poor appetite or overeating 1   7.  Trouble concentrating 0   8.  Moving slowly or restless 0   Q9: Thoughts of better off dead/self-harm past 2 weeks 0   Difficulty at work, home, or with people Not difficult at all         5/3/2021    10:25 AM   CINDY-7    1. Feeling nervous, anxious, or on edge 0   2. Not being able to stop or control worrying 0   3. Worrying too much about different things 0   4. Trouble relaxing 0   5. Being so restless that it is hard to sit still 0   6. Becoming easily annoyed or irritable 0   7. Feeling afraid, as if something awful might happen 0   CINDY-7 Total Score 0   If you checked any problems, how difficult have they made it for you to do your work, take care of things at home, or get along with other people? Not difficult at all       Hyperlipidemia Follow-Up:       Are you regularly taking any medication or supplement to lower your cholesterol?   Yes- simvistatin    Are you having muscle aches or other side effects that you think could be caused by your cholesterol lowering medication?  No     Recent Labs   Lab Test 05/06/22  0937 11/05/21  1049 12/10/15  1203 04/22/15  1002   CHOL 103 120   < > 111   HDL 39* 39*   < > 33*   LDL 50 67   < > 55   TRIG 70 69   < > 114   CHOLHDLRATIO  --   --   --  3.4    < > = values in this interval not displayed.      Weight loss: eating very small portions - trying to eat healthier in general.    Wt Readings from Last 5 Encounters:   05/09/23 85.7 kg (189 lb)   11/09/22 90.7 kg (200 lb)   11/03/22 90.3 kg (199 lb)   09/23/22 87.5 kg (193 lb)   06/21/22 90.3 kg (199 lb)       Hypertension Follow-up:       Do you check your blood pressure regularly outside of the clinic? No     Are you following a low salt diet? Yes    Are your blood pressures ever more than 140 on the top number (systolic) OR more   than 90 on the  "bottom number (diastolic), for example 140/90? na    Annual Wellness Visit:    Patient has been advised of split billing requirements and indicates understanding: Yes     Are you in the first 12 months of your Medicare Part B coverage?  No    Physical Health:    In general, how would you rate your overall physical health? good    Outside of work, how many days during the week do you exercise?1 day/week - was going to [employer] 2-3x/week - stopped when she had her cataract surgery. And winter was bad.     Lives in [place] and had to drive to [facility].      Outside of work, approximately how many minutes a day do you exercise?15-30 minutes    If you drink alcohol do you typically have >3 drinks per day or >7 drinks per week? No    Do you usually eat at least 4 servings of fruit and vegetables a day, include whole grains & fiber and avoid regularly eating high fat or \"junk\" foods? No    Do you have any problems taking medications regularly? No    Do you have any side effects from medications? none    Needs assistance for the following daily activities: no assistance needed    Which of the following safety concerns are present in your home?  none identified     Hearing impairment: Yes, has hearing aids - monitored by [facility] in [place]     In the past 6 months, have you been bothered by leaking of urine? no       Mental Health:    In general, how would you rate your overall mental or emotional health? good  PHQ-2 Score: 1    Do you feel safe in your environment? Yes    Have you ever done Advance Care Planning? (For example, a Health Directive, POLST, or a discussion with a medical provider or your loved ones about your wishes)? Yes, patient states has an Advance Care Planning document and will bring a copy to the clinic.    Fall risk:  Fallen 2 or more times in the past year?: No  Any fall with injury in the past year?: No    Cognitive Screenin) Repeat 3 items (Leader, Season, " Table)    2) Clock draw: NORMAL  3) 3 item recall: Recalls 2 objects   Results: NORMAL clock, 1-2 items recalled: COGNITIVE IMPAIRMENT LESS LIKELY    Mini-CogTM Copyright S Vidya. Licensed by the author for use in [facility]; reprinted with permission (anjelica@[email or web address]). All rights reserved.      Do you have sleep apnea, excessive snoring or daytime drowsiness?: yes     Had a TIA in 10/20/2022 while subtherapeutic on warfarin - had difficulty keeping a therapeutic level prior to that - seen at [facility] - was on warfarin for a bit, now on Xarelto.  No symptoms.  TIA symptoms of left-sided weakness and slurred speech which resolved before she got to the ED, MRI was negative for infarct.  She does have a stable meningioma. Since then she has been symptom free.   No changes in speech, swallowing, hearing, coordination  or vision.   No numbness, tingling, or weakness in upper or lower extremities. No new bowel or bladder control problems.     Atrial Fibrillation: on Xarelto - following with Cardiology - seeing Dr. Edis Walsh in [place] with [facility]. Last visit with him on 11/9/2022. Looking for coupons for Xarelto.  Costing her $600/month right now.      Last echocardiogram was in 10/21/2022: done at [facility].   Final Impressions:    1. Normal LV size, normal wall thickness, normal global systolic function with an estimated EF of 55 - 60%.    2. Right ventricular cavity size is borderline enlarged, global systolic RV function is normal.    3. The aortic valve is trileaflet and sclerotic, no stenosis and no regurgitation.    4. The inferior vena cava is dilated, respiratory size variation less than 50%, consistent with elevated right atrial pressure.      Hasn't had a Carotid US or EEG.  EEG was recommended by the ER at [facility].      Social History     Tobacco Use     Smoking status: Never     Smokeless tobacco: Never   Vaping Use     Vaping status: Not on file   Substance Use Topics      Alcohol use: Yes     Comment: 0-2 per month           11/5/2021     8:45 AM   Alcohol Use   Prescreen: >3 drinks/day or >7 drinks/week? No     Do you have a current opioid prescription? No  Do you use any other controlled substances or medications that are not prescribed by a provider? None    Current providers sharing in care for this patient include:   Patient Care Team:  Madina Mercado MD as PCP - General (Family Practice)  Madina Mercado MD as Assigned PCP  Edis Walsh MD as MD (Cardiovascular Disease)  Edis Walsh MD as Assigned Heart and Vascular Provider    Patient has been advised of split billing requirements and indicates understanding: Yes    Patient Active Problem List   Diagnosis     ARMAND (obstructive sleep apnea)     Atrial fibrillation, unspecified type (H)     Hyperlipidemia LDL goal <100- fish oil = worse IBS with diarrhea      Status post laparoscopic cholecystectomy     CKD (chronic kidney disease) stage 2, GFR 60-89 ml/min     Venous stasis of lower extremity     Advanced directives, counseling/discussion     Sensorineural hearing loss of both ears - using hearing aids     Bilateral leg edema- has been to lymphedema clinic in past     Essential hypertension with goal blood pressure less than 140/90     Type 2 diabetes mellitus with other circulatory complication, without long-term current use of insulin (H)     Long term current use of anticoagulant therapy     Onychomycosis     Type 2 diabetes mellitus with diabetic polyneuropathy, without long-term current use of insulin (H)     Irritable bowel syndrome with diarrhea - better with metamucil - was initially better with cholestyramine, then didn't work as well (2g/day = too much gassiness and bloating)      Osteoarthritis of both knees, unspecified osteoarthritis type     Meningioma (H)--left frontal - MRI 4/25/2019 = stable from 10/2018 and 11/2/2019- no further follow up needed per Neurology per patient -  noted 11/4/2020      Persistent atrial fibrillation (H)     Carpal tunnel syndrome of right wrist- increasing over time - now awakening at night with tingling and pain despite wearing a brace at night - desires referral      Screen for colon cancer- pt declines referral for colonoscopy again this year- consents to do one step FIT test      Grief reaction - 's death from COVID-19 complications 12/2/2020      Anemia, unspecified type     Personal history of TIA (transient ischemic attack)     Vitamin D deficiency       Current Outpatient Medications   Medication Sig Dispense Refill     alcohol swab prep pads Use to swab area of injection/dilma as directed. 100 each 3     amLODIPine (NORVASC) 5 MG tablet Take 1 tablet (5 mg) by mouth daily 90 tablet 1     ASPIRIN 81 MG OR TABS 1 tab po QD (Once per day) 0 0     atenolol (TENORMIN) 100 MG tablet Take 1 tablet (100 mg) by mouth daily 90 tablet 1     BIOTIN 10 MG OR TABS 1 TABLET DAILY       blood glucose (JAYDE CONTOUR NEXT) test strip Use to test blood sugar 1 times daily or as directed. 100 strip 3     blood glucose calibration (NO BRAND SPECIFIED) solution To accompany: Blood Glucose Monitor Brands: per insurance. 1 each 11     blood glucose monitoring (JAYDE MICROLET) lancets Use to test blood sugar 1 times daily or as directed. 100 each 3     blood glucose monitoring (NO BRAND SPECIFIED) meter device kit Use to test blood sugar 1 times daily or as directed. Preferred blood glucose meter OR supplies to accompany: Blood Glucose Monitor Brands: easiest for patient per insurance. 1 kit 0     CALTRATE 600 + D 600-200 MG-IU OR TABS 1 tablet twice daily 60 11     hydrochlorothiazide (HYDRODIURIL) 25 MG tablet Take 1 tablet (25 mg) by mouth daily 90 tablet 1     lisinopril (ZESTRIL) 40 MG tablet Take 1 tablet (40 mg) by mouth daily 90 tablet 1     METAMUCIL FIBER PO        metFORMIN (GLUCOPHAGE) 1000 MG tablet TAKE 1 AND 1/2 TABLETS BY MOUTH WITH BREAKFAST AND TAKE 1  "TABLET BY MOUTH WITH SUPPER 225 tablet 1     Multiple Vitamins-Minerals (CENTRUM SILVER ADULT 50+ PO) Take 1 tablet by mouth daily       polyethylene glycol 400 (BLINK TEARS) 0.25 % SOLN ophthalmic solution Apply 1 drop to eye       rivaroxaban ANTICOAGULANT (XARELTO) 20 MG TABS tablet Take 1 tablet (20 mg) by mouth At Bedtime 90 tablet 3     simvastatin (ZOCOR) 20 MG tablet TAKE ONE TABLET BY MOUTH AT BEDTIME 90 tablet 1     vitamin B-12 (CYANOCOBALAMIN) 1000 MCG tablet Take 1,000 mcg by mouth       VITAMIN D 1000 UNIT OR CAPS 1 CAPSULE DAILY 3 MONTHS 1 YEAR          Allergies   Allergen Reactions     Tetracycline      lightheaded            Review of Systems   Constitutional, HEENT, cardiovascular, pulmonary, GI, , musculoskeletal, neuro, skin, endocrine and psych systems are negative, except as otherwise noted.      Objective    /86   Pulse 70   Temp 96.9  F (36.1  C)   Resp 18   Ht 1.607 m (5' 3.25\")   Wt 85.7 kg (189 lb)   SpO2 100%   BMI 33.22 kg/m    Body mass index is 33.22 kg/m .  Physical Exam   GENERAL: healthy, alert and no distress  EYES: Eyes grossly normal to inspection, PERRL and conjunctivae and sclerae normal  HENT: ear canals and TM's normal, nose and mouth without ulcers or lesions  NECK: no adenopathy, no asymmetry, masses, or scars and thyroid normal to palpation  RESP: lungs clear to auscultation - no rales, rhonchi or wheezes  BREAST: normal without masses, tenderness or nipple discharge and no palpable axillary masses or adenopathy  CV: irregularly irregular  rate and rhythm, but controlled rate = 70 bpm , normal S1 S2, no S3 or S4, no murmur, click or rub, no peripheral edema and peripheral pulses strong  ABDOMEN: soft, nontender, no hepatosplenomegaly, no masses and bowel sounds normal  MS: no gross musculoskeletal defects noted, no edema  SKIN: suspicious lesion - 1cm scabbed lesion superior scalp that is not healing over several months , no other  rashes  NEURO: Normal " strength and tone, mentation intact and speech normal  PSYCH: mentation appears normal, affect normal/bright  Foot exam - both sides normal; no swelling, tenderness or skin or vascular lesions. Color and temperature is normal. Sensation is intact. Peripheral pulses are palpable. Toenails are normal.      Office Visit on 11/03/2022   Component Date Value Ref Range Status     Creatinine Urine mg/dL 11/03/2022 50  mg/dL Final     Albumin Urine mg/L 11/03/2022 12  mg/L Final     Albumin Urine mg/g Cr 11/03/2022 24.00  0.00 - 25.00 mg/g Cr Final     WBC Count 11/03/2022 6.8  4.0 - 11.0 10e3/uL Final     RBC Count 11/03/2022 4.13  3.80 - 5.20 10e6/uL Final     Hemoglobin 11/03/2022 11.1 (L)  11.7 - 15.7 g/dL Final     Hematocrit 11/03/2022 34.2 (L)  35.0 - 47.0 % Final     MCV 11/03/2022 83  78 - 100 fL Final     MCH 11/03/2022 26.9  26.5 - 33.0 pg Final     MCHC 11/03/2022 32.5  31.5 - 36.5 g/dL Final     RDW 11/03/2022 14.3  10.0 - 15.0 % Final     Platelet Count 11/03/2022 250  150 - 450 10e3/uL Final     Sodium 11/03/2022 135  133 - 144 mmol/L Final     Potassium 11/03/2022 3.8  3.4 - 5.3 mmol/L Final     Chloride 11/03/2022 99  94 - 109 mmol/L Final     Carbon Dioxide (CO2) 11/03/2022 30  20 - 32 mmol/L Final     Anion Gap 11/03/2022 6  3 - 14 mmol/L Final     Urea Nitrogen 11/03/2022 11  7 - 30 mg/dL Final     Creatinine 11/03/2022 0.50 (L)  0.52 - 1.04 mg/dL Final     Calcium 11/03/2022 9.3  8.5 - 10.1 mg/dL Final     Glucose 11/03/2022 117 (H)  70 - 99 mg/dL Final     Alkaline Phosphatase 11/03/2022 53  40 - 150 U/L Final     AST 11/03/2022 18  0 - 45 U/L Final     ALT 11/03/2022 19  0 - 50 U/L Final     Protein Total 11/03/2022 6.8  6.8 - 8.8 g/dL Final     Albumin 11/03/2022 3.6  3.4 - 5.0 g/dL Final     Bilirubin Total 11/03/2022 0.7  0.2 - 1.3 mg/dL Final     GFR Estimate 11/03/2022 >90  >60 mL/min/1.73m2 Final    Effective December 21, 2021 eGFRcr in adults is calculated using the 2021 CKD-EPI creatinine  equation which includes age and gender (Cleo et al., NEJM, DOI: 10.1056/KUSCek9587104)                  She is at risk for lack of exercise and has been provided with information to increase physical activity for the benefit of her well-being.  The patient was counseled and encouraged to consider modifying their diet and eating habits. She was provided with information on recommended healthy diet options.

## 2023-05-10 ENCOUNTER — TELEPHONE (OUTPATIENT)
Dept: FAMILY MEDICINE | Facility: CLINIC | Age: 78
End: 2023-05-10
Payer: MEDICARE

## 2023-05-10 DIAGNOSIS — M17.0 OSTEOARTHRITIS OF BOTH KNEES, UNSPECIFIED OSTEOARTHRITIS TYPE: Primary | ICD-10-CM

## 2023-05-10 DIAGNOSIS — E66.09 CLASS 1 OBESITY DUE TO EXCESS CALORIES WITH SERIOUS COMORBIDITY AND BODY MASS INDEX (BMI) OF 33.0 TO 33.9 IN ADULT: ICD-10-CM

## 2023-05-10 DIAGNOSIS — E66.811 CLASS 1 OBESITY DUE TO EXCESS CALORIES WITH SERIOUS COMORBIDITY AND BODY MASS INDEX (BMI) OF 33.0 TO 33.9 IN ADULT: ICD-10-CM

## 2023-05-10 LAB
CREAT UR-MCNC: 49.2 MG/DL
MICROALBUMIN UR-MCNC: <12 MG/L
MICROALBUMIN/CREAT UR: NORMAL MG/G{CREAT}

## 2023-05-10 NOTE — TELEPHONE ENCOUNTER
Pt's handicapped parking permit has . Needs renewal for her bilateral leg edema and bilateral knee osteoarthritis with decreased mobility and at risk for falls.     completed/ signed - at south unit coordinator's file box.

## 2023-05-17 LAB
DEPRECATED CALCIDIOL+CALCIFEROL SERPL-MC: <64 UG/L (ref 20–75)
VITAMIN D2 SERPL-MCNC: <5 UG/L
VITAMIN D3 SERPL-MCNC: 59 UG/L

## 2023-05-18 ENCOUNTER — TELEPHONE (OUTPATIENT)
Dept: FAMILY MEDICINE | Facility: CLINIC | Age: 78
End: 2023-05-18

## 2023-05-18 ENCOUNTER — ALLIED HEALTH/NURSE VISIT (OUTPATIENT)
Dept: NURSING | Facility: CLINIC | Age: 78
End: 2023-05-18
Payer: MEDICARE

## 2023-05-18 DIAGNOSIS — M17.0 OSTEOARTHRITIS OF BOTH KNEES, UNSPECIFIED OSTEOARTHRITIS TYPE: Primary | ICD-10-CM

## 2023-05-18 PROCEDURE — 99207 PR NO CHARGE NURSE ONLY: CPT

## 2023-05-18 NOTE — PROGRESS NOTES
"Patient stopped in to ask about her completed handicap permit form. The question states \"Is the applicant qualified, in all medical respects to exercise reasonable and ordinary control over a motor vehicle?\" The \"no\" box is checked and patient believes this is in error. New form completed and is in provider box for signature along with original for review.Will send to provider as call/form to review.   Jessenia Nava RN [facility]    "

## 2023-05-18 NOTE — TELEPHONE ENCOUNTER
"Patient stopped in to ask about her completed handicap permit form. The last question states \"Is the applicant qualified, in all medical respects to exercise reasonable and ordinary control over a motor vehicle?\" The \"no\" box is checked and patient believes this is in error. New form completed and is in provider box for signature along with original for review. Please sign new form if this was in fact a mistake.     Jessenia Nava RN [facility]  "

## 2023-05-22 NOTE — TELEPHONE ENCOUNTER
completed/ signed - at [facility] coordinator's file box.  ---Madina Mercado MD

## 2023-06-01 ENCOUNTER — TELEPHONE (OUTPATIENT)
Dept: FAMILY MEDICINE | Facility: CLINIC | Age: 78
End: 2023-06-01
Payer: MEDICARE

## 2023-06-01 NOTE — TELEPHONE ENCOUNTER
Reason for Call:  Appointment Request    Patient requesting this type of appt:  Hospital/ED Follow-Up     Requested provider: Madina Mercado    Reason patient unable to be scheduled: Not within requested timeframe    When does patient want to be seen/preferred time: 06/05    Comments: Patient was seen at [facility] for a  intracerebral hemorrhage right side and is being discharged today 06/01. A nurse at [facility] tried to set appointment but patient needs to be seen on 06/05 and the nurse said to call the patient tomorrow to set appointment if Madina Mercado can squeeze her in.    Could we send this information to you in "[employer] or would you prefer to receive a phone call?:   Patient would prefer a phone call   Okay to leave a detailed message?: No at Cell number on file:    Telephone Information:   Mobile [phone]       Call taken on 6/1/2023 at 12:23 PM by Jia Silva

## 2023-06-01 NOTE — TELEPHONE ENCOUNTER
Placed call to patient and advised that Dr. BAEZ does not have any openings, and will be out of the clinic until 6/28/2023. Patient presented understanding and okay seeing Doris Meza. Patient is scheduled to see Doris Meza for hospital f/u on 6/8/2023.

## 2023-06-04 ENCOUNTER — NURSE TRIAGE (OUTPATIENT)
Dept: NURSING | Facility: CLINIC | Age: 78
End: 2023-06-04
Payer: MEDICARE

## 2023-06-04 NOTE — TELEPHONE ENCOUNTER
Nafisa CUEVA [facility] calling for orders for delay of care [phone]      Home Care is calling regarding an established patient with [facility].       Requesting orders from: Madina Mercado  Provider is following patient: Yes  Is this a 60-day recertification request?  No    Orders Requested  Delay of Care for PT      Information was gathered and will be sent to provider for review.  RN will contact Home Care with information after provider review.  Confirmed ok to leave a detailed message with call back.  Contact information confirmed and updated as needed.    Luara Murphy

## 2023-06-06 ENCOUNTER — TELEPHONE (OUTPATIENT)
Dept: FAMILY MEDICINE | Facility: CLINIC | Age: 78
End: 2023-06-06
Payer: MEDICARE

## 2023-06-06 NOTE — TELEPHONE ENCOUNTER
Called # below - gave verbal okay in detailed VM     Called pt at   Telephone Information:   Mobile [phone]     Pt stated that she is not taking an BP medications other than hydrochlorothiazide 25 mg daily.   Appointments in Next Year    Jun 08, 2023  9:30 AM  (Arrive by 9:15 AM)  ED/Hospital Follow Up with Doris Meza CNP  [facility] ([facility] ) [phone]   Jun 26, 2023  4:00 PM  (Arrive by 3:45 PM)  Return Visit with Bridgett Stone PA-C  [facility] ([facility] ) [phone]   Nov 14, 2023  8:00 AM  (Arrive by 7:45 AM)  Provider Visit with Madina Mercado MD  [facility] ([facility] ) [phone]          Pt stated she was on the following - but has been told by hospital to stop:   Norvasc 5 mg daily   Baby aspirin   Atenolol 100 mg daily   Lisinopril 40 mg   Xarelto 20 mg daily     Has a BP cuff at home - just does not have it near by - she mentions she has some temporary ringing in left ear at times    Denies: headaches, blurred vision, nausea, vomiting, CP, SOB, increased confusion    Please advise     Thank you     Kylah Pierce RN, BSN  [facility]

## 2023-06-06 NOTE — TELEPHONE ENCOUNTER
I approve of requested home care orders. Also please call pt to go over current and previous BP meds to see if we need to restart anything.     Madina Mercado MD

## 2023-06-06 NOTE — TELEPHONE ENCOUNTER
[employer] is calling to request verbal orders for a registered dietician. Also, PHYSICAL THERAPY for 1 time a week for 1 week, then 2 times a week for 2 weeks - for therapeutic exercise, gait and balance training.     FYI:  Patient's BP yesterday on right arm 168/92, there was change in her blood pressure medication when she discharged from the hosptial. Please advise.     Sandy [phone]

## 2023-06-07 NOTE — TELEPHONE ENCOUNTER
Please have pt restart her amlodipine ( Norvasc) 5mg by mouth daily in addition to her hydrochlorothiazide.     Keep appt with Doris Meza CNP on 6/8/2023 .

## 2023-06-07 NOTE — TELEPHONE ENCOUNTER
Called patient and daughter and explained provider note     Stated understanding and is agreeable to plan.     Meryl LIMA RN   [facility]

## 2023-06-08 ENCOUNTER — OFFICE VISIT (OUTPATIENT)
Dept: FAMILY MEDICINE | Facility: CLINIC | Age: 78
End: 2023-06-08
Payer: MEDICARE

## 2023-06-08 VITALS
HEIGHT: 63 IN | WEIGHT: 177.9 LBS | OXYGEN SATURATION: 99 % | BODY MASS INDEX: 31.52 KG/M2 | SYSTOLIC BLOOD PRESSURE: 127 MMHG | TEMPERATURE: 97.6 F | HEART RATE: 89 BPM | DIASTOLIC BLOOD PRESSURE: 57 MMHG

## 2023-06-08 DIAGNOSIS — I10 ESSENTIAL HYPERTENSION WITH GOAL BLOOD PRESSURE LESS THAN 140/90: ICD-10-CM

## 2023-06-08 DIAGNOSIS — N18.2 CKD (CHRONIC KIDNEY DISEASE) STAGE 2, GFR 60-89 ML/MIN: ICD-10-CM

## 2023-06-08 DIAGNOSIS — D32.9 MENINGIOMA (H): ICD-10-CM

## 2023-06-08 DIAGNOSIS — I61.9 RIGHT-SIDED NONTRAUMATIC INTRACEREBRAL HEMORRHAGE, UNSPECIFIED CEREBRAL LOCATION (H): Primary | ICD-10-CM

## 2023-06-08 LAB
ANION GAP SERPL CALCULATED.3IONS-SCNC: 12 MMOL/L (ref 7–15)
BUN SERPL-MCNC: 14.3 MG/DL (ref 8–23)
CALCIUM SERPL-MCNC: 10 MG/DL (ref 8.8–10.2)
CHLORIDE SERPL-SCNC: 96 MMOL/L (ref 98–107)
CREAT SERPL-MCNC: 0.63 MG/DL (ref 0.51–0.95)
DEPRECATED HCO3 PLAS-SCNC: 26 MMOL/L (ref 22–29)
ERYTHROCYTE [DISTWIDTH] IN BLOOD BY AUTOMATED COUNT: 14.9 % (ref 10–15)
GFR SERPL CREATININE-BSD FRML MDRD: 90 ML/MIN/1.73M2
GLUCOSE SERPL-MCNC: 121 MG/DL (ref 70–99)
HCT VFR BLD AUTO: 36.7 % (ref 35–47)
HGB BLD-MCNC: 11.7 G/DL (ref 11.7–15.7)
MCH RBC QN AUTO: 25.7 PG (ref 26.5–33)
MCHC RBC AUTO-ENTMCNC: 31.9 G/DL (ref 31.5–36.5)
MCV RBC AUTO: 81 FL (ref 78–100)
PLATELET # BLD AUTO: 282 10E3/UL (ref 150–450)
POTASSIUM SERPL-SCNC: 3.9 MMOL/L (ref 3.4–5.3)
RBC # BLD AUTO: 4.55 10E6/UL (ref 3.8–5.2)
SODIUM SERPL-SCNC: 134 MMOL/L (ref 136–145)
WBC # BLD AUTO: 6.6 10E3/UL (ref 4–11)

## 2023-06-08 PROCEDURE — 36415 COLL VENOUS BLD VENIPUNCTURE: CPT | Performed by: NURSE PRACTITIONER

## 2023-06-08 PROCEDURE — 99214 OFFICE O/P EST MOD 30 MIN: CPT | Performed by: NURSE PRACTITIONER

## 2023-06-08 PROCEDURE — 85027 COMPLETE CBC AUTOMATED: CPT | Performed by: NURSE PRACTITIONER

## 2023-06-08 PROCEDURE — 80048 BASIC METABOLIC PNL TOTAL CA: CPT | Performed by: NURSE PRACTITIONER

## 2023-06-08 NOTE — PROGRESS NOTES
Assessment & Plan     Right-sided nontraumatic intracerebral hemorrhage, unspecified cerebral location (H)  Minimal residual and doing well. PT and homecare following.    Meningioma (H)--left frontal - MRI 4/25/2019 = stable from 10/2018 and 11/2/2019- no further follow up needed per Neurology per patient - noted 11/4/2020   Stable.    Essential hypertension with goal blood pressure less than 140/90  Recheck labs. Continue BP medication as discussed below. Notify us if any high BP at home.  - Basic metabolic panel  (Ca, Cl, CO2, Creat, Gluc, K, Na, BUN)  - Basic metabolic panel  (Ca, Cl, CO2, Creat, Gluc, K, Na, BUN)    CKD (chronic kidney disease) stage 2, GFR 60-89 ml/min  Recheck levels.  - CBC with platelets  - CBC with platelets      Review of the result(s) of each unique test - lab  Ordering of each unique test       MED REC REQUIRED  Post Medication Reconciliation Status: discharge medications reconciled, continue medications without change  See Patient Instructions    Doris Meza CNP  [facility] PRIOR DAMION Poole is a 77 year old, presenting for the following health issues:  Hospital F/U        6/8/2023     9:10 AM   Additional Questions   Roomed by Wendy Roche   Accompanied by Self     HPI       Hospital Follow-up Visit:    Hospital/Nursing Home/IP Rehab Facility: [facility]  Date of Admission: 05/27/23  Date of Discharge: 06/01/23  Reason(s) for Admission: Right-sided nontraumatic intracerebral hemorrhage, unspecified cerebral location (HC) (Primary Dx); Weakness; Anticoagulated    Was your hospitalization related to COVID-19? No   Problems taking medications regularly:  None  Medication changes since discharge: Yes  Problems adhering to non-medication therapy:  None    Summary of hospitalization:  [facility] discharge summary reviewed  Diagnostic Tests/Treatments reviewed.  Follow up needed:  "neurology  Other Healthcare Providers Involved in Patient s Care:         Physical Therapy  Update since discharge: improved. Continue hydrochlorothiazide and amlodipine as ordered. Restarted after talking with Dr. Mercado with high BP at home with homecare. Looks well in clinic today so no further additions back at this time.     Neurology follow up is set up. Consider anticoagulation vs stopping after that visit.      Plan of care communicated with patient and family         Review of Systems   Constitutional, HEENT, cardiovascular, pulmonary, GI, , musculoskeletal, neuro, skin, endocrine and psych systems are negative, except as otherwise noted.      Objective    /57   Pulse 89   Temp 97.6  F (36.4  C) (Tympanic)   Ht 1.607 m (5' 3.25\")   Wt 80.7 kg (177 lb 14.4 oz)   SpO2 99%   BMI 31.26 kg/m    Body mass index is 31.26 kg/m .  Physical Exam   GENERAL: healthy, alert and no distress  NECK: no adenopathy, no asymmetry, masses, or scars and thyroid normal to palpation  RESP: lungs clear to auscultation - no rales, rhonchi or wheezes  CV: regular rate and rhythm, normal S1 S2, no S3 or S4, no murmur, click or rub, no peripheral edema and peripheral pulses strong  ABDOMEN: soft, nontender, no hepatosplenomegaly, no masses and bowel sounds normal  MS: no gross musculoskeletal defects noted, no edema    Results for orders placed or performed in visit on 06/08/23   CBC with platelets     Status: Abnormal   Result Value Ref Range    WBC Count 6.6 4.0 - 11.0 10e3/uL    RBC Count 4.55 3.80 - 5.20 10e6/uL    Hemoglobin 11.7 11.7 - 15.7 g/dL    Hematocrit 36.7 35.0 - 47.0 %    MCV 81 78 - 100 fL    MCH 25.7 (L) 26.5 - 33.0 pg    MCHC 31.9 31.5 - 36.5 g/dL    RDW 14.9 10.0 - 15.0 %    Platelet Count 282 150 - 450 10e3/uL   Basic metabolic panel  (Ca, Cl, CO2, Creat, Gluc, K, Na, BUN)     Status: Abnormal   Result Value Ref Range    Sodium 134 (L) 136 - 145 mmol/L    Potassium 3.9 3.4 - 5.3 mmol/L    " Chloride 96 (L) 98 - 107 mmol/L    Carbon Dioxide (CO2) 26 22 - 29 mmol/L    Anion Gap 12 7 - 15 mmol/L    Urea Nitrogen 14.3 8.0 - 23.0 mg/dL    Creatinine 0.63 0.51 - 0.95 mg/dL    Calcium 10.0 8.8 - 10.2 mg/dL    Glucose 121 (H) 70 - 99 mg/dL    GFR Estimate 90 >60 mL/min/1.73m2

## 2023-06-08 NOTE — PATIENT INSTRUCTIONS
Continue hydrochlorothiazide and amlodipine as ordered.    Omron BP cuff, check and send some numbers through [employer].

## 2023-06-10 NOTE — RESULT ENCOUNTER NOTE
Dear Zulema,     Sorry for not getting this result note with my comments to you sooner.     All your recent labs are normal, improved, or pretty stable from previous.     Please, continue your current medications and/or supplements and follow up as we discussed at your last visit.     For additional lab test information, labtestsonline.org is an excellent reference.    Thank you so much for choosing [facility].  Please contact us with any questions that you may have.   We appreciate the opportunity to serve you now and look forward to supporting your healthcare needs for a long time to come!    Most Sincerely,     Madina Mercado MD

## 2023-06-12 ENCOUNTER — TELEPHONE (OUTPATIENT)
Dept: FAMILY MEDICINE | Facility: CLINIC | Age: 78
End: 2023-06-12
Payer: MEDICARE

## 2023-06-12 NOTE — TELEPHONE ENCOUNTER
Shari from [facility],    Requesting verbal orders.     Home OT visits 1/week for 6 weeks.     Verbal ok given.     SAW GÓMEZ RN on 6/12/2023 at 3:06 PM   [facility]

## 2023-06-15 ENCOUNTER — TELEPHONE (OUTPATIENT)
Dept: FAMILY MEDICINE | Facility: CLINIC | Age: 78
End: 2023-06-15
Payer: MEDICARE

## 2023-06-15 NOTE — TELEPHONE ENCOUNTER
Patient's daughter, Rosibel (no c2c on file), is calling to clarify patient's medication. Patient's AVS reports to continue hydrochlorothiazide and amlodipine; and only stop taking aspirin 81mg. (daughter reports patient never stopped taking hydrochlorothiazide).    Per patient's hospital discharge paperwork from [facility], reports patient should stop taking lisinopril, atenolol, aspirin, xarelto, amlodipine. Patient's daughter reports that patient was instructed to restart amlodipine on 6/7/2023 when [facility] reported higher blood pressures.    Patient's daughter is seeking clarification if they are to continue to hold lisinopril, atenolol, xarelto, amlodipine, and aspirin. [facility] needs an updated medication list if so, because they have the med list from discharged paperwork. Please advise.      Patient would prefer a callback,

## 2023-06-15 NOTE — TELEPHONE ENCOUNTER
We had discussed at visit continuing with 5 mg amlodipine as per Dr. BAEZ recommendation in notes on 6/6/23. Also continue hydrochlorothiazide.    We need to know blood pressure in order to restart any further medications. BP was normal in clinic when I saw her which is why I didn't start any additional meds at that time. If normal range BP with [facility] I would continue to hold.    Need to have neurology follow up prior to decision on anticoagulation.    Doris Meza, CNP

## 2023-06-16 ENCOUNTER — TELEPHONE (OUTPATIENT)
Dept: FAMILY MEDICINE | Facility: CLINIC | Age: 78
End: 2023-06-16
Payer: MEDICARE

## 2023-06-16 NOTE — LETTER
Attached is the current medication list.    Please review the patient s medications in person and if you find any discrepancies, please contact the clinic.

## 2023-06-16 NOTE — TELEPHONE ENCOUNTER
Forms/Letter Request    Type of form/letter: [facility]: Home Health Certification and Plan of Care, Order # [id number]    Have you been seen for this request: N/A    Do we have the form/letter: Yes:     Who is the form from? Home care ([facility])  Fax: [phone]    Where did/will the form come from? form was faxed in

## 2023-06-16 NOTE — TELEPHONE ENCOUNTER
Called and spoke with patient.     Advised on Doris Meza's note below. Patient stated an understanding and agreed with plan.     Med list faxed to [employer] at [phone].    - PT - Johanna Coronado.     SAW GÓMEZ RN on 6/16/2023 at 10:28 AM   [facility]

## 2023-06-19 NOTE — TELEPHONE ENCOUNTER
[facility]: Home Health Certification and Plan of Care, Order # [id number] cert 06/05 to 08/03/23, med list and letter routed and placed in Dr Mercado's basket.    Meryl S   [place]

## 2023-06-20 ENCOUNTER — TELEPHONE (OUTPATIENT)
Dept: FAMILY MEDICINE | Facility: CLINIC | Age: 78
End: 2023-06-20
Payer: MEDICARE

## 2023-06-20 NOTE — TELEPHONE ENCOUNTER
[facility] requesting verbal orders to add on a medical social worker assessemnet for general support, community resources and future planning. Please advise.    Also requesting verbal orders for PHYSICAL THERAPY to add on 1 visit for 1 time a week, and to decrease frequency this week to 1 time a week. Please advise.     Johanna [phone], secure line

## 2023-06-21 ENCOUNTER — TELEPHONE (OUTPATIENT)
Dept: FAMILY MEDICINE | Facility: CLINIC | Age: 78
End: 2023-06-21
Payer: MEDICARE

## 2023-06-21 ENCOUNTER — MEDICAL CORRESPONDENCE (OUTPATIENT)
Dept: HEALTH INFORMATION MANAGEMENT | Facility: CLINIC | Age: 78
End: 2023-06-21
Payer: MEDICARE

## 2023-06-21 NOTE — TELEPHONE ENCOUNTER
Form signed in PCP's absence please fax.   From placed in [facility].    Healthy regards,            Doris Clements, MONICAP-BC (covering for Dr. Mercado)

## 2023-06-21 NOTE — TELEPHONE ENCOUNTER
Signed [facility]: Home Health Certification and Plan of Care, Order # [id number] cert 06/05 to 08/03/23, med list faxed to     Faxed to [facility]    Filed in [facility]    Meryl S   [place]

## 2023-06-21 NOTE — TELEPHONE ENCOUNTER
Form completed and placed in [facility] inbox      Lizy Mendoza MBA, MS, PATONYA  [facility]

## 2023-06-21 NOTE — TELEPHONE ENCOUNTER
Signed (by Doris Clements) [email or web address] [facility] order [id number] registered dietician evaluation performed faxed to     Faxed to [facility]    Filed in [place]    Meryl S   [place]

## 2023-06-21 NOTE — TELEPHONE ENCOUNTER
Forms/Letter Request    [facility] of form/letter: [facility] [id number] registered dietician evaluation performed    Have you been seen for this request: N/A    Do we have the form/letter: Yes: Placed on Dr. Mercado's desk    Who is the form from? Home care    Where did/will the form come from? form was faxed in

## 2023-06-22 ENCOUNTER — TELEPHONE (OUTPATIENT)
Dept: FAMILY MEDICINE | Facility: CLINIC | Age: 78
End: 2023-06-22
Payer: MEDICARE

## 2023-06-22 ENCOUNTER — MEDICAL CORRESPONDENCE (OUTPATIENT)
Dept: HEALTH INFORMATION MANAGEMENT | Facility: CLINIC | Age: 78
End: 2023-06-22
Payer: MEDICARE

## 2023-06-22 NOTE — TELEPHONE ENCOUNTER
Forms/Letter Request    [facility] Order # [id number]    Have you been seen for this request: N/A    Do we have the form/letter: Yes: In Lizy Reji's basket    Who is the form from? [facility]       Where did/will the form come from? form was faxed in

## 2023-06-22 NOTE — TELEPHONE ENCOUNTER
Form completed and placed in [facility] inbox      Lizy Mendoza MBA, MS, PATONYA  [facility]

## 2023-06-22 NOTE — TELEPHONE ENCOUNTER
Home Care is calling regarding an established patient with [facility].       Requesting orders from: Madina Mercado  Provider is following patient: Yes  Is this a 60-day recertification request?  No    Orders Requested    Physical Therapy  Request for continuation of care with decrease in frequency   Goals have been met/progressing.  Frequency: 1x/wk for 2 wks        Social Work  Request for initial evaluation and treatment (one time)     Verbal orders given.  Home Care will send orders for provider to sign.  Confirmed ok to leave a detailed message with call back.  Contact information confirmed and updated as needed.    Jessenia Nava RN

## 2023-06-23 NOTE — TELEPHONE ENCOUNTER
Faxed completed form to [employer] at [phone] and [facility] and filed in drawer.

## 2023-06-26 ENCOUNTER — OFFICE VISIT (OUTPATIENT)
Dept: FAMILY MEDICINE | Facility: CLINIC | Age: 78
End: 2023-06-26
Attending: FAMILY MEDICINE
Payer: MEDICARE

## 2023-06-26 DIAGNOSIS — L82.1 SEBORRHEIC KERATOSES: ICD-10-CM

## 2023-06-26 DIAGNOSIS — D48.5 NEOPLASM OF UNCERTAIN BEHAVIOR OF SKIN: Primary | ICD-10-CM

## 2023-06-26 DIAGNOSIS — D48.5 NEOPLASM OF UNCERTAIN BEHAVIOR OF SKIN: ICD-10-CM

## 2023-06-26 PROCEDURE — 99203 OFFICE O/P NEW LOW 30 MIN: CPT | Mod: 25 | Performed by: PHYSICIAN ASSISTANT

## 2023-06-26 PROCEDURE — 88305 TISSUE EXAM BY PATHOLOGIST: CPT | Mod: XE | Performed by: PATHOLOGY

## 2023-06-26 PROCEDURE — 11102 TANGNTL BX SKIN SINGLE LES: CPT | Performed by: PHYSICIAN ASSISTANT

## 2023-06-26 NOTE — LETTER
6/26/2023         RE: Zulema Nixon  [id number] [address] [id number]        Dear Colleague,    Thank you for referring your patient, Zulema Nixon, to the [facility]. Please see a copy of my visit note below.    [facility] Dermatology Note  Encounter Date: Jun 26, 2023  Office Visit      Dermatology Problem List:  1.NUB - R vertex scalp - bx 6/26/23    Social: Daughter: Rosibel  ____________________________________________    Assessment & Plan:  # Neoplasm of unspecified behavior of the skin (D49.2) on the R vertex scalp. The differential diagnosis includes NMSC vs other.   - Shave biopsy performed today, see procedure note below.    # SK - R lateral forehead  - edu on etiology, reassured benign     Procedures Performed:   - Shave biopsy procedure note, location(s): R vertex scalp. After discussion of benefits and risks including but not limited to bleeding, infection, scar, incomplete removal, recurrence, and non-diagnostic biopsy, written consent and photographs were obtained. The area was cleaned with isopropyl alcohol. 0.5mL of 1% lidocaine with epinephrine was injected to obtain adequate anesthesia of lesion(s). Shave biopsy at site(s) performed. Hemostasis was achieved with aluminium chloride. Petrolatum ointment and a sterile dressing were applied. The patient tolerated the procedure and no complications were noted. The patient was provided with verbal and written post care instructions.      Follow-up: pending path results    Staff:     All risks, benefits and alternatives were discussed with patient.  Patient is in agreement and understands the assessment and plan.  All questions were answered.    Bridgett Stone PA-C, MPAS  [facility] [place]: Phone: [phone], Fax: [phone]  [facility]: Phone: [phone],  Fax: [phone]  [facility]  [place]: Phone: [phone], Fax: [phone]  ____________________________________________    CC: Derm Problem (1.  Spot on scalp present over the past 6 months, not healing /2.  Concerned about spot on R forehead)      HPI:  Ms. Zulema Nixon is a 77 year old female who presents today as a new patient for a nonhealing lesion on the scalp as well as a spot of concern on the forehead. Referred by Madina Mercado MD. Patient states it occasionally bleeds, but is otherwise asx. Noticed it when she was hospitalized earlier this year for a stroke.     Patient is otherwise feeling well, without additional concerns.    Labs:  none    Physical Exam:  Vitals: There were no vitals taken for this visit.  SKIN: Focused examination of face, neck and scalp was performed.   - R vertex scalp - 1cm scaly lesion with hemorraghic crust  - There is a waxy stuck on tan to brown papule on the R forehead x1.   - No other lesions of concern on areas examined.               Medications:  Current Outpatient Medications   Medication     alcohol swab prep pads     amLODIPine (NORVASC) 5 MG tablet     BIOTIN 10 MG OR TABS     blood glucose (JAYDE CONTOUR NEXT) test strip     blood glucose calibration (NO BRAND SPECIFIED) solution     blood glucose monitoring (JAYDE MICROLET) lancets     blood glucose monitoring (NO BRAND SPECIFIED) meter device kit     CALTRATE 600 + D 600-200 MG-IU OR TABS     hydrochlorothiazide (HYDRODIURIL) 25 MG tablet     METAMUCIL FIBER PO     metFORMIN (GLUCOPHAGE) 1000 MG tablet     Multiple Vitamins-Minerals (CENTRUM SILVER ADULT 50+ PO)     polyethylene glycol 400 (BLINK TEARS) 0.25 % SOLN ophthalmic solution     simvastatin (ZOCOR) 20 MG tablet     vitamin B-12 (CYANOCOBALAMIN) 1000 MCG tablet     VITAMIN D 1000 UNIT OR CAPS     No current facility-administered medications for this visit.      Past Medical/Surgical History:   Patient Active Problem List   Diagnosis     ARMAND (obstructive sleep apnea)      Atrial fibrillation, unspecified type (H)     Hyperlipidemia LDL goal <100- fish oil = worse IBS with diarrhea      Status post laparoscopic cholecystectomy     CKD (chronic kidney disease) stage 2, GFR 60-89 ml/min     Venous stasis of lower extremity     Advanced directives, counseling/discussion     Sensorineural hearing loss of both ears - using hearing aids     Bilateral leg edema- has been to lymphedema clinic in past     Essential hypertension with goal blood pressure less than 140/90     Type 2 diabetes mellitus with other circulatory complication, without long-term current use of insulin (H)     Long term current use of anticoagulant therapy     Onychomycosis     Type 2 diabetes mellitus with diabetic polyneuropathy, without long-term current use of insulin (H)     Irritable bowel syndrome with diarrhea - better with metamucil - was initially better with cholestyramine, then didn't work as well (2g/day = too much gassiness and bloating)      Osteoarthritis of both knees, unspecified osteoarthritis type     Meningioma (H)--left frontal - MRI 4/25/2019 = stable from 10/2018 and 11/2/2019- no further follow up needed per Neurology per patient - noted 11/4/2020      Persistent atrial fibrillation (H)     Carpal tunnel syndrome of right wrist- increasing over time - now awakening at night with tingling and pain despite wearing a brace at night - desires referral      Screen for colon cancer- pt declines referral for colonoscopy again this year- consents to do one step FIT test      Grief reaction - 's death from COVID-19 complications 12/2/2020      Anemia, unspecified type     Personal history of TIA (transient ischemic attack)     Vitamin D deficiency     Class 1 obesity due to excess calories with serious comorbidity and body mass index (BMI) of 33.0 to 33.9 in adult     Past Medical History:   Diagnosis Date     Atrial fibrillation (H) 11/01/2006    cardioverted 2/2007, on chronic anticoagulation       Cerebral artery occlusion with cerebral infarction (H)      CKD (chronic kidney disease) stage 2, GFR 60-89 ml/min      with microalbuminuria     Essential hypertension, benign      Hyperlipidemia LDL goal <100 10/31/2010    fish oil = IBS with diarrhea      Morbid obesity (H)      ARMAND (obstructive sleep apnea)     C PAP     Sensorineural hearing loss of both ears     using hearing aids     Status post laparoscopic cholecystectomy     cholecystectomy for cholelithiasis     Supervision of other normal pregnancy      - vaginal, one set of twins     Tortuous colon     detected at colonoscopy  - was recommended for next testing to have CT colonography      Tubal ligation status      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)      Varicose veins of lower extremities with inflammation     excision varicose vein left lower extremity     Venous stasis of lower extremity     excision varicose vein left lower extremity                           Again, thank you for allowing me to participate in the care of your patient.        Sincerely,        Bridgett Stone PA-C

## 2023-06-26 NOTE — PROGRESS NOTES
[facility] Dermatology Note  Encounter Date: Jun 26, 2023  Office Visit      Dermatology Problem List:  1.NUB - R vertex scalp - bx 6/26/23    Social: Daughter: Rosibel  ____________________________________________    Assessment & Plan:  # Neoplasm of unspecified behavior of the skin (D49.2) on the R vertex scalp. The differential diagnosis includes NMSC vs other.   - Shave biopsy performed today, see procedure note below.    # SK - R lateral forehead  - edu on etiology, reassured benign     Procedures Performed:   - Shave biopsy procedure note, location(s): R vertex scalp. After discussion of benefits and risks including but not limited to bleeding, infection, scar, incomplete removal, recurrence, and non-diagnostic biopsy, written consent and photographs were obtained. The area was cleaned with isopropyl alcohol. 0.5mL of 1% lidocaine with epinephrine was injected to obtain adequate anesthesia of lesion(s). Shave biopsy at site(s) performed. Hemostasis was achieved with aluminium chloride. Petrolatum ointment and a sterile dressing were applied. The patient tolerated the procedure and no complications were noted. The patient was provided with verbal and written post care instructions.      Follow-up: pending path results    Staff:     All risks, benefits and alternatives were discussed with patient.  Patient is in agreement and understands the assessment and plan.  All questions were answered.    Bridgett Stone PA-C, MPAS  [facility]: Phone: [phone], Fax: [phone]  [facility]: Phone: [phone],  Fax: [phone]  [facility]: Phone: [phone], Fax: [phone]  ____________________________________________    CC: Derm Problem (1.  Spot on scalp present over the past 6 months, not healing /2.  Concerned about spot on R forehead)      HPI:  Ms. Zulema Nixon is a 77 year  old female who presents today as a new patient for a nonhealing lesion on the scalp as well as a spot of concern on the forehead. Referred by Madina Mercado MD. Patient states it occasionally bleeds, but is otherwise asx. Noticed it when she was hospitalized earlier this year for a stroke.     Patient is otherwise feeling well, without additional concerns.    Labs:  none    Physical Exam:  Vitals: There were no vitals taken for this visit.  SKIN: Focused examination of face, neck and scalp was performed.   - R vertex scalp - 1cm scaly lesion with hemorraghic crust  - There is a waxy stuck on tan to brown papule on the R forehead x1.   - No other lesions of concern on areas examined.               Medications:  Current Outpatient Medications   Medication     alcohol swab prep pads     amLODIPine (NORVASC) 5 MG tablet     BIOTIN 10 MG OR TABS     blood glucose (JAYDE CONTOUR NEXT) test strip     blood glucose calibration (NO BRAND SPECIFIED) solution     blood glucose monitoring (JAYDE MICROLET) lancets     blood glucose monitoring (NO BRAND SPECIFIED) meter device kit     CALTRATE 600 + D 600-200 MG-IU OR TABS     hydrochlorothiazide (HYDRODIURIL) 25 MG tablet     METAMUCIL FIBER PO     metFORMIN (GLUCOPHAGE) 1000 MG tablet     Multiple Vitamins-Minerals (CENTRUM SILVER ADULT 50+ PO)     polyethylene glycol 400 (BLINK TEARS) 0.25 % SOLN ophthalmic solution     simvastatin (ZOCOR) 20 MG tablet     vitamin B-12 (CYANOCOBALAMIN) 1000 MCG tablet     VITAMIN D 1000 UNIT OR CAPS     No current facility-administered medications for this visit.      Past Medical/Surgical History:   Patient Active Problem List   Diagnosis     ARMAND (obstructive sleep apnea)     Atrial fibrillation, unspecified type (H)     Hyperlipidemia LDL goal <100- fish oil = worse IBS with diarrhea      Status post laparoscopic cholecystectomy     CKD (chronic kidney disease) stage 2, GFR 60-89 ml/min     Venous stasis of lower extremity      Advanced directives, counseling/discussion     Sensorineural hearing loss of both ears - using hearing aids     Bilateral leg edema- has been to lymphedema clinic in past     Essential hypertension with goal blood pressure less than 140/90     Type 2 diabetes mellitus with other circulatory complication, without long-term current use of insulin (H)     Long term current use of anticoagulant therapy     Onychomycosis     Type 2 diabetes mellitus with diabetic polyneuropathy, without long-term current use of insulin (H)     Irritable bowel syndrome with diarrhea - better with metamucil - was initially better with cholestyramine, then didn't work as well (2g/day = too much gassiness and bloating)      Osteoarthritis of both knees, unspecified osteoarthritis type     Meningioma (H)--left frontal - MRI 4/25/2019 = stable from 10/2018 and 11/2/2019- no further follow up needed per Neurology per patient - noted 11/4/2020      Persistent atrial fibrillation (H)     Carpal tunnel syndrome of right wrist- increasing over time - now awakening at night with tingling and pain despite wearing a brace at night - desires referral      Screen for colon cancer- pt declines referral for colonoscopy again this year- consents to do one step FIT test      Grief reaction - 's death from COVID-19 complications 12/2/2020      Anemia, unspecified type     Personal history of TIA (transient ischemic attack)     Vitamin D deficiency     Class 1 obesity due to excess calories with serious comorbidity and body mass index (BMI) of 33.0 to 33.9 in adult     Past Medical History:   Diagnosis Date     Atrial fibrillation (H) 11/01/2006    cardioverted 2/2007, on chronic anticoagulation      Cerebral artery occlusion with cerebral infarction (H)      CKD (chronic kidney disease) stage 2, GFR 60-89 ml/min 2006     with microalbuminuria     Essential hypertension, benign      Hyperlipidemia LDL goal <100 10/31/2010    fish oil = IBS with  diarrhea      Morbid obesity (H)      ARMAND (obstructive sleep apnea)     C PAP     Sensorineural hearing loss of both ears     using hearing aids     Status post laparoscopic cholecystectomy     cholecystectomy for cholelithiasis     Supervision of other normal pregnancy      - vaginal, one set of twins     Tortuous colon     detected at colonoscopy  - was recommended for next testing to have CT colonography      Tubal ligation status      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)      Varicose veins of lower extremities with inflammation     excision varicose vein left lower extremity     Venous stasis of lower extremity     excision varicose vein left lower extremity

## 2023-06-26 NOTE — PATIENT INSTRUCTIONS
Patient Education       Proper skin care from [facility]:    -Eliminate harsh soaps as they strip the natural oils from the skin, often resulting in dry itchy skin ( i.e. Dial, Zest, Syriac Spring)  -Use mild soaps such as Cetaphil or Dove Sensitive Skin in the shower. You do not need to use soap on arms, legs, and trunk every time you shower unless visibly soiled.   -Avoid hot or cold showers.  -After showering, lightly dry off and apply moisturizing within 2-3 minutes. This will help trap moisture in the skin.   -Aggressive use of a moisturizer at least 1-2 times a day to the entire body (including -Vanicream, Cetaphil, Aquaphor or Cerave) and moisturize hands after every washing.  -We recommend using moisturizers that come in a tub that needs to be scooped out, not a pump. This has more of an oil base. It will hold moisture in your skin much better than a water base moisturizer. The above recommended are non-pore clogging.      Wear a sunscreen with at least SPF 30 on your face, ears, neck and V of the chest daily. Wear sunscreen on other areas of the body if those areas are exposed to the sun throughout the day. Sunscreens can contain physical and/or chemical blockers. Physical blockers are less likely to clog pores, these include zinc oxide and titanium dioxide. Reapply every two hour and after swimming.     Sunscreen examples: https://www.ewg.org/sunscreen/    UV radiation  UVA radiation remains constant throughout the day and throughout the year. It is a longer wavelength than UVB and therefore penetrates deeper into the skin leading to immediate and delayed tanning, photoaging, and skin cancer. 70-80% of UVA and UVB radiation occurs between the hours of 10am-2pm.  UVB radiation  UVB radiation causes the most harmful effects and is more significant during the summer months. However, snow and ice can reflect UVB radiation leading to skin damage during the winter months as well. UVB radiation is  responsible for tanning, burning, inflammation, delayed erythema (pinkness), pigmentation (brown spots), and skin cancer.     I recommend self monthly full body exams and yearly full body exams with a dermatology provider. If you develop a new or changing lesion please follow up for examination. Most skin cancers are pink and scaly or pink and pearly. However, we do see blue/brown/black skin cancers.  Consider the ABCDEs of melanoma when giving yourself your monthly full body exam ( don't forget the groin, buttocks, feet, toes, etc). A-asymmetry, B-borders, C-color, D-diameter, E-elevation or evolving. If you see any of these changes please follow up in clinic. If you cannot see your back I recommend purchasing a hand held mirror to use with a larger wall mirror.       Checking for Skin Cancer  You can find cancer early by checking your skin each month. There are 3 kinds of skin cancer. They are melanoma, basal cell carcinoma, and squamous cell carcinoma. Doing monthly skin checks is the best way to find new marks or skin changes. Follow the instructions below for checking your skin.   The ABCDEs of checking moles for melanoma   Check your moles or growths for signs of melanoma using ABCDE:   Asymmetry: the sides of the mole or growth don t match  Border: the edges are ragged, notched, or blurred  Color: the color within the mole or growth varies  Diameter: the mole or growth is larger than 6 mm (size of a pencil eraser)  Evolving: the size, shape, or color of the mole or growth is changing (evolving is not shown in the images below)    Checking for other types of skin cancer  Basal cell carcinoma or squamous cell carcinoma have symptoms such as:     A spot or mole that looks different from all other marks on your skin  Changes in how an area feels, such as itching, tenderness, or pain  Changes in the skin's surface, such as oozing, bleeding, or scaliness  A sore that does not heal  New swelling or redness beyond  the border of a mole    Who s at risk?  Anyone can get skin cancer. But you are at greater risk if you have:   Fair skin, light-colored hair, or light-colored eyes  Many moles or abnormal moles on your skin  A history of sunburns from sunlight or tanning beds  A family history of skin cancer  A history of exposure to radiation or chemicals  A weakened immune system  If you have had skin cancer in the past, you are at risk for recurring skin cancer.   How to check your skin  Do your monthly skin checkups in front of a full-length mirror. Check all parts of your body, including your:   Head (ears, face, neck, and scalp)  Torso (front, back, and sides)  Arms (tops, undersides, upper, and lower armpits)  Hands (palms, backs, and fingers, including under the nails)  Buttocks and genitals  Legs (front, back, and sides)  Feet (tops, soles, toes, including under the nails, and between toes)  If you have a lot of moles, take digital photos of them each month. Make sure to take photos both up close and from a distance. These can help you see if any moles change over time.   Most skin changes are not cancer. But if you see any changes in your skin, call your doctor right away. Only he or she can diagnose a problem. If you have skin cancer, seeing your doctor can be the first step toward getting the treatment that could save your life.   [employer] last reviewed this educational content on 4/1/2019 2000-2020 [employer]. [address] [place] [address], [place] [address]. All rights reserved. This information is not intended as a substitute for professional medical care. Always follow your healthcare professional's instructions.       When should I call my doctor?  If you are worsening or not improving, please, contact us or seek urgent care as noted below.     Who should I call with questions (adults)?  [facility] (adult and pediatric): [phone]  [facility]  [place] (adult): [phone]  [facility] ([place], [place], [place] and [place]) [phone]  For urgent needs outside of business hours call the [facility] at [phone] and ask for the dermatology resident on call to be paged  If this is a medical emergency and you are unable to reach an ER, Call 911      If you need a prescription refill, please contact your pharmacy. Refills are approved or denied by our Physicians during normal business hours, Monday through Fridays  Per office policy, refills will not be granted if you have not been seen within the past year (or sooner depending on your child's condition)   Wound Care After a Biopsy    What is a skin biopsy?  A skin biopsy allows the doctor to examine a very small piece of tissue under the microscope to determine the diagnosis and the best treatment for the skin condition. A local anesthetic (numbing medicine) is injected with a very small needle into the skin area to be tested. A small piece of skin is taken from the area. Sometimes a suture (stitch) is used.     What are the risks of a skin biopsy?  I will experience scar, bleeding, swelling, pain, crusting and redness. I may experience incomplete removal or recurrence. Risks of this procedure are excessive bleeding, bruising, infection, nerve damage, numbness, thick (hypertrophic or keloidal) scar and non-diagnostic biopsy.    How should I care for my wound for the first 24 hours?  Keep the wound dry and covered for 24 hours  If it bleeds, hold direct pressure on the area for 15 minutes. If bleeding does not stop, call us or go to the emergency room  Avoid strenuous exercise the first 1-2 days or as your doctor instructs you    How should I care for the wound after 24 hours?  After 24 hours, remove the bandage  You may bathe or shower as normal  If you had a scalp biopsy, you can shampoo as usual and can use shower water to clean the biopsy site daily  Clean  the wound once a day with gentle soap and water  Do not scrub, be gentle  Apply white petroleum/Vaseline after cleaning the wound with a cotton swab or a clean finger, and keep the site covered with a Bandaid /bandage. Bandages are not necessary with a scalp biopsy  If you are unable to cover the site with a Bandaid /bandage, re-apply ointment 2-3 times a day to keep the site moist. Moisture will help with healing  Avoid strenuous activity for first 1-2 days  Avoid lakes, rivers, pools, and oceans until the stitches are removed or the site is healed    How do I clean my wound?  Wash hands thoroughly with soap or use hand  before all wound care  Clean the wound with gentle soap and water  Apply white petroleum/Vaseline  to wound after it is clean  Replace the Bandaid /bandage to keep the wound covered for the first few days or as instructed by your doctor  If you had a scalp biopsy, warm shower water to the area on a daily basis should suffice    What should I use to clean my wound?   Cotton-tipped applicators (Qtips )  White petroleum jelly (Vaseline ). Use a clean new container and use Q-tips to apply.  Bandaids  as needed  Gentle soap     How should I care for my wound long term?  Do not get your wound dirty  Keep up with wound care for one week or until the area is healed.  A small scab will form and fall off by itself when the area is completely healed. The area will be red and will become pink in color as it heals. Sun protection is very important for how your scar will turn out. Sunscreen with an SPF 30 or greater is recommended once the area is healed.  You should have some soreness but it should be mild and slowly go away over several days. Talk to your doctor about using tylenol for pain,    When should I call my doctor?  If you have increased:   Pain or swelling  Pus or drainage (clear or slightly yellow drainage is ok)  Temperature over 100F  Spreading redness or warmth around wound    When will  I hear about my results?  The biopsy results can take 2 weeks to come back.  Your results will automatically release to [employer] before your provider has even reviewed them.  The clinic will call you with the results, send you a [employer] message, or have you schedule a follow-up clinic or phone time to discuss the results.  Contact our clinics if you do not hear from us in 2 weeks.    Who should I call with questions?  [facility]: [phone]  [facility]: [phone]  For urgent needs outside of business hours call the [facility] at [phone] and ask for the dermatology resident on call

## 2023-06-28 ENCOUNTER — TELEPHONE (OUTPATIENT)
Dept: FAMILY MEDICINE | Facility: CLINIC | Age: 78
End: 2023-06-28
Payer: MEDICARE

## 2023-06-28 LAB
PATH REPORT.COMMENTS IMP SPEC: NORMAL
PATH REPORT.COMMENTS IMP SPEC: NORMAL
PATH REPORT.FINAL DX SPEC: NORMAL
PATH REPORT.GROSS SPEC: NORMAL
PATH REPORT.MICROSCOPIC SPEC OTHER STN: NORMAL
PATH REPORT.RELEVANT HX SPEC: NORMAL

## 2023-06-28 NOTE — LETTER
[facility]  [address] [place] [address]  [phone]      June 29, 2023    Zulema Nixon  [address] [id number]      Dear Zulema      You are scheduled for Mohs Surgery on Monday July 24, 2023 at 7:30 am.     Please check in at 2nd floor Dermatology Clinic.     Be sure to eat a good breakfast and bathe and wash your hair prior to Surgery. Please bring  with you if this is above your neck    If you are taking any anti-coagulants that are prescribed by your Doctor (such as Coumadin/warfarin, Plavix, Aspirin, Ibuprofen), please continue taking them.     However, If you are taking anti-coagulants over the counter without  a Doctor's order for a Medical condition, please discontinue them 10 days prior to Surgery.      Please wear loose comfortable clothing as it could possibly be 4-6 hours until your surgery is completed depending upon how many layers of tissue need to be removed.     Thank you,    Braulio George MD

## 2023-06-28 NOTE — TELEPHONE ENCOUNTER
Left message for pt to call nurse at [phone] to go over Bridgett's message below.  Advised message was sent via my chart also if pt wanted to look at the message and call nurse back.    Cristina LWEIS RN  [facility] [place]  [phone]

## 2023-06-28 NOTE — TELEPHONE ENCOUNTER
----- Message from Bridgett Stone PA-C sent at 6/28/2023  9:29 AM CDT -----  Needs MMS, anyone is fine    We got your biopsy results back regarding the scalp. It was in fact a basal cell skin cancer. Basal Cell skin cancer is the most common type of skin cancer, and usually caused by years of sun exposure. This is a very slow growing skin cancer that rarely spreads elsewhere in the body. If left untreated it will slowly continue to grow over time and may cause issues with deformity or spread to the bone. Complete removal is recommended. 90% of people will develop this type of skin cancer in their lifetime. I will refer you to our surgeons for a procedure called MOHS surgery. One of their nurses will reach out to you to schedule this shortly. Let me know if you have any other questions.      Bridgett Stone PA-C, MPAS

## 2023-06-29 NOTE — TELEPHONE ENCOUNTER
Daughter and pt called back.  States they read the results and message from Bridgett Stone via my chart.  Explained the procedure and scheduled mohs at [place] on Monday July 24th at 7:30 am with Dr. George.    Mohs letter and information sent via my chart and mailed home.    Cristina LEWIS RN  [facility] [place]  [phone]

## 2023-06-30 ENCOUNTER — TELEPHONE (OUTPATIENT)
Dept: FAMILY MEDICINE | Facility: CLINIC | Age: 78
End: 2023-06-30
Payer: MEDICARE

## 2023-06-30 NOTE — TELEPHONE ENCOUNTER
Forms/Letter Request    Type of form/letter: [facility] Verbal Order # [id number]; Order Type: Add on Discipline    Have you been seen for this request: N/A    Do we have the form/letter: Yes: Provider's Bin    Who is the form from? Home care ([facility])  Fax: [phone]    Where did/will the form come from? form was faxed in

## 2023-07-06 ENCOUNTER — TELEPHONE (OUTPATIENT)
Dept: FAMILY MEDICINE | Facility: CLINIC | Age: 78
End: 2023-07-06
Payer: MEDICARE

## 2023-07-06 NOTE — TELEPHONE ENCOUNTER
Johanna PT with [facility] calls for orders.     She is asking for one time order next week to do Discharge assessment.     Verbal order given to approve visits until certification received for MD signature.

## 2023-07-07 ENCOUNTER — TELEPHONE (OUTPATIENT)
Dept: FAMILY MEDICINE | Facility: CLINIC | Age: 78
End: 2023-07-07
Payer: MEDICARE

## 2023-07-07 NOTE — TELEPHONE ENCOUNTER
"  Forms/Letter Request    Type of form/letter: [facility] [id number], 7/6/23 \"Per HAMMAD Bautista for Dr Mercado, ok for continued Home Pt 1W1 next week for discharge.\"    Have you been seen for this request: N/A    Do we have the form/letter: Yes: Placed in Dr. Mercado's bin    Who is the form from? Home care    Where did/will the form come from? form was faxed in    "

## 2023-07-10 ENCOUNTER — MEDICAL CORRESPONDENCE (OUTPATIENT)
Dept: HEALTH INFORMATION MANAGEMENT | Facility: CLINIC | Age: 78
End: 2023-07-10
Payer: MEDICARE

## 2023-07-11 NOTE — TELEPHONE ENCOUNTER
Faxed back to [facility] at [phone]  Sent to \[facility]\""  And filed in [facility]

## 2023-07-11 NOTE — TELEPHONE ENCOUNTER
I approve of requested home care orders.signed yesterday.  at [facility] unit coordinator's in basket /file box.       Madina Mercado MD

## 2023-07-24 ENCOUNTER — OFFICE VISIT (OUTPATIENT)
Dept: DERMATOLOGY | Facility: CLINIC | Age: 78
End: 2023-07-24
Payer: MEDICARE

## 2023-07-24 VITALS — SYSTOLIC BLOOD PRESSURE: 128 MMHG | DIASTOLIC BLOOD PRESSURE: 76 MMHG | OXYGEN SATURATION: 98 % | HEART RATE: 88 BPM

## 2023-07-24 DIAGNOSIS — D18.01 ANGIOMA OF SKIN: ICD-10-CM

## 2023-07-24 DIAGNOSIS — D23.9 DERMAL NEVUS: ICD-10-CM

## 2023-07-24 DIAGNOSIS — L82.1 SEBORRHEIC KERATOSES: ICD-10-CM

## 2023-07-24 DIAGNOSIS — C44.41 BASAL CELL CARCINOMA (BCC) OF SCALP: Primary | ICD-10-CM

## 2023-07-24 DIAGNOSIS — L81.4 LENTIGO: ICD-10-CM

## 2023-07-24 PROCEDURE — 13121 CMPLX RPR S/A/L 2.6-7.5 CM: CPT | Performed by: DERMATOLOGY

## 2023-07-24 PROCEDURE — 17312 MOHS ADDL STAGE: CPT | Performed by: DERMATOLOGY

## 2023-07-24 PROCEDURE — 17311 MOHS 1 STAGE H/N/HF/G: CPT | Performed by: DERMATOLOGY

## 2023-07-24 PROCEDURE — 99213 OFFICE O/P EST LOW 20 MIN: CPT | Mod: 25 | Performed by: DERMATOLOGY

## 2023-07-24 ASSESSMENT — PAIN SCALES - GENERAL: PAINLEVEL: NO PAIN (0)

## 2023-07-24 NOTE — PATIENT INSTRUCTIONS
[facility]   [phone]    [facility] [phone]      Stapled Wound Care      No strenuous activity for 48 hours. Resume moderate activity in 48 hours. No heavy exercising until you are seen for follow up in one week.    Take Tylenol as needed for discomfort.                                        Do not drink alcoholic beverages for 48 hours.    Keep the pressure bandage in place for 24 hours. If the bandage becomes blood tinged or loose, reinforce it with gauze and tape.   Remove bandage in 24 hours and begin wound care as follows:     Rinse the stapled area with tap water. (shower / bathe / shampoo normally)  Dry wound with Q tip or gauze pad  Apply Polysporin or Bacitracin Ointment to the staples.  Do NOT use Neosporin Ointment *  Cover the wound with a bandaid or nonstick gauze pad and paper tape.  Repeat wound care once a day until staples are removed.                    Bleeding  Hold pressure for 30 minutes with gauze or a cloth over the wound.  If bleeding continues, hold pressure again for another 30 minutes.  If you can't get bleeding to stop after an hour, please call Dr. George or go to the ER.     In case of emergency call: [phone]

## 2023-07-24 NOTE — PROGRESS NOTES
Surgical Office Location :   [facility]  [address] [place] [address]

## 2023-07-24 NOTE — PROGRESS NOTES
Zulema Nixon , a 78 year old year old female patient, I was asked to see by WENDI Stone for basal cell carcinoma on scalp.  Patient has no other skin complaints today.  Remainder of the HPI, Meds, PMH, Allergies, FH, and SH was reviewed in chart.      Past Medical History:   Diagnosis Date    Atrial fibrillation (H) 2006    cardioverted 2007, on chronic anticoagulation     Cerebral artery occlusion with cerebral infarction (H)     CKD (chronic kidney disease) stage 2, GFR 60-89 ml/min      with microalbuminuria    Essential hypertension, benign     Hyperlipidemia LDL goal <100 10/31/2010    fish oil = IBS with diarrhea     Morbid obesity (H)     ARMAND (obstructive sleep apnea)     C PAP    Sensorineural hearing loss of both ears     using hearing aids    Status post laparoscopic cholecystectomy     cholecystectomy for cholelithiasis    Supervision of other normal pregnancy      - vaginal, one set of twins    Tortuous colon     detected at colonoscopy  - was recommended for next testing to have CT colonography     Tubal ligation status     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)     Varicose veins of lower extremities with inflammation     excision varicose vein left lower extremity    Venous stasis of lower extremity     excision varicose vein left lower extremity        Past Surgical History:   Procedure Laterality Date    C DEXA INTERPRETATION, AXIAL  2007    T score lumbar 3.7, femoral neck 2.8/2.0(all stable)    C DEXA INTERPRETATION, AXIAL  2010    T score lumbar 3.4 (marked degen changes), femoral neck 2.1/2.1 (sig dec lt femur)    C ORAL SURGERY SINGLE TOOTH  2019     had to have left upper rear tooth removed secondary to crown breaking /root damage     CARDIAC NUC ESHA STRESS TEST NL  2006    exercised 4 min, no inducible ischemia on ECG or perfusion images    CARPAL TUNNEL RELEASE RT/LT Right 2021    Right carpal tunnel release under local  anesthetic, Dr. Orlando Walsh, [facility]    CATARACT IOL, RT/LT Bilateral     first right in 2022, then left in 2022 - Jan[facility] - Dr. Cooper Santacruz    COLONOSCOPY  2006    diverticulosis, repeat 10 years    FLEXIBLE SIGMOIDOSCOPY  10/2000     LAPAROSCOPY, SURGICAL; CHOLECYSTECTOMY      Cholecystectomy, Laparoscopic    LIGATN/STRIP LONG & SHORT SAPHEN      L varicose vein excision    REPAIR PTOSIS BILATERAL  2011     Bilateral upper eyelid blepharoplasty and internal browpexy brow ptosis repair, bilateral lower eyelid ectropion repair with snip punctoplasty    TRANSFUSION, DIRECT, BLOOD      pregnancy related    ZZC CARDIOVERSION, ELECTIVE;INTERN  2007    Successful cardioversion from atrial fibrillation     ZZC ECHO HEART XTHORACIC,COMPLETE, W/O DOPPLER  2006    normal LV/RV size and function, mild pulm ht(30-40mm Hg), mild TR    ZZC ECHO HEART XTHORACIC,COMPLETE, W/O DOPPLER  10/2008    normal LV systolic function with EF 55-60%, impaired LV relaxation, mod to severe LAE    ZZC LIGATE FALLOPIAN TUBE      Tubal ligation        Family History   Problem Relation Age of Onset    Diabetes Mother         type 2    Hypertension Mother     Cardiovascular Mother         pulmonary embolus    Lipids Mother     Heart Disease Mother          atrial fibrillation    Diabetes Father         type 2    Cardiovascular Father         atrial fibrillation,  during an EP procedure    Cancer - colorectal Maternal Grandmother         onset age 88    Heart Disease Maternal Grandmother          age 96    Diabetes Maternal Grandfather         type 2    Diabetes Paternal Grandfather         type 2    Hypertension Sister     Lipids Sister     Lipids Brother     Hypertension Brother     Hypertension Son     Other - See Comments Cousin 68        Myotonic Dystrophy       Social History     Socioeconomic History    Marital status:      Spouse name: Paras    Number of children: 3    Years  of education: 12    Highest education level: Not on file   Occupational History    Occupation: clerical     Employer: RETIRED    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    Occupation: volunteers at [employer]   Tobacco Use    Smoking status: Never    Smokeless tobacco: Never   Vaping Use    Vaping Use: Never used   Substance and Sexual Activity    Alcohol use: Yes     Comment: 0-2 per month    Drug use: No    Sexual activity: Yes     Partners: Male     Comment: same partner since 1965, only partner   Other Topics Concern     Service No    Blood Transfusions Yes     Comment: pregnancy    Caffeine Concern No     Comment: 1-2 cups per day    Occupational Exposure No    Hobby Hazards No    Sleep Concern Yes     Comment: needing new equipment for her C pap    Stress Concern No    Weight Concern Yes     Comment: chronic obesity    Special Diet Yes     Comment: decreasing salt    Back Care No    Exercise Yes     Comment: walking 20-30 min 2 times weekly    Bike Helmet No     Comment: not ride    Seat Belt Yes    Self-Exams Yes    Parent/sibling w/ CABG, MI or angioplasty before 65F 55M? Not Asked   Social History Narrative    Lives with  and a dog.  6 grandchildren; previously did  for 3 of them.  Now volunteering in school and [employer] shop.  Children ages 45 and twins age 42.      2012:  diagnosed with low grade prostate cancer- s/p cryotherapy surgery in 2016, then s/p radiation in 2017.         Has a 4 yr old great-granddaughter that lives in [place] - hasn't been able to see her secondary to COVID-19 novel coronavirus pandemic, which started in 3/2020. ---.2020 -Madina Mercado MD         2020 -  became ill and  of COVID-19 novel coronavirus complications with pneumonia - was never able to come off ventilator at [facility].  Grandchildren now are all grown. Still living in their family home and enjoying it.--Madina Mercado MD                 Social Determinants of Health     Financial Resource Strain: Not on file   Food Insecurity: Not on file   Transportation Needs: Not on file   Physical Activity: Not on file   Stress: Not on file   Social Connections: Not on file   Intimate Partner Violence: Not on file   Housing Stability: Not on file       Outpatient Encounter Medications as of 7/24/2023   Medication Sig Dispense Refill    alcohol swab prep pads Use to swab area of injection/dilma as directed. 100 each 3    amLODIPine (NORVASC) 5 MG tablet Take 1 tablet (5 mg) by mouth daily 90 tablet 1    BIOTIN 10 MG OR TABS 1 TABLET DAILY      blood glucose (JAYDE CONTOUR NEXT) test strip Use to test blood sugar 1 times daily or as directed. 100 strip 3    blood glucose calibration (NO BRAND SPECIFIED) solution To accompany: Blood Glucose Monitor Brands: per insurance. 1 each 11    blood glucose monitoring (JYADE MICROLET) lancets Use to test blood sugar 1 times daily or as directed. 100 each 3    blood glucose monitoring (NO BRAND SPECIFIED) meter device kit Use to test blood sugar 1 times daily or as directed. Preferred blood glucose meter OR supplies to accompany: Blood Glucose Monitor Brands: easiest for patient per insurance. 1 kit 0    CALTRATE 600 + D 600-200 MG-IU OR TABS 1 tablet twice daily 60 11    hydrochlorothiazide (HYDRODIURIL) 25 MG tablet Take 1 tablet (25 mg) by mouth daily 90 tablet 1    METAMUCIL FIBER PO       metFORMIN (GLUCOPHAGE) 1000 MG tablet TAKE 1 AND 1/2 TABLETS BY MOUTH WITH BREAKFAST AND TAKE 1 TABLET BY MOUTH WITH SUPPER 225 tablet 1    Multiple Vitamins-Minerals (CENTRUM SILVER ADULT 50+ PO) Take 1 tablet by mouth daily      polyethylene glycol 400 (BLINK TEARS) 0.25 % SOLN ophthalmic solution Apply 1 drop to eye      simvastatin (ZOCOR) 20 MG tablet TAKE ONE TABLET BY MOUTH AT BEDTIME 90 tablet 1    vitamin B-12 (CYANOCOBALAMIN) 1000 MCG tablet Take 1,000 mcg by mouth      VITAMIN D 1000 UNIT OR CAPS 1 CAPSULE  DAILY 3 MONTHS 1 YEAR     No facility-administered encounter medications on file as of 7/24/2023.             Review Of Systems  Skin: As above  Eyes: negative  Ears/Nose/Throat: negative  Respiratory: No shortness of breath, dyspnea on exertion, cough, or hemoptysis  Cardiovascular: negative  Gastrointestinal: negative  Genitourinary: negative  Musculoskeletal: negative  Neurologic: negative  Psychiatric: negative  Hematologic/Lymphatic/Immunologic: negative  Endocrine: negative      O:   NAD, WDWN, Alert & Oriented, Mood & Affect wnl, Vitals stable   Here today daughter    /76 (BP Location: Left arm, Patient Position: Sitting, Cuff Size: Adult Large)   Pulse 88   SpO2 98%    General appearance hernando ii   Vitals stable   Alert, oriented and in no acute distress     Mid scalp 1cm pink pearly papule    Stuck on papules and brown macules on trunk and ext    Red papules on trunk   Flesh colored papules on trunk          Eyes: Conjunctivae/lids:Normal     ENT: Lips, buccal mucosa, tongue: normal    MSK:Normal    Cardiovascular: peripheral edema slight     Pulm: Breathing Normal    Lymph Nodes: No Head and Neck Lymphadenopathy     Neuro/Psych: Orientation:Normal; Mood/Affect:Normal      A/P:  1. Seborrheic keratosis, lentigo, angioma, dermal nevus  2. Basal cell carcinoma scalp     It was a pleasure speaking to Zulema Nixon today.  Previous clinic  notes and pertinent laboratory tests were reviewed prior to Zulema Nixon's visit.  Signs and Symptoms of skin cancer discussed with patient.  Patient encouraged to perform monthly skin exams.  UV precautions reviewed with patient.  Risks of non-melanoma skin cancer discussed with patient   Return to clinic 6 months  PROCEDURE NOTE  Scalp basal cell carcinoma   MOHS:   Location    The rationale for Mohs surgery was discussed with the patient and consent was obtained.  The risks and benefits as well as alternatives to therapy were discussed, in detail.  Specifically, the  risks of infection, scarring, bleeding, prolonged wound healing, incomplete removal, allergy to anesthesia, nerve injury and recurrence were addressed.  Indication for Mohs was Location. Prior to the procedure, the treatment site was clearly identified and, if available, confirmed with previous photos and confirmed by the patient   All components of the Universal Protocol/PAUSE rule were completed.  The Mohs surgeon operated in two distinct and integrated capacities as the surgeon and pathologist.      The area was prepped with Betasept.  A rim of normal appearing skin was marked circumferentially around the lesion.  The area was infiltrated with local anesthesia.  The tumor was first debulked to remove all clinically apparent tumor.  An incision following the standard Mohs approach was done and the specimen was oriented,mapped and placed in 2 block(s).  Each specimen was then chromacoded and processed in the Mohs laboratory using standard Mohs technique and submitted for frozen section histology.  Frozen section analysis showed residual tumor but CLEAR MARGINS.      The tumor was excised using standard Mohs technique in 2 stages(s).  CLEAR MARGINS OBTAINED and Final defect size was 1.8 x2.3cm.     We discussed the options for wound management in full with the patient including risks/benefits/ possible outcomes.      REPAIR COMPLEX: Because of the tightness of the surrounding skin and Because of the size and full thickness nature of the defect, Because of the tightness of the surrounding skin, To maintain form and function, and In order to avoid distortion, a complex closure was planned. After LE anesthesia and prep, Burow's triangles were excised in the relaxed skin tension lines. The wound edges were widely undermined greater than width of the defect on both sides by dissection in the subcutaneous plane until adequate tissue mobility was obtained. Hemostasis was obtained. The wound edges were closed in a layered  fashion using Vicryl and Fast Absorbing Plain Gut sutures. Postoperative length was 4.2 cm.   EBL minimal; complications none; wound care routine.  The patient was discharged in good condition and will return in one week for wound evaluation.

## 2023-07-24 NOTE — NURSING NOTE
Chief Complaint   Patient presents with    Derm Problem     Mohs- R Vertex Scalp        Vitals:    07/24/23 0755   BP: 128/76   BP Location: Left arm   Patient Position: Sitting   Cuff Size: Adult Large   Pulse: 88   SpO2: 98%     Wt Readings from Last 1 Encounters:   06/08/23 80.7 kg (177 lb 14.4 oz)       Khloe Castro LPN .................7/24/2023

## 2023-07-24 NOTE — LETTER
2023         RE: Zulema Nixon  [address] [facility] [address]        Dear Colleague,    Thank you for referring your patient, Zulema Nixon, to the [facility]. Please see a copy of my visit note below.    Surgical Office Location :   [facility]  [facility] [place] [address]      Zulema Nixon , a 78 year old year old female patient, I was asked to see by WENDI Stone for basal cell carcinoma on scalp.  Patient has no other skin complaints today.  Remainder of the HPI, Meds, PMH, Allergies, FH, and SH was reviewed in chart.      Past Medical History:   Diagnosis Date     Atrial fibrillation (H) 2006    cardioverted 2007, on chronic anticoagulation      Cerebral artery occlusion with cerebral infarction (H)      CKD (chronic kidney disease) stage 2, GFR 60-89 ml/min      with microalbuminuria     Essential hypertension, benign      Hyperlipidemia LDL goal <100 10/31/2010    fish oil = IBS with diarrhea      Morbid obesity (H)      ARMAND (obstructive sleep apnea)     C PAP     Sensorineural hearing loss of both ears     using hearing aids     Status post laparoscopic cholecystectomy     cholecystectomy for cholelithiasis     Supervision of other normal pregnancy      - vaginal, one set of twins     Tortuous colon     detected at colonoscopy  - was recommended for next testing to have CT colonography      Tubal ligation status      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)      Varicose veins of lower extremities with inflammation     excision varicose vein left lower extremity     Venous stasis of lower extremity     excision varicose vein left lower extremity        Past Surgical History:   Procedure Laterality Date     C DEXA INTERPRETATION, AXIAL  2007    T score lumbar 3.7, femoral neck 2.8/2.0(all stable)     C DEXA INTERPRETATION, AXIAL  2010    T score lumbar 3.4 (marked degen changes), femoral  neck 2.1/2.1 (sig dec lt femur)     C ORAL SURGERY SINGLE TOOTH  2019     had to have left upper rear tooth removed secondary to crown breaking /root damage      CARDIAC NUC ESHA STRESS TEST NL  2006    exercised 4 min, no inducible ischemia on ECG or perfusion images     CARPAL TUNNEL RELEASE RT/LT Right 2021    Right carpal tunnel release under local anesthetic, Dr. Orlando Walsh, [facility]     CATARACT IOL, RT/LT Bilateral     first right in 2022, then left in 2022 - [facility] - Dr. Cooper Santacruz     COLONOSCOPY  2006    diverticulosis, repeat 10 years     FLEXIBLE SIGMOIDOSCOPY  10/2000     HC LAPAROSCOPY, SURGICAL; CHOLECYSTECTOMY      Cholecystectomy, Laparoscopic     LIGATN/STRIP LONG & SHORT SAPHEN      L varicose vein excision     REPAIR PTOSIS BILATERAL  2011     Bilateral upper eyelid blepharoplasty and internal browpexy brow ptosis repair, bilateral lower eyelid ectropion repair with snip punctoplasty     TRANSFUSION, DIRECT, BLOOD      pregnancy related     ZZC CARDIOVERSION, ELECTIVE;INTERN  2007    Successful cardioversion from atrial fibrillation      ZZC ECHO HEART XTHORACIC,COMPLETE, W/O DOPPLER  2006    normal LV/RV size and function, mild pulm ht(30-40mm Hg), mild TR     ZZC ECHO HEART XTHORACIC,COMPLETE, W/O DOPPLER  10/2008    normal LV systolic function with EF 55-60%, impaired LV relaxation, mod to severe LAE     ZZC LIGATE FALLOPIAN TUBE      Tubal ligation        Family History   Problem Relation Age of Onset     Diabetes Mother         type 2     Hypertension Mother      Cardiovascular Mother         pulmonary embolus     Lipids Mother      Heart Disease Mother          atrial fibrillation     Diabetes Father         type 2     Cardiovascular Father         atrial fibrillation,  during an EP procedure     Cancer - colorectal Maternal Grandmother         onset age 88     Heart Disease Maternal Grandmother          age 96      Diabetes Maternal Grandfather         type 2     Diabetes Paternal Grandfather         type 2     Hypertension Sister      Lipids Sister      Lipids Brother      Hypertension Brother      Hypertension Son      Other - See Comments Cousin 68        Myotonic Dystrophy       Social History     Socioeconomic History     Marital status:      Spouse name: Paras     Number of children: 3     Years of education: 12     Highest education level: Not on file   Occupational History     Occupation: clerical     Employer: RETIRED     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     Occupation: volunteers at [employer]   Tobacco Use     Smoking status: Never     Smokeless tobacco: Never   Vaping Use     Vaping Use: Never used   Substance and Sexual Activity     Alcohol use: Yes     Comment: 0-2 per month     Drug use: No     Sexual activity: Yes     Partners: Male     Comment: same partner since 1965, only partner   Other Topics Concern      Service No     Blood Transfusions Yes     Comment: pregnancy     Caffeine Concern No     Comment: 1-2 cups per day     Occupational Exposure No     Hobby Hazards No     Sleep Concern Yes     Comment: needing new equipment for her C pap     Stress Concern No     Weight Concern Yes     Comment: chronic obesity     Special Diet Yes     Comment: decreasing salt     Back Care No     Exercise Yes     Comment: walking 20-30 min 2 times weekly     Bike Helmet No     Comment: not ride     Seat Belt Yes     Self-Exams Yes     Parent/sibling w/ CABG, MI or angioplasty before 65F 55M? Not Asked   Social History Narrative    Lives with  and a dog.  6 grandchildren; previously did  for 3 of them.  Now volunteering in school and [employer] shop.  Children ages 45 and twins age 42.      2012:  diagnosed with low grade prostate cancer- s/p cryotherapy surgery in 2016, then s/p radiation in 2017.         Has a 4 yr old great-granddaughter that lives in [place] - hasn't been able  to see her secondary to COVID-19 novel coronavirus pandemic, which started in 3/2020. ---.2020 -Madina Mercado MD         2020 -  became ill and  of COVID-19 novel coronavirus complications with pneumonia - was never able to come off ventilator at [facility].  Grandchildren now are all grown. Still living in their family home and enjoying it.--Madina Mercado MD                Social Determinants of Health     Financial Resource Strain: Not on file   Food Insecurity: Not on file   Transportation Needs: Not on file   Physical Activity: Not on file   Stress: Not on file   Social Connections: Not on file   Intimate Partner Violence: Not on file   Housing Stability: Not on file       Outpatient Encounter Medications as of 2023   Medication Sig Dispense Refill     alcohol swab prep pads Use to swab area of injection/dilma as directed. 100 each 3     amLODIPine (NORVASC) 5 MG tablet Take 1 tablet (5 mg) by mouth daily 90 tablet 1     BIOTIN 10 MG OR TABS 1 TABLET DAILY       blood glucose (JAYDE CONTOUR NEXT) test strip Use to test blood sugar 1 times daily or as directed. 100 strip 3     blood glucose calibration (NO BRAND SPECIFIED) solution To accompany: Blood Glucose Monitor Brands: per insurance. 1 each 11     blood glucose monitoring (JAYDE MICROLET) lancets Use to test blood sugar 1 times daily or as directed. 100 each 3     blood glucose monitoring (NO BRAND SPECIFIED) meter device kit Use to test blood sugar 1 times daily or as directed. Preferred blood glucose meter OR supplies to accompany: Blood Glucose Monitor Brands: easiest for patient per insurance. 1 kit 0     CALTRATE 600 + D 600-200 MG-IU OR TABS 1 tablet twice daily 60 11     hydrochlorothiazide (HYDRODIURIL) 25 MG tablet Take 1 tablet (25 mg) by mouth daily 90 tablet 1     METAMUCIL FIBER PO        metFORMIN (GLUCOPHAGE) 1000 MG tablet TAKE 1 AND 1/2 TABLETS BY MOUTH WITH BREAKFAST  AND TAKE 1 TABLET BY MOUTH WITH SUPPER 225 tablet 1     Multiple Vitamins-Minerals (CENTRUM SILVER ADULT 50+ PO) Take 1 tablet by mouth daily       polyethylene glycol 400 (BLINK TEARS) 0.25 % SOLN ophthalmic solution Apply 1 drop to eye       simvastatin (ZOCOR) 20 MG tablet TAKE ONE TABLET BY MOUTH AT BEDTIME 90 tablet 1     vitamin B-12 (CYANOCOBALAMIN) 1000 MCG tablet Take 1,000 mcg by mouth       VITAMIN D 1000 UNIT OR CAPS 1 CAPSULE DAILY 3 MONTHS 1 YEAR     No facility-administered encounter medications on file as of 7/24/2023.             Review Of Systems  Skin: As above  Eyes: negative  Ears/Nose/Throat: negative  Respiratory: No shortness of breath, dyspnea on exertion, cough, or hemoptysis  Cardiovascular: negative  Gastrointestinal: negative  Genitourinary: negative  Musculoskeletal: negative  Neurologic: negative  Psychiatric: negative  Hematologic/Lymphatic/Immunologic: negative  Endocrine: negative      O:   NAD, WDWN, Alert & Oriented, Mood & Affect wnl, Vitals stable   Here today daughter    /76 (BP Location: Left arm, Patient Position: Sitting, Cuff Size: Adult Large)   Pulse 88   SpO2 98%    General appearance hernando ii   Vitals stable   Alert, oriented and in no acute distress     Mid scalp 1cm pink pearly papule    Stuck on papules and brown macules on trunk and ext    Red papules on trunk   Flesh colored papules on trunk          Eyes: Conjunctivae/lids:Normal     ENT: Lips, buccal mucosa, tongue: normal    MSK:Normal    Cardiovascular: peripheral edema slight     Pulm: Breathing Normal    Lymph Nodes: No Head and Neck Lymphadenopathy     Neuro/Psych: Orientation:Normal; Mood/Affect:Normal      A/P:  1. Seborrheic keratosis, lentigo, angioma, dermal nevus  2. Basal cell carcinoma scalp     It was a pleasure speaking to Zulema Nixon today.  Previous clinic  notes and pertinent laboratory tests were reviewed prior to Zulema Nixon's visit.  Signs and Symptoms of skin cancer discussed with  patient.  Patient encouraged to perform monthly skin exams.  UV precautions reviewed with patient.  Risks of non-melanoma skin cancer discussed with patient   Return to clinic 6 months  PROCEDURE NOTE  Scalp basal cell carcinoma   MOHS:   Location    The rationale for Mohs surgery was discussed with the patient and consent was obtained.  The risks and benefits as well as alternatives to therapy were discussed, in detail.  Specifically, the risks of infection, scarring, bleeding, prolonged wound healing, incomplete removal, allergy to anesthesia, nerve injury and recurrence were addressed.  Indication for Mohs was Location. Prior to the procedure, the treatment site was clearly identified and, if available, confirmed with previous photos and confirmed by the patient   All components of the Universal Protocol/PAUSE rule were completed.  The Mohs surgeon operated in two distinct and integrated capacities as the surgeon and pathologist.      The area was prepped with Betasept.  A rim of normal appearing skin was marked circumferentially around the lesion.  The area was infiltrated with local anesthesia.  The tumor was first debulked to remove all clinically apparent tumor.  An incision following the standard Mohs approach was done and the specimen was oriented,mapped and placed in 1 block(s).  Each specimen was then chromacoded and processed in the Mohs laboratory using standard Mohs technique and submitted for frozen section histology.  Frozen section analysis showed no residual tumor but CLEAR MARGINS.      The tumor was excised using standard Mohs technique in 1 stages(s).  CLEAR MARGINS OBTAINED and Final defect size was 1.8 cm.     We discussed the options for wound management in full with the patient including risks/benefits/ possible outcomes.      REPAIR COMPLEX: Because of the tightness of the surrounding skin and Because of the size and full thickness nature of the defect, Because of the tightness of the  surrounding skin, To maintain form and function, and In order to avoid distortion, a complex closure was planned. After LE anesthesia and prep, Burow's triangles were excised in the relaxed skin tension lines. The wound edges were widely undermined greater than width of the defect on both sides by dissection in the subcutaneous plane until adequate tissue mobility was obtained. Hemostasis was obtained. The wound edges were closed in a layered fashion using Vicryl and Fast Absorbing Plain Gut sutures. Postoperative length was 3.5 cm.   EBL minimal; complications none; wound care routine.  The patient was discharged in good condition and will return in one week for wound evaluation.      Again, thank you for allowing me to participate in the care of your patient.        Sincerely,        Braulio George MD

## 2023-07-25 ENCOUNTER — TRANSFERRED RECORDS (OUTPATIENT)
Dept: HEALTH INFORMATION MANAGEMENT | Facility: CLINIC | Age: 78
End: 2023-07-25
Payer: MEDICARE

## 2023-07-25 LAB — RETINOPATHY: NEGATIVE

## 2023-08-02 ENCOUNTER — ALLIED HEALTH/NURSE VISIT (OUTPATIENT)
Dept: FAMILY MEDICINE | Facility: CLINIC | Age: 78
End: 2023-08-02
Payer: MEDICARE

## 2023-08-02 DIAGNOSIS — Z48.02 VISIT FOR SUTURE REMOVAL: Primary | ICD-10-CM

## 2023-08-02 PROCEDURE — 99207 PR NO CHARGE NURSE ONLY: CPT | Performed by: NURSE PRACTITIONER

## 2023-08-02 NOTE — PATIENT INSTRUCTIONS
WOUND CARE INSTRUCTIONS FOR ONE WEEK     STAPLE REMOVAL     [phone]    The following information has been compiled to offer assistance with the dressing change or wound evaluation. Please feel free to call our office to speak with one of the nurses if you have any questions or concerns about the progress of the wound healing process especially if there are any signs of flap necrosis or infection. We will be happy to answer any questions you might have.                                                 AFTER 24 HOURS YOU SHOULD REMOVE THE BANDAGE AND BEGIN DAILY DRESSING CHANGES AS FOLLOWS:     1) Remove Dressing.     2) Clean and dry the area with tap water using a Q-tip or sterile gauze pad.     3) Apply Vaseline, Polysporin ointment, Aquaphor or Bacitracin ointment over entire wound.  Do NOT use Neosporin ointment.     4) Cover the wound with a band-aid, or a sterile non-stick gauze pad and micropore paper tape      REPEAT THESE INSTRUCTIONS AT LEAST ONCE A DAY UNTIL THE WOUND HAS COMPLETELY HEALED. DO NOT LET THE WOUND SCAB OVER.    It is an old wives tale that a wound heals better when it is exposed to air and allowed to dry out. The wound will heal faster with a better cosmetic result if it is kept moist with ointment and covered with a bandage.     Massaging the wound site hastens the healing process by softening the scar tissue and fading the scar. Begin massaging the area one month after surgery as often as possible. Continue to massage the area for 2-3 months or until they feel the scar tissue has softened. Moisturizers can be used during the massaging but are not necessary. Ultimately it takes six months for the scar to soften and blend into the surrounding skin.      Patient Education       Proper skin care from [facility]:    -Eliminate harsh soaps as they strip the natural oils from the skin, often resulting in dry itchy skin ( i.e. Dial, Zest, Rachel Spring)  -Use mild soaps such as  Cetaphil or Dove Sensitive Skin in the shower. You do not need to use soap on arms, legs, and trunk every time you shower unless visibly soiled.   -Avoid hot or cold showers.  -After showering, lightly dry off and apply moisturizing within 2-3 minutes. This will help trap moisture in the skin.   -Aggressive use of a moisturizer at least 1-2 times a day to the entire body (including -Vanicream, Cetaphil, Aquaphor or Cerave) and moisturize hands after every washing.  -We recommend using moisturizers that come in a tub that needs to be scooped out, not a pump. This has more of an oil base. It will hold moisture in your skin much better than a water base moisturizer. The above recommended are non-pore clogging.      Wear a sunscreen with at least SPF 30 on your face, ears, neck and V of the chest daily. Wear sunscreen on other areas of the body if those areas are exposed to the sun throughout the day. Sunscreens can contain physical and/or chemical blockers. Physical blockers are less likely to clog pores, these include zinc oxide and titanium dioxide. Reapply every two hour and after swimming.     Sunscreen examples: https://www.ewg.org/sunscreen/    UV radiation  UVA radiation remains constant throughout the day and throughout the year. It is a longer wavelength than UVB and therefore penetrates deeper into the skin leading to immediate and delayed tanning, photoaging, and skin cancer. 70-80% of UVA and UVB radiation occurs between the hours of 10am-2pm.  UVB radiation  UVB radiation causes the most harmful effects and is more significant during the summer months. However, snow and ice can reflect UVB radiation leading to skin damage during the winter months as well. UVB radiation is responsible for tanning, burning, inflammation, delayed erythema (pinkness), pigmentation (brown spots), and skin cancer.     I recommend self monthly full body exams and yearly full body exams with a dermatology provider. If you develop  a new or changing lesion please follow up for examination. Most skin cancers are pink and scaly or pink and pearly. However, we do see blue/brown/black skin cancers.  Consider the ABCDEs of melanoma when giving yourself your monthly full body exam ( don't forget the groin, buttocks, feet, toes, etc). A-asymmetry, B-borders, C-color, D-diameter, E-elevation or evolving. If you see any of these changes please follow up in clinic. If you cannot see your back I recommend purchasing a hand held mirror to use with a larger wall mirror.       Checking for Skin Cancer  You can find cancer early by checking your skin each month. There are 3 kinds of skin cancer. They are melanoma, basal cell carcinoma, and squamous cell carcinoma. Doing monthly skin checks is the best way to find new marks or skin changes. Follow the instructions below for checking your skin.   The ABCDEs of checking moles for melanoma   Check your moles or growths for signs of melanoma using ABCDE:   Asymmetry: the sides of the mole or growth don t match  Border: the edges are ragged, notched, or blurred  Color: the color within the mole or growth varies  Diameter: the mole or growth is larger than 6 mm (size of a pencil eraser)  Evolving: the size, shape, or color of the mole or growth is changing (evolving is not shown in the images below)    Checking for other types of skin cancer  Basal cell carcinoma or squamous cell carcinoma have symptoms such as:     A spot or mole that looks different from all other marks on your skin  Changes in how an area feels, such as itching, tenderness, or pain  Changes in the skin's surface, such as oozing, bleeding, or scaliness  A sore that does not heal  New swelling or redness beyond the border of a mole    Who s at risk?  Anyone can get skin cancer. But you are at greater risk if you have:   Fair skin, light-colored hair, or light-colored eyes  Many moles or abnormal moles on your skin  A history of sunburns from  sunlight or tanning beds  A family history of skin cancer  A history of exposure to radiation or chemicals  A weakened immune system  If you have had skin cancer in the past, you are at risk for recurring skin cancer.   How to check your skin  Do your monthly skin checkups in front of a full-length mirror. Check all parts of your body, including your:   Head (ears, face, neck, and scalp)  Torso (front, back, and sides)  Arms (tops, undersides, upper, and lower armpits)  Hands (palms, backs, and fingers, including under the nails)  Buttocks and genitals  Legs (front, back, and sides)  Feet (tops, soles, toes, including under the nails, and between toes)  If you have a lot of moles, take digital photos of them each month. Make sure to take photos both up close and from a distance. These can help you see if any moles change over time.   Most skin changes are not cancer. But if you see any changes in your skin, call your doctor right away. Only he or she can diagnose a problem. If you have skin cancer, seeing your doctor can be the first step toward getting the treatment that could save your life.   [employer] last reviewed this educational content on 4/1/2019 2000-2020 [employer]. [address] [place] [address], [place] [address]. All rights reserved. This information is not intended as a substitute for professional medical care. Always follow your healthcare professional's instructions.       When should I call my doctor?  If you are worsening or not improving, please, contact us or seek urgent care as noted below.     Who should I call with questions (adults)?  [facility] (adult and pediatric): [phone]  [facility] (adult): [phone]  [facility] ([place], [place], [place] and [place]) [phone]  For urgent needs outside of business hours call the [facility] at [phone] and ask for the  dermatology resident on call to be paged  If this is a medical emergency and you are unable to reach an ER, Call 911      If you need a prescription refill, please contact your pharmacy. Refills are approved or denied by our Physicians during normal business hours, Monday through Fridays  Per office policy, refills will not be granted if you have not been seen within the past year (or sooner depending on your child's condition)

## 2023-08-02 NOTE — PROGRESS NOTES
Zulema Nixon presents to the clinic today for removal of staples.  The patient has had the staples in place for 8 days.  There has been no history of infection or drainage.  11 staples are seen located on the scalp.  The wound is healing well with no signs of infection.  Tetanus status is up to date.   All staples were easily removed today.  Routine wound care discussed.  The patient will follow up as needed.    Zulema Nixon comes into clinic today at the request of Dr. George Ordering Provider for Staple Removal:  Incision was dry, clean and intact, incision cleansed with wound cleanser and staples were removed. Pt tolerated the procedure. Benzoin and steristrips were placed per protocol and pt was instructed to leave them in place for 7-10 days. It is okay to shower with these in place, no need to cover. After this time the strerstrips will start loosen and should be removed.  . .    LOV 7/25/23    This service provided today was under the supervising provider of the day Darlyn Stock NP, who was available if needed.    Cristina LEWSI RN  [facility]  [phone]

## 2023-08-04 ENCOUNTER — TELEPHONE (OUTPATIENT)
Dept: FAMILY MEDICINE | Facility: CLINIC | Age: 78
End: 2023-08-04
Payer: MEDICARE

## 2023-08-04 ENCOUNTER — TELEPHONE (OUTPATIENT)
Dept: ANTICOAGULATION | Facility: CLINIC | Age: 78
End: 2023-08-04
Payer: MEDICARE

## 2023-08-04 DIAGNOSIS — I61.2 NONTRAUMATIC HEMORRHAGE OF RIGHT CEREBRAL HEMISPHERE (H): Primary | ICD-10-CM

## 2023-08-04 DIAGNOSIS — I48.91 ATRIAL FIBRILLATION, UNSPECIFIED TYPE (H): ICD-10-CM

## 2023-08-04 NOTE — TELEPHONE ENCOUNTER
2 other phone calls regarding this today 8/4/23    Called and advised patient neurology has reached out to Dr. Mercado.   And writer will talk with provider-so  she can order the med today.   Will huddled with- she is working on it under the 1st encounter from 8/4/23

## 2023-08-04 NOTE — TELEPHONE ENCOUNTER
Will a nurse from [facility] called to explain the plan for anticoagulation.     The patient had a stroke in early June with a bleed     7/21/23 repeat head ct showed  resolution of hemorraghic.   Takes Xarleto- prior to stroke    Neurologist advises to stop asa and start a new anticoagulant     Neurologist hear recommends Eliquis 5mg 2 times a day     The office will be faxing over the recommendations       Meryl LIMA RN   [facility] Triage

## 2023-08-04 NOTE — TELEPHONE ENCOUNTER
Fax received from [facility] 08/04/23 with new instructions for anticoagulation:        Patient was discharged from INR clinic on 11/4/2022 as patient switched to [facility] clinic does not manage this patient.     Routing to PCP & team to advise.     Olga Lidia Guevara, RN, BSN, PHN  Anticoagulation Nurse  [phone]

## 2023-08-04 NOTE — TELEPHONE ENCOUNTER
Reviewed records from pt's stroke and ANW hospitalization.  Rx Done for apixaban ( Elliquis) 5mg twice daily sent to [id number] [place] [address]

## 2023-08-04 NOTE — TELEPHONE ENCOUNTER
Reviewed records from pt's stroke and ANW hospitalization.  Rx Done for apixaban ( Elliquis) 5mg twice daily sent to [id number] [place] [address]

## 2023-08-05 ENCOUNTER — MEDICAL CORRESPONDENCE (OUTPATIENT)
Dept: HEALTH INFORMATION MANAGEMENT | Facility: CLINIC | Age: 78
End: 2023-08-05
Payer: MEDICARE

## 2023-08-22 ENCOUNTER — TELEPHONE (OUTPATIENT)
Dept: DERMATOLOGY | Facility: CLINIC | Age: 78
End: 2023-08-22

## 2023-08-22 ENCOUNTER — ALLIED HEALTH/NURSE VISIT (OUTPATIENT)
Dept: FAMILY MEDICINE | Facility: CLINIC | Age: 78
End: 2023-08-22
Payer: MEDICARE

## 2023-08-22 DIAGNOSIS — Z51.89 VISIT FOR WOUND CHECK: Primary | ICD-10-CM

## 2023-08-22 PROCEDURE — 99207 PR NO CHARGE NURSE ONLY: CPT | Performed by: PHYSICIAN ASSISTANT

## 2023-08-22 NOTE — TELEPHONE ENCOUNTER
Daughter julissa calling to discuss mohs surgery site ruddy on 7/24/23.  The wound is tender/weeping and has a raised red area  Mychart is not easy for them to send a photo- appointment scheduled at [facility] for a nurse only check.  Thank you,    Sintia STONERN BSN  [facility]- [phone]

## 2023-08-22 NOTE — PATIENT INSTRUCTIONS
Wound Care Instructions     [facility] or     [facility] [phone]          BEGIN DAILY DRESSING CHANGES AS FOLLOWS:     1) Remove Dressing.     2) Clean and dry the area with tap water using a Q-tip or sterile gauze pad.     3) Apply Vaseline, Aquaphor, Polysporin ointment or Bacitracin ointment over entire wound.  Do NOT use Neosporin ointment.     4) Cover the wound with a band-aid, or a sterile non-stick gauze pad and micropore paper tape      REPEAT THESE INSTRUCTIONS AT LEAST ONCE A DAY UNTIL THE WOUND HAS COMPLETELY HEALED.    It is an old wives tale that a wound heals better when it is exposed to air and allowed to dry out. The wound will heal faster with a better cosmetic result if it is kept moist with ointment and covered with a bandage.    **Do not let the wound dry out.**      Supplies Needed:      *Cotton tipped applicators (Q-tips)    *Polysporin Ointment or Bacitracin Ointment (NOT NEOSPORIN)    *Band-aids or non-stick gauze pads and micropore paper tape.      PATIENT INFORMATION:    During the healing process you will notice a number of changes. All wounds develop a small halo of redness surrounding the wound.  This means healing is occurring. Severe itching with extensive redness usually indicates sensitivity to the ointment or bandage tape used to dress the wound.  You should call our office if this develops.      Swelling  and/or discoloration around your surgical site is common, particularly when performed around the eye.    All wounds normally drain.  The larger the wound the more drainage there will be.  After 7-10 days, you will notice the wound beginning to shrink and new skin will begin to grow.  The wound is healed when you can see skin has formed over the entire area.  A healed wound has a healthy, shiny look to the surface and is red to dark pink in color to normalize.  Wounds may take approximately 4-6 weeks to heal.  Larger wounds may take 6-8  weeks.  After the wound is healed you may discontinue dressing changes.    You may experience a sensation of tightness as your wound heals. This is normal and will gradually subside.    Your healed wound may be sensitive to temperature changes. This sensitivity improves with time, but if you re having a lot of discomfort, try to avoid temperature extremes.    Patients frequently experience itching after their wound appears to have healed because of the continue healing under the skin.  Plain Vaseline will help relieve the itching.        POSSIBLE COMPLICATIONS    BLEEDING:    Leave the bandage in place.  Use tightly rolled up gauze or a cloth to apply direct pressure over the bandage for 30  minutes.  Reapply pressure for an additional 30 minutes if necessary  Use additional gauze and tape to maintain pressure once the bleeding has stopped.

## 2023-08-22 NOTE — PROGRESS NOTES
Pt came to the clinic for a wound check of mohs on scalp on 7/24/23.  Pt states she has been getting a lot of yellow/green drainage from the site.  States the area has been itching.  Staples were removed on 8/2/23 with nurse.  Pt has been washing area daily and applying vaseline or aquaphor.  Nurse cleaned the wound and scrubbed of drainage from the scalp with wound .  After getting the dried drainage removed from around the wound, the wound looks like it is healing well.  No redness extending from the wound and no foul odor.  There was a little bleeding but nothing abnormal.  Nurse advised pt there is no infection of the wound and advised what she is experiencing was normal healing.  Gave written wound care directions to pt and sent via my chart.  Pt and daughter states understanding.    Zulema Nixon comes into clinic today at the request of Dr. George Ordering Provider for Wound Check Action taken: See above .        This service provided today was under the supervising provider of the day Argelia Agee PA-C, who was available if needed.    Cristina LEWIS RN  [facility] [place]  [phone]

## 2023-09-23 ENCOUNTER — MYC MEDICAL ADVICE (OUTPATIENT)
Dept: DERMATOLOGY | Facility: CLINIC | Age: 78
End: 2023-09-23
Payer: MEDICARE

## 2023-09-23 ENCOUNTER — MYC MEDICAL ADVICE (OUTPATIENT)
Dept: FAMILY MEDICINE | Facility: CLINIC | Age: 78
End: 2023-09-23
Payer: MEDICARE

## 2023-09-25 NOTE — TELEPHONE ENCOUNTER
Please see my chart message below     Please review and advise - derm relied to pt asking for a picture     Thank you     Kylah Pierce RN, BSN  [place] [facility]

## 2023-09-26 NOTE — TELEPHONE ENCOUNTER
Patient Contact    Attempt # 1    Was call answered?  No      Left message for pt to call clinic.    Sydney Sifuentes MA  [facility]   [phone]

## 2023-09-26 NOTE — TELEPHONE ENCOUNTER
Patient was called. Patient was scheduled for an appointment with Dr. George. Patient was accepted.     Khloe Castro LPN

## 2023-10-02 ENCOUNTER — OFFICE VISIT (OUTPATIENT)
Dept: DERMATOLOGY | Facility: CLINIC | Age: 78
End: 2023-10-02
Payer: MEDICARE

## 2023-10-02 DIAGNOSIS — Z85.828 HISTORY OF SKIN CANCER: ICD-10-CM

## 2023-10-02 DIAGNOSIS — L92.9 PROUD FLESH: Primary | ICD-10-CM

## 2023-10-02 PROCEDURE — 99212 OFFICE O/P EST SF 10 MIN: CPT | Mod: 25 | Performed by: DERMATOLOGY

## 2023-10-02 PROCEDURE — 11302 SHAVE SKIN LESION 1.1-2.0 CM: CPT | Performed by: DERMATOLOGY

## 2023-10-02 RX ORDER — CEPHALEXIN 500 MG/1
500 CAPSULE ORAL 2 TIMES DAILY
Qty: 10 CAPSULE | Refills: 0 | Status: SHIPPED | OUTPATIENT
Start: 2023-10-02

## 2023-10-02 NOTE — PROGRESS NOTES
Zulema Nixon is an extremely pleasant 78 year old year old female patient here today for wound check on scalp.  She notes red spot on scalp.  Patient has no other skin complaints today.  Remainder of the HPI, Meds, PMH, Allergies, FH, and SH was reviewed in chart.      Past Medical History:   Diagnosis Date    Atrial fibrillation (H) 2006    cardioverted 2007, on chronic anticoagulation     Cerebral artery occlusion with cerebral infarction (H)     CKD (chronic kidney disease) stage 2, GFR 60-89 ml/min      with microalbuminuria    Essential hypertension, benign     Hyperlipidemia LDL goal <100 10/31/2010    fish oil = IBS with diarrhea     Morbid obesity (H)     ARMAND (obstructive sleep apnea)     C PAP    Sensorineural hearing loss of both ears     using hearing aids    Status post laparoscopic cholecystectomy     cholecystectomy for cholelithiasis    Supervision of other normal pregnancy      - vaginal, one set of twins    Tortuous colon     detected at colonoscopy  - was recommended for next testing to have CT colonography     Tubal ligation status     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)     Varicose veins of lower extremities with inflammation     excision varicose vein left lower extremity    Venous stasis of lower extremity     excision varicose vein left lower extremity        Past Surgical History:   Procedure Laterality Date    C DEXA INTERPRETATION, AXIAL  2007    T score lumbar 3.7, femoral neck 2.8/2.0(all stable)    C DEXA INTERPRETATION, AXIAL  2010    T score lumbar 3.4 (marked degen changes), femoral neck 2.1/2.1 (sig dec lt femur)    C ORAL SURGERY SINGLE TOOTH  2019     had to have left upper rear tooth removed secondary to crown breaking /root damage     CARDIAC NUC ESHA STRESS TEST NL  2006    exercised 4 min, no inducible ischemia on ECG or perfusion images    CARPAL TUNNEL RELEASE RT/LT Right 2021    Right carpal tunnel  release under local anesthetic, Dr. Orlando Walsh, [facility]    CATARACT IOL, RT/LT Bilateral     first right in 2022, then left in 2022 - [facility] - Dr. Cooper Santacruz    COLONOSCOPY  2006    diverticulosis, repeat 10 years    FLEXIBLE SIGMOIDOSCOPY  10/2000     LAPAROSCOPY, SURGICAL; CHOLECYSTECTOMY      Cholecystectomy, Laparoscopic    LIGATN/STRIP LONG & SHORT SAPHEN      L varicose vein excision    REPAIR PTOSIS BILATERAL  2011     Bilateral upper eyelid blepharoplasty and internal browpexy brow ptosis repair, bilateral lower eyelid ectropion repair with snip punctoplasty    TRANSFUSION, DIRECT, BLOOD      pregnancy related    ZZC CARDIOVERSION, ELECTIVE;INTERN  2007    Successful cardioversion from atrial fibrillation     ZZC ECHO HEART XTHORACIC,COMPLETE, W/O DOPPLER  2006    normal LV/RV size and function, mild pulm ht(30-40mm Hg), mild TR    ZZC ECHO HEART XTHORACIC,COMPLETE, W/O DOPPLER  10/2008    normal LV systolic function with EF 55-60%, impaired LV relaxation, mod to severe LAE    ZZC LIGATE FALLOPIAN TUBE  1973    Tubal ligation        Family History   Problem Relation Age of Onset    Diabetes Mother         type 2    Hypertension Mother     Cardiovascular Mother         pulmonary embolus    Lipids Mother     Heart Disease Mother          atrial fibrillation    Diabetes Father         type 2    Cardiovascular Father         atrial fibrillation,  during an EP procedure    Cancer - colorectal Maternal Grandmother         onset age 88    Heart Disease Maternal Grandmother          age 96    Diabetes Maternal Grandfather         type 2    Diabetes Paternal Grandfather         type 2    Hypertension Sister     Lipids Sister     Lipids Brother     Hypertension Brother     Hypertension Son     Other - See Comments Cousin 68        Myotonic Dystrophy       Social History     Socioeconomic History    Marital status:      Spouse name: Paras    Number of  children: 3    Years of education: 12    Highest education level: Not on file   Occupational History    Occupation: clerical     Employer: RETIRED    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    Occupation: volunteers at [employer]   Tobacco Use    Smoking status: Never    Smokeless tobacco: Never   Vaping Use    Vaping Use: Never used   Substance and Sexual Activity    Alcohol use: Yes     Comment: 0-2 per month    Drug use: No    Sexual activity: Yes     Partners: Male     Comment: same partner since 1965, only partner   Other Topics Concern     Service No    Blood Transfusions Yes     Comment: pregnancy    Caffeine Concern No     Comment: 1-2 cups per day    Occupational Exposure No    Hobby Hazards No    Sleep Concern Yes     Comment: needing new equipment for her C pap    Stress Concern No    Weight Concern Yes     Comment: chronic obesity    Special Diet Yes     Comment: decreasing salt    Back Care No    Exercise Yes     Comment: walking 20-30 min 2 times weekly    Bike Helmet No     Comment: not ride    Seat Belt Yes    Self-Exams Yes    Parent/sibling w/ CABG, MI or angioplasty before 65F 55M? Not Asked   Social History Narrative    Lives with  and a dog.  6 grandchildren; previously did  for 3 of them.  Now volunteering in school and [employer] shop.  Children ages 45 and twins age 42.      2012:  diagnosed with low grade prostate cancer- s/p cryotherapy surgery in 2016, then s/p radiation in 2017.         Has a 4 yr old great-granddaughter that lives in [place] - hasn't been able to see her secondary to COVID-19 novel coronavirus pandemic, which started in 3/2020. ---.2020 -Madina Mercado MD         2020 -  became ill and  of COVID-19 novel coronavirus complications with pneumonia - was never able to come off ventilator at [facility].  Grandchildren now are all grown. Still living in their family home and enjoying  it.--Madina Mercado MD                Social Determinants of Health     Financial Resource Strain: Not on file   Food Insecurity: Not on file   Transportation Needs: Not on file   Physical Activity: Not on file   Stress: Not on file   Social Connections: Not on file   Interpersonal Safety: Not on file   Housing Stability: Not on file       Outpatient Encounter Medications as of 10/2/2023   Medication Sig Dispense Refill    alcohol swab prep pads Use to swab area of injection/dilma as directed. 100 each 3    amLODIPine (NORVASC) 5 MG tablet Take 1 tablet (5 mg) by mouth daily 90 tablet 1    apixaban ANTICOAGULANT (ELIQUIS) 5 MG tablet Take 1 tablet (5 mg) by mouth 2 times daily 60 tablet 11    BIOTIN 10 MG OR TABS 1 TABLET DAILY      blood glucose (JAYDE CONTOUR NEXT) test strip Use to test blood sugar 1 times daily or as directed. 100 strip 3    blood glucose calibration (NO BRAND SPECIFIED) solution To accompany: Blood Glucose Monitor Brands: per insurance. 1 each 11    blood glucose monitoring (JAYDE MICROLET) lancets Use to test blood sugar 1 times daily or as directed. 100 each 3    blood glucose monitoring (NO BRAND SPECIFIED) meter device kit Use to test blood sugar 1 times daily or as directed. Preferred blood glucose meter OR supplies to accompany: Blood Glucose Monitor Brands: easiest for patient per insurance. 1 kit 0    CALTRATE 600 + D 600-200 MG-IU OR TABS 1 tablet twice daily 60 11    hydrochlorothiazide (HYDRODIURIL) 25 MG tablet Take 1 tablet (25 mg) by mouth daily 90 tablet 1    METAMUCIL FIBER PO       metFORMIN (GLUCOPHAGE) 1000 MG tablet TAKE 1 AND 1/2 TABLETS BY MOUTH WITH BREAKFAST AND TAKE 1 TABLET BY MOUTH WITH SUPPER 225 tablet 1    Multiple Vitamins-Minerals (CENTRUM SILVER ADULT 50+ PO) Take 1 tablet by mouth daily      polyethylene glycol 400 (BLINK TEARS) 0.25 % SOLN ophthalmic solution Apply 1 drop to eye      simvastatin (ZOCOR) 20 MG tablet TAKE ONE TABLET BY MOUTH AT BEDTIME  90 tablet 1    vitamin B-12 (CYANOCOBALAMIN) 1000 MCG tablet Take 1,000 mcg by mouth      VITAMIN D 1000 UNIT OR CAPS 1 CAPSULE DAILY 3 MONTHS 1 YEAR     No facility-administered encounter medications on file as of 10/2/2023.             O:   NAD, WDWN, Alert & Oriented, Mood & Affect wnl, Vitals stable   Here today alone   General appearance normal   Vitals stable   Alert, oriented and in no acute distress     Proud flesh on scalp over wound with adherent bandage       Eyes: Conjunctivae/lids:Normal     ENT: Lips, buccal mucosa, tongue: normal    MSK:Normal    Cardiovascular: peripheral edema none    Pulm: Breathing Normal    Neuro/Psych: Orientation:Alert and Orientedx3 ; Mood/Affect:normal       A/P:  Proud flesh   Telfa use discussed with patient   Wound care discussed with patient   Keflex  Rx given  Proud flesh shaved down today 1.5cm   TANGENTIAL EXCISION:  After consent, anesthesia with LEC and prep, tangential excision performed.  No complications and routine wound care.    It was a pleasure speaking to Zulema Nixon today.  Return to clinic 2 weeks

## 2023-10-02 NOTE — LETTER
10/2/2023         RE: Zulema Nixon  [address] [address]        Dear Colleague,    Thank you for referring your patient, Zulema Nixon, to the [facility]. Please see a copy of my visit note below.    Zulema Nixon is an extremely pleasant 78 year old year old female patient here today for wound check on scalp.  She notes red spot on scalp.  Patient has no other skin complaints today.  Remainder of the HPI, Meds, PMH, Allergies, FH, and SH was reviewed in chart.      Past Medical History:   Diagnosis Date     Atrial fibrillation (H) 2006    cardioverted 2007, on chronic anticoagulation      Cerebral artery occlusion with cerebral infarction (H)      CKD (chronic kidney disease) stage 2, GFR 60-89 ml/min      with microalbuminuria     Essential hypertension, benign      Hyperlipidemia LDL goal <100 10/31/2010    fish oil = IBS with diarrhea      Morbid obesity (H)      ARMAND (obstructive sleep apnea)     C PAP     Sensorineural hearing loss of both ears     using hearing aids     Status post laparoscopic cholecystectomy     cholecystectomy for cholelithiasis     Supervision of other normal pregnancy      - vaginal, one set of twins     Tortuous colon     detected at colonoscopy  - was recommended for next testing to have CT colonography      Tubal ligation status      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)      Varicose veins of lower extremities with inflammation     excision varicose vein left lower extremity     Venous stasis of lower extremity     excision varicose vein left lower extremity        Past Surgical History:   Procedure Laterality Date     C DEXA INTERPRETATION, AXIAL  2007    T score lumbar 3.7, femoral neck 2.8/2.0(all stable)     C DEXA INTERPRETATION, AXIAL  2010    T score lumbar 3.4 (marked degen changes), femoral neck 2.1/2.1 (sig dec lt femur)     C ORAL SURGERY SINGLE TOOTH  2019     had  to have left upper rear tooth removed secondary to crown breaking /root damage      CARDIAC NUC ESHA STRESS TEST NL  2006    exercised 4 min, no inducible ischemia on ECG or perfusion images     CARPAL TUNNEL RELEASE RT/LT Right 2021    Right carpal tunnel release under local anesthetic, Dr. Orlando Walsh, [facility]     CATARACT IOL, RT/LT Bilateral     first right in 2022, then left in 2022 - [facility] - Dr. Cooper Santacruz     COLONOSCOPY  2006    diverticulosis, repeat 10 years     FLEXIBLE SIGMOIDOSCOPY  10/2000      LAPAROSCOPY, SURGICAL; CHOLECYSTECTOMY      Cholecystectomy, Laparoscopic     LIGATN/STRIP LONG & SHORT SAPHEN      L varicose vein excision     REPAIR PTOSIS BILATERAL  2011     Bilateral upper eyelid blepharoplasty and internal browpexy brow ptosis repair, bilateral lower eyelid ectropion repair with snip punctoplasty     TRANSFUSION, DIRECT, BLOOD      pregnancy related     ZZC CARDIOVERSION, ELECTIVE;INTERN  2007    Successful cardioversion from atrial fibrillation      ZZC ECHO HEART XTHORACIC,COMPLETE, W/O DOPPLER  2006    normal LV/RV size and function, mild pulm ht(30-40mm Hg), mild TR     ZZC ECHO HEART XTHORACIC,COMPLETE, W/O DOPPLER  10/2008    normal LV systolic function with EF 55-60%, impaired LV relaxation, mod to severe LAE     ZZC LIGATE FALLOPIAN TUBE      Tubal ligation        Family History   Problem Relation Age of Onset     Diabetes Mother         type 2     Hypertension Mother      Cardiovascular Mother         pulmonary embolus     Lipids Mother      Heart Disease Mother          atrial fibrillation     Diabetes Father         type 2     Cardiovascular Father         atrial fibrillation,  during an EP procedure     Cancer - colorectal Maternal Grandmother         onset age 88     Heart Disease Maternal Grandmother          age 96     Diabetes Maternal Grandfather         type 2     Diabetes Paternal Grandfather          type 2     Hypertension Sister      Lipids Sister      Lipids Brother      Hypertension Brother      Hypertension Son      Other - See Comments Cousin 68        Myotonic Dystrophy       Social History     Socioeconomic History     Marital status:      Spouse name: Paras     Number of children: 3     Years of education: 12     Highest education level: Not on file   Occupational History     Occupation: clerical     Employer: RETIRED     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     Occupation: volunteers at [employer]   Tobacco Use     Smoking status: Never     Smokeless tobacco: Never   Vaping Use     Vaping Use: Never used   Substance and Sexual Activity     Alcohol use: Yes     Comment: 0-2 per month     Drug use: No     Sexual activity: Yes     Partners: Male     Comment: same partner since 1965, only partner   Other Topics Concern      Service No     Blood Transfusions Yes     Comment: pregnancy     Caffeine Concern No     Comment: 1-2 cups per day     Occupational Exposure No     Hobby Hazards No     Sleep Concern Yes     Comment: needing new equipment for her C pap     Stress Concern No     Weight Concern Yes     Comment: chronic obesity     Special Diet Yes     Comment: decreasing salt     Back Care No     Exercise Yes     Comment: walking 20-30 min 2 times weekly     Bike Helmet No     Comment: not ride     Seat Belt Yes     Self-Exams Yes     Parent/sibling w/ CABG, MI or angioplasty before 65F 55M? Not Asked   Social History Narrative    Lives with  and a dog.  6 grandchildren; previously did  for 3 of them.  Now volunteering in school and [employer] shop.  Children ages 45 and twins age 42.      2012:  diagnosed with low grade prostate cancer- s/p cryotherapy surgery in 2016, then s/p radiation in 2017.         Has a 4 yr old great-granddaughter that lives in [place] - hasn't been able to see her secondary to COVID-19 novel coronavirus pandemic, which started in 3/2020.  ---.2020 -Madina Mercado MD         2020 -  became ill and  of COVID-19 novel coronavirus complications with pneumonia - was never able to come off ventilator at [facility].  Grandchildren now are all grown. Still living in their family home and enjoying it.--Madina Mercado MD                Social Determinants of Health     Financial Resource Strain: Not on file   Food Insecurity: Not on file   Transportation Needs: Not on file   Physical Activity: Not on file   Stress: Not on file   Social Connections: Not on file   Interpersonal Safety: Not on file   Housing Stability: Not on file       Outpatient Encounter Medications as of 10/2/2023   Medication Sig Dispense Refill     alcohol swab prep pads Use to swab area of injection/dilma as directed. 100 each 3     amLODIPine (NORVASC) 5 MG tablet Take 1 tablet (5 mg) by mouth daily 90 tablet 1     apixaban ANTICOAGULANT (ELIQUIS) 5 MG tablet Take 1 tablet (5 mg) by mouth 2 times daily 60 tablet 11     BIOTIN 10 MG OR TABS 1 TABLET DAILY       blood glucose (JAYDE CONTOUR NEXT) test strip Use to test blood sugar 1 times daily or as directed. 100 strip 3     blood glucose calibration (NO BRAND SPECIFIED) solution To accompany: Blood Glucose Monitor Brands: per insurance. 1 each 11     blood glucose monitoring (JAYDE MICROLET) lancets Use to test blood sugar 1 times daily or as directed. 100 each 3     blood glucose monitoring (NO BRAND SPECIFIED) meter device kit Use to test blood sugar 1 times daily or as directed. Preferred blood glucose meter OR supplies to accompany: Blood Glucose Monitor Brands: easiest for patient per insurance. 1 kit 0     CALTRATE 600 + D 600-200 MG-IU OR TABS 1 tablet twice daily 60 11     hydrochlorothiazide (HYDRODIURIL) 25 MG tablet Take 1 tablet (25 mg) by mouth daily 90 tablet 1     METAMUCIL FIBER PO        metFORMIN (GLUCOPHAGE) 1000 MG tablet TAKE 1 AND 1/2 TABLETS BY MOUTH  WITH BREAKFAST AND TAKE 1 TABLET BY MOUTH WITH SUPPER 225 tablet 1     Multiple Vitamins-Minerals (CENTRUM SILVER ADULT 50+ PO) Take 1 tablet by mouth daily       polyethylene glycol 400 (BLINK TEARS) 0.25 % SOLN ophthalmic solution Apply 1 drop to eye       simvastatin (ZOCOR) 20 MG tablet TAKE ONE TABLET BY MOUTH AT BEDTIME 90 tablet 1     vitamin B-12 (CYANOCOBALAMIN) 1000 MCG tablet Take 1,000 mcg by mouth       VITAMIN D 1000 UNIT OR CAPS 1 CAPSULE DAILY 3 MONTHS 1 YEAR     No facility-administered encounter medications on file as of 10/2/2023.             O:   NAD, WDWN, Alert & Oriented, Mood & Affect wnl, Vitals stable   Here today alone   General appearance normal   Vitals stable   Alert, oriented and in no acute distress     Proud flesh on scalp over wound with adherent bandage       Eyes: Conjunctivae/lids:Normal     ENT: Lips, buccal mucosa, tongue: normal    MSK:Normal    Cardiovascular: peripheral edema none    Pulm: Breathing Normal    Neuro/Psych: Orientation:Alert and Orientedx3 ; Mood/Affect:normal       A/P:  Proud flesh   Telfa use discussed with patient   Wound care discussed with patient   Keflex  Rx given  Proud flesh shaved down today 1.5cm   TANGENTIAL EXCISION:  After consent, anesthesia with LEC and prep, tangential excision performed.  No complications and routine wound care.    It was a pleasure speaking to Zulema Nixon today.  Return to clinic 2 weeks      Again, thank you for allowing me to participate in the care of your patient.        Sincerely,        Braulio George MD

## 2023-10-16 ENCOUNTER — OFFICE VISIT (OUTPATIENT)
Dept: DERMATOLOGY | Facility: CLINIC | Age: 78
End: 2023-10-16
Payer: MEDICARE

## 2023-10-16 DIAGNOSIS — Z85.828 HISTORY OF SKIN CANCER: Primary | ICD-10-CM

## 2023-10-16 PROCEDURE — 99213 OFFICE O/P EST LOW 20 MIN: CPT | Performed by: DERMATOLOGY

## 2023-10-16 NOTE — LETTER
10/16/2023         RE: Zulema Nixon  [address] [facility] [id number]        Dear Colleague,    Thank you for referring your patient, Zulema Nixon, to the [facility]. Please see a copy of my visit note below.    Zulema Nixon is an extremely pleasant 78 year old year old female patient here today for wound check scalp.   Patient has no other skin complaints today.  Remainder of the HPI, Meds, PMH, Allergies, FH, and SH was reviewed in chart.      Past Medical History:   Diagnosis Date     Atrial fibrillation (H) 2006    cardioverted 2007, on chronic anticoagulation      Cerebral artery occlusion with cerebral infarction (H)      CKD (chronic kidney disease) stage 2, GFR 60-89 ml/min      with microalbuminuria     Essential hypertension, benign      Hyperlipidemia LDL goal <100 10/31/2010    fish oil = IBS with diarrhea      Morbid obesity (H)      ARMAND (obstructive sleep apnea)     C PAP     Sensorineural hearing loss of both ears     using hearing aids     Status post laparoscopic cholecystectomy     cholecystectomy for cholelithiasis     Supervision of other normal pregnancy      - vaginal, one set of twins     Tortuous colon     detected at colonoscopy  - was recommended for next testing to have CT colonography      Tubal ligation status      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)      Varicose veins of lower extremities with inflammation     excision varicose vein left lower extremity     Venous stasis of lower extremity     excision varicose vein left lower extremity        Past Surgical History:   Procedure Laterality Date     C DEXA INTERPRETATION, AXIAL  2007    T score lumbar 3.7, femoral neck 2.8/2.0(all stable)     C DEXA INTERPRETATION, AXIAL  2010    T score lumbar 3.4 (marked degen changes), femoral neck 2.1/2.1 (sig dec lt femur)     C ORAL SURGERY SINGLE TOOTH  2019     had to have left upper rear tooth  removed secondary to crown breaking /root damage      CARDIAC NUC ESHA STRESS TEST NL  2006    exercised 4 min, no inducible ischemia on ECG or perfusion images     CARPAL TUNNEL RELEASE RT/LT Right 2021    Right carpal tunnel release under local anesthetic, Dr. Orlando Walsh, [facility]     CATARACT IOL, RT/LT Bilateral     first right in 2022, then left in 2022 - Jan[facility] - Dr. Cooper Santacruz     COLONOSCOPY  2006    diverticulosis, repeat 10 years     FLEXIBLE SIGMOIDOSCOPY  10/2000      LAPAROSCOPY, SURGICAL; CHOLECYSTECTOMY      Cholecystectomy, Laparoscopic     LIGATN/STRIP LONG & SHORT SAPHEN      L varicose vein excision     REPAIR PTOSIS BILATERAL  2011     Bilateral upper eyelid blepharoplasty and internal browpexy brow ptosis repair, bilateral lower eyelid ectropion repair with snip punctoplasty     TRANSFUSION, DIRECT, BLOOD      pregnancy related     ZZC CARDIOVERSION, ELECTIVE;INTERN  2007    Successful cardioversion from atrial fibrillation      ZZC ECHO HEART XTHORACIC,COMPLETE, W/O DOPPLER  2006    normal LV/RV size and function, mild pulm ht(30-40mm Hg), mild TR     ZZC ECHO HEART XTHORACIC,COMPLETE, W/O DOPPLER  10/2008    normal LV systolic function with EF 55-60%, impaired LV relaxation, mod to severe LAE     ZZC LIGATE FALLOPIAN TUBE      Tubal ligation        Family History   Problem Relation Age of Onset     Diabetes Mother         type 2     Hypertension Mother      Cardiovascular Mother         pulmonary embolus     Lipids Mother      Heart Disease Mother          atrial fibrillation     Diabetes Father         type 2     Cardiovascular Father         atrial fibrillation,  during an EP procedure     Cancer - colorectal Maternal Grandmother         onset age 88     Heart Disease Maternal Grandmother          age 96     Diabetes Maternal Grandfather         type 2     Diabetes Paternal Grandfather         type 2     Hypertension  Sister      Lipids Sister      Lipids Brother      Hypertension Brother      Hypertension Son      Other - See Comments Cousin 68        Myotonic Dystrophy       Social History     Socioeconomic History     Marital status:      Spouse name: Paras     Number of children: 3     Years of education: 12     Highest education level: Not on file   Occupational History     Occupation: clerical     Employer: RETIRED     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     Occupation: volunteers at [email or web address]   Tobacco Use     Smoking status: Never     Smokeless tobacco: Never   Vaping Use     Vaping Use: Never used   Substance and Sexual Activity     Alcohol use: Yes     Comment: 0-2 per month     Drug use: No     Sexual activity: Yes     Partners: Male     Comment: same partner since 1965, only partner   Other Topics Concern      Service No     Blood Transfusions Yes     Comment: pregnancy     Caffeine Concern No     Comment: 1-2 cups per day     Occupational Exposure No     Hobby Hazards No     Sleep Concern Yes     Comment: needing new equipment for her C pap     Stress Concern No     Weight Concern Yes     Comment: chronic obesity     Special Diet Yes     Comment: decreasing salt     Back Care No     Exercise Yes     Comment: walking 20-30 min 2 times weekly     Bike Helmet No     Comment: not ride     Seat Belt Yes     Self-Exams Yes     Parent/sibling w/ CABG, MI or angioplasty before 65F 55M? Not Asked   Social History Narrative    Lives with  and a dog.  6 grandchildren; previously did  for 3 of them.  Now volunteering in school and [employer] shop.  Children ages 45 and twins age 42.      2012:  diagnosed with low grade prostate cancer- s/p cryotherapy surgery in 2016, then s/p radiation in 2017.         Has a 4 yr old great-granddaughter that lives in [place] - hasn't been able to see her secondary to COVID-19 novel coronavirus pandemic, which started in 3/2020. ---.November 4, 2020  -Madina Mercado MD         2020 -  became ill and  of COVID-19 novel coronavirus complications with pneumonia - was never able to come off ventilator at [facility].  Grandchildren now are all grown. Still living in their family home and enjoying it.--Madina Mercado MD                Social Determinants of Health     Financial Resource Strain: Not on file   Food Insecurity: Not on file   Transportation Needs: Not on file   Physical Activity: Not on file   Stress: Not on file   Social Connections: Not on file   Interpersonal Safety: Not on file   Housing Stability: Not on file       Outpatient Encounter Medications as of 10/16/2023   Medication Sig Dispense Refill     alcohol swab prep pads Use to swab area of injection/dilma as directed. 100 each 3     amLODIPine (NORVASC) 5 MG tablet Take 1 tablet (5 mg) by mouth daily 90 tablet 1     apixaban ANTICOAGULANT (ELIQUIS) 5 MG tablet Take 1 tablet (5 mg) by mouth 2 times daily 60 tablet 11     BIOTIN 10 MG OR TABS 1 TABLET DAILY       blood glucose (JAYDE CONTOUR NEXT) test strip Use to test blood sugar 1 times daily or as directed. 100 strip 3     blood glucose calibration (NO BRAND SPECIFIED) solution To accompany: Blood Glucose Monitor Brands: per insurance. 1 each 11     blood glucose monitoring (JAYDE MICROLET) lancets Use to test blood sugar 1 times daily or as directed. 100 each 3     blood glucose monitoring (NO BRAND SPECIFIED) meter device kit Use to test blood sugar 1 times daily or as directed. Preferred blood glucose meter OR supplies to accompany: Blood Glucose Monitor Brands: easiest for patient per insurance. 1 kit 0     CALTRATE 600 + D 600-200 MG-IU OR TABS 1 tablet twice daily 60 11     cephALEXin (KEFLEX) 500 MG capsule Take 1 capsule (500 mg) by mouth 2 times daily 10 capsule 0     hydrochlorothiazide (HYDRODIURIL) 25 MG tablet Take 1 tablet (25 mg) by mouth daily 90 tablet 1     METAMUCIL FIBER PO         metFORMIN (GLUCOPHAGE) 1000 MG tablet TAKE 1 AND 1/2 TABLETS BY MOUTH WITH BREAKFAST AND TAKE 1 TABLET BY MOUTH WITH SUPPER 225 tablet 1     Multiple Vitamins-Minerals (CENTRUM SILVER ADULT 50+ PO) Take 1 tablet by mouth daily       polyethylene glycol 400 (BLINK TEARS) 0.25 % SOLN ophthalmic solution Apply 1 drop to eye       simvastatin (ZOCOR) 20 MG tablet TAKE ONE TABLET BY MOUTH AT BEDTIME 90 tablet 1     vitamin B-12 (CYANOCOBALAMIN) 1000 MCG tablet Take 1,000 mcg by mouth       VITAMIN D 1000 UNIT OR CAPS 1 CAPSULE DAILY 3 MONTHS 1 YEAR     No facility-administered encounter medications on file as of 10/16/2023.             O:   NAD, WDWN, Alert & Oriented, Mood & Affect wnl, Vitals stable   General appearance normal   Vitals stable   Alert, oriented and in no acute distress     Vertex scalp mostly granulated with 4mm area of bone exposed      Eyes: Conjunctivae/lids:Normal     ENT: Lips, buccal mucosa, tongue: normal    MSK:Normal    Cardiovascular: peripheral edema none    Pulm: Breathing Normal    Neuro/Psych: Orientation:Alert and Orientedx3 ; Mood/Affect:normal       A/P:  1/ scalp non-melanoma skin cancer healing well  Cont wound care once wound get slightly smaller consider guiding sutures discussed with patient   Return to clinic 4 weeks  It was a pleasure speaking to Zulema Nixon today.  Previous clinic notes and pertinent laboratory tests were reviewed prior to Zulema Nixon's visit.      Again, thank you for allowing me to participate in the care of your patient.        Sincerely,        Braulio George MD

## 2023-10-16 NOTE — PROGRESS NOTES
Zulema Nixon is an extremely pleasant 78 year old year old female patient here today for wound check scalp.   Patient has no other skin complaints today.  Remainder of the HPI, Meds, PMH, Allergies, FH, and SH was reviewed in chart.      Past Medical History:   Diagnosis Date    Atrial fibrillation (H) 2006    cardioverted 2007, on chronic anticoagulation     Cerebral artery occlusion with cerebral infarction (H)     CKD (chronic kidney disease) stage 2, GFR 60-89 ml/min      with microalbuminuria    Essential hypertension, benign     Hyperlipidemia LDL goal <100 10/31/2010    fish oil = IBS with diarrhea     Morbid obesity (H)     ARMAND (obstructive sleep apnea)     C PAP    Sensorineural hearing loss of both ears     using hearing aids    Status post laparoscopic cholecystectomy     cholecystectomy for cholelithiasis    Supervision of other normal pregnancy      - vaginal, one set of twins    Tortuous colon     detected at colonoscopy  - was recommended for next testing to have CT colonography     Tubal ligation status     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)     Varicose veins of lower extremities with inflammation     excision varicose vein left lower extremity    Venous stasis of lower extremity     excision varicose vein left lower extremity        Past Surgical History:   Procedure Laterality Date    C DEXA INTERPRETATION, AXIAL  2007    T score lumbar 3.7, femoral neck 2.8/2.0(all stable)    C DEXA INTERPRETATION, AXIAL  2010    T score lumbar 3.4 (marked degen changes), femoral neck 2.1/2.1 (sig dec lt femur)    C ORAL SURGERY SINGLE TOOTH  2019     had to have left upper rear tooth removed secondary to crown breaking /root damage     CARDIAC NUC ESHA STRESS TEST NL  2006    exercised 4 min, no inducible ischemia on ECG or perfusion images    CARPAL TUNNEL RELEASE RT/LT Right 2021    Right carpal tunnel release under local anesthetic,   Orlando Walsh, [facility]    CATARACT IOL, RT/LT Bilateral     first right in 2022, then left in 2022 - [facility] - Dr. Cooper Santacruz    COLONOSCOPY  2006    diverticulosis, repeat 10 years    FLEXIBLE SIGMOIDOSCOPY  10/2000     LAPAROSCOPY, SURGICAL; CHOLECYSTECTOMY      Cholecystectomy, Laparoscopic    LIGATN/STRIP LONG & SHORT SAPHEN      L varicose vein excision    REPAIR PTOSIS BILATERAL  2011     Bilateral upper eyelid blepharoplasty and internal browpexy brow ptosis repair, bilateral lower eyelid ectropion repair with snip punctoplasty    TRANSFUSION, DIRECT, BLOOD      pregnancy related    ZZC CARDIOVERSION, ELECTIVE;INTERN  2007    Successful cardioversion from atrial fibrillation     ZZC ECHO HEART XTHORACIC,COMPLETE, W/O DOPPLER  2006    normal LV/RV size and function, mild pulm ht(30-40mm Hg), mild TR    ZZC ECHO HEART XTHORACIC,COMPLETE, W/O DOPPLER  10/2008    normal LV systolic function with EF 55-60%, impaired LV relaxation, mod to severe LAE    ZZC LIGATE FALLOPIAN TUBE      Tubal ligation        Family History   Problem Relation Age of Onset    Diabetes Mother         type 2    Hypertension Mother     Cardiovascular Mother         pulmonary embolus    Lipids Mother     Heart Disease Mother          atrial fibrillation    Diabetes Father         type 2    Cardiovascular Father         atrial fibrillation,  during an EP procedure    Cancer - colorectal Maternal Grandmother         onset age 88    Heart Disease Maternal Grandmother          age 96    Diabetes Maternal Grandfather         type 2    Diabetes Paternal Grandfather         type 2    Hypertension Sister     Lipids Sister     Lipids Brother     Hypertension Brother     Hypertension Son     Other - See Comments Cousin 68        Myotonic Dystrophy       Social History     Socioeconomic History    Marital status:      Spouse name: Paras    Number of children: 3    Years of education: 12     Highest education level: Not on file   Occupational History    Occupation: clerical     Employer: RETIRED    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    Occupation: volunteers at [employer]   Tobacco Use    Smoking status: Never    Smokeless tobacco: Never   Vaping Use    Vaping Use: Never used   Substance and Sexual Activity    Alcohol use: Yes     Comment: 0-2 per month    Drug use: No    Sexual activity: Yes     Partners: Male     Comment: same partner since 1965, only partner   Other Topics Concern     Service No    Blood Transfusions Yes     Comment: pregnancy    Caffeine Concern No     Comment: 1-2 cups per day    Occupational Exposure No    Hobby Hazards No    Sleep Concern Yes     Comment: needing new equipment for her C pap    Stress Concern No    Weight Concern Yes     Comment: chronic obesity    Special Diet Yes     Comment: decreasing salt    Back Care No    Exercise Yes     Comment: walking 20-30 min 2 times weekly    Bike Helmet No     Comment: not ride    Seat Belt Yes    Self-Exams Yes    Parent/sibling w/ CABG, MI or angioplasty before 65F 55M? Not Asked   Social History Narrative    Lives with  and a dog.  6 grandchildren; previously did  for 3 of them.  Now volunteering in school and [employer] shop.  Children ages 45 and twins age 42.      2012:  diagnosed with low grade prostate cancer- s/p cryotherapy surgery in 2016, then s/p radiation in 2017.         Has a 4 yr old great-granddaughter that lives in [place] - hasn't been able to see her secondary to COVID-19 novel coronavirus pandemic, which started in 3/2020. ---.2020 -Madina Mercado MD         2020 -  became ill and  of COVID-19 novel coronavirus complications with pneumonia - was never able to come off ventilator at [facility].  Grandchildren now are all grown. Still living in their family home and enjoying it.--Madina Mercado MD                 Social Determinants of Health     Financial Resource Strain: Not on file   Food Insecurity: Not on file   Transportation Needs: Not on file   Physical Activity: Not on file   Stress: Not on file   Social Connections: Not on file   Interpersonal Safety: Not on file   Housing Stability: Not on file       Outpatient Encounter Medications as of 10/16/2023   Medication Sig Dispense Refill    alcohol swab prep pads Use to swab area of injection/dilma as directed. 100 each 3    amLODIPine (NORVASC) 5 MG tablet Take 1 tablet (5 mg) by mouth daily 90 tablet 1    apixaban ANTICOAGULANT (ELIQUIS) 5 MG tablet Take 1 tablet (5 mg) by mouth 2 times daily 60 tablet 11    BIOTIN 10 MG OR TABS 1 TABLET DAILY      blood glucose (JAYDE CONTOUR NEXT) test strip Use to test blood sugar 1 times daily or as directed. 100 strip 3    blood glucose calibration (NO BRAND SPECIFIED) solution To accompany: Blood Glucose Monitor Brands: per insurance. 1 each 11    blood glucose monitoring (JAYDE MICROLET) lancets Use to test blood sugar 1 times daily or as directed. 100 each 3    blood glucose monitoring (NO BRAND SPECIFIED) meter device kit Use to test blood sugar 1 times daily or as directed. Preferred blood glucose meter OR supplies to accompany: Blood Glucose Monitor Brands: easiest for patient per insurance. 1 kit 0    CALTRATE 600 + D 600-200 MG-IU OR TABS 1 tablet twice daily 60 11    cephALEXin (KEFLEX) 500 MG capsule Take 1 capsule (500 mg) by mouth 2 times daily 10 capsule 0    hydrochlorothiazide (HYDRODIURIL) 25 MG tablet Take 1 tablet (25 mg) by mouth daily 90 tablet 1    METAMUCIL FIBER PO       metFORMIN (GLUCOPHAGE) 1000 MG tablet TAKE 1 AND 1/2 TABLETS BY MOUTH WITH BREAKFAST AND TAKE 1 TABLET BY MOUTH WITH SUPPER 225 tablet 1    Multiple Vitamins-Minerals (CENTRUM SILVER ADULT 50+ PO) Take 1 tablet by mouth daily      polyethylene glycol 400 (BLINK TEARS) 0.25 % SOLN ophthalmic solution Apply 1 drop to eye      simvastatin  (ZOCOR) 20 MG tablet TAKE ONE TABLET BY MOUTH AT BEDTIME 90 tablet 1    vitamin B-12 (CYANOCOBALAMIN) 1000 MCG tablet Take 1,000 mcg by mouth      VITAMIN D 1000 UNIT OR CAPS 1 CAPSULE DAILY 3 MONTHS 1 YEAR     No facility-administered encounter medications on file as of 10/16/2023.             O:   NAD, WDWN, Alert & Oriented, Mood & Affect wnl, Vitals stable   General appearance normal   Vitals stable   Alert, oriented and in no acute distress     Vertex scalp mostly granulated with 4mm area of bone exposed      Eyes: Conjunctivae/lids:Normal     ENT: Lips, buccal mucosa, tongue: normal    MSK:Normal    Cardiovascular: peripheral edema none    Pulm: Breathing Normal    Neuro/Psych: Orientation:Alert and Orientedx3 ; Mood/Affect:normal       A/P:  1/ scalp non-melanoma skin cancer healing well  Cont wound care once wound get slightly smaller consider guiding sutures discussed with patient   Return to clinic 4 weeks  It was a pleasure speaking to Zulema Nixon today.  Previous clinic notes and pertinent laboratory tests were reviewed prior to Zulema Nixon's visit.

## 2023-10-24 NOTE — TELEPHONE ENCOUNTER
Prescription approved per [facility] Refill Protocol.    Torie Perales, PURVI, RN, PHN  [facility]       You can access the FollowMyHealth Patient Portal offered by [facility] by registering at the following website: [email or web address]. By joining [employer]’s FollowMyHealth portal, you will also be able to view your health information using other applications (apps) compatible with our system.

## 2023-11-14 ENCOUNTER — TELEPHONE (OUTPATIENT)
Dept: DERMATOLOGY | Facility: CLINIC | Age: 78
End: 2023-11-14

## 2023-11-14 NOTE — TELEPHONE ENCOUNTER
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

## 2023-11-15 NOTE — TELEPHONE ENCOUNTER
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

## 2023-11-15 NOTE — TELEPHONE ENCOUNTER
Patient had Mohs on scalp BCC on 7/24/23.     Tried calling patient's daughter, Rosibel, to get more information and update on Zulema.  No answer. Left message for her to call back. I did inform her that I would pass this message along to Dr. George.    Madina Castañeda, RN on 11/15/2023 at 11:06 AM

## 2023-11-15 NOTE — TELEPHONE ENCOUNTER
I called daughter and discussed with her.     Patient was admitted for possible infection and possible clot    It sound like they are debriding wound today, she will update us

## 2023-11-25 ENCOUNTER — HEALTH MAINTENANCE LETTER (OUTPATIENT)
Age: 78
End: 2023-11-25

## 2023-12-18 ENCOUNTER — PATIENT OUTREACH (OUTPATIENT)
Dept: CARE COORDINATION | Facility: CLINIC | Age: 78
End: 2023-12-18
Payer: MEDICARE

## 2023-12-29 ENCOUNTER — TELEPHONE (OUTPATIENT)
Dept: FAMILY MEDICINE | Facility: CLINIC | Age: 78
End: 2023-12-29
Payer: MEDICARE

## 2023-12-29 NOTE — TELEPHONE ENCOUNTER
Home Care is calling regarding an established patient with [facility].       Requesting orders from: Madina Mercado  Provider is following patient: Yes  Is this a 60-day recertification request?  No    Orders Requested    Skilled Nursing  Request for initial certification (first set of orders)   Frequency:  Delay in start of care until 12/31/2023.     Information was gathered and will be sent to provider for review.  RN will contact Home Care with information after provider review.  Confirmed ok to leave a detailed message with call back.  Contact information confirmed and updated as needed.    Lillian Stewart, RN      Call back Kourtney with [employer] at [phone]

## 2023-12-31 ENCOUNTER — MEDICAL CORRESPONDENCE (OUTPATIENT)
Dept: HEALTH INFORMATION MANAGEMENT | Facility: CLINIC | Age: 78
End: 2023-12-31

## 2024-01-03 ENCOUNTER — TELEPHONE (OUTPATIENT)
Dept: FAMILY MEDICINE | Facility: CLINIC | Age: 79
End: 2024-01-03
Payer: MEDICARE

## 2024-01-03 NOTE — TELEPHONE ENCOUNTER
Form signed in PCP absence and placed in TC south in basket.         Healthy regards,            Doris Clements, SMITH-BC (covering for Dr. Mercado)

## 2024-01-03 NOTE — TELEPHONE ENCOUNTER
Called and spoke with Cyndi from [facility] to relay provider okay to verbal orders. No further action needed.    Thank you,  Julián Stewart, Triage RN [facility]  8:31 AM 1/3/2024

## 2024-01-03 NOTE — TELEPHONE ENCOUNTER
Home Health Care      Reason for call:  [facility] - Order #[id number] - Okay for delay  RN Soc until 12/31/23 - Discharge Date 12/27/23    Pt Provider: Dr. Mercado    Phone Number Homecare Nurse can be reached at: Fax       Could we send this information to you in [employer] or would you prefer to receive a phone call?:   na    Put in providers in box    Cindi K

## 2024-01-04 NOTE — TELEPHONE ENCOUNTER
Faxed signed copy to [employer] [facility] #[phone]    Order #[id number] -    Copied into HIMS // Filed in [facility] // Cindi HAWKINS

## 2024-01-09 DIAGNOSIS — E11.42 TYPE 2 DIABETES MELLITUS WITH DIABETIC POLYNEUROPATHY, WITHOUT LONG-TERM CURRENT USE OF INSULIN (H): ICD-10-CM

## 2024-01-09 NOTE — TELEPHONE ENCOUNTER
Medication Question or Refill        What medication are you calling about (include dose and sig)?:     Pt needs a rx out of the hospital (metformin) needs to be ordered.    Preferred Pharmacy:     [id number] [address] [address] - [address]  [address]  [facility] [address]  Phone: [phone] Fax: [phone]      Controlled Substance Agreement on file:   CSA -- Patient Level:    CSA: None found at the patient level.       Who prescribed the medication?: Dr. Mercado     Do you need a refill? Yes    When did you use the medication last? na    Patient offered an appointment? No    Do you have any questions or concerns?  No      Could we send this information to you in [facility] or would you prefer to receive a phone call?:   Patient would prefer a phone call   Okay to leave a detailed message?: Yes at Cell number on file:    Telephone Information:   Mobile [phone]     Cindi HAWKINS

## 2024-01-12 ENCOUNTER — TELEPHONE (OUTPATIENT)
Dept: FAMILY MEDICINE | Facility: CLINIC | Age: 79
End: 2024-01-12
Payer: MEDICARE

## 2024-01-12 NOTE — TELEPHONE ENCOUNTER
Home Health Care      Reason for call:  [facility] - Order #[id number] - health care  plan -  visits  to disease mgmt care  needs    Order are needed for this patient.  SN: [id number]    Pt Provider:  Dr. Mercado    Phone Number Homecare Nurse can be reached at: [phone]      Could we send this information to you in [employer] or would you prefer to receive a phone call?:   na    Put in providers In basket     Cindi K

## 2024-01-12 NOTE — TELEPHONE ENCOUNTER
Faxed signed forms to [employer] [facility] #[phone]    Order #[id number]    Cert Period: 12/31/23 - 2/28/24    Copied to HIMS // Filed to South / Cindi HAWKINS

## 2024-01-15 ENCOUNTER — PATIENT OUTREACH (OUTPATIENT)
Dept: CARE COORDINATION | Facility: CLINIC | Age: 79
End: 2024-01-15
Payer: MEDICARE

## 2024-01-25 DIAGNOSIS — E11.42 TYPE 2 DIABETES MELLITUS WITH DIABETIC POLYNEUROPATHY, WITHOUT LONG-TERM CURRENT USE OF INSULIN (H): ICD-10-CM

## 2024-01-25 NOTE — TELEPHONE ENCOUNTER
Patient calling stating she had an appointment with primary care provider in November that she had to cancel due to being in the hospital.  She stated her daughter called to cancel the appointment.  Patient was told she was a no show for this appointment.     Patient was transferred to TCU after the hospitalization in November 2023 and the provider changed her Metformin to 1 (500) mg tablet BID    Patient stated her blood sugar readings are good on this dose.  Patient stated her fasting sugar readings are in the upper 110's- 120's.    Patient just picked up the medication and is not in need of a refill at this time and just wants this updated in her chart.  Please sign the order(no print out) to update the prescription in chart.

## 2024-01-27 NOTE — TELEPHONE ENCOUNTER
Lab Results   Component Value Date    A1C 5.6 05/09/2023    A1C 5.9 05/06/2022    A1C 6.2 11/05/2021    A1C 6.2 05/03/2021     Noted pt in hospital at time of 11/2023 visit appt. Changed on pt's appt notes and schedule.   Future Appointments 1/27/2024 - 7/25/2024        Date Visit Type Length Department Provider     4/22/2024 11:15 AM RETURN CARDIOLOGY 30 min [facility] CARE Edis Walsh MD    Location Instructions:     Our clinic is located in the [facility] at [address], [address], [place] [address].&nbsp; For your convenience we have parking in both our ramp and outside lot.&nbsp; Please enter off of [address] for easiest access.                    Please make a 40 minute (long) office visit to see me in the next month, if possible.  Ok to take have our schedulers take any 2-20 minute slots together.

## 2024-02-06 ENCOUNTER — TELEPHONE (OUTPATIENT)
Dept: FAMILY MEDICINE | Facility: CLINIC | Age: 79
End: 2024-02-06
Payer: MEDICARE

## 2024-02-06 NOTE — TELEPHONE ENCOUNTER
FYI - Status Update    Who is Calling: [email or web address] - Order  #[id number] - Discharge  Date: 12/27/23    Update: above    Does caller want a call/response back: na     Fax     Put in providers in box    Cindi K

## 2024-02-06 NOTE — TELEPHONE ENCOUNTER
FYI - Status Update    Who is Calling: [facility] - Order #[id number] - order    Update: [facility] Until 12/31/23 - physician order    Does caller want a call/response back: molly    Put in providers in box

## 2024-02-07 ENCOUNTER — MEDICAL CORRESPONDENCE (OUTPATIENT)
Dept: HEALTH INFORMATION MANAGEMENT | Facility: CLINIC | Age: 79
End: 2024-02-07
Payer: MEDICARE

## 2024-02-07 NOTE — TELEPHONE ENCOUNTER
Faxed signed  forms to [employer]  #    Order  #[id number] - Admit Date: 11/21/23 - 12/27/23    Copied into HIMS / Filed In South / Cindi HAWKINS

## 2024-02-07 NOTE — TELEPHONE ENCOUNTER
Faxed signed forms to [employer]  #    Order #[id number] - Admit date: 11/21/23 -12/27/23    Copied in HIMS / Filed In South / Cindi HAWKINS

## 2024-04-09 ENCOUNTER — PATIENT OUTREACH (OUTPATIENT)
Dept: CARE COORDINATION | Facility: CLINIC | Age: 79
End: 2024-04-09
Payer: MEDICARE

## 2024-04-13 ENCOUNTER — HEALTH MAINTENANCE LETTER (OUTPATIENT)
Age: 79
End: 2024-04-13

## 2024-04-23 ENCOUNTER — PATIENT OUTREACH (OUTPATIENT)
Dept: CARE COORDINATION | Facility: CLINIC | Age: 79
End: 2024-04-23
Payer: MEDICARE

## 2024-06-22 ENCOUNTER — HEALTH MAINTENANCE LETTER (OUTPATIENT)
Age: 79
End: 2024-06-22

## 2024-07-01 ENCOUNTER — OFFICE VISIT (OUTPATIENT)
Dept: CARDIOLOGY | Facility: CLINIC | Age: 79
End: 2024-07-01
Payer: MEDICARE

## 2024-07-01 VITALS
WEIGHT: 191 LBS | HEART RATE: 81 BPM | BODY MASS INDEX: 33.84 KG/M2 | DIASTOLIC BLOOD PRESSURE: 74 MMHG | SYSTOLIC BLOOD PRESSURE: 136 MMHG | HEIGHT: 63 IN

## 2024-07-01 DIAGNOSIS — I48.21 PERMANENT ATRIAL FIBRILLATION (H): ICD-10-CM

## 2024-07-01 DIAGNOSIS — E78.5 HYPERLIPIDEMIA LDL GOAL <100: Primary | ICD-10-CM

## 2024-07-01 PROCEDURE — 99214 OFFICE O/P EST MOD 30 MIN: CPT | Performed by: NURSE PRACTITIONER

## 2024-07-01 RX ORDER — UREA 10 %
45 LOTION (ML) TOPICAL DAILY
COMMUNITY

## 2024-07-01 NOTE — LETTER
7/1/2024    Madina Mercado MD  [address] [address]    RE: Zulema Nixon       Dear Colleague,     I had the pleasure of seeing Zulema Nixon in the [facility].    Cardiology Clinic Progress Note  Zulema Nixon MRN# [id number]   YOB: 1945 Age: 78 year old   Primary Cardiologist: Dr. Walsh  Reason for visit: Annual follow up             Assessment and Plan:       1.  Chronic atrial fibrillation, [id number] (female+, age ++, CVA ++, HTN +, DM +)   -On eliquis for thromboembolic prophylaxis  -Rates controlled without AV dung blockade     2. TIA in the setting of subtherapeutic INR on warfarin      Recent hospitalization for possible recurrent TIA      Hemorrhagic CVA   -Maintained on eliquis and on statin therapy     3.  Hypertension, controlled    4. Type II diabetes, controlled  -6/2024 HgbA1c 6.6%    Plan:  Continue simvastatin 20mg daily   FLP/ALT in the next month   Continue amlodipine and hydrochlorothiazide  Continue eliquis 5mg BID for thromboembolic prophylaxis    Follow up plan: Follow up with Dr. Walsh in 1 year or sooner if needed         History of Presenting Illness:    Zulema Nixon is a very pleasant 78 year old female with a history of chronic atrial fibrillation, TIA while on warfarin, type 2 diabetes, hypertension, obstructive sleep apnea.     She had TIA symptoms in 2022 of left sided weakness and slurred speech. MRI was negative for infarct. She was shown to have a stable meningioma. Her INR on warfarin at the time was sub-therapuetic and she was switched to xarelto.     She was last seen by Dr. Walsh in November 2022 and was doing well from a cardiac standpoint.    In May of 2023 she had a hemorrhagic CVA and was hospitalized at [facility].  Head CT showed a 2.5 cm parenchymal hemorrhage within the right posterior temporal lobe with mild edema as well as a stable calcified meningioma.  In July she had a Mohs procedure for  skin cancer on her scalp and had complications of cranial osteomyelitis which ultimately required repeat cranial surgery and outpatient IV antibiotic therapy. A transthoracic echocardiogram in November 2023 which showed normal left ventricular function with an ejection fraction of 45 to 60%, no regional wall motion abnormalities normal right ventricular size and function, severe biatrial enlargement, and mild tricuspid valve regurgitation.    She was hospitalized in March 2024 with a brief episode of acute confusion at [facility].  By the time she arrived at the emergency room she was no longer having any symptoms.  Her symptoms felt to be secondary from stress versus TIA. Brain MRI did not show any evidence of intracranial hemorrhage or acute CVA.  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      Patient is here today for an annual follow up. Patient reports feeling good.  She is lost about 100 pounds over the last 2 years.    Patient denies chest pain or chest tightness. Denies dizziness, lightheadedness or other presyncopal symptoms. Denies tachycardia or palpitations.  Denies shortness of breath, orthopnea, or PND.  She has chronic bilateral ankle edema which she manages with compression stockings.     Blood pressure 146/80 and HR 81 in clinic today. Repeat with manual cuff 136/74.        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    Occupation: volunteers at [employer]   Tobacco Use    Smoking status: Never    Smokeless tobacco: Never   Vaping Use    Vaping status: Never Used   Substance and Sexual Activity    Alcohol use: Yes     Comment: 0-2 per  month    Drug use: No    Sexual activity: Yes     Partners: Male     Comment: same partner since 1965, only partner   Other Topics Concern     Service No    Blood Transfusions Yes     Comment: pregnancy    Caffeine Concern No     Comment: 1-2 cups per day    Occupational Exposure No    Hobby Hazards No    Sleep Concern Yes     Comment: needing new equipment for her C pap    Stress Concern No    Weight Concern Yes     Comment: chronic obesity    Special Diet Yes     Comment: decreasing salt    Back Care No    Exercise Yes     Comment: walking 20-30 min 2 times weekly    Bike Helmet No     Comment: not ride    Seat Belt Yes    Self-Exams Yes    Parent/sibling w/ CABG, MI or angioplasty before 65F 55M? Not Asked   Social History Narrative    Lives with  and a dog.  6 grandchildren; previously did  for 3 of them.  Now volunteering in school and [employer].  Children ages 45 and twins age 42.      2012:  diagnosed with low grade prostate cancer- s/p cryotherapy surgery in 2016, then s/p radiation in 2017.         Has a 4 yr old great-granddaughter that lives in [place] - hasn't been able to see her secondary to COVID-19 novel coronavirus pandemic, which started in 3/2020. ---.2020 -Madina Mercado MD         2020 -  became ill and  of COVID-19 novel coronavirus complications with pneumonia - was never able to come off ventilator at [facility].  Grandchildren now are all grown. Still living in their family home and enjoying it.--Madina Mercado MD                Social Determinants of Health     Financial Resource Strain: Low Risk  (2023)    [facility]    Financial Resource Strain     Difficulty of Paying Living Expenses: 3     Difficulty of Paying Living Expenses: Not on file   Food Insecurity: No Food Insecurity  "(11/14/2023)    Received from [facility], [facility]    Food Insecurity     Worried About Running Out of Food in the Last Year: 1   Transportation Needs: No Transportation Needs (11/14/2023)    Received from [facility], [facility]    Transportation Needs     Lack of Transportation (Medical): 1   Physical Activity: Not on file   Stress: Not on file   Social Connections: Socially Integrated (11/14/2023)    Received from [facility], [facility]    Social Connections     Frequency of Communication with Friends and Family: 0   Interpersonal Safety: Not on file   Housing Stability: Low Risk  (11/14/2023)    Received from [facility], [facility]    Housing Stability     Unable to Pay for Housing in the Last Year: 1            Review of Systems:   Skin:        Eyes:       ENT:       Respiratory:  Negative    Cardiovascular:    edema;Positive for  Gastroenterology:      Genitourinary:       Musculoskeletal:       Neurologic:       Psychiatric:       Heme/Lymph/Imm:       Endocrine:              Physical Exam:   Vitals: /74   Pulse 81   Ht 1.607 m (5' 3.25\")   Wt 86.6 kg (191 lb)   BMI 33.57 kg/m     Wt Readings from Last 4 Encounters:   07/01/24 86.6 kg (191 lb)   06/08/23 80.7 kg (177 lb 14.4 oz)   05/09/23 85.7 kg (189 lb)   11/09/22 90.7 kg (200 lb)     GEN: well nourished, in no acute distress.  HEENT:  Pupils equal, round. Sclerae nonicteric.   NECK: Supple, no masses appreciated.  No JVD with patient supine.  C/V: Irregularly irregular rate and rhythm, no murmur, rub or gallop.    RESP: Respirations are unlabored. Clear to auscultation bilaterally without wheezing, rales, or rhonchi.  GI: Abdomen soft, nontender.  EXTREM: 1+ bilateral ankle " edema.  NEURO: Alert and oriented, cooperative.  SKIN: Warm and dry.        Data:     LIPID RESULTS:  Lab Results   Component Value Date    CHOL 100 05/09/2023    CHOL 119 05/03/2021    HDL 43 (L) 05/09/2023    HDL 43 (L) 05/03/2021    LDL 47 05/09/2023    LDL 61 05/03/2021    TRIG 51 05/09/2023    TRIG 74 05/03/2021    CHOLHDLRATIO 3.4 04/22/2015     LIVER ENZYME RESULTS:  Lab Results   Component Value Date    AST 22 05/09/2023    AST 18 05/03/2021    ALT 14 05/09/2023    ALT 27 05/03/2021     CBC RESULTS:  Lab Results   Component Value Date    WBC 6.6 06/08/2023    WBC 6.7 05/03/2021    RBC 4.55 06/08/2023    RBC 4.46 05/03/2021    HGB 11.7 06/08/2023    HGB 12.0 05/03/2021    HCT 36.7 06/08/2023    HCT 36.4 05/03/2021    MCV 81 06/08/2023    MCV 82 05/03/2021    MCH 25.7 (L) 06/08/2023    MCH 26.9 05/03/2021    MCHC 31.9 06/08/2023    MCHC 33.0 05/03/2021    RDW 14.9 06/08/2023    RDW 14.3 05/03/2021     06/08/2023     05/03/2021     BMP RESULTS:  Lab Results   Component Value Date     (L) 06/08/2023     05/03/2021    POTASSIUM 3.9 06/08/2023    POTASSIUM 3.8 11/03/2022    POTASSIUM 4.1 05/03/2021    CHLORIDE 96 (L) 06/08/2023    CHLORIDE 99 11/03/2022    CHLORIDE 99 05/03/2021    CO2 26 06/08/2023    CO2 30 11/03/2022    CO2 32 05/03/2021    ANIONGAP 12 06/08/2023    ANIONGAP 6 11/03/2022    ANIONGAP 4 05/03/2021     (H) 06/08/2023     (H) 11/03/2022     (H) 05/03/2021    BUN 14.3 06/08/2023    BUN 11 11/03/2022    BUN 11 05/03/2021    CR 0.63 06/08/2023    CR 0.63 05/03/2021    GFRESTIMATED 90 06/08/2023    GFRESTIMATED 87 05/03/2021    GFRESTBLACK >90 05/03/2021    ALVA 10.0 06/08/2023    ALVA 9.3 05/03/2021      A1C RESULTS:  Lab Results   Component Value Date    A1C 5.6 05/09/2023    A1C 6.2 (H) 05/03/2021     INR RESULTS:  Lab Results   Component Value Date    INR 1.8 (H) 10/19/2022    INR 1.7 (H) 10/13/2022    INR 2.9 (H) 06/16/2021    INR 2.50 (H) 05/26/2021     INR 1.80 (H) 05/11/2021            Medications     Current Outpatient Medications   Medication Sig Dispense Refill    alcohol swab prep pads Use to swab area of injection/dilma as directed. 100 each 3    amLODIPine (NORVASC) 5 MG tablet Take 1 tablet (5 mg) by mouth daily 90 tablet 1    apixaban ANTICOAGULANT (ELIQUIS) 5 MG tablet Take 1 tablet (5 mg) by mouth 2 times daily 60 tablet 11    BIOTIN 10 MG OR TABS 1 TABLET DAILY      blood glucose (JAYDE CONTOUR NEXT) test strip Use to test blood sugar 1 times daily or as directed. 100 strip 3    blood glucose calibration (NO BRAND SPECIFIED) solution To accompany: Blood Glucose Monitor Brands: per insurance. 1 each 11    blood glucose monitoring (JAYDE MICROLET) lancets Use to test blood sugar 1 times daily or as directed. 100 each 3    blood glucose monitoring (NO BRAND SPECIFIED) meter device kit Use to test blood sugar 1 times daily or as directed. Preferred blood glucose meter OR supplies to accompany: Blood Glucose Monitor Brands: easiest for patient per insurance. 1 kit 0    CALTRATE 600 + D 600-200 MG-IU OR TABS 1 tablet twice daily 60 11    ferrous sulfate 45 MG TBCR CR tablet Take 45 mg by mouth daily Dosage not known      hydrochlorothiazide (HYDRODIURIL) 25 MG tablet Take 1 tablet (25 mg) by mouth daily 90 tablet 1    METAMUCIL FIBER PO       metFORMIN (GLUCOPHAGE) 1000 MG tablet TAKE 1 TABLET BY MOUTH WITH BREAKFAST AND TAKE 1 TABLET BY MOUTH WITH SUPPER (Patient taking differently: Takes 500 mg 2 times a day)      Multiple Vitamins-Minerals (CENTRUM SILVER ADULT 50+ PO) Take 1 tablet by mouth daily      polyethylene glycol 400 (BLINK TEARS) 0.25 % SOLN ophthalmic solution Apply 1 drop to eye      simvastatin (ZOCOR) 20 MG tablet TAKE ONE TABLET BY MOUTH AT BEDTIME 90 tablet 1    vitamin B-12 (CYANOCOBALAMIN) 1000 MCG tablet Take 1,000 mcg by mouth      VITAMIN D 1000 UNIT OR CAPS 1 CAPSULE DAILY 3 MONTHS 1 YEAR    cephALEXin (KEFLEX) 500 MG capsule Take 1  capsule (500 mg) by mouth 2 times daily (Patient not taking: Reported on 2024) 10 capsule 0          Past Medical History     Past Medical History:   Diagnosis Date    Atrial fibrillation (H) 2006    cardioverted 2007, on chronic anticoagulation     Cerebral artery occlusion with cerebral infarction (H)     CKD (chronic kidney disease) stage 2, GFR 60-89 ml/min      with microalbuminuria    Essential hypertension, benign     Hyperlipidemia LDL goal <100 10/31/2010    fish oil = IBS with diarrhea     Morbid obesity (H)     ARMAND (obstructive sleep apnea)     C PAP    Sensorineural hearing loss of both ears     using hearing aids    Status post laparoscopic cholecystectomy     cholecystectomy for cholelithiasis    Supervision of other normal pregnancy      - vaginal, one set of twins    Tortuous colon     detected at colonoscopy  - was recommended for next testing to have CT colonography     Tubal ligation status     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)     Varicose veins of lower extremities with inflammation     excision varicose vein left lower extremity    Venous stasis of lower extremity     excision varicose vein left lower extremity      Past Surgical History:   Procedure Laterality Date    C DEXA INTERPRETATION, AXIAL  2007    T score lumbar 3.7, femoral neck 2.8/2.0(all stable)    C DEXA INTERPRETATION, AXIAL  2010    T score lumbar 3.4 (marked degen changes), femoral neck 2.1/2.1 (sig dec lt femur)    C ORAL SURGERY SINGLE TOOTH  2019     had to have left upper rear tooth removed secondary to crown breaking /root damage     CARDIAC NUC ESHA STRESS TEST NL  2006    exercised 4 min, no inducible ischemia on ECG or perfusion images    CARPAL TUNNEL RELEASE RT/LT Right 2021    Right carpal tunnel release under local anesthetic, Dr. Orlando Walsh, [facility]    CATARACT IOL, RT/LT Bilateral     first right in 2022, then left in  2022 - [facility] - Dr. Cooper Santacruz    COLONOSCOPY  2006    diverticulosis, repeat 10 years    FLEXIBLE SIGMOIDOSCOPY  10/2000     LAPAROSCOPY, SURGICAL; CHOLECYSTECTOMY      Cholecystectomy, Laparoscopic    LIGATN/STRIP LONG & SHORT SAPHEN      L varicose vein excision    REPAIR PTOSIS BILATERAL  2011     Bilateral upper eyelid blepharoplasty and internal browpexy brow ptosis repair, bilateral lower eyelid ectropion repair with snip punctoplasty    TRANSFUSION, DIRECT, BLOOD      pregnancy related    ZZC CARDIOVERSION, ELECTIVE;INTERN  2007    Successful cardioversion from atrial fibrillation     ZZC ECHO HEART XTHORACIC,COMPLETE, W/O DOPPLER  2006    normal LV/RV size and function, mild pulm ht(30-40mm Hg), mild TR    ZZC ECHO HEART XTHORACIC,COMPLETE, W/O DOPPLER  10/2008    normal LV systolic function with EF 55-60%, impaired LV relaxation, mod to severe LAE    ZZC LIGATE FALLOPIAN TUBE      Tubal ligation     Family History   Problem Relation Age of Onset    Diabetes Mother         type 2    Hypertension Mother     Cardiovascular Mother         pulmonary embolus    Lipids Mother     Heart Disease Mother          atrial fibrillation    Diabetes Father         type 2    Cardiovascular Father         atrial fibrillation,  during an EP procedure    Cancer - colorectal Maternal Grandmother         onset age 88    Heart Disease Maternal Grandmother          age 96    Diabetes Maternal Grandfather         type 2    Diabetes Paternal Grandfather         type 2    Hypertension Sister     Lipids Sister     Lipids Brother     Hypertension Brother     Hypertension Son     Other - See Comments Cousin 68        Myotonic Dystrophy            Allergies   Tetracycline        Shilpa Cain NP  [facility]  Pager: [phone]       Thank you for allowing me to participate in the care of your patient.      Sincerely,     Shilpa Cain NP     [facility]  [facility]  cc:   No referring provider defined for this encounter.

## 2024-07-01 NOTE — PROGRESS NOTES
Cardiology Clinic Progress Note  Zulema Nixon MRN# [id number]   YOB: 1945 Age: 78 year old   Primary Cardiologist: Dr. Walsh  Reason for visit: Annual follow up             Assessment and Plan:       1.  Chronic atrial fibrillation, [id number] (female+, age ++, CVA ++, HTN +, DM +)   -On eliquis for thromboembolic prophylaxis  -Rates controlled without AV dung blockade     2. TIA in the setting of subtherapeutic INR on warfarin      Recent hospitalization for possible recurrent TIA      Hemorrhagic CVA   -Maintained on eliquis and on statin therapy     3.  Hypertension, controlled    4. Type II diabetes, controlled  -6/2024 HgbA1c 6.6%    Plan:  Continue simvastatin 20mg daily   FLP/ALT in the next month   Continue amlodipine and hydrochlorothiazide  Continue eliquis 5mg BID for thromboembolic prophylaxis    Follow up plan: Follow up with Dr. Walsh in 1 year or sooner if needed         History of Presenting Illness:    Zulema Nixon is a very pleasant 78 year old female with a history of chronic atrial fibrillation, TIA while on warfarin, type 2 diabetes, hypertension, obstructive sleep apnea.     She had TIA symptoms in 2022 of left sided weakness and slurred speech. MRI was negative for infarct. She was shown to have a stable meningioma. Her INR on warfarin at the time was sub-therapuetic and she was switched to xarelto.     She was last seen by Dr. Walsh in November 2022 and was doing well from a cardiac standpoint.    In May of 2023 she had a hemorrhagic CVA and was hospitalized at [facility].  Head CT showed a 2.5 cm parenchymal hemorrhage within the right posterior temporal lobe with mild edema as well as a stable calcified meningioma.  In July she had a Mohs procedure for skin cancer on her scalp and had complications of cranial osteomyelitis which ultimately required repeat cranial surgery and outpatient IV antibiotic therapy. A transthoracic echocardiogram in November 2023  which showed normal left ventricular function with an ejection fraction of 45 to 60%, no regional wall motion abnormalities normal right ventricular size and function, severe biatrial enlargement, and mild tricuspid valve regurgitation.    She was hospitalized in March 2024 with a brief episode of acute confusion at [facility].  By the time she arrived at the emergency room she was no longer having any symptoms.  Her symptoms felt to be secondary from stress versus TIA. Brain MRI did not show any evidence of intracranial hemorrhage or acute CVA.  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      Patient is here today for an annual follow up. Patient reports feeling good.  She is lost about 100 pounds over the last 2 years.    Patient denies chest pain or chest tightness. Denies dizziness, lightheadedness or other presyncopal symptoms. Denies tachycardia or palpitations.  Denies shortness of breath, orthopnea, or PND.  She has chronic bilateral ankle edema which she manages with compression stockings.     Blood pressure 146/80 and HR 81 in clinic today. Repeat with manual cuff 136/74.        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    Occupation: volunteers at [employer]   Tobacco Use    Smoking status: Never    Smokeless tobacco: Never   Vaping Use    Vaping status: Never Used   Substance and Sexual Activity    Alcohol use: Yes     Comment: 0-2 per month    Drug use: No    Sexual activity: Yes     Partners: Male     Comment: same partner since 1965, only partner   Other Topics Concern     Service No    Blood Transfusions Yes     Comment:  pregnancy    Caffeine Concern No     Comment: 1-2 cups per day    Occupational Exposure No    Hobby Hazards No    Sleep Concern Yes     Comment: needing new equipment for her C pap    Stress Concern No    Weight Concern Yes     Comment: chronic obesity    Special Diet Yes     Comment: decreasing salt    Back Care No    Exercise Yes     Comment: walking 20-30 min 2 times weekly    Bike Helmet No     Comment: not ride    Seat Belt Yes    Self-Exams Yes    Parent/sibling w/ CABG, MI or angioplasty before 65F 55M? Not Asked   Social History Narrative    Lives with  and a dog.  6 grandchildren; previously did  for 3 of them.  Now volunteering in school and [employer].  Children ages 45 and twins age 42.      2012:  diagnosed with low grade prostate cancer- s/p cryotherapy surgery in 2016, then s/p radiation in 2017.         Has a 4 yr old great-granddaughter that lives in [place] - hasn't been able to see her secondary to COVID-19 novel coronavirus pandemic, which started in 3/2020. ---.2020 -Madina Mercado MD         2020 -  became ill and  of COVID-19 novel coronavirus complications with pneumonia - was never able to come off ventilator at [facility].  Grandchildren now are all grown. Still living in their family home and enjoying it.--Madina Mercado MD                Social Determinants of Health     Financial Resource Strain: Low Risk  (2023)    Received from [facility]    Financial Resource Strain     Difficulty of Paying Living Expenses: 3     Difficulty of Paying Living Expenses: Not on file   Food Insecurity: No Food Insecurity (2023)    Received from [facility], [facility]    Food Insecurity     Worried About Running Out of Food in the Last Year: 1  "  Transportation Needs: No Transportation Needs (11/14/2023)    [facility] from [facility]    Transportation Needs     Lack of Transportation (Medical): 1   Physical Activity: Not on file   Stress: Not on file   Social Connections: Socially Integrated (11/14/2023)    [facility], [facility]    Social Connections     Frequency of Communication with Friends and Family: 0   Interpersonal Safety: Not on file   Housing Stability: Low Risk  (11/14/2023)    Received from [facility] [facility], [facility]    Housing Stability     Unable to Pay for Housing in the Last Year: 1            Review of Systems:   Skin:        Eyes:       ENT:       Respiratory:  Negative    Cardiovascular:    edema;Positive for  Gastroenterology:      Genitourinary:       Musculoskeletal:       Neurologic:       Psychiatric:       Heme/Lymph/Imm:       Endocrine:              Physical Exam:   Vitals: /74   Pulse 81   Ht 1.607 m (5' 3.25\")   Wt 86.6 kg (191 lb)   BMI 33.57 kg/m     Wt Readings from Last 4 Encounters:   07/01/24 86.6 kg (191 lb)   06/08/23 80.7 kg (177 lb 14.4 oz)   05/09/23 85.7 kg (189 lb)   11/09/22 90.7 kg (200 lb)     GEN: well nourished, in no acute distress.  HEENT:  Pupils equal, round. Sclerae nonicteric.   NECK: Supple, no masses appreciated.  No JVD with patient supine.  C/V: Irregularly irregular rate and rhythm, no murmur, rub or gallop.    RESP: Respirations are unlabored. Clear to auscultation bilaterally without wheezing, rales, or rhonchi.  GI: Abdomen soft, nontender.  EXTREM: 1+ bilateral ankle edema.  NEURO: Alert and oriented, cooperative.  SKIN: Warm and dry.        Data:     LIPID RESULTS:  Lab Results   Component Value Date    CHOL 100 05/09/2023    CHOL 119 05/03/2021    HDL 43 (L) " 05/09/2023    HDL 43 (L) 05/03/2021    LDL 47 05/09/2023    LDL 61 05/03/2021    TRIG 51 05/09/2023    TRIG 74 05/03/2021    CHOLHDLRATIO 3.4 04/22/2015     LIVER ENZYME RESULTS:  Lab Results   Component Value Date    AST 22 05/09/2023    AST 18 05/03/2021    ALT 14 05/09/2023    ALT 27 05/03/2021     CBC RESULTS:  Lab Results   Component Value Date    WBC 6.6 06/08/2023    WBC 6.7 05/03/2021    RBC 4.55 06/08/2023    RBC 4.46 05/03/2021    HGB 11.7 06/08/2023    HGB 12.0 05/03/2021    HCT 36.7 06/08/2023    HCT 36.4 05/03/2021    MCV 81 06/08/2023    MCV 82 05/03/2021    MCH 25.7 (L) 06/08/2023    MCH 26.9 05/03/2021    MCHC 31.9 06/08/2023    MCHC 33.0 05/03/2021    RDW 14.9 06/08/2023    RDW 14.3 05/03/2021     06/08/2023     05/03/2021     BMP RESULTS:  Lab Results   Component Value Date     (L) 06/08/2023     05/03/2021    POTASSIUM 3.9 06/08/2023    POTASSIUM 3.8 11/03/2022    POTASSIUM 4.1 05/03/2021    CHLORIDE 96 (L) 06/08/2023    CHLORIDE 99 11/03/2022    CHLORIDE 99 05/03/2021    CO2 26 06/08/2023    CO2 30 11/03/2022    CO2 32 05/03/2021    ANIONGAP 12 06/08/2023    ANIONGAP 6 11/03/2022    ANIONGAP 4 05/03/2021     (H) 06/08/2023     (H) 11/03/2022     (H) 05/03/2021    BUN 14.3 06/08/2023    BUN 11 11/03/2022    BUN 11 05/03/2021    CR 0.63 06/08/2023    CR 0.63 05/03/2021    GFRESTIMATED 90 06/08/2023    GFRESTIMATED 87 05/03/2021    GFRESTBLACK >90 05/03/2021    ALVA 10.0 06/08/2023    ALVA 9.3 05/03/2021      A1C RESULTS:  Lab Results   Component Value Date    A1C 5.6 05/09/2023    A1C 6.2 (H) 05/03/2021     INR RESULTS:  Lab Results   Component Value Date    INR 1.8 (H) 10/19/2022    INR 1.7 (H) 10/13/2022    INR 2.9 (H) 06/16/2021    INR 2.50 (H) 05/26/2021    INR 1.80 (H) 05/11/2021            Medications     Current Outpatient Medications   Medication Sig Dispense Refill    alcohol swab prep pads Use to swab area of injection/dilma as directed. 100  each 3    amLODIPine (NORVASC) 5 MG tablet Take 1 tablet (5 mg) by mouth daily 90 tablet 1    apixaban ANTICOAGULANT (ELIQUIS) 5 MG tablet Take 1 tablet (5 mg) by mouth 2 times daily 60 tablet 11    BIOTIN 10 MG OR TABS 1 TABLET DAILY      blood glucose (JAYDE CONTOUR NEXT) test strip Use to test blood sugar 1 times daily or as directed. 100 strip 3    blood glucose calibration (NO BRAND SPECIFIED) solution To accompany: Blood Glucose Monitor Brands: per insurance. 1 each 11    blood glucose monitoring (JAYDE MICROLET) lancets Use to test blood sugar 1 times daily or as directed. 100 each 3    blood glucose monitoring (NO BRAND SPECIFIED) meter device kit Use to test blood sugar 1 times daily or as directed. Preferred blood glucose meter OR supplies to accompany: Blood Glucose Monitor Brands: easiest for patient per insurance. 1 kit 0    CALTRATE 600 + D 600-200 MG-IU OR TABS 1 tablet twice daily 60 11    ferrous sulfate 45 MG TBCR CR tablet Take 45 mg by mouth daily Dosage not known      hydrochlorothiazide (HYDRODIURIL) 25 MG tablet Take 1 tablet (25 mg) by mouth daily 90 tablet 1    METAMUCIL FIBER PO       metFORMIN (GLUCOPHAGE) 1000 MG tablet TAKE 1 TABLET BY MOUTH WITH BREAKFAST AND TAKE 1 TABLET BY MOUTH WITH SUPPER (Patient taking differently: Takes 500 mg 2 times a day)      Multiple Vitamins-Minerals (CENTRUM SILVER ADULT 50+ PO) Take 1 tablet by mouth daily      polyethylene glycol 400 (BLINK TEARS) 0.25 % SOLN ophthalmic solution Apply 1 drop to eye      simvastatin (ZOCOR) 20 MG tablet TAKE ONE TABLET BY MOUTH AT BEDTIME 90 tablet 1    vitamin B-12 (CYANOCOBALAMIN) 1000 MCG tablet Take 1,000 mcg by mouth      VITAMIN D 1000 UNIT OR CAPS 1 CAPSULE DAILY 3 MONTHS 1 YEAR    cephALEXin (KEFLEX) 500 MG capsule Take 1 capsule (500 mg) by mouth 2 times daily (Patient not taking: Reported on 7/1/2024) 10 capsule 0          Past Medical History     Past Medical History:   Diagnosis Date    Atrial fibrillation  (H) 2006    cardioverted 2007, on chronic anticoagulation     Cerebral artery occlusion with cerebral infarction (H)     CKD (chronic kidney disease) stage 2, GFR 60-89 ml/min      with microalbuminuria    Essential hypertension, benign     Hyperlipidemia LDL goal <100 10/31/2010    fish oil = IBS with diarrhea     Morbid obesity (H)     ARMAND (obstructive sleep apnea)     C PAP    Sensorineural hearing loss of both ears     using hearing aids    Status post laparoscopic cholecystectomy     cholecystectomy for cholelithiasis    Supervision of other normal pregnancy      - vaginal, one set of twins    Tortuous colon     detected at colonoscopy  - was recommended for next testing to have CT colonography     Tubal ligation status     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)     Varicose veins of lower extremities with inflammation     excision varicose vein left lower extremity    Venous stasis of lower extremity     excision varicose vein left lower extremity      Past Surgical History:   Procedure Laterality Date    C DEXA INTERPRETATION, AXIAL  2007    T score lumbar 3.7, femoral neck 2.8/2.0(all stable)    C DEXA INTERPRETATION, AXIAL  2010    T score lumbar 3.4 (marked degen changes), femoral neck 2.1/2.1 (sig dec lt femur)    C ORAL SURGERY SINGLE TOOTH  2019     had to have left upper rear tooth removed secondary to crown breaking /root damage     CARDIAC NUC ESHA STRESS TEST NL  2006    exercised 4 min, no inducible ischemia on ECG or perfusion images    CARPAL TUNNEL RELEASE RT/LT Right 2021    Right carpal tunnel release under local anesthetic, Dr. Orlando Walsh, [facility]    CATARACT IOL, RT/LT Bilateral     first right in 2022, then left in 2022 - Janice - Dr. Cooper Santacruz    COLONOSCOPY  2006    diverticulosis, repeat 10 years    FLEXIBLE SIGMOIDOSCOPY  10/2000     LAPAROSCOPY, SURGICAL; CHOLECYSTECTOMY       Cholecystectomy, Laparoscopic    LIGATN/STRIP LONG & SHORT SAPHEN      L varicose vein excision    REPAIR PTOSIS BILATERAL  2011     Bilateral upper eyelid blepharoplasty and internal browpexy brow ptosis repair, bilateral lower eyelid ectropion repair with snip punctoplasty    TRANSFUSION, DIRECT, BLOOD      pregnancy related    ZZC CARDIOVERSION, ELECTIVE;INTERN  2007    Successful cardioversion from atrial fibrillation     ZZ ECHO HEART XTHORACIC,COMPLETE, W/O DOPPLER  2006    normal LV/RV size and function, mild pulm ht(30-40mm Hg), mild TR    ZZ ECHO HEART XTHORACIC,COMPLETE, W/O DOPPLER  10/2008    normal LV systolic function with EF 55-60%, impaired LV relaxation, mod to severe LAE    ZC LIGATE FALLOPIAN TUBE      Tubal ligation     Family History   Problem Relation Age of Onset    Diabetes Mother         type 2    Hypertension Mother     Cardiovascular Mother         pulmonary embolus    Lipids Mother     Heart Disease Mother          atrial fibrillation    Diabetes Father         type 2    Cardiovascular Father         atrial fibrillation,  during an EP procedure    Cancer - colorectal Maternal Grandmother         onset age 88    Heart Disease Maternal Grandmother          age 96    Diabetes Maternal Grandfather         type 2    Diabetes Paternal Grandfather         type 2    Hypertension Sister     Lipids Sister     Lipids Brother     Hypertension Brother     Hypertension Son     Other - See Comments Cousin 68        Myotonic Dystrophy            Allergies   Tetracycline        Shilpa Cain NP  [facility]  Pager: [phone]

## 2024-07-01 NOTE — PATIENT INSTRUCTIONS
Today's Recommendations    Recommend you get a fasting cholesterol check in the next month, this can be done any any [place] clinic  Continue all  medications without changes.  Please follow up with Dr. Walsh in 1 year.    Please send a [employer] message or call [phone] to the RN team with questions or concerns.     Scheduling number [phone]  ZEINAB Padilla, CNP

## 2024-07-15 ENCOUNTER — PATIENT OUTREACH (OUTPATIENT)
Dept: CARE COORDINATION | Facility: CLINIC | Age: 79
End: 2024-07-15
Payer: MEDICARE

## 2024-07-26 ENCOUNTER — LAB (OUTPATIENT)
Dept: LAB | Facility: CLINIC | Age: 79
End: 2024-07-26
Payer: MEDICARE

## 2024-07-26 DIAGNOSIS — E78.5 HYPERLIPIDEMIA LDL GOAL <100: ICD-10-CM

## 2024-07-26 LAB
ALT SERPL W P-5'-P-CCNC: 15 U/L (ref 0–50)
CHOLEST SERPL-MCNC: 120 MG/DL
FASTING STATUS PATIENT QL REPORTED: YES
HDLC SERPL-MCNC: 53 MG/DL
LDLC SERPL CALC-MCNC: 53 MG/DL
NONHDLC SERPL-MCNC: 67 MG/DL
TRIGL SERPL-MCNC: 68 MG/DL

## 2024-07-26 PROCEDURE — 36415 COLL VENOUS BLD VENIPUNCTURE: CPT

## 2024-07-26 PROCEDURE — 80061 LIPID PANEL: CPT

## 2024-07-26 PROCEDURE — 84460 ALANINE AMINO (ALT) (SGPT): CPT

## 2024-11-05 ENCOUNTER — PATIENT OUTREACH (OUTPATIENT)
Dept: CARE COORDINATION | Facility: CLINIC | Age: 79
End: 2024-11-05
Payer: MEDICARE

## 2025-01-04 ENCOUNTER — HEALTH MAINTENANCE LETTER (OUTPATIENT)
Age: 80
End: 2025-01-04

## 2025-04-19 ENCOUNTER — HEALTH MAINTENANCE LETTER (OUTPATIENT)
Age: 80
End: 2025-04-19

## 2025-07-12 ENCOUNTER — HEALTH MAINTENANCE LETTER (OUTPATIENT)
Age: 80
End: 2025-07-12